# Patient Record
Sex: FEMALE | Race: WHITE | NOT HISPANIC OR LATINO | Employment: UNEMPLOYED | URBAN - METROPOLITAN AREA
[De-identification: names, ages, dates, MRNs, and addresses within clinical notes are randomized per-mention and may not be internally consistent; named-entity substitution may affect disease eponyms.]

---

## 2017-02-27 ENCOUNTER — APPOINTMENT (EMERGENCY)
Dept: RADIOLOGY | Facility: HOSPITAL | Age: 32
End: 2017-02-27
Payer: COMMERCIAL

## 2017-02-27 ENCOUNTER — HOSPITAL ENCOUNTER (EMERGENCY)
Facility: HOSPITAL | Age: 32
Discharge: HOME | End: 2017-02-27
Payer: COMMERCIAL

## 2017-02-27 VITALS
HEART RATE: 79 BPM | OXYGEN SATURATION: 99 % | DIASTOLIC BLOOD PRESSURE: 87 MMHG | SYSTOLIC BLOOD PRESSURE: 157 MMHG | TEMPERATURE: 99 F | RESPIRATION RATE: 20 BRPM | WEIGHT: 135 LBS | BODY MASS INDEX: 24.84 KG/M2 | HEIGHT: 62 IN

## 2017-02-27 DIAGNOSIS — S09.93XA FACIAL INJURY, INITIAL ENCOUNTER: Primary | ICD-10-CM

## 2017-02-27 PROCEDURE — 99284 EMERGENCY DEPT VISIT MOD MDM: CPT

## 2017-02-27 PROCEDURE — 70486 CT MAXILLOFACIAL W/O DYE: CPT

## 2017-02-27 RX ORDER — MULTIVIT-MIN/IRON/FOLIC ACID/K 18-600-40
CAPSULE ORAL DAILY
COMMUNITY
End: 2018-02-02

## 2017-02-27 RX ORDER — NAPROXEN 500 MG/1
500 TABLET ORAL ONCE
Status: COMPLETED | OUTPATIENT
Start: 2017-02-27 | End: 2017-02-27

## 2017-02-27 RX ORDER — NAPROXEN 500 MG/1
500 TABLET ORAL 2 TIMES DAILY WITH MEALS
Qty: 20 TABLET | Refills: 0 | Status: SHIPPED | OUTPATIENT
Start: 2017-02-27 | End: 2018-02-02

## 2017-02-27 RX ADMIN — NAPROXEN 500 MG: 500 TABLET ORAL at 15:20

## 2017-05-16 ENCOUNTER — ALLSCRIPTS OFFICE VISIT (OUTPATIENT)
Dept: OTHER | Facility: OTHER | Age: 32
End: 2017-05-16

## 2017-05-19 LAB
HPV 18 (HISTORICAL): NOT DETECTED
HPV HIGH RISK 16/18 (HISTORICAL): NOT DETECTED
HPV16 (HISTORICAL): NOT DETECTED
PAP (HISTORICAL): NORMAL

## 2017-06-12 ENCOUNTER — ALLSCRIPTS OFFICE VISIT (OUTPATIENT)
Dept: OTHER | Facility: OTHER | Age: 32
End: 2017-06-12

## 2017-06-12 DIAGNOSIS — N64.53 RETRACTION OF NIPPLE: ICD-10-CM

## 2017-06-13 ENCOUNTER — GENERIC CONVERSION - ENCOUNTER (OUTPATIENT)
Dept: OTHER | Facility: OTHER | Age: 32
End: 2017-06-13

## 2017-06-13 ENCOUNTER — HOSPITAL ENCOUNTER (OUTPATIENT)
Dept: MAMMOGRAPHY | Facility: CLINIC | Age: 32
Discharge: HOME | End: 2017-06-13
Payer: COMMERCIAL

## 2017-06-13 ENCOUNTER — HOSPITAL ENCOUNTER (OUTPATIENT)
Dept: ULTRASOUND IMAGING | Facility: CLINIC | Age: 32
Discharge: HOME | End: 2017-06-13
Payer: COMMERCIAL

## 2017-06-13 DIAGNOSIS — N64.53 RETRACTION OF NIPPLE: ICD-10-CM

## 2017-06-13 PROCEDURE — G0204 DX MAMMO INCL CAD BI: HCPCS

## 2017-06-13 PROCEDURE — 76642 ULTRASOUND BREAST LIMITED: CPT

## 2017-06-15 ENCOUNTER — ALLSCRIPTS OFFICE VISIT (OUTPATIENT)
Dept: OTHER | Facility: OTHER | Age: 32
End: 2017-06-15

## 2017-06-15 ENCOUNTER — TRANSCRIBE ORDERS (OUTPATIENT)
Dept: LAB | Facility: CLINIC | Age: 32
End: 2017-06-15

## 2017-06-15 ENCOUNTER — APPOINTMENT (OUTPATIENT)
Dept: LAB | Facility: CLINIC | Age: 32
End: 2017-06-15
Payer: COMMERCIAL

## 2017-06-15 DIAGNOSIS — Z12.39 BREAST CANCER SCREENING: Primary | ICD-10-CM

## 2017-06-15 DIAGNOSIS — Z12.39 BREAST CANCER SCREENING: ICD-10-CM

## 2017-06-15 PROCEDURE — 36415 COLL VENOUS BLD VENIPUNCTURE: CPT

## 2017-06-19 LAB — MISCELLANEOUS LAB TEST RESULT: NORMAL

## 2017-09-18 ENCOUNTER — ALLSCRIPTS OFFICE VISIT (OUTPATIENT)
Dept: OTHER | Facility: OTHER | Age: 32
End: 2017-09-18

## 2017-10-24 ENCOUNTER — GENERIC CONVERSION - ENCOUNTER (OUTPATIENT)
Dept: OTHER | Facility: OTHER | Age: 32
End: 2017-10-24

## 2018-01-04 ENCOUNTER — ALLSCRIPTS OFFICE VISIT (OUTPATIENT)
Dept: OTHER | Facility: OTHER | Age: 33
End: 2018-01-04

## 2018-01-04 DIAGNOSIS — Z34.81 ENCOUNTER FOR SUPERVISION OF OTHER NORMAL PREGNANCY, FIRST TRIMESTER: ICD-10-CM

## 2018-01-04 LAB
EXTERNAL CHLAMYDIA SCREEN: NORMAL
EXTERNAL GONORRHEA SCREEN: NORMAL

## 2018-01-05 ENCOUNTER — GENERIC CONVERSION - ENCOUNTER (OUTPATIENT)
Dept: OBGYN CLINIC | Facility: CLINIC | Age: 33
End: 2018-01-05

## 2018-01-06 LAB
CHLAMYDIA TRACHOMATIS BY MOL. METHOD (HISTORICAL): NOT DETECTED
GC BY MOL. METHOD (HISTORICAL): NOT DETECTED

## 2018-01-07 ENCOUNTER — LAB CONVERSION - ENCOUNTER (OUTPATIENT)
Dept: OTHER | Facility: OTHER | Age: 33
End: 2018-01-07

## 2018-01-07 LAB — CULT RSLT GENITAL (HISTORICAL): NORMAL

## 2018-01-10 NOTE — MISCELLANEOUS
Message  As per Dr Mary Anne Rouse, pt was called with her breast imaging results from 06/13/17 and recommendations for 3 month f/u visit  Pt verbalized understanding and is agreeable  Appt scheduled for September 2017  Active Problems    1  Encounter for routine gynecological examination with Papanicolaou smear of cervix   (V72 31,V76 2) (Z01 419)   2  Retracted nipple in female (611 79) (N64 53)   3  Seasonal allergies (477 9) (J30 2)    Current Meds   1  Magnesium 250 MG Oral Tablet; Therapy: (Recorded:12Jun2017) to Recorded   2  Prenatal Vitamin TABS; pt takes daily; Therapy: (Recorded:12Jun2017) to Recorded   3  Probiotic CAPS; pt takes daily; Therapy: (Recorded:12Jun2017) to Recorded   4  Tylenol with Codeine #3 TABS (Acetaminophen-Codeine #3); Therapy: (Recorded:[id number]) to Recorded   5  Vitamin D3 1000 UNIT Oral Capsule; Therapy: (Recorded:12Jun2017) to Recorded   6  Zyrtec 10 MG TABS; Therapy: (Recorded:12Jun2017) to Recorded    Allergies    1  No Known Drug Allergies    Signatures   Electronically signed by :  Marciana Duverney, ; Jun 13 2017  3:52PM EST                       (Author)

## 2018-01-13 VITALS
WEIGHT: 141 LBS | BODY MASS INDEX: 25.95 KG/M2 | RESPIRATION RATE: 14 BRPM | TEMPERATURE: 100.6 F | SYSTOLIC BLOOD PRESSURE: 124 MMHG | DIASTOLIC BLOOD PRESSURE: 82 MMHG | HEART RATE: 80 BPM | OXYGEN SATURATION: 97 % | HEIGHT: 62 IN

## 2018-01-15 VITALS
HEIGHT: 62 IN | DIASTOLIC BLOOD PRESSURE: 76 MMHG | SYSTOLIC BLOOD PRESSURE: 120 MMHG | WEIGHT: 142 LBS | BODY MASS INDEX: 26.13 KG/M2

## 2018-01-16 NOTE — MISCELLANEOUS
Provider Comments  Provider Comments:   left message to call office regarding missed appointment for today at 8:45 am       Signatures   Electronically signed by : Kady Bolaños, ; Sep 18 2017  9:00AM EST                       (Author)

## 2018-01-23 VITALS
BODY MASS INDEX: 27.42 KG/M2 | DIASTOLIC BLOOD PRESSURE: 72 MMHG | SYSTOLIC BLOOD PRESSURE: 118 MMHG | HEIGHT: 62 IN | WEIGHT: 149 LBS

## 2018-01-23 NOTE — MISCELLANEOUS
Attached is your new obstetrical paperwork package for your upcoming appointment  Please fill out all highlighted sections of Parts 2, 3 and 4 and complete  the questionnaire and consent forms in their entirety  Please contact your insurance company to find out which lab you must use for bloodwork  Also, please bring all completed paperwork with you to your first appointment  Prior to your first  appointment, please fax or email a copy of your current insurance card (both sides) in order for our office to pre-certify your insurance  Our fax number is [phone], tea Peña or email to  [email or web address]  Arrive at least 20 minutes prior to your appointment time

## 2018-02-01 ENCOUNTER — OB ABSTRACT (OUTPATIENT)
Dept: OBGYN CLINIC | Facility: CLINIC | Age: 33
End: 2018-02-01

## 2018-02-02 ENCOUNTER — ULTRASOUND (OUTPATIENT)
Dept: OBGYN CLINIC | Facility: CLINIC | Age: 33
End: 2018-02-02

## 2018-02-02 VITALS — WEIGHT: 148 LBS | BODY MASS INDEX: 27.07 KG/M2 | DIASTOLIC BLOOD PRESSURE: 64 MMHG | SYSTOLIC BLOOD PRESSURE: 108 MMHG

## 2018-02-02 DIAGNOSIS — Z3A.12 12 WEEKS GESTATION OF PREGNANCY: ICD-10-CM

## 2018-02-02 PROCEDURE — PNV: Performed by: OBSTETRICS & GYNECOLOGY

## 2018-02-02 RX ORDER — PNV NO.95/FERROUS FUM/FOLIC AC 28MG-0.8MG
1 TABLET ORAL DAILY
COMMUNITY
End: 2018-08-17 | Stop reason: HOSPADM

## 2018-02-02 NOTE — PROGRESS NOTES
Visit:  Feeling somewhat better - tolerating PNV - still considering PNC early [address] with good FM and FHM

## 2018-02-04 LAB
ABO GROUP BLD: NORMAL
APPEARANCE UR: CLEAR
BACTERIA UR CULT: NORMAL
BACTERIA UR QL AUTO: NORMAL /HPF
BASOPHILS # BLD AUTO: 41 CELLS/UL (ref 0–200)
BASOPHILS NFR BLD AUTO: 0.4 %
BILIRUB UR QL STRIP: NEGATIVE
BLD GP AB SCN SERPL QL: NORMAL
CFTR MUT ANL BLD/T: NORMAL
CFTR MUT ANL BLD/T: NORMAL
CFTR MUT TESTED BLD/T: NORMAL
COLOR UR: YELLOW
EOSINOPHIL # BLD AUTO: 61 CELLS/UL (ref 15–500)
EOSINOPHIL NFR BLD AUTO: 0.6 %
ERYTHROCYTE [DISTWIDTH] IN BLOOD BY AUTOMATED COUNT: 12.8 % (ref 11–15)
GLUCOSE UR QL STRIP: NEGATIVE
HBV SURFACE AG SERPL QL IA: NORMAL
HCT VFR BLD AUTO: 39.8 % (ref 35–45)
HCV AB S/CO SERPL IA: 0.01
HCV AB SERPL QL IA: NORMAL
HGB BLD-MCNC: 13.3 G/DL (ref 11.7–15.5)
HGB UR QL STRIP: NEGATIVE
HIV 1+2 AB+HIV1 P24 AG SERPL QL IA: NORMAL
HYALINE CASTS #/AREA URNS LPF: NORMAL /LPF
KETONES UR QL STRIP: NEGATIVE
LEUKOCYTE ESTERASE UR QL STRIP: NEGATIVE
LYMPHOCYTES # BLD AUTO: 2213 CELLS/UL (ref 850–3900)
LYMPHOCYTES NFR BLD AUTO: 21.7 %
MCH RBC QN AUTO: 29.4 PG (ref 27–33)
MCHC RBC AUTO-ENTMCNC: 33.4 G/DL (ref 32–36)
MCV RBC AUTO: 88.1 FL (ref 80–100)
MONOCYTES # BLD AUTO: 622 CELLS/UL (ref 200–950)
MONOCYTES NFR BLD AUTO: 6.1 %
NEUTROPHILS # BLD AUTO: 7262 CELLS/UL (ref 1500–7800)
NEUTROPHILS NFR BLD AUTO: 71.2 %
NITRITE UR QL STRIP: NEGATIVE
PH UR STRIP: 6 [PH] (ref 5–8)
PLATELET # BLD AUTO: 387 THOUSAND/UL (ref 140–400)
PMV BLD REES-ECKER: 9.9 FL (ref 7.5–12.5)
PROT UR QL STRIP: NEGATIVE
RBC # BLD AUTO: 4.52 MILLION/UL (ref 3.8–5.1)
RBC #/AREA URNS HPF: NORMAL /HPF
REF LAB TEST METHOD: NORMAL
RH BLD: NORMAL
RPR SER QL: NORMAL
RUBV IGG SERPL IA-ACNC: 2.29 INDEX
SERVICE CMNT-IMP: NORMAL
SP GR UR STRIP: 1 (ref 1–1.03)
SQUAMOUS #/AREA URNS HPF: NORMAL /HPF
WBC # BLD AUTO: 10.2 THOUSAND/UL (ref 3.8–10.8)
WBC #/AREA URNS HPF: NORMAL /HPF

## 2018-02-15 ENCOUNTER — TELEPHONE (OUTPATIENT)
Dept: OBGYN CLINIC | Facility: CLINIC | Age: 33
End: 2018-02-15

## 2018-02-15 DIAGNOSIS — Z3A.11 11 WEEKS GESTATION OF PREGNANCY: Primary | ICD-10-CM

## 2018-03-06 ENCOUNTER — ROUTINE PRENATAL (OUTPATIENT)
Dept: OBGYN CLINIC | Facility: CLINIC | Age: 33
End: 2018-03-06

## 2018-03-06 VITALS — DIASTOLIC BLOOD PRESSURE: 78 MMHG | BODY MASS INDEX: 27.62 KG/M2 | WEIGHT: 151 LBS | SYSTOLIC BLOOD PRESSURE: 122 MMHG

## 2018-03-06 DIAGNOSIS — Z3A.16 16 WEEKS GESTATION OF PREGNANCY: Primary | ICD-10-CM

## 2018-03-06 PROCEDURE — PNV: Performed by: NURSE PRACTITIONER

## 2018-03-06 NOTE — PROGRESS NOTES
OFFICE VISIT: Pt states occasional headaches, resolve with rest  Denies any N/V,  Cramping, VB, LOF, Edema, Domestic Violence, Smoking  + flutters  Tolerating PNV  Feeling much better, nausea has resolved  Has Level II scheduled with PNC  RTO 4 weeks or sooner as needed

## 2018-03-07 NOTE — PROGRESS NOTES
Education  Baby & Me Education 1st Trimester:   First Trimester Education provided:   benefits of breastfeeding, importance of exclusive breastfeeding, early initiation of breastfeeding, exclusive breastfeeding for the first 6 months and Pregnancy Essentials Reference Guide given   The patient is planning on breastfeeding     Prenatal education provided by: Isis ECKERT      Signatures   Electronically signed by : INA Cohen; Jan 5 2018  7:08PM EST                       (Author)

## 2018-03-12 ENCOUNTER — TELEPHONE (OUTPATIENT)
Dept: OBGYN CLINIC | Facility: CLINIC | Age: 33
End: 2018-03-12

## 2018-03-16 ENCOUNTER — ROUTINE PRENATAL (OUTPATIENT)
Dept: OBGYN CLINIC | Facility: CLINIC | Age: 33
End: 2018-03-16

## 2018-03-16 VITALS
WEIGHT: 155 LBS | BODY MASS INDEX: 28.52 KG/M2 | DIASTOLIC BLOOD PRESSURE: 76 MMHG | SYSTOLIC BLOOD PRESSURE: 118 MMHG | HEIGHT: 62 IN

## 2018-03-16 DIAGNOSIS — Z87.59 HISTORY OF MIGRAINE DURING PREGNANCY: Primary | ICD-10-CM

## 2018-03-16 DIAGNOSIS — Z86.69 HISTORY OF MIGRAINE DURING PREGNANCY: Primary | ICD-10-CM

## 2018-03-16 PROBLEM — N64.53: Status: ACTIVE | Noted: 2017-06-12

## 2018-03-16 PROBLEM — J30.2 SEASONAL ALLERGIES: Status: ACTIVE | Noted: 2017-05-16

## 2018-03-16 PROCEDURE — PNV: Performed by: NURSE PRACTITIONER

## 2018-03-16 RX ORDER — METOCLOPRAMIDE 10 MG/1
10 TABLET ORAL 2 TIMES DAILY PRN
Qty: 15 TABLET | Refills: 0 | Status: SHIPPED | OUTPATIENT
Start: 2018-03-16 | End: 2018-08-17 | Stop reason: HOSPADM

## 2018-03-16 RX ORDER — BUTALBITAL, ACETAMINOPHEN AND CAFFEINE 50; 325; 40 MG/1; MG/1; MG/1
1 TABLET ORAL 2 TIMES DAILY PRN
Qty: 15 TABLET | Refills: 0 | Status: SHIPPED | OUTPATIENT
Start: 2018-03-16 | End: 2018-08-17 | Stop reason: HOSPADM

## 2018-04-03 ENCOUNTER — ROUTINE PRENATAL (OUTPATIENT)
Dept: OBGYN CLINIC | Facility: CLINIC | Age: 33
End: 2018-04-03

## 2018-04-03 VITALS
DIASTOLIC BLOOD PRESSURE: 80 MMHG | BODY MASS INDEX: 29.08 KG/M2 | WEIGHT: 158 LBS | SYSTOLIC BLOOD PRESSURE: 122 MMHG | HEIGHT: 62 IN

## 2018-04-03 DIAGNOSIS — Z34.82 PRENATAL CARE, SUBSEQUENT PREGNANCY, SECOND TRIMESTER: Primary | ICD-10-CM

## 2018-04-03 PROCEDURE — PNV: Performed by: NURSE PRACTITIONER

## 2018-04-03 NOTE — PROGRESS NOTES
OFFICE VISIT: Pt states Headaches much improved with vitamin cocktail  States only had one migraine since last office visit almost a month ago  Denies any need for any other medication at this point  Pt complains of occasional N/V  Denies any Cramping, VB, LOF, Edema, Domestic Violence, Smoking  + FM  Tolerating PNV  Has Level II scheduled for tomorrow with [facility]  RTO 4 weeks or sooner as needed

## 2018-04-04 ENCOUNTER — ROUTINE PRENATAL (OUTPATIENT)
Dept: PERINATAL CARE | Facility: CLINIC | Age: 33
End: 2018-04-04
Payer: COMMERCIAL

## 2018-04-04 VITALS
HEART RATE: 86 BPM | BODY MASS INDEX: 28.97 KG/M2 | SYSTOLIC BLOOD PRESSURE: 131 MMHG | DIASTOLIC BLOOD PRESSURE: 70 MMHG | WEIGHT: 157.4 LBS | HEIGHT: 62 IN

## 2018-04-04 DIAGNOSIS — Z36.3 ENCOUNTER FOR ANTENATAL SCREENING FOR MALFORMATIONS: Primary | ICD-10-CM

## 2018-04-04 DIAGNOSIS — Z3A.20 20 WEEKS GESTATION OF PREGNANCY: ICD-10-CM

## 2018-04-04 DIAGNOSIS — Z36.86 ENCOUNTER FOR ANTENATAL SCREENING FOR CERVICAL LENGTH: ICD-10-CM

## 2018-04-04 DIAGNOSIS — Z3A.11 11 WEEKS GESTATION OF PREGNANCY: ICD-10-CM

## 2018-04-04 PROCEDURE — 99201 PR OFFICE OUTPATIENT NEW 10 MINUTES: CPT | Performed by: OBSTETRICS & GYNECOLOGY

## 2018-04-04 PROCEDURE — 76805 OB US >/= 14 WKS SNGL FETUS: CPT | Performed by: OBSTETRICS & GYNECOLOGY

## 2018-04-04 PROCEDURE — 76817 TRANSVAGINAL US OBSTETRIC: CPT | Performed by: OBSTETRICS & GYNECOLOGY

## 2018-04-04 RX ORDER — RIBOFLAVIN (VITAMIN B2) 400 MG
1 TABLET ORAL DAILY
COMMUNITY
End: 2018-08-17 | Stop reason: HOSPADM

## 2018-04-04 RX ORDER — CETIRIZINE HYDROCHLORIDE 10 MG/1
10 TABLET ORAL DAILY
COMMUNITY
End: 2019-07-18 | Stop reason: SDUPTHER

## 2018-04-04 RX ORDER — MAGNESIUM 30 MG
400 TABLET ORAL DAILY
COMMUNITY
End: 2018-08-17 | Stop reason: HOSPADM

## 2018-04-04 NOTE — LETTER
April 4, 2018     Courtney Barth MD  [address]    Patient: Joselyn Mijares   YOB: 1985   Date of Visit: 4/4/2018       Dear Dr Lion Vallejo: Thank you for referring Janny Cobos to me for evaluation  Below are my notes for this consultation  If you have questions, please do not hesitate to call me  I look forward to following your patient along with you  Sincerely,        Stephanie Dixon MD        CC: No Recipients  Stephanie Dixon MD  4/4/2018  4:25 PM  Sign at close encounter  Please refer to the [facility] ultrasound report in Ob Procedures for additional information regarding the visit to the [facility]  today

## 2018-04-04 NOTE — PROGRESS NOTES
Please refer to the [facility] ultrasound report in Ob Procedures for additional information regarding the visit to the [facility], [facility]  today

## 2018-04-20 ENCOUNTER — ULTRASOUND (OUTPATIENT)
Dept: PERINATAL CARE | Facility: CLINIC | Age: 33
End: 2018-04-20
Payer: COMMERCIAL

## 2018-04-20 VITALS
DIASTOLIC BLOOD PRESSURE: 79 MMHG | SYSTOLIC BLOOD PRESSURE: 137 MMHG | WEIGHT: 163.4 LBS | BODY MASS INDEX: 30.07 KG/M2 | HEART RATE: 83 BPM | HEIGHT: 62 IN

## 2018-04-20 DIAGNOSIS — Z36.2 ENCOUNTER FOR OTHER ANTENATAL SCREENING FOLLOW-UP: Primary | ICD-10-CM

## 2018-04-20 DIAGNOSIS — R55 NEAR SYNCOPE: ICD-10-CM

## 2018-04-20 DIAGNOSIS — Z3A.22 22 WEEKS GESTATION OF PREGNANCY: ICD-10-CM

## 2018-04-20 PROCEDURE — 76816 OB US FOLLOW-UP PER FETUS: CPT | Performed by: OBSTETRICS & GYNECOLOGY

## 2018-04-20 PROCEDURE — 99212 OFFICE O/P EST SF 10 MIN: CPT | Performed by: OBSTETRICS & GYNECOLOGY

## 2018-05-04 ENCOUNTER — ROUTINE PRENATAL (OUTPATIENT)
Dept: OBGYN CLINIC | Facility: CLINIC | Age: 33
End: 2018-05-04

## 2018-05-04 VITALS — WEIGHT: 168 LBS | DIASTOLIC BLOOD PRESSURE: 60 MMHG | BODY MASS INDEX: 30.73 KG/M2 | SYSTOLIC BLOOD PRESSURE: 110 MMHG

## 2018-05-04 DIAGNOSIS — Z3A.24 24 WEEKS GESTATION OF PREGNANCY: Primary | ICD-10-CM

## 2018-05-04 PROCEDURE — PNV: Performed by: OBSTETRICS & GYNECOLOGY

## 2018-05-04 NOTE — PROGRESS NOTES
Patient reports good fm, no  cramping, bleeding, loss of fluid,  dom violence, or smoking  nickolas pnv   Some n/v headache, and edema, reviewed occasional light headed episode, discussed and reviewed, patient watching for triggers, advised to hydrate and change position slowly, return in 4 weeks or sooner as needed, given slip for glucola cbc

## 2018-05-17 ENCOUNTER — ROUTINE PRENATAL (OUTPATIENT)
Dept: OBGYN CLINIC | Facility: CLINIC | Age: 33
End: 2018-05-17
Payer: COMMERCIAL

## 2018-05-17 ENCOUNTER — TELEPHONE (OUTPATIENT)
Dept: OBGYN CLINIC | Facility: CLINIC | Age: 33
End: 2018-05-17

## 2018-05-17 VITALS — WEIGHT: 173 LBS | SYSTOLIC BLOOD PRESSURE: 108 MMHG | BODY MASS INDEX: 31.64 KG/M2 | DIASTOLIC BLOOD PRESSURE: 76 MMHG

## 2018-05-17 DIAGNOSIS — N89.8 VAGINAL DISCHARGE IN PREGNANCY IN SECOND TRIMESTER: ICD-10-CM

## 2018-05-17 DIAGNOSIS — Z34.82 PRENATAL CARE, SUBSEQUENT PREGNANCY, SECOND TRIMESTER: ICD-10-CM

## 2018-05-17 DIAGNOSIS — R30.0 DYSURIA: Primary | ICD-10-CM

## 2018-05-17 DIAGNOSIS — O26.892 VAGINAL DISCHARGE IN PREGNANCY IN SECOND TRIMESTER: ICD-10-CM

## 2018-05-17 DIAGNOSIS — O26.899 PELVIC PAIN IN PREGNANCY: ICD-10-CM

## 2018-05-17 DIAGNOSIS — R10.2 PELVIC PAIN IN PREGNANCY: ICD-10-CM

## 2018-05-17 LAB
SL AMB  POCT GLUCOSE, UA: ABNORMAL
SL AMB LEUKOCYTE ESTERASE,UA: ABNORMAL
SL AMB POCT BLOOD,UA: ABNORMAL
SL AMB POCT KETONES,UA: ABNORMAL
SL AMB POCT NITRITE,UA: ABNORMAL
SL AMB POCT PH,UA: 6
SL AMB POCT SPECIFIC GRAVITY,UA: 1
SL AMB POCT URINE PROTEIN: ABNORMAL

## 2018-05-17 PROCEDURE — PNV: Performed by: NURSE PRACTITIONER

## 2018-05-17 PROCEDURE — 81002 URINALYSIS NONAUTO W/O SCOPE: CPT | Performed by: NURSE PRACTITIONER

## 2018-05-17 NOTE — PROGRESS NOTES
OB Problem: Pt presents today with complaints of pelvic pain/pressure and back pain  Pt states she has had back pain throughout pregnancy but seems worse today  Pelvic pressure and pain seemed worse today as well, unsure if its just the way the baby is lying, but wanted to be checked out  Denies any cramping or contractions  Pt states she was working all day and checked for any abnormal discharge and did not note any  Denies any urinary symptoms  Has regular BMs with last BM being today  Denies feeling constipated  Has been staying well hydrated throughout the day  Does state she has been doing 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  Has been walking up and down stairs a lot more than normal  Denies any pain medication and has not been able to rest as came right from work  Denies any vaginal itching, burning, or abnormal odors  No fever or chills  Pelvic exam performed moderate amount of leukorrhea, wet mount negative for yeast, bv, trich  Nitrazine negative  Genital culture collected and sent  Cervix closed on visualization, no pooling noted  Urine dip in office sm/mod leuk  Will send out for urine culture  Treat based on culture results due to asymptomatic  Reviewed with patient pelvic pressure/pain as well as back pain may be due to second pregnancy and ligaments and muscles more lax  Reviewed maternity belt to help with pelvic pain/pressure as well as back pain  Rest, massage, tylenol as needed for pain   labor precautions given  Call with any fever and chills, reviewed precaution for kidney infection as has had kidney infection in her past    Denies any N/V, HA, Cramping, VB, LOF, Edema, Domestic Violence, Smoking  + FM  Tolerating PNV  RTO for regular ob check or sooner as needed

## 2018-05-19 LAB — BACTERIA UR CULT: NORMAL

## 2018-05-20 LAB — SL AMB GENITAL CULTURE: NORMAL

## 2018-05-27 LAB
BASOPHILS # BLD AUTO: 128 CELLS/UL (ref 0–200)
BASOPHILS NFR BLD AUTO: 1 %
EOSINOPHIL # BLD AUTO: 128 CELLS/UL (ref 15–500)
EOSINOPHIL NFR BLD AUTO: 1 %
ERYTHROCYTE [DISTWIDTH] IN BLOOD BY AUTOMATED COUNT: 12.9 % (ref 11–15)
GLUCOSE 1H P 50 G GLC PO SERPL-MCNC: 124 MG/DL
HCT VFR BLD AUTO: 36.2 % (ref 35–45)
HGB BLD-MCNC: 12.2 G/DL (ref 11.7–15.5)
LYMPHOCYTES # BLD MANUAL: 1664 CELLS/UL (ref 850–3900)
LYMPHOCYTES NFR BLD AUTO: 13 %
MCH RBC QN AUTO: 31.1 PG (ref 27–33)
MCHC RBC AUTO-ENTMCNC: 33.7 G/DL (ref 32–36)
MCV RBC AUTO: 92.3 FL (ref 80–100)
METAMYELOCYTES # BLD: 256 CELLS/UL
METAMYELOCYTES NFR BLD MANUAL: 2 %
MONOCYTES # BLD AUTO: 768 CELLS/UL (ref 200–950)
MONOCYTES NFR BLD AUTO: 6 %
MYELOCYTES # BLD: 256 CELLS/UL
MYELOCYTES NFR BLD MANUAL: 2 %
NEUTROPHILS # BLD AUTO: 8960 CELLS/UL (ref 1500–7800)
NEUTROPHILS NFR BLD AUTO: 70 %
NEUTS BAND # BLD: 640 CELLS/UL (ref 0–750)
NEUTS BAND NFR BLD MANUAL: 5 %
PLATELET # BLD AUTO: 262 THOUSAND/UL (ref 140–400)
PMV BLD REES-ECKER: 9.5 FL (ref 7.5–12.5)
RBC # BLD AUTO: 3.92 MILLION/UL (ref 3.8–5.1)
WBC # BLD AUTO: 12.8 THOUSAND/UL (ref 3.8–10.8)

## 2018-06-01 ENCOUNTER — ROUTINE PRENATAL (OUTPATIENT)
Dept: OBGYN CLINIC | Facility: CLINIC | Age: 33
End: 2018-06-01

## 2018-06-01 VITALS — WEIGHT: 176 LBS | BODY MASS INDEX: 32.19 KG/M2 | SYSTOLIC BLOOD PRESSURE: 110 MMHG | DIASTOLIC BLOOD PRESSURE: 72 MMHG

## 2018-06-01 DIAGNOSIS — Z34.83 PRENATAL CARE, SUBSEQUENT PREGNANCY, THIRD TRIMESTER: Primary | ICD-10-CM

## 2018-06-01 PROCEDURE — PNV: Performed by: NURSE PRACTITIONER

## 2018-06-01 NOTE — PROGRESS NOTES
OFFICE VISIT: Pregnancy support belt seems to be helping with back pain and lower pelvic pain and pressure  Mild lower leg edema  Denies any N/V, HA, Cramping, VB, LOF, Domestic Violence, Smoking  + FM  Tolerating PNV  Pt planning on 32 week growth scan  RTO 2 weeks or sooner as needed

## 2018-06-15 ENCOUNTER — ROUTINE PRENATAL (OUTPATIENT)
Dept: OBGYN CLINIC | Facility: CLINIC | Age: 33
End: 2018-06-15

## 2018-06-15 VITALS — DIASTOLIC BLOOD PRESSURE: 74 MMHG | WEIGHT: 184 LBS | BODY MASS INDEX: 33.65 KG/M2 | SYSTOLIC BLOOD PRESSURE: 112 MMHG

## 2018-06-15 DIAGNOSIS — Z34.83 PRENATAL CARE, SUBSEQUENT PREGNANCY, THIRD TRIMESTER: Primary | ICD-10-CM

## 2018-06-15 PROCEDURE — PNV: Performed by: NURSE PRACTITIONER

## 2018-06-15 NOTE — PROGRESS NOTES
OFFICE VISIT: Mild lower leg edema after being on feet all day, resolves with rest  Denies any N/V, HA, Cramping, VB, LOF, Domestic Violence, Smoking  + FM  Tolerating PNV  Pt worried this baby is much larger than her last, has 32 week growth scan scheduled  30 week packet given and reviewed  RTO 2 weeks or sooner as needed

## 2018-06-25 ENCOUNTER — ULTRASOUND (OUTPATIENT)
Dept: PERINATAL CARE | Facility: CLINIC | Age: 33
End: 2018-06-25
Payer: COMMERCIAL

## 2018-06-25 VITALS
BODY MASS INDEX: 33.75 KG/M2 | WEIGHT: 183.4 LBS | HEIGHT: 62 IN | SYSTOLIC BLOOD PRESSURE: 111 MMHG | DIASTOLIC BLOOD PRESSURE: 72 MMHG | HEART RATE: 82 BPM

## 2018-06-25 DIAGNOSIS — O40.3XX0 POLYHYDRAMNIOS IN SINGLETON PREGNANCY IN THIRD TRIMESTER: Primary | ICD-10-CM

## 2018-06-25 DIAGNOSIS — O99.213 OBESITY AFFECTING PREGNANCY IN THIRD TRIMESTER: ICD-10-CM

## 2018-06-25 DIAGNOSIS — Z34.83 PRENATAL CARE, SUBSEQUENT PREGNANCY, THIRD TRIMESTER: ICD-10-CM

## 2018-06-25 DIAGNOSIS — Z3A.32 32 WEEKS GESTATION OF PREGNANCY: ICD-10-CM

## 2018-06-25 PROCEDURE — 99212 OFFICE O/P EST SF 10 MIN: CPT | Performed by: OBSTETRICS & GYNECOLOGY

## 2018-06-25 PROCEDURE — 76816 OB US FOLLOW-UP PER FETUS: CPT | Performed by: OBSTETRICS & GYNECOLOGY

## 2018-06-25 NOTE — PROGRESS NOTES
Please refer to the [facility] ultrasound report in Ob Procedures for additional information regarding the visit to the [facility], [facility]  today

## 2018-06-25 NOTE — LETTER
June 26, 2018     Alyx Balderrama MD  [address]    Patient: Aelx Gannon   YOB: 1985   Date of Visit: 6/25/2018       Dear Dr Azalia Aguila: Thank you for referring Sosa Souza to me for evaluation  Below are my notes for this consultation  If you have questions, please do not hesitate to call me  I look forward to following your patient along with you  Sincerely,        Gita Fenton MD        CC: No Recipients  Gita Fenton MD  6/25/2018  2:43 PM  Sign at close encounter  Please refer to the [facility] ultrasound report in Ob Procedures for additional information regarding the visit to the [facility]  today

## 2018-06-26 ENCOUNTER — ROUTINE PRENATAL (OUTPATIENT)
Dept: OBGYN CLINIC | Facility: CLINIC | Age: 33
End: 2018-06-26

## 2018-06-26 VITALS — SYSTOLIC BLOOD PRESSURE: 118 MMHG | WEIGHT: 185 LBS | DIASTOLIC BLOOD PRESSURE: 78 MMHG | BODY MASS INDEX: 33.84 KG/M2

## 2018-06-26 DIAGNOSIS — Z34.83 PRENATAL CARE, SUBSEQUENT PREGNANCY, THIRD TRIMESTER: Primary | ICD-10-CM

## 2018-06-26 PROBLEM — M20.019 MALLET FINGER: Status: ACTIVE | Noted: 2018-06-26

## 2018-06-26 PROBLEM — O99.213 OBESITY AFFECTING PREGNANCY IN THIRD TRIMESTER: Status: ACTIVE | Noted: 2018-06-26

## 2018-06-26 PROBLEM — O40.3XX0 POLYHYDRAMNIOS IN SINGLETON PREGNANCY IN THIRD TRIMESTER: Status: ACTIVE | Noted: 2018-06-26

## 2018-06-26 PROCEDURE — PNV: Performed by: PHYSICIAN ASSISTANT

## 2018-06-27 NOTE — PROGRESS NOTES
Visit: Good Fm, mild swelling in lower legs  No N/V, HA, cramping, VB, LOF, domestic violence, or smoking  Tolerating PNV  Patient saw [facility] yesterday was found to have polyhydramnios and baby measuring in 81st percentile  Has fluid check in 2 weeks and growth scan in 4 weeks  Continue routine prenatal care  Return to office in 2 weeks for ob check/GBS

## 2018-07-09 ENCOUNTER — ROUTINE PRENATAL (OUTPATIENT)
Dept: OBGYN CLINIC | Facility: CLINIC | Age: 33
End: 2018-07-09

## 2018-07-09 VITALS
WEIGHT: 191 LBS | SYSTOLIC BLOOD PRESSURE: 116 MMHG | HEIGHT: 62 IN | DIASTOLIC BLOOD PRESSURE: 70 MMHG | BODY MASS INDEX: 35.15 KG/M2

## 2018-07-09 DIAGNOSIS — Z34.83 PRENATAL CARE, SUBSEQUENT PREGNANCY, THIRD TRIMESTER: Primary | ICD-10-CM

## 2018-07-09 PROCEDURE — PNV: Performed by: PHYSICIAN ASSISTANT

## 2018-07-09 NOTE — PROGRESS NOTES
Pt plans [facility] appt tomorrow for FELIX, then 2 weeks for growth    + FM, + FKCs, no vb/lof  Few ctxns, nothing regular  GBS done today  LT n/v   Pt getting more uncomfortable and fatigued, advised rest, fluids, maternity support belt

## 2018-07-10 ENCOUNTER — ROUTINE PRENATAL (OUTPATIENT)
Dept: PERINATAL CARE | Facility: CLINIC | Age: 33
End: 2018-07-10
Payer: COMMERCIAL

## 2018-07-10 VITALS
WEIGHT: 190.8 LBS | BODY MASS INDEX: 35.11 KG/M2 | SYSTOLIC BLOOD PRESSURE: 111 MMHG | HEART RATE: 93 BPM | DIASTOLIC BLOOD PRESSURE: 72 MMHG | HEIGHT: 62 IN

## 2018-07-10 DIAGNOSIS — Z3A.34 34 WEEKS GESTATION OF PREGNANCY: ICD-10-CM

## 2018-07-10 DIAGNOSIS — O40.3XX0 POLYHYDRAMNIOS IN SINGLETON PREGNANCY IN THIRD TRIMESTER: Primary | ICD-10-CM

## 2018-07-10 PROCEDURE — 76818 FETAL BIOPHYS PROFILE W/NST: CPT | Performed by: OBSTETRICS & GYNECOLOGY

## 2018-07-12 LAB — GP B STREP DNA SPEC QL NAA+PROBE: NEGATIVE

## 2018-07-13 ENCOUNTER — HOSPITAL ENCOUNTER (OUTPATIENT)
Facility: HOSPITAL | Age: 33
Discharge: HOME | End: 2018-07-13
Attending: OBSTETRICS & GYNECOLOGY | Admitting: OBSTETRICS & GYNECOLOGY
Payer: COMMERCIAL

## 2018-07-13 VITALS
HEIGHT: 62 IN | DIASTOLIC BLOOD PRESSURE: 84 MMHG | BODY MASS INDEX: 35.88 KG/M2 | WEIGHT: 195 LBS | OXYGEN SATURATION: 99 % | TEMPERATURE: 97.9 F | SYSTOLIC BLOOD PRESSURE: 132 MMHG | RESPIRATION RATE: 20 BRPM | HEART RATE: 90 BPM

## 2018-07-13 PROCEDURE — 99204 OFFICE O/P NEW MOD 45 MIN: CPT

## 2018-07-13 RX ORDER — ACETAMINOPHEN 325 MG/1
650 TABLET ORAL EVERY 4 HOURS PRN
COMMUNITY
End: 2018-08-17 | Stop reason: HOSPADM

## 2018-07-14 NOTE — DISCHARGE INSTRUCTIONS
Labor   AMBULATORY CARE:    labor  labor occurs when the uterus contracts and your cervix opens earlier than normal  The cervix is the opening of your uterus  In  labor, contractions are strong enough and occur often enough to allow the cervix to open for delivery of your baby   labor happens after the 20th week of pregnancy but before the 37th week of pregnancy  An early labor could cause you to have your baby before he is ready to be born  Common signs and symptoms include the following: You may not know that you are having  labor  It is common to have  contractions (tightening and relaxing of the uterus) and not notice them  The following are signs and symptoms that suggest a  labor:  · Contractions that get stronger and closer together    · Changes in vaginal discharge, such as more discharge or discharge that is watery or bloody     · Low back pain     · Pressure in the lower abdomen     · Vaginal spotting or bleeding  Call 911 for any of the following:   · You see or feel like there is something in your vagina  Seek care immediately if:   · You have bright red, painless vaginal bleeding  · Your symptoms do not get better or they get worse  · Your water broke or you feel warm water gushing or trickling from your vagina  · You have contractions that get stronger and closer together for more than 1 hour  Contact your healthcare provider if:   · You notice a decrease in your baby's movement  · You have abdominal cramps, pressure, or tightening  · You have a change in vaginal discharge  · You have a fever  · You have burning when you urinate or you are urinating less than is normal for you  · You have questions or concerns about your condition or care  Treatment for  labor  may delay delivery  You may need any of the following:  · Bed rest  may be recommended   You may need to lie on your left side, which improves circulation to the uterus and baby  Your healthcare provider will tell you when it is okay to get out of bed  · Medicine  may be given to stop contractions if your baby is not ready to be born  You may also need certain medicines if your  labor cannot be stopped and your healthcare provider thinks you will have your baby early  These medicines help your baby's lungs, brain, and digestive organs mature  They also help decrease your baby's risk of being born with cerebral palsy  Antibiotics may be given to treat a bacterial infection, if needed  Self-care:   · Rest  as much as possible  You may need to lie on your left side to improve circulation to the uterus and baby  You may be able to prevent  labor by resting and reducing your physical activity  · Ask your healthcare provider about activities that are safe for you to do  Your healthcare provider or obstetrician may recommend that you avoid sexual intercourse  Ask your healthcare provider if exercise is safe  · Drink liquids as directed  Ask how much liquid to drink each day and which liquids are best for you  · Do not smoke  Your baby may not grow well and he may weigh less at birth if you smoke during pregnancy  Smoking also increases the risk that your baby will be born too early  Nicotine and other chemicals in cigarettes and cigars can cause lung damage  Ask your healthcare provider for information if you currently smoke and need help to quit  E-cigarettes or smokeless tobacco still contain nicotine  Talk to your healthcare provider before you use these products  · Do not drink alcohol  Alcohol may harm your unborn baby and cause  labor  · Maintain a healthy weight  A healthy weight may prevent  labor  Ask your healthcare provider how much weight you should gain during your pregnancy  Follow up with your healthcare provider as directed:  Write down your questions so you remember to ask them during your visits     © 2017 [address] [facility] Information is for End User's use only and may not be sold, redistributed or otherwise used for commercial purposes  All illustrations and images included in CareNotes® are the copyrighted property of A D A M [employer] Inc  or Gurmeet Andrade  The above information is an  only  It is not intended as medical advice for individual conditions or treatments  Talk to your doctor, nurse or pharmacist before following any medical regimen to see if it is safe and effective for you

## 2018-07-14 NOTE — PROGRESS NOTES
Triage Note - OB  Jaxon Cotto 35 y o  female MRN: [id number]  Unit/Bed#:  Triage  Encounter: [id number]    OB TRIAGE NOTE  Jaxon Cotto  [id number]  2018  11:34 PM  LD Triage 4/LD Triage 4-*    ASSESS:  35 y o   34w4d not in  labor    PLAN  - Cervix is fingertip/thick/high and posterior  - Contractions are happening every 2-3 minutes but patient is comfortable in room  - D/c home with labor precautions    D/w Dr Tracy Helms  ______________    SUBJECTIVE    ISIDRO: Estimated Date of Delivery: 18    HPI Chronology:  35 y o   34w4d presents with complaint of contractions  Contractions have been on and off for the past week but started picking up this evening  Patient rates the contractions 3/10 pain  Pt reports hydrating and eating well throughout the day  Contractions: yes  Leakage: no  Bleeding: no  Fetal Movement: yes    Vitals:   /84 (BP Location: Left arm)   Pulse 90   Temp 97 9 °F (36 6 °C) (Oral)   Resp 20   Ht 5' 2" (1 575 m)   Wt 88 5 kg (195 lb)   LMP 2017   SpO2 99%   BMI 35 67 kg/m²   Body mass index is 35 67 kg/m²  Review of Systems   Constitutional: Negative for diaphoresis and fever  Respiratory: Negative for apnea, shortness of breath and wheezing  Cardiovascular: Negative for chest pain and palpitations  Gastrointestinal: Negative for blood in stool and vomiting  Genitourinary: Negative for dysuria and hematuria  Neurological: Negative for dizziness and numbness  Physical Exam   Constitutional: She appears well-nourished  HENT:   Head: Normocephalic and atraumatic  Cardiovascular: Normal rate, regular rhythm and normal heart sounds  Pulmonary/Chest: Breath sounds normal  No respiratory distress  She has no wheezes  Abdominal: Bowel sounds are normal  There is no tenderness          FHT:  Baseline Rate: 125 bpm  Variability: Moderate 6-25 bpm  Accelerations: Episodic, 15 x 15 or greater, At variable times  Decelerations: None  FHR Category: Category I  TOCO:   Contraction Frequency (minutes): 2-4"  Contraction Duration (seconds): 30-45  Contraction Quality: Mild    Cervical exam: Fingertip/thick/high, posterior        Leonie Ogden MD  7/13/2018  11:34 PM

## 2018-07-16 ENCOUNTER — HOSPITAL ENCOUNTER (EMERGENCY)
Facility: HOSPITAL | Age: 33
Discharge: HOME | End: 2018-07-16
Attending: EMERGENCY MEDICINE
Payer: COMMERCIAL

## 2018-07-16 ENCOUNTER — APPOINTMENT (EMERGENCY)
Dept: RADIOLOGY | Facility: HOSPITAL | Age: 33
End: 2018-07-16
Payer: COMMERCIAL

## 2018-07-16 ENCOUNTER — APPOINTMENT (EMERGENCY)
Dept: NON INVASIVE DIAGNOSTICS | Facility: HOSPITAL | Age: 33
End: 2018-07-16
Payer: COMMERCIAL

## 2018-07-16 ENCOUNTER — TELEPHONE (OUTPATIENT)
Dept: FAMILY MEDICINE CLINIC | Facility: CLINIC | Age: 33
End: 2018-07-16

## 2018-07-16 ENCOUNTER — TELEPHONE (OUTPATIENT)
Dept: OBGYN CLINIC | Facility: CLINIC | Age: 33
End: 2018-07-16

## 2018-07-16 VITALS
DIASTOLIC BLOOD PRESSURE: 59 MMHG | RESPIRATION RATE: 20 BRPM | HEART RATE: 84 BPM | WEIGHT: 195.11 LBS | TEMPERATURE: 98.5 F | SYSTOLIC BLOOD PRESSURE: 118 MMHG | OXYGEN SATURATION: 98 % | BODY MASS INDEX: 35.69 KG/M2

## 2018-07-16 DIAGNOSIS — R06.02 SHORTNESS OF BREATH DUE TO PREGNANCY IN THIRD TRIMESTER: Primary | ICD-10-CM

## 2018-07-16 DIAGNOSIS — R06.02 SHORTNESS OF BREATH: ICD-10-CM

## 2018-07-16 DIAGNOSIS — O26.893 SHORTNESS OF BREATH DUE TO PREGNANCY IN THIRD TRIMESTER: Primary | ICD-10-CM

## 2018-07-16 LAB
ATRIAL RATE: 85 BPM
P AXIS: 49 DEGREES
PR INTERVAL: 138 MS
QRS AXIS: 65 DEGREES
QRSD INTERVAL: 96 MS
QT INTERVAL: 354 MS
QTC INTERVAL: 421 MS
T WAVE AXIS: 47 DEGREES
VENTRICULAR RATE: 85 BPM

## 2018-07-16 PROCEDURE — 93970 EXTREMITY STUDY: CPT

## 2018-07-16 PROCEDURE — 99285 EMERGENCY DEPT VISIT HI MDM: CPT

## 2018-07-16 PROCEDURE — 93010 ELECTROCARDIOGRAM REPORT: CPT | Performed by: INTERNAL MEDICINE

## 2018-07-16 PROCEDURE — 93005 ELECTROCARDIOGRAM TRACING: CPT

## 2018-07-16 PROCEDURE — 71046 X-RAY EXAM CHEST 2 VIEWS: CPT

## 2018-07-16 NOTE — ED PROVIDER NOTES
History  Chief Complaint   Patient presents with    Shortness of Breath     pt 35 weeks and having SOB since Saturday  pt spoke with Dr Seamus Fragoso and was told to be evaluated in ED  pt denies contractions, abd pain, and pelvic pressure  HPI  35 y o  Female at 35 wks gestation complicated by polyhydramnios presents to the ED with shortness of breath, gradually worsening x 3 days  Pt spoke to the staff at her OBGYN office on the phone today, and was advised to come to the ED  Pt denies fever, cough, recent sickness  No leg pain or unilateral swelling, no history of DVT, no recent travel, no recent surgeries  Symptoms are worse with exertion  Pt did not have bad shortness of breath with her previous pregnancy  No abdominal pain or contractions currently, though she has been experiencing this intermittently for the past few days and was seen in L&D triage on 7/13  Prior to Admission Medications   Prescriptions Last Dose Informant Patient Reported? Taking? Prenatal Vit-Fe Fumarate-FA (PRENATAL VITAMIN) 27-0 8 MG TABS 7/16/2018 at morning Self Yes Yes   Sig: Take 1 tablet by mouth daily     Riboflavin 400 MG TABS 7/16/2018 at morning Self Yes Yes   Sig: Take 1 tablet by mouth daily     acetaminophen (TYLENOL) 325 mg tablet Unknown at Unknown time Self Yes No   Sig: Take 650 mg by mouth every 4 (four) hours as needed for mild pain   butalbital-acetaminophen-caffeine (FIORICET,ESGIC) -40 mg per tablet More than a month at Unknown time Self No No   Sig: Take 1 tablet by mouth 2 (two) times a day as needed for headaches or migraine Do not take more than two times a week     cetirizine (ZyrTEC) 10 mg tablet Unknown at Unknown time Self Yes No   Sig: Take 10 mg by mouth daily   magnesium 30 MG tablet 7/16/2018 at morning Self Yes Yes   Sig: Take 400 mg by mouth daily     metoclopramide (REGLAN) 10 mg tablet Past Week at Unknown time Self No Yes   Sig: Take 1 tablet (10 mg total) by mouth 2 (two) times a day as needed (migraine) No more than two times a week  Facility-Administered Medications: None       Past Medical History:   Diagnosis Date    Abnormal Pap smear of cervix     HPV (human papilloma virus) infection     Kidney stone 2011    H/O PYELONEPHRITIS    Migraine     Seasonal allergies     Varicella     POS HX    Visual impairment     glasses for reading       Past Surgical History:   Procedure Laterality Date    CHOLECYSTECTOMY      FINGER SURGERY      left hand     GYNECOLOGIC CRYOSURGERY      HAND SURGERY Left     Phelebitis in left hand  post IV infilitration, had sx for clot removal     LYMPH NODE BIOPSY      of neck       Family History   Problem Relation Age of Onset    Heart disease Mother     Hypertension Mother     Heart disease Father     Hypertension Father     Heart attack Father     Hyperlipidemia Father     Breast cancer Paternal Grandmother     Birth defects Paternal Grandmother     Stroke Paternal Grandmother     Colon cancer Paternal Aunt     Birth defects Paternal Aunt     Lymphoma Maternal Uncle     Early death Maternal Uncle     Lymphoma Maternal Uncle 39    Undescended testes Son     Cystic fibrosis Other     Cystic fibrosis Other      I have reviewed and agree with the history as documented  Social History   Substance Use Topics    Smoking status: Former Smoker     Packs/day: 0 50     Years: 3 00     Quit date: 2013    Smokeless tobacco: Never Used    Alcohol use Yes      Comment: occasional social  , once with pregnancy/wine        Review of Systems   Constitutional: Positive for fatigue  Negative for fever  HENT: Negative for congestion and rhinorrhea  Respiratory: Positive for chest tightness and shortness of breath  Cardiovascular: Negative for chest pain  Gastrointestinal: Negative for abdominal pain  Genitourinary: Negative for pelvic pain  Musculoskeletal: Negative for arthralgias, joint swelling and myalgias     Skin: Negative for color change  All other systems reviewed and are negative  Physical Exam  ED Triage Vitals   Temperature Pulse Respirations Blood Pressure SpO2   07/16/18 1813 07/16/18 1813 07/16/18 1813 07/16/18 1813 07/16/18 1813   98 5 °F (36 9 °C) 97 18 147/72 98 %      Temp Source Heart Rate Source Patient Position - Orthostatic VS BP Location FiO2 (%)   07/16/18 1813 07/16/18 1813 07/16/18 1813 07/16/18 1813 --   Tympanic Monitor Lying Left arm       Pain Score       07/16/18 2015       No Pain           Orthostatic Vital Signs  Vitals:    07/16/18 1813 07/16/18 2015   BP: 147/72 118/59   Pulse: 97 84   Patient Position - Orthostatic VS: Lying Lying       Physical Exam   Constitutional: She is oriented to person, place, and time  She appears well-developed and well-nourished  HENT:   Head: Normocephalic and atraumatic  Eyes: Conjunctivae are normal    Neck: Normal range of motion  Neck supple  Cardiovascular: Normal rate, regular rhythm and normal heart sounds  Exam reveals no gallop and no friction rub  No murmur heard  Pulmonary/Chest: Effort normal and breath sounds normal  No respiratory distress  She has no wheezes  She has no rales  She exhibits no tenderness  Abdominal: Soft  Bowel sounds are normal  There is no tenderness  Musculoskeletal:        Right lower leg: She exhibits no tenderness, no swelling and no edema  Left lower leg: She exhibits no tenderness, no swelling and no edema  Neurological: She is alert and oriented to person, place, and time  Skin: Skin is warm and dry         ED Medications  Medications - No data to display    Diagnostic Studies  Results Reviewed     None                 XR chest 2 views   ED Interpretation by Guerrero Sabillon MD (07/16 2127)   No evidence of pneumonia or pneumothorax      VAS lower limb venous duplex study, complete bilateral    (Results Pending)         Procedures  ECG 12 Lead Documentation  Date/Time: 7/16/2018 8:38 PM  Performed by: Bryce Mcdermott  Authorized by: Bryce Mcdermott     ECG reviewed by me, the ED Provider: yes    Patient location:  ED  Previous ECG:     Previous ECG:  Unavailable  Interpretation:     Interpretation: normal    Rate:     ECG rate:  85    ECG rate assessment: normal    Rhythm:     Rhythm: sinus rhythm    QRS:     QRS axis:  Normal  Conduction:     Conduction: normal    ST segments:     ST segments:  Normal  T waves:     T waves: normal            Phone Consults  ED Phone Contact    ED Course  ED Course as of Jul 16 2130 Mon Jul 16, 2018 2126 No evidence of pneumonia or pneumothorax XR chest 2 views   2127 No evidence of lower extremity DVT VAS lower limb venous duplex study, complete bilateral                               MDM  Number of Diagnoses or Management Options  Diagnosis management comments: 35 y o  Female 35 weeks pregnant presenting with new onset shortness of breath  Will get CXR, EKG, and duplex US to evaluate for pneumonia and DVT/PE  CritCare Time    Disposition  Final diagnoses:   Shortness of breath due to pregnancy in third trimester     Time reflects when diagnosis was documented in both MDM as applicable and the Disposition within this note     Time User Action Codes Description Comment    7/16/2018  7:56 PM Yasmine Spicer Add [R06 02] Shortness of breath     7/16/2018  9:23 PM Patel Rodriguez Add [O26 893,  R06 02] Shortness of breath due to pregnancy in third trimester       ED Disposition     ED Disposition Condition Comment    Discharge  Kobi Ortiz discharge to home/self care  Condition at discharge: Stable        Follow-up Information     Follow up With Specialties Details Why Thomas Walker MD Obstetrics and Gynecology, Obstetrics, Gynecology  If symptoms worsen [address] [place] [address]  [phone]            Patient's Medications   Discharge Prescriptions    No medications on file     No discharge procedures on file      ED Provider  Attending physically available and evaluated Dorie Ramsey I managed the patient along with the ED Attending      Electronically Signed by         Gal Schulz MD  07/16/18 0622

## 2018-07-16 NOTE — TELEPHONE ENCOUNTER
Pt called from work, very tearful and anxious  Pt was seen @ L&D triage fri 07/13 for ctx  Pt has been having ctx since, but with decreased frequency  Pt states ctx worsen with activity and resolve with rest   Advised pt rest, increase po fluid intake, and take tylenol for pain  Pt also states having SOB with exertion  Recommended patient refrain from work today and tomorrow and rest   Pt will call back when home and relaxed to re-evaluate

## 2018-07-16 NOTE — ED ATTENDING ATTESTATION
Marshall Valencia DO, saw and evaluated the patient  I have discussed the patient with the resident/non-physician practitioner and agree with the resident's/non-physician practitioner's findings, Plan of Care, and MDM as documented in the resident's/non-physician practitioner's note, except where noted  All available labs and Radiology studies were reviewed  At this point I agree with the current assessment done in the Emergency Department  I have conducted an independent evaluation of this patient a history and physical is as follows:    34 yo female at 28 WGA presents for evaluation of dyspnea without cough, fever, chest pain, leg pain or swelling  Denies abd pain, urinary sxs, vag dc or bleeding  No hx DVT/PE  Imp: dyspnea  Plan: ECG, CXR, duplex        Critical Care Time  CritCare Time    Procedures

## 2018-07-17 ENCOUNTER — ROUTINE PRENATAL (OUTPATIENT)
Dept: PERINATAL CARE | Facility: CLINIC | Age: 33
End: 2018-07-17
Payer: COMMERCIAL

## 2018-07-17 ENCOUNTER — TELEPHONE (OUTPATIENT)
Dept: OBGYN CLINIC | Facility: CLINIC | Age: 33
End: 2018-07-17

## 2018-07-17 VITALS
SYSTOLIC BLOOD PRESSURE: 127 MMHG | BODY MASS INDEX: 34.96 KG/M2 | HEART RATE: 93 BPM | HEIGHT: 62 IN | DIASTOLIC BLOOD PRESSURE: 87 MMHG | WEIGHT: 190 LBS

## 2018-07-17 DIAGNOSIS — Z3A.35 35 WEEKS GESTATION OF PREGNANCY: ICD-10-CM

## 2018-07-17 DIAGNOSIS — O40.3XX0 POLYHYDRAMNIOS IN SINGLETON PREGNANCY IN THIRD TRIMESTER: Primary | ICD-10-CM

## 2018-07-17 PROCEDURE — 93970 EXTREMITY STUDY: CPT | Performed by: SURGERY

## 2018-07-17 PROCEDURE — 76818 FETAL BIOPHYS PROFILE W/NST: CPT | Performed by: OBSTETRICS & GYNECOLOGY

## 2018-07-17 NOTE — TELEPHONE ENCOUNTER
Pt states still having CABALLERO, but is tolerating  Pt would like to discuss the option of elective induction at 39 weeks at her next appt with Dr Brady Rosenbaum  Reviewed with pt to keep hydrated, rest, use tylenol for abd cramping

## 2018-07-17 NOTE — DISCHARGE INSTRUCTIONS
You were evaluated in the emergency room today for your shortness of breath  Your EKG, chest X-ray, and duplex ultrasound did not show any evidence of pneumonia or blood clot at this time  Please return to the ED for sudden worsening of your shortness of breath, leg swelling, chest pain, any other new or concerning symptoms

## 2018-07-24 ENCOUNTER — TELEPHONE (OUTPATIENT)
Dept: PERINATAL CARE | Facility: CLINIC | Age: 33
End: 2018-07-24

## 2018-07-24 ENCOUNTER — ULTRASOUND (OUTPATIENT)
Dept: PERINATAL CARE | Facility: CLINIC | Age: 33
End: 2018-07-24
Payer: COMMERCIAL

## 2018-07-24 VITALS
HEART RATE: 83 BPM | HEIGHT: 62 IN | SYSTOLIC BLOOD PRESSURE: 116 MMHG | DIASTOLIC BLOOD PRESSURE: 78 MMHG | BODY MASS INDEX: 35.87 KG/M2 | WEIGHT: 194.9 LBS

## 2018-07-24 DIAGNOSIS — Z3A.36 36 WEEKS GESTATION OF PREGNANCY: ICD-10-CM

## 2018-07-24 DIAGNOSIS — O40.3XX0 POLYHYDRAMNIOS IN SINGLETON PREGNANCY IN THIRD TRIMESTER: Primary | ICD-10-CM

## 2018-07-24 DIAGNOSIS — Z36.89 ENCOUNTER FOR ULTRASOUND TO CHECK FETAL GROWTH: ICD-10-CM

## 2018-07-24 PROCEDURE — 76816 OB US FOLLOW-UP PER FETUS: CPT | Performed by: OBSTETRICS & GYNECOLOGY

## 2018-07-24 PROCEDURE — 59025 FETAL NON-STRESS TEST: CPT | Performed by: OBSTETRICS & GYNECOLOGY

## 2018-07-24 PROCEDURE — 99212 OFFICE O/P EST SF 10 MIN: CPT | Performed by: OBSTETRICS & GYNECOLOGY

## 2018-07-24 NOTE — PATIENT INSTRUCTIONS
Thank you for choosing [facility] for your  care today  If you have any questions about your ultrasound or care, please do not hesitate to contact us or your primary obstetrician

## 2018-07-24 NOTE — PROGRESS NOTES
[address]: Ms Enedelia Ramirez was seen today at 36w1d for NST (found under the pregnancy episode) which I reviewed the RN assessment and agree, and fetal growth ultrasound (see ultrasound report under OB procedures tab)  Polyhydramnios was found (by max vertical pocket alone); growth is stably large  Would advise weekly NST/FELIX (either at your office or ours) and delivery by [facility] given poly  Please don't hesitate to contact our office with any concerns or questions    Ely Salamanca MD

## 2018-07-25 ENCOUNTER — HOSPITAL ENCOUNTER (OUTPATIENT)
Facility: HOSPITAL | Age: 33
Discharge: HOME | End: 2018-07-25
Attending: OBSTETRICS & GYNECOLOGY | Admitting: OBSTETRICS & GYNECOLOGY
Payer: COMMERCIAL

## 2018-07-25 ENCOUNTER — TELEPHONE (OUTPATIENT)
Dept: OBGYN CLINIC | Facility: CLINIC | Age: 33
End: 2018-07-25

## 2018-07-25 VITALS
SYSTOLIC BLOOD PRESSURE: 133 MMHG | HEIGHT: 62 IN | RESPIRATION RATE: 18 BRPM | WEIGHT: 195 LBS | TEMPERATURE: 98.9 F | DIASTOLIC BLOOD PRESSURE: 86 MMHG | HEART RATE: 94 BPM | BODY MASS INDEX: 35.88 KG/M2

## 2018-07-25 PROCEDURE — 99213 OFFICE O/P EST LOW 20 MIN: CPT

## 2018-07-25 NOTE — DISCHARGE INSTR - AVS FIRST PAGE
Follow up in office with all scheduled appointments    To ease the pain of contractions:  - Benadryl at night   - Warm showers  - Stay hydrated

## 2018-07-25 NOTE — DISCHARGE INSTRUCTIONS
Pregnancy at 28 to [address]:   You are considered full term at the beginning of 37 weeks  Your breathing may be easier if your baby has moved down into a head-down position  You may need to urinate more often because the baby may be pressing on your bladder  You may also feel more discomfort and get tired easily  DISCHARGE INSTRUCTIONS:   Seek care immediately if:   · You develop a severe headache that does not go away  · You have new or increased vision changes, such as blurred or spotted vision  · You have new or increased swelling in your face or hands  · You have vaginal spotting or bleeding  · Your water broke or you feel warm water gushing or trickling from your vagina  Contact your healthcare provider if:   · You have more than 5 contractions in 1 hour  · You notice any changes in your baby's movements  · You have abdominal cramps, pressure, or tightening  · You have a change in vaginal discharge  · You have chills or a fever  · You have vaginal itching, burning, or pain  · You have yellow, green, white, or foul-smelling vaginal discharge  · You have pain or burning when you urinate, less urine than usual, or pink or bloody urine  · You have questions or concerns about your condition or care  How to care for yourself at this stage of your pregnancy:   · Eat a variety of healthy foods  Healthy foods include fruits, vegetables, whole-grain breads, low-fat dairy foods, beans, lean meats, and fish  Drink liquids as directed  Ask how much liquid to drink each day and which liquids are best for you  Limit caffeine to less than 200 milligrams each day  Limit your intake of fish to 2 servings each week  Choose fish low in mercury such as canned light tuna, shrimp, salmon, cod, or tilapia  Do not  eat fish high in mercury such as swordfish, tilefish, loyda mackerel, and shark  · Take prenatal vitamins as directed    Your need for certain vitamins and minerals, such as folic acid, increases during pregnancy  Prenatal vitamins provide some of the extra vitamins and minerals you need  Prenatal vitamins may also help to decrease the risk of certain birth defects  · Rest as needed  Put your feet up if you have swelling in your ankles and feet  · Do not smoke  If you smoke, it is never too late to quit  Smoking increases your risk of a miscarriage and other health problems during your pregnancy  Smoking can cause your baby to be born too early or weigh less at birth  Ask your healthcare provider for information if you need help quitting  · Do not drink alcohol  Alcohol passes from your body to your baby through the placenta  It can affect your baby's brain development and cause fetal alcohol syndrome (FAS)  FAS is a group of conditions that causes mental, behavior, and growth problems  · Talk to your healthcare provider before you take any medicines  Many medicines may harm your baby if you take them when you are pregnant  Do not take any medicines, vitamins, herbs, or supplements without first talking to your healthcare provider  Never use illegal or street drugs (such as marijuana or cocaine) while you are pregnant  · Talk to your healthcare provider before you travel  You may not be able to travel in an airplane after 36 weeks  He may also recommend that you avoid long road trips  Safety tips:   · Avoid hot tubs and saunas  Do not use a hot tub or sauna while you are pregnant, especially during your first trimester  Hot tubs and saunas may raise your baby's temperature and increase the risk of birth defects  · Avoid toxoplasmosis  This is an infection caused by eating raw meat or being around infected cat feces  It can cause birth defects, miscarriages, and other problems  Wash your hands after you touch raw meat  Make sure any meat is well-cooked before you eat it  Avoid raw eggs and unpasteurized milk   Use gloves or ask someone else to clean your cat's litter box while you are pregnant  · Ask your healthcare provider about travel  The most comfortable time to travel is during the second trimester  Ask your healthcare provider if you can travel after 36 weeks  You may not be able to travel in an airplane after 36 weeks  He may also recommend that you avoid long road trips  Changes that are happening with your baby:  By 38 weeks, your baby may weigh between 6 and 9 pounds  Your baby may be about 14 inches long from the top of the head to the rump (baby's bottom)  Your baby hears well enough to know your voice  As your baby gets larger, you may feel fewer kicks and more stretching and rolling  Your baby may move into a head-down position  Your baby will also rest lower in your abdomen  What you need to know about prenatal care: Your healthcare provider will check your blood pressure and weight  You may also need the following:  · A urine test  may also be done to check for sugar and protein  These can be signs of gestational diabetes or infection  Protein in your urine may also be a sign of preeclampsia  Preeclampsia is a condition that can develop during week 20 or later of your pregnancy  It causes high blood pressure, and it can cause problems with your kidneys and other organs  · A blood test  may be done to check for anemia (low iron level)  · A Tdap vaccine  may be recommended by your healthcare provider  · A group B strep test  is a test that is done to check for group B strep infection  Group B strep is a type of bacteria that may be found in the vagina or rectum  It can be passed to your baby during delivery if you have it  Your healthcare provider will take swab your vagina or rectum and send the sample to the lab for tests  · Fundal height  is a measurement of your uterus to check your baby's growth  This number is usually the same as the number of weeks that you have been pregnant   Your healthcare provider may also check your baby's position  · Your baby's heart rate  will be checked  © 2017 [address] [employer] Information is for End User's use only and may not be sold, redistributed or otherwise used for commercial purposes  All illustrations and images included in CareNotes® are the copyrighted property of A D A M [employer]  or Gurmeet Andrade  The above information is an  only  It is not intended as medical advice for individual conditions or treatments  Talk to your doctor, nurse or pharmacist before following any medical regimen to see if it is safe and effective for you

## 2018-07-25 NOTE — PROGRESS NOTES
Triage Note - OB  Shamar Ortiz 35 y o  female MRN: [id number]  Unit/Bed#: LD Triage 3- Encounter: [id number]    OB TRIAGE NOTE  Shamar Ortiz  [id number]  2018  5:29 PM  LD Triage 3/LD Triage 3-*    ASSESS:  35 y o   36w2d not in  labor    PLAN  - Cervix is closed/thick/high and unchanged one hour later  - FHT: reactive and reassuring  - Patient kathy every two minutes but she looks comfortable   - Counseled patient that polyhydramnios can cause uterus to contract more frequently  - Discussed hydration, warm showers, and benadryl for symptomatic relief  - D/c home with labor precautions    D/w Dr Asuncion Rosales  ______________    SUBJECTIVE    ISIDRO: Estimated Date of Delivery: 18    HPI Chronology:  35 y o   36w2d presents with complaint of contractions  Contractions started at midnight and woke her up  The contractions have progressed to every 3 minutes apart  Patient looks comfortable in the room  Pregnancy complicated by polyhydramnios  Contractions: yes  Leakage: no  Bleeding: no  Fetal Movement: yes      Vitals:   /86 (BP Location: Right arm)   Pulse 94   Temp 98 9 °F (37 2 °C) (Oral)   Resp 18   Ht 5' 2" (1 575 m)   Wt 88 5 kg (195 lb)   LMP 2017   BMI 35 67 kg/m²   Body mass index is 35 67 kg/m²  Review of Systems   Constitutional: Negative for diaphoresis and fever  Respiratory: Negative for apnea, shortness of breath and wheezing  Cardiovascular: Negative for chest pain and palpitations  Gastrointestinal: Negative for blood in stool and vomiting  Genitourinary: Negative for dysuria and hematuria  Neurological: Negative for dizziness and numbness  Physical Exam   Constitutional: She appears well-nourished  HENT:   Head: Normocephalic and atraumatic  Cardiovascular: Normal rate, regular rhythm and normal heart sounds  Pulmonary/Chest: Breath sounds normal  No respiratory distress  She has no wheezes     Abdominal: Bowel sounds are normal  There is no tenderness            FHT:  Variability: Moderate 6-25 bpm  Accelerations: 15 x 15 or greater  Decelerations: None  FHR Category: Category I  TOCO:   Contraction Frequency (minutes): 2-3  Contraction Duration (seconds): 50-80  Contraction Quality: Mild    SVE: Closed/thick/high      Jack Shafer MD  7/25/2018  5:29 PM

## 2018-07-26 ENCOUNTER — ROUTINE PRENATAL (OUTPATIENT)
Dept: OBGYN CLINIC | Facility: CLINIC | Age: 33
End: 2018-07-26

## 2018-07-26 VITALS
BODY MASS INDEX: 35.51 KG/M2 | WEIGHT: 193 LBS | DIASTOLIC BLOOD PRESSURE: 70 MMHG | HEIGHT: 62 IN | SYSTOLIC BLOOD PRESSURE: 116 MMHG

## 2018-07-26 DIAGNOSIS — O40.3XX0 POLYHYDRAMNIOS IN SINGLETON PREGNANCY IN THIRD TRIMESTER: Primary | ICD-10-CM

## 2018-07-26 PROBLEM — Z3A.36 36 WEEKS GESTATION OF PREGNANCY: Status: ACTIVE | Noted: 2018-04-20

## 2018-07-26 PROCEDURE — PNV: Performed by: OBSTETRICS & GYNECOLOGY

## 2018-07-26 NOTE — PROGRESS NOTES
Visit:  Good FM - labor talk done - very tearful and complaints of inability  To do normal activity - very uncomfortable with poly -  Very scared about delivery and would like to have primary c/s at 39 weeks - exam yesterday with r/o labor

## 2018-07-31 ENCOUNTER — ROUTINE PRENATAL (OUTPATIENT)
Dept: PERINATAL CARE | Facility: CLINIC | Age: 33
End: 2018-07-31
Payer: COMMERCIAL

## 2018-07-31 VITALS
DIASTOLIC BLOOD PRESSURE: 74 MMHG | SYSTOLIC BLOOD PRESSURE: 111 MMHG | BODY MASS INDEX: 35.26 KG/M2 | WEIGHT: 191.6 LBS | HEIGHT: 62 IN | HEART RATE: 88 BPM

## 2018-07-31 DIAGNOSIS — Z3A.37 37 WEEKS GESTATION OF PREGNANCY: ICD-10-CM

## 2018-07-31 DIAGNOSIS — O40.3XX0 POLYHYDRAMNIOS IN SINGLETON PREGNANCY IN THIRD TRIMESTER: Primary | ICD-10-CM

## 2018-07-31 PROCEDURE — 76818 FETAL BIOPHYS PROFILE W/NST: CPT | Performed by: OBSTETRICS & GYNECOLOGY

## 2018-08-03 ENCOUNTER — ROUTINE PRENATAL (OUTPATIENT)
Dept: OBGYN CLINIC | Facility: CLINIC | Age: 33
End: 2018-08-03

## 2018-08-03 VITALS
SYSTOLIC BLOOD PRESSURE: 116 MMHG | HEIGHT: 62 IN | BODY MASS INDEX: 35.88 KG/M2 | WEIGHT: 195 LBS | DIASTOLIC BLOOD PRESSURE: 70 MMHG

## 2018-08-03 DIAGNOSIS — Z34.83 PRENATAL CARE, SUBSEQUENT PREGNANCY, THIRD TRIMESTER: Primary | ICD-10-CM

## 2018-08-03 PROCEDURE — PNV: Performed by: NURSE PRACTITIONER

## 2018-08-03 NOTE — PROGRESS NOTES
OFFICE VISIT: Occasional cramping  Denies any N/V, HA, VB, LOF, Edema, Domestic Violence, Smoking  + FM  Tolerating PNV  Has primary c/s scheduled for 8/14 at 8am  RTO 1 week or sooner as needed

## 2018-08-06 ENCOUNTER — APPOINTMENT (OUTPATIENT)
Dept: PERINATAL CARE | Facility: CLINIC | Age: 33
End: 2018-08-06
Payer: COMMERCIAL

## 2018-08-06 ENCOUNTER — ROUTINE PRENATAL (OUTPATIENT)
Dept: PERINATAL CARE | Facility: CLINIC | Age: 33
End: 2018-08-06
Payer: COMMERCIAL

## 2018-08-06 VITALS
HEIGHT: 62 IN | SYSTOLIC BLOOD PRESSURE: 133 MMHG | HEART RATE: 91 BPM | WEIGHT: 195.4 LBS | BODY MASS INDEX: 35.96 KG/M2 | DIASTOLIC BLOOD PRESSURE: 77 MMHG

## 2018-08-06 DIAGNOSIS — Z3A.38 38 WEEKS GESTATION OF PREGNANCY: Primary | ICD-10-CM

## 2018-08-06 DIAGNOSIS — O40.3XX0 POLYHYDRAMNIOS IN SINGLETON PREGNANCY IN THIRD TRIMESTER: ICD-10-CM

## 2018-08-06 PROCEDURE — 76818 FETAL BIOPHYS PROFILE W/NST: CPT | Performed by: OBSTETRICS & GYNECOLOGY

## 2018-08-10 ENCOUNTER — ROUTINE PRENATAL (OUTPATIENT)
Dept: OBGYN CLINIC | Facility: CLINIC | Age: 33
End: 2018-08-10

## 2018-08-10 VITALS
HEIGHT: 62 IN | WEIGHT: 197 LBS | SYSTOLIC BLOOD PRESSURE: 125 MMHG | DIASTOLIC BLOOD PRESSURE: 75 MMHG | BODY MASS INDEX: 36.25 KG/M2

## 2018-08-10 DIAGNOSIS — Z34.83 PRENATAL CARE, SUBSEQUENT PREGNANCY, THIRD TRIMESTER: Primary | ICD-10-CM

## 2018-08-10 PROCEDURE — PNV: Performed by: NURSE PRACTITIONER

## 2018-08-10 NOTE — PROGRESS NOTES
OFFICE VISIT: Denies any N/V, HA, Cramping, VB, LOF, Edema, Domestic Violence, Smoking  + FM  Tolerating PNV  Pt has NST/FELIX on Monday and  scheduled for Tuesday  RTO postpartum or sooner as needed

## 2018-08-13 ENCOUNTER — ROUTINE PRENATAL (OUTPATIENT)
Dept: PERINATAL CARE | Facility: CLINIC | Age: 33
End: 2018-08-13
Payer: COMMERCIAL

## 2018-08-13 ENCOUNTER — ANESTHESIA EVENT (OUTPATIENT)
Dept: LABOR AND DELIVERY | Facility: HOSPITAL | Age: 33
End: 2018-08-13
Payer: COMMERCIAL

## 2018-08-13 ENCOUNTER — APPOINTMENT (OUTPATIENT)
Dept: PERINATAL CARE | Facility: CLINIC | Age: 33
End: 2018-08-13
Payer: COMMERCIAL

## 2018-08-13 VITALS
HEIGHT: 62 IN | BODY MASS INDEX: 36.01 KG/M2 | HEART RATE: 78 BPM | WEIGHT: 195.7 LBS | SYSTOLIC BLOOD PRESSURE: 120 MMHG | DIASTOLIC BLOOD PRESSURE: 80 MMHG

## 2018-08-13 DIAGNOSIS — O40.3XX0 POLYHYDRAMNIOS IN SINGLETON PREGNANCY IN THIRD TRIMESTER: Primary | ICD-10-CM

## 2018-08-13 DIAGNOSIS — Z3A.39 39 WEEKS GESTATION OF PREGNANCY: ICD-10-CM

## 2018-08-13 PROCEDURE — 76818 FETAL BIOPHYS PROFILE W/NST: CPT | Performed by: OBSTETRICS & GYNECOLOGY

## 2018-08-13 NOTE — ANESTHESIA PREPROCEDURE EVALUATION
Review of Systems/Medical History  Patient summary reviewed  Chart reviewed  No history of anesthetic complications     Cardiovascular  Negative cardio ROS    Pulmonary  Smoker ex-smoker  ,        GI/Hepatic  Negative GI/hepatic ROS          Kidney stones,        Endo/Other    Obesity    GYN  Currently pregnant , Prior pregnancy/OB history : 2 Parity: 1,     Comment: Hx of  x1  Current pregnancy c/b polyhydramnios and LGA     Hematology  Negative hematology ROS      Musculoskeletal  Negative musculoskeletal ROS        Neurology    Headaches,   Comment: Migraines Psychology   Negative psychology ROS              Physical Exam    Airway    Mallampati score: II  TM Distance: >3 FB  Neck ROM: full     Dental   No notable dental hx     Cardiovascular  Comment: Negative ROS, Rhythm: regular, Rate: normal, Cardiovascular exam normal    Pulmonary  Pulmonary exam normal Breath sounds clear to auscultation,     Other Findings        Anesthesia Plan  ASA Score- 2     Anesthesia Type- spinal with ASA Monitors  Additional Monitors:   Airway Plan:         Plan Factors-    Induction-     Postoperative Plan- Plan for postoperative opioid use  Informed Consent- Anesthetic plan and risks discussed with patient  Recent labs personally reviewed:  Lab Results   Component Value Date    HGB 12 2 2018     2018     No results found for: NA, K, BUN, CREATININE, GLUCOSE  No results found for: PTT   No results found for: INR    Blood type A    No results found for: Aby Garcia MD, have personally seen and evaluated the patient prior to anesthetic care  I have reviewed the pre-anesthetic record, and other medical records if appropriate to the anesthetic care  If a CRNA is involved in the case, I have reviewed the CRNA assessment, if present, and agree   Risks/benefits and alternatives discussed with patient including possible PONV, sore throat, and possibility of rare anesthetic and surgical emergencies

## 2018-08-14 ENCOUNTER — HOSPITAL ENCOUNTER (INPATIENT)
Facility: HOSPITAL | Age: 33
LOS: 3 days | Discharge: HOME | End: 2018-08-17
Attending: OBSTETRICS & GYNECOLOGY | Admitting: OBSTETRICS & GYNECOLOGY
Payer: COMMERCIAL

## 2018-08-14 ENCOUNTER — ANESTHESIA (OUTPATIENT)
Dept: LABOR AND DELIVERY | Facility: HOSPITAL | Age: 33
End: 2018-08-14
Payer: COMMERCIAL

## 2018-08-14 PROBLEM — Z3A.39 PREGNANCY WITH 39 COMPLETED WEEKS GESTATION: Status: ACTIVE | Noted: 2018-08-14

## 2018-08-14 LAB
ABO GROUP BLD: NORMAL
BASE EXCESS BLDCOA CALC-SCNC: -8.3 MMOL/L (ref 3–11)
BASE EXCESS BLDCOV CALC-SCNC: -8 MMOL/L (ref 1–9)
BLD GP AB SCN SERPL QL: NEGATIVE
ERYTHROCYTE [DISTWIDTH] IN BLOOD BY AUTOMATED COUNT: 13.5 % (ref 11.6–15.1)
EXTERNAL GROUP B STREP ANTIGEN: NEGATIVE
HCO3 BLDCOA-SCNC: 24.1 MMOL/L (ref 17.3–27.3)
HCO3 BLDCOV-SCNC: 23.1 MMOL/L (ref 12.2–28.6)
HCT VFR BLD AUTO: 39.9 % (ref 34.8–46.1)
HGB BLD-MCNC: 13 G/DL (ref 11.5–15.4)
MCH RBC QN AUTO: 29.7 PG (ref 26.8–34.3)
MCHC RBC AUTO-ENTMCNC: 32.6 G/DL (ref 31.4–37.4)
MCV RBC AUTO: 91 FL (ref 82–98)
O2 CT VFR BLDCOA CALC: 2.5 ML/DL
OXYHGB MFR BLDCOA: 11.9 %
OXYHGB MFR BLDCOV: 45.5 %
PCO2 BLDCOA: 86.2 MM[HG] (ref 30–60)
PCO2 BLDCOV: 73.6 MM HG (ref 27–43)
PH BLDCOA: 7.07 [PH] (ref 7.23–7.43)
PH BLDCOV: 7.12 [PH] (ref 7.19–7.49)
PLATELET # BLD AUTO: 343 THOUSANDS/UL (ref 149–390)
PMV BLD AUTO: 10.8 FL (ref 8.9–12.7)
PO2 BLDCOA: 11 MM HG (ref 5–25)
PO2 BLDCOV: 25.5 MM HG (ref 15–45)
RBC # BLD AUTO: 4.38 MILLION/UL (ref 3.81–5.12)
RH BLD: POSITIVE
RPR SER QL: NORMAL
SAO2 % BLDCOV: 9.9 ML/DL
SPECIMEN EXPIRATION DATE: NORMAL
WBC # BLD AUTO: 15.14 THOUSAND/UL (ref 4.31–10.16)

## 2018-08-14 PROCEDURE — 86592 SYPHILIS TEST NON-TREP QUAL: CPT | Performed by: OBSTETRICS & GYNECOLOGY

## 2018-08-14 PROCEDURE — 85027 COMPLETE CBC AUTOMATED: CPT | Performed by: OBSTETRICS & GYNECOLOGY

## 2018-08-14 PROCEDURE — 82805 BLOOD GASES W/O2 SATURATION: CPT | Performed by: OBSTETRICS & GYNECOLOGY

## 2018-08-14 PROCEDURE — 86900 BLOOD TYPING SEROLOGIC ABO: CPT | Performed by: OBSTETRICS & GYNECOLOGY

## 2018-08-14 PROCEDURE — 59510 CESAREAN DELIVERY: CPT | Performed by: OBSTETRICS & GYNECOLOGY

## 2018-08-14 PROCEDURE — 86850 RBC ANTIBODY SCREEN: CPT | Performed by: OBSTETRICS & GYNECOLOGY

## 2018-08-14 PROCEDURE — 86901 BLOOD TYPING SEROLOGIC RH(D): CPT | Performed by: OBSTETRICS & GYNECOLOGY

## 2018-08-14 RX ORDER — SODIUM CHLORIDE, SODIUM LACTATE, POTASSIUM CHLORIDE, CALCIUM CHLORIDE 600; 310; 30; 20 MG/100ML; MG/100ML; MG/100ML; MG/100ML
125 INJECTION, SOLUTION INTRAVENOUS CONTINUOUS
Status: DISCONTINUED | OUTPATIENT
Start: 2018-08-14 | End: 2018-08-14

## 2018-08-14 RX ORDER — DIPHENHYDRAMINE HYDROCHLORIDE 50 MG/ML
25 INJECTION INTRAMUSCULAR; INTRAVENOUS EVERY 6 HOURS PRN
Status: DISCONTINUED | OUTPATIENT
Start: 2018-08-14 | End: 2018-08-17 | Stop reason: HOSPADM

## 2018-08-14 RX ORDER — LORATADINE 10 MG/1
10 TABLET ORAL DAILY PRN
Status: DISCONTINUED | OUTPATIENT
Start: 2018-08-14 | End: 2018-08-17 | Stop reason: HOSPADM

## 2018-08-14 RX ORDER — METOCLOPRAMIDE HYDROCHLORIDE 5 MG/ML
INJECTION INTRAMUSCULAR; INTRAVENOUS AS NEEDED
Status: DISCONTINUED | OUTPATIENT
Start: 2018-08-14 | End: 2018-08-14 | Stop reason: SURG

## 2018-08-14 RX ORDER — OXYTOCIN/RINGER'S LACTATE 30/500 ML
PLASTIC BAG, INJECTION (ML) INTRAVENOUS CONTINUOUS PRN
Status: DISCONTINUED | OUTPATIENT
Start: 2018-08-14 | End: 2018-08-14 | Stop reason: SURG

## 2018-08-14 RX ORDER — KETOROLAC TROMETHAMINE 30 MG/ML
INJECTION, SOLUTION INTRAMUSCULAR; INTRAVENOUS AS NEEDED
Status: DISCONTINUED | OUTPATIENT
Start: 2018-08-14 | End: 2018-08-14 | Stop reason: SURG

## 2018-08-14 RX ORDER — KETOROLAC TROMETHAMINE 30 MG/ML
30 INJECTION, SOLUTION INTRAMUSCULAR; INTRAVENOUS EVERY 6 HOURS
Status: COMPLETED | OUTPATIENT
Start: 2018-08-14 | End: 2018-08-15

## 2018-08-14 RX ORDER — OXYTOCIN/RINGER'S LACTATE 30/500 ML
PLASTIC BAG, INJECTION (ML) INTRAVENOUS
Status: COMPLETED
Start: 2018-08-14 | End: 2018-08-14

## 2018-08-14 RX ORDER — SODIUM CHLORIDE, SODIUM LACTATE, POTASSIUM CHLORIDE, CALCIUM CHLORIDE 600; 310; 30; 20 MG/100ML; MG/100ML; MG/100ML; MG/100ML
75 INJECTION, SOLUTION INTRAVENOUS CONTINUOUS
Status: DISCONTINUED | OUTPATIENT
Start: 2018-08-14 | End: 2018-08-14

## 2018-08-14 RX ORDER — SUCCINYLCHOLINE CHLORIDE 20 MG/ML
INJECTION INTRAMUSCULAR; INTRAVENOUS AS NEEDED
Status: DISCONTINUED | OUTPATIENT
Start: 2018-08-14 | End: 2018-08-14 | Stop reason: SURG

## 2018-08-14 RX ORDER — ONDANSETRON 2 MG/ML
4 INJECTION INTRAMUSCULAR; INTRAVENOUS EVERY 8 HOURS PRN
Status: DISCONTINUED | OUTPATIENT
Start: 2018-08-14 | End: 2018-08-14

## 2018-08-14 RX ORDER — EPHEDRINE SULFATE 50 MG/ML
INJECTION, SOLUTION INTRAVENOUS AS NEEDED
Status: DISCONTINUED | OUTPATIENT
Start: 2018-08-14 | End: 2018-08-14 | Stop reason: SURG

## 2018-08-14 RX ORDER — FENTANYL CITRATE 50 UG/ML
INJECTION, SOLUTION INTRAMUSCULAR; INTRAVENOUS AS NEEDED
Status: DISCONTINUED | OUTPATIENT
Start: 2018-08-14 | End: 2018-08-14 | Stop reason: SURG

## 2018-08-14 RX ORDER — BUPIVACAINE HYDROCHLORIDE 7.5 MG/ML
INJECTION, SOLUTION EPIDURAL; RETROBULBAR AS NEEDED
Status: DISCONTINUED | OUTPATIENT
Start: 2018-08-14 | End: 2018-08-14 | Stop reason: SURG

## 2018-08-14 RX ORDER — DEXAMETHASONE SODIUM PHOSPHATE 4 MG/ML
8 INJECTION, SOLUTION INTRA-ARTICULAR; INTRALESIONAL; INTRAMUSCULAR; INTRAVENOUS; SOFT TISSUE ONCE AS NEEDED
Status: ACTIVE | OUTPATIENT
Start: 2018-08-14 | End: 2018-08-15

## 2018-08-14 RX ORDER — PROPOFOL 10 MG/ML
INJECTION, EMULSION INTRAVENOUS AS NEEDED
Status: DISCONTINUED | OUTPATIENT
Start: 2018-08-14 | End: 2018-08-14 | Stop reason: SURG

## 2018-08-14 RX ORDER — OXYTOCIN/RINGER'S LACTATE 30/500 ML
62.5 PLASTIC BAG, INJECTION (ML) INTRAVENOUS CONTINUOUS
Status: ACTIVE | OUTPATIENT
Start: 2018-08-14 | End: 2018-08-14

## 2018-08-14 RX ORDER — IBUPROFEN 600 MG/1
600 TABLET ORAL EVERY 6 HOURS PRN
Status: DISCONTINUED | OUTPATIENT
Start: 2018-08-15 | End: 2018-08-17 | Stop reason: HOSPADM

## 2018-08-14 RX ORDER — ONDANSETRON 2 MG/ML
4 INJECTION INTRAMUSCULAR; INTRAVENOUS EVERY 4 HOURS PRN
Status: ACTIVE | OUTPATIENT
Start: 2018-08-14 | End: 2018-08-15

## 2018-08-14 RX ORDER — MORPHINE SULFATE 0.5 MG/ML
INJECTION, SOLUTION EPIDURAL; INTRATHECAL; INTRAVENOUS AS NEEDED
Status: DISCONTINUED | OUTPATIENT
Start: 2018-08-14 | End: 2018-08-14 | Stop reason: SURG

## 2018-08-14 RX ORDER — SODIUM CHLORIDE, SODIUM LACTATE, POTASSIUM CHLORIDE, CALCIUM CHLORIDE 600; 310; 30; 20 MG/100ML; MG/100ML; MG/100ML; MG/100ML
125 INJECTION, SOLUTION INTRAVENOUS CONTINUOUS
Status: DISCONTINUED | OUTPATIENT
Start: 2018-08-14 | End: 2018-08-17 | Stop reason: HOSPADM

## 2018-08-14 RX ORDER — MAGNESIUM HYDROXIDE/ALUMINUM HYDROXICE/SIMETHICONE 120; 1200; 1200 MG/30ML; MG/30ML; MG/30ML
15 SUSPENSION ORAL EVERY 6 HOURS PRN
Status: DISCONTINUED | OUTPATIENT
Start: 2018-08-14 | End: 2018-08-17 | Stop reason: HOSPADM

## 2018-08-14 RX ORDER — METOCLOPRAMIDE HYDROCHLORIDE 5 MG/ML
5 INJECTION INTRAMUSCULAR; INTRAVENOUS EVERY 6 HOURS PRN
Status: ACTIVE | OUTPATIENT
Start: 2018-08-14 | End: 2018-08-15

## 2018-08-14 RX ORDER — NALBUPHINE HCL 10 MG/ML
10 AMPUL (ML) INJECTION
Status: DISCONTINUED | OUTPATIENT
Start: 2018-08-14 | End: 2018-08-17 | Stop reason: HOSPADM

## 2018-08-14 RX ADMIN — FENTANYL CITRATE 85 MCG: 50 INJECTION, SOLUTION INTRAMUSCULAR; INTRAVENOUS at 08:32

## 2018-08-14 RX ADMIN — Medication 62.5 MILLI-UNITS/MIN: at 11:52

## 2018-08-14 RX ADMIN — LIDOCAINE HYDROCHLORIDE 5 ML: 20 INJECTION, SOLUTION INTRAVENOUS at 08:28

## 2018-08-14 RX ADMIN — FENTANYL CITRATE 15 MCG: 50 INJECTION, SOLUTION INTRAMUSCULAR; INTRAVENOUS at 08:06

## 2018-08-14 RX ADMIN — SODIUM CHLORIDE, SODIUM LACTATE, POTASSIUM CHLORIDE, AND CALCIUM CHLORIDE 125 ML/HR: .6; .31; .03; .02 INJECTION, SOLUTION INTRAVENOUS at 18:41

## 2018-08-14 RX ADMIN — CEFAZOLIN SODIUM 2000 MG: 2 SOLUTION INTRAVENOUS at 08:53

## 2018-08-14 RX ADMIN — METOCLOPRAMIDE 10 MG: 5 INJECTION, SOLUTION INTRAMUSCULAR; INTRAVENOUS at 09:28

## 2018-08-14 RX ADMIN — SODIUM CHLORIDE, SODIUM LACTATE, POTASSIUM CHLORIDE, AND CALCIUM CHLORIDE 125 ML/HR: .6; .31; .03; .02 INJECTION, SOLUTION INTRAVENOUS at 10:27

## 2018-08-14 RX ADMIN — SUCCINYLCHOLINE CHLORIDE 100 MG: 20 INJECTION, SOLUTION INTRAMUSCULAR; INTRAVENOUS at 08:38

## 2018-08-14 RX ADMIN — SODIUM CHLORIDE, SODIUM LACTATE, POTASSIUM CHLORIDE, AND CALCIUM CHLORIDE 1000 ML: .6; .31; .03; .02 INJECTION, SOLUTION INTRAVENOUS at 06:42

## 2018-08-14 RX ADMIN — KETOROLAC TROMETHAMINE 30 MG: 30 INJECTION, SOLUTION INTRAMUSCULAR at 16:13

## 2018-08-14 RX ADMIN — LIDOCAINE HYDROCHLORIDE 5 ML: 20 INJECTION, SOLUTION INTRAVENOUS at 08:32

## 2018-08-14 RX ADMIN — EPHEDRINE SULFATE 25 MG: 50 INJECTION, SOLUTION INTRAMUSCULAR; INTRAVENOUS; SUBCUTANEOUS at 09:20

## 2018-08-14 RX ADMIN — NALBUPHINE HYDROCHLORIDE 10 MG: 10 INJECTION, SOLUTION INTRAMUSCULAR; INTRAVENOUS; SUBCUTANEOUS at 13:15

## 2018-08-14 RX ADMIN — ONDANSETRON 4 MG: 2 INJECTION INTRAMUSCULAR; INTRAVENOUS at 09:28

## 2018-08-14 RX ADMIN — KETOROLAC TROMETHAMINE 30 MG: 30 INJECTION, SOLUTION INTRAMUSCULAR at 09:59

## 2018-08-14 RX ADMIN — SODIUM CHLORIDE, SODIUM LACTATE, POTASSIUM CHLORIDE, AND CALCIUM CHLORIDE: .6; .31; .03; .02 INJECTION, SOLUTION INTRAVENOUS at 09:11

## 2018-08-14 RX ADMIN — SODIUM CHLORIDE, SODIUM LACTATE, POTASSIUM CHLORIDE, AND CALCIUM CHLORIDE: .6; .31; .03; .02 INJECTION, SOLUTION INTRAVENOUS at 08:36

## 2018-08-14 RX ADMIN — PROPOFOL 200 MG: 10 INJECTION, EMULSION INTRAVENOUS at 08:38

## 2018-08-14 RX ADMIN — LIDOCAINE HYDROCHLORIDE 5 ML: 20 INJECTION, SOLUTION INTRAVENOUS at 08:24

## 2018-08-14 RX ADMIN — PROPOFOL 50 MG: 10 INJECTION, EMULSION INTRAVENOUS at 09:52

## 2018-08-14 RX ADMIN — BUPIVACAINE HYDROCHLORIDE 1.6 ML: 7.5 INJECTION, SOLUTION EPIDURAL; RETROBULBAR at 08:06

## 2018-08-14 RX ADMIN — LIDOCAINE HYDROCHLORIDE 5 ML: 20 INJECTION, SOLUTION INTRAVENOUS at 08:36

## 2018-08-14 RX ADMIN — SODIUM CHLORIDE, SODIUM LACTATE, POTASSIUM CHLORIDE, AND CALCIUM CHLORIDE 125 ML/HR: .6; .31; .03; .02 INJECTION, SOLUTION INTRAVENOUS at 07:29

## 2018-08-14 RX ADMIN — PHENYLEPHRINE HYDROCHLORIDE 50 MCG/MIN: 10 INJECTION INTRAVENOUS at 08:16

## 2018-08-14 RX ADMIN — KETOROLAC TROMETHAMINE 30 MG: 30 INJECTION, SOLUTION INTRAMUSCULAR at 22:19

## 2018-08-14 RX ADMIN — MORPHINE SULFATE 0.15 MG: 0.5 INJECTION, SOLUTION EPIDURAL; INTRATHECAL; INTRAVENOUS at 08:06

## 2018-08-14 RX ADMIN — Medication 250 MILLI-UNITS/MIN: at 09:13

## 2018-08-14 NOTE — ANESTHESIA PROCEDURE NOTES
CSE Block    Patient location during procedure: OR  Start time: 8/14/2018 8:24 AM  Reason for block: procedure for pain and at surgeon's request  Staffing  Anesthesiologist: Jacqueline Higginbotham  Performed: anesthesiologist   Preanesthetic Checklist  Completed: patient identified, site marked, surgical consent, pre-op evaluation, timeout performed, IV checked, risks and benefits discussed and monitors and equipment checked  CSE  Patient position: sitting  Prep: ChloraPrep  Patient monitoring: cardiac monitor and continuous pulse ox  Approach: midline  Spinal Needle  Needle type: pencil-tip   Needle gauge: 27 G  Needle length: 10 cm  Epidural Needle  Injection technique: TEODORA saline  Needle type: Tuohy   Needle gauge: 18 G  Location: lumbar (1-5)  Catheter  Catheter type: side hole  Catheter size: 20 G  Catheter at skin depth: 12 cm  Test dose: negative  Assessment  Injection Assessment:  negative aspiration for heme, no paresthesia on injection, positive aspiration for clear CSF and no pain on injection

## 2018-08-14 NOTE — LACTATION NOTE
This note was copied from a baby's chart  CONSULT - LACTATION  Baby Boy  Ranjit Ceja) Cesar 0 days male MRN: [id number]    [facility] Room / Bed: (N)/(N) Encounter: [id number]    Maternal Information     MOTHER:  Mara Cunningham  Maternal Age: 35 y o    OB History: #: 1, Date: 06, Sex: Male, Weight: 3062 g (6 lb 12 oz), GA: 39w0d, Delivery: Vaginal, Spontaneous Delivery, Apgar1: None, Apgar5: None, Living: Living, Birth Comments: None    #: 2, Date: 18, Sex: Male, Weight: 3890 g (8 lb 9 2 oz), GA: 39w1d, Delivery: , Low Transverse, Apgar1: 4, Apgar5: 8, Living: Living, Birth Comments: None   Previouse breast reduction surgery? No    Lactation history:   Has patient previously breast fed: Yes   How long had patient previously breast fed: 3 months   Previous breast feeding complications: Low milk supply     Past Surgical History:   Procedure Laterality Date    CHOLECYSTECTOMY      FINGER SURGERY      left hand     GYNECOLOGIC CRYOSURGERY      HAND SURGERY Left     Phelebitis in left hand  post IV infilitration, had sx for clot removal     LYMPH NODE BIOPSY      of neck       Birth information:  YOB: 2018   Time of birth: 9:13 AM   Sex: male   Delivery type: , Low Transverse   Birth Weight: 3890 g (8 lb 9 2 oz)   Percent of Weight Change: 0%     Gestational Age: 36w3d   [unfilled]    Assessment     Breast and nipple assessment: inverted nipple    [place] Assessment: suck issue (jittery), sleepy and jittery    Feeding assessment: latch difficulty (wont close mouth over areola)  LATCH:  Latch: Repeated attempts, hold nipple in mouth, stimulate to suck   Audible Swallowing: A few with stimulation   Type of Nipple:  Inverted (inverted right nipple, everted left)   Comfort (Breast/Nipple): Soft/non-tender   Hold (Positioning): Full assist, teach one side, mother does other, staff holds   [facility] Score: 5 Feeding recommendations:  breast feed on demand  And Pump for feedings as needed for low serum glucose levels  Infant not latching well, he did suck well on bottle nipple to deliver mom's pumped breast milk for low serum glucose level  Discussed 2nd night syndrome and ways to calm infant  Hand out given  Information on hand expression given  Discussed benefits of knowing how to manually express breast including stimulating milk supply, softening nipple for latch and evacuating breast in the event of engorgement  Encouraged parents to watch breastfeeding class in the education area of My Chart Bedside  Met with mother  Provided mother with Ready, Set, Baby booklet  Discussed Skin to Skin contact an benefits to mom and baby  Talked about the delay of the first bath until baby has adjusted  Spoke about the benefits of rooming in  Feeding on cue and what that means for recognizing infant's hunger  Avoidance of pacifiers for the first month discussed  Talked about exclusive breastfeeding for the first 6 months  Positioning and latch reviewed as well as showing images of other feeding positions  Discussed the properties of a good latch in any position  Reviewed hand/manual expression  Discussed s/s that baby is getting enough milk and some s/s that breastfeeding dyad may need further help  Gave information on common concerns, what to expect the first few weeks after delivery, preparing for other caregivers, and how partners can help  Resources for support also provided  Instructions given on pumping  Discussed when to start, frequency, different pumps available versus manual expression  Discussed hygiene of hands and supplies as well as assembly, placement of flanges, size of flanged, preparing the breast and cycles and suction settings on pump  Demonstrated use of hand pump  Discussed labeling of milk, storage, and preparation of stored milk      Encouraged MOB to call for assistance, questions, and concerns about breastfeeding  Extension provided      Gauri Encinas RN 8/14/2018 4:23 PM

## 2018-08-14 NOTE — ANESTHESIA PROCEDURE NOTES
Spinal Block    Patient location during procedure: OR  Start time: 8/14/2018 8:06 AM  Reason for block: procedure for pain, at surgeon's request and primary anesthetic  Staffing  Anesthesiologist: Radha LASSITER  Preanesthetic Checklist  Completed: patient identified, site marked, surgical consent, pre-op evaluation, timeout performed, IV checked, risks and benefits discussed and monitors and equipment checked  Spinal Block  Patient position: sitting  Prep: ChloraPrep  Patient monitoring: cardiac monitor and frequent blood pressure checks  Approach: midline  Location: L3-4  Injection technique: single-shot  Needle  Needle type: pencil-tip   Needle gauge: 25 G  Needle length: 10 cm  Assessment  Sensory level: T4  Injection Assessment:  negative aspiration for heme, no paresthesia on injection and positive aspiration for clear CSF    Post-procedure:  adhesive bandage applied, pressure dressing applied, secured with tape, site cleaned and sterile dressing applied

## 2018-08-14 NOTE — INTERVAL H&P NOTE
Patient seen and examined, counseled on c/s, patient status unchanged from time of h&p  Patient and  report no further questions, desire to proceed with  section

## 2018-08-14 NOTE — DISCHARGE SUMMARY
Discharge Summary - OB/GYN   Perico Dunn 35 y o  female MRN: [id number]  Unit/Bed#: LD OR 2- Encounter: [id number]      Admission Date: 2018     Discharge Date: 2018     Admitting Diagnosis:   1  Pregnancy at 39w1d    Discharge Diagnosis:   Same, delivered  Male infant, 8lb9oz    Procedures: primary  section, low transverse incision    Attending: Otis Granados MD    Hospital Course:     Perico Dunn is a 35 y o  [place] at 39w1d wks who was initially admitted for an elective  section  Her obstetrical history is significant for polyhydramnios and LGA baby  Patient elected for  due to difficulty with pregnancy and suspected LGA  Spinal anesthesia was attempted, but patient endorsed sensation  An epidural was placed, but ultimately the patient was put under general anesthesia as the patient was having difficulty breathing  She delivered a viable male  on 2018 at 9:13  Weight 8lbs 9oz via primary  section, low transverse incision  Apgars were 4 (1 min) and 8 (5 min)   was transferred to  nursery  Patient tolerated the procedure well and was transferred to recovery in stable condition  Her post-operative course was uncomplicated  Preoperative hemaglobin was 12 2, postoperative was 9 8  Her postoperative pain was well controlled with oral analgesics  On day of discharge, she was ambulating and able to reasonably perform all ADLs  She was voiding and had appropriate bowel function  Pain was well controlled  She was discharged home on post-operative day #3 without complications  Patient was instructed to follow up with her OB as an outpatient and was given appropriate warnings to call provider if she develops signs of infection or uncontrolled pain      Complications: none apparent    Condition at discharge: stable     Discharge instructions/Information to patient and family:   See after visit summary for information provided to patient and family  Provisions for Follow-Up Care:  See after visit summary for information related to follow-up care and any pertinent home health orders  Disposition: Home    Planned Readmission: No    Discharge Medications: For a complete list of the patient's medications, please refer to her med rec      Victorino Logan  OB/Gyn, PGY-1  8/17/2018  6:55 AM

## 2018-08-14 NOTE — H&P (VIEW-ONLY)
H&P Exam - Obstetrics   Dorie Ramsey 35 y o  female MRN: [id number]  Unit/Bed#:  Encounter: [id number]    Assessment/Plan     History of Present Illness   Chief Complaint: Elective  for LGA baby and Polyhydramnios    HPI:  Dorie Ramsey is a 35 y o   female with an ISIDRO of 2018, by Last Menstrual Period at 39w1d weeks gestation who is being admitted for Elective   Her current obstetrical history is significant for polyhydramnios and LGA baby  Contractions: irregular  Leakage of fluid: None  Bleeding: None  Fetal movement: present  Pregnancy complications: polyhydramnios       Review of Systems    Historical Information   OB History    Para Term  AB Living   2 1 1     1   SAB TAB Ectopic Multiple Live Births           1      # Outcome Date GA Lbr Reji/2nd Weight Sex Delivery Anes PTL Lv   2 Current            1 Term 06 39w0d  3062 g (6 lb 12 oz) M Vag-Spont EPI N UDAY      Complications: Oligohydramnios,Fetal intolerance to labor, delivered, current hospitalization      Obstetric Comments   New paternity, long interval between pregnancy ,migraines      Baby complications/comments:   Past Medical History:   Diagnosis Date    Abnormal Pap smear of cervix     HPV (human papilloma virus) infection     Kidney stone     H/O PYELONEPHRITIS    Migraine     Seasonal allergies     Varicella     POS HX    Visual impairment     glasses for reading     Past Surgical History:   Procedure Laterality Date    CHOLECYSTECTOMY      FINGER SURGERY      left hand     GYNECOLOGIC CRYOSURGERY      HAND SURGERY Left     Phelebitis in left hand  post IV infilitration, had sx for clot removal     LYMPH NODE BIOPSY      of neck     Social History   History   Alcohol Use    Yes     Comment: occasional social  , once with pregnancy/wine     History   Drug Use No     History   Smoking Status    Former Smoker    Packs/day: 0 50    Years: 3 00    Quit date:    Smokeless Tobacco    Never Used         Meds/Allergies   PTA meds:   PNV  No Known Allergies    Objective   Vitals: Last menstrual period 2017, currently breastfeeding  There is no height or weight on file to calculate BMI      Invasive Devices          No matching active lines, drains, or airways          Physical Exam   Lungs: cta B  Heart: RRR S1 S2  Abd: soft gravid nontender positive bowel sounds  Ext no edema no calf pain          Prenatal Labs:   Blood Type:   Lab Results   Component Value Date/Time    ABO Grouping A 2018 03:00 PM    ABO Grouping A 2018     , D (Rh type):   Lab Results   Component Value Date/Time    Rh Type RH(D) POSITIVE 2018 03:00 PM     , Antibody Screen: No results found for: ANTIBODYSCR , HCT/HGB:   Lab Results   Component Value Date/Time    Hematocrit 36 2 2018 07:09 AM    Hemoglobin 12 2 2018 07:09 AM      , Platelets:   Lab Results   Component Value Date/Time    Platelet Count 309  07:09 AM      , 1 hour Glucola:   Lab Results   Component Value Date/Time    Glucose 124 2018 07:09 AM   , Rubella: Immune  Lab Results   Component Value Date/Time    RPR NON-REACTIVE 2018 03:00 PM      , Hep B:   Lab Results   Component Value Date/Time    External Hepatitis B Surface Ag negative 2018     , HIV:   Lab Results   Component Value Date/Time    External HIV-1 Antibody negative 2018     , Group B Strep:  neg        Assessment:  36 y/o  at 44 2/7 weeks desire primary c/s for lga and polyhydramnios    Plan:  Primary c/s

## 2018-08-14 NOTE — PROGRESS NOTES
Progress Note - OB/GYN  Alex Gannon 35 y o  female MRN: [id number]  Unit/Bed#: -01 Encounter: [id number]      Subjective:  Patient is feeling well post-peratively  Baby is in room with mom, dad, and big brother  Pain: Controlled  Tolerating PO: yes, no nausea or vomiting  Voiding: Dale in place, draining clear-yellow urine  Flatus: No  BM: No  Ambulating: No  Breastfeeding:  Yes  Chest pain: no  Shortness of breath: no  Leg pain: no  Lochia: minimal    Vitals:   /77 (BP Location: Right arm)   Pulse 84   Temp 97 7 °F (36 5 °C) (Oral)   Resp 18   Ht 5' 2" (1 575 m)   Wt 86 2 kg (190 lb)   LMP 11/13/2017   SpO2 99%   Breastfeeding? Yes   BMI 34 75 kg/m²       Intake/Output Summary (Last 24 hours) at 08/14/18 1702  Last data filed at 08/14/18 1401   Gross per 24 hour   Intake             2000 ml   Output              900 ml   Net             1100 ml       Invasive Devices     Peripheral Intravenous Line            Peripheral IV 08/14/18 Left Forearm less than 1 day          Drain            Urethral Catheter Double-lumen 16 Fr  less than 1 day                Physical Exam:   Physical Exam   Constitutional: She appears well-developed and well-nourished  No distress  Cardiovascular: Normal rate, regular rhythm and normal heart sounds  Exam reveals no gallop and no friction rub  No murmur heard  Pulmonary/Chest: Effort normal and breath sounds normal  No respiratory distress  She has no wheezes  She has no rales  Abdominal: Soft  Bowel sounds are normal  She exhibits no distension  There is no tenderness  There is no rebound and no guarding  Incision clean, dry, and intact  Covered  No evidence of bleeding or drainage  Fundus at level of the umbilicus  Genitourinary:   Genitourinary Comments: Lochia minimal on pad  Dale draining clear-yellow urine  Musculoskeletal:   SCD's bilaterally   Skin: She is not diaphoretic       Labs:   Admission on 08/14/2018   Component Date Value    ABO Grouping 08/14/2018 A     Rh Factor 08/14/2018 Positive     Antibody Screen 08/14/2018 Negative     Specimen Expiration Date 08/14/2018 [id number]     WBC 08/14/2018 15 14*    RBC 08/14/2018 4 38     Hemoglobin 08/14/2018 13 0     Hematocrit 08/14/2018 39 9     MCV 08/14/2018 91     MCH 08/14/2018 29 7     MCHC 08/14/2018 32 6     RDW 08/14/2018 13 5     Platelets 65/36/6184 343     MPV 08/14/2018 10 8     RPR 08/14/2018 Non-Reactive     External Strep Group B Ag 08/14/2018 Negative     pH, Cord Cam 08/14/2018 7 115*    pCO2, Cord Cam 08/14/2018 73 6*    pO2, Cord Cam 08/14/2018 25 5     HCO3, Cord Cam 08/14/2018 23 1     Base Exc, Cord Cam 08/14/2018 -8 0*    O2 Cont, Cord Cam 08/14/2018 9 9     O2 HGB,VENOUS CORD 08/14/2018 45 5     pH, Cord Art 08/14/2018 7 065*    pCO2, Cord Art 08/14/2018 86 2*    pO2, Cord Art 08/14/2018 11 0     HCO3, Cord Art 08/14/2018 24 1     Base Exc, Cord Art 08/14/2018 -8 3*    O2 Content, Cord Art 08/14/2018 2 5     O2 Hgb, Arterial Cord 08/14/2018 11 9          Patient Active Problem List   Diagnosis    Retracted nipple in female    Seasonal allergies    45 weeks gestation of pregnancy    Polyhydramnios in villalta pregnancy in third trimester    Obesity affecting pregnancy in third trimester    [facility]    Prenatal care, subsequent pregnancy, third trimester    Pregnancy with 39 completed weeks gestation        A/P: POD #0 s/p C/S    1  Postoperative care  - Dale in place, UOP 900mL since surgery  D/C in the AM   - Pain well controlled  - Tolerating PO, not ambulating, no flatus or BM at this time  - Encourage breastfeeding  - Continue routine postpartum care    2   Discharge planning  - Anticipate discharge 8/172018    Karen Dodson MD  8/14/2018  5:02 PM

## 2018-08-14 NOTE — OP NOTE
OPERATIVE REPORT  PATIENT NAME: Mildred Can    :  1985  MRN: [id number]  Pt Location: [facility] L&[facility] OR ROOM 02    SURGERY DATE: 2018    Surgeon(s) and Role:     * Toya Gan MD - Primary     * Rigo Tesfaye MD - Assisting    Preop Diagnosis:  Delivery by elective  section [O82]    Post-Op Diagnosis Codes:     * Delivery by elective  section [O82]    Procedure(s) (LRB):   SECTION () (N/A)    Specimen(s):  ID Type Source Tests Collected by Time Destination   A :  Cord Blood Cord BLOOD GAS, VENOUS, CORD, BLOOD GAS, ARTERIAL, CORD Toya Gan MD 2018    B :  Tissue (Placenta on Hold) OB Only Placenta PLACENTA IN STORAGE Toya Gan MD 2018        Estimated Blood Loss:   Minimal    Drains:  Urethral Catheter Double-lumen 16 Fr  (Active)   Site Assessment Skin intact 2018  8:44 AM   Collection Container Standard drainage bag 2018  8:44 AM   Securement Method Securing device (Describe) 2018  8:44 AM   Number of days: 0       Anesthesia Type:   epidural    Operative Indications:  Delivery by elective  section [O82]  LGA and polyhydramnios    Operative Findings:  polyhydramnios    Complications:   None    Procedure and Technique:  The patient was taken to the operating room where a time out was performed to confirm correct patient and correct procedure  See anesthesia notes, initially spinal placed then epidural placed, patient developed some respiratory distress and general anesthesia was adequately established  2 gm Ancef was given for preoperative prophylaxis  The patient was then placed in the dorsal supine position with a left tilt of the hips  The patient was  prepped and draped in the usual sterile fashion prior to general anesthesia for a pfannenstiel skin incision        An incision was made in the skin with a surgical scalpel and sharp dissection was carried out over subsequent layers of tissue including the fascia  The fascia was incised at the midline and the fascial incision was extended bilaterally using the curved Wade scissors  The superior edge of the fascial incision was grasped with Kocher clamps, tented up and the underlying rectus muscles were dissected off bluntly and sharply using the wade scissors and the gloved fingers of the surgeon  The rectus muscles were then divided at midline and the peritoneum was identified, tented up at its upper margin taking care to avoid the bladder, and then entered  The peritoneal incision was extended superiorly and inferiorly  The Baldev O ring was inserted and the vesicouterine peritoneum was identified, grasped with forceps and cut laterally to both sides using the Metzenbaum scissors  A transverse incision was made in the lower uterine segment using a new surgical blade  The uterine incision was extended cephalad and caudal using blunt dissection  The amniotic sac was entered and the amniotic fluid was noted to be Clear and copious  The surgeon's hand was placed into the uterine cavity  The fetus was noted to be in cephalic presentation, and the presenting part was grasped and delivered through the uterine incision with the assistance of fundal pressure  No nuchal cord was identified but a true knot was noted in the cord  The infant's oral and nasal passages were bulb suctioned  On delivery the cord was doubly clamped and cut  The infant was then passed off the table to the awaiting  staff  Venous and arterial blood gas, cord blood, and portion of cord was obtained for analysis and routine blood testing  The placenta delivery was then manually expressed intact for 3-v cord  Placenta was noted to be intact with a centrally inserted three-vessel cord  Oxytocin was administered by IV infusion to enhance uterine contraction  The uterus cleared of all clots and remaining products of conception    The uterine incision was re approximated using O monocryl in a running locked fashion  A second vertical embrocating stitch with O monocryl was applied  The uterine incision was examined and noted to be hemostatic  The pericolic gutters were irrigated cleared of all clots and products of conception  The uterine incision was once again reexamined and noted to be hemostatic  The fascia was re approximated using 0 Vicryl in a running nonlocked fashion  The subcutaneous tissue was irrigated and cleared of all clots and debris  Good hemostasis was noted  The skin incision was closed using running absorbable suture with  4-0 Vicryl  Histocryl was placed on top of the skin incision  Good hemostasis was noted  Patient tolerated the procedure well  All needle, sponge, and instrument counts were noted to be correct x 2 at the end of the procedure  Patient was transferred to the recovery room in stable condition  Dr Dalila Marshall was present for the procedure         I was present for the entire procedure    Patient Disposition:  PACU  and extubated and stable    SIGNATURE: Whit Palma MD  DATE: August 14, 2018  TIME: 10:25 AM

## 2018-08-15 LAB
BASOPHILS # BLD AUTO: 0.03 THOUSANDS/ΜL (ref 0–0.1)
BASOPHILS NFR BLD AUTO: 0 % (ref 0–1)
EOSINOPHIL # BLD AUTO: 0.03 THOUSAND/ΜL (ref 0–0.61)
EOSINOPHIL NFR BLD AUTO: 0 % (ref 0–6)
ERYTHROCYTE [DISTWIDTH] IN BLOOD BY AUTOMATED COUNT: 13.3 % (ref 11.6–15.1)
HCT VFR BLD AUTO: 30.1 % (ref 34.8–46.1)
HGB BLD-MCNC: 9.8 G/DL (ref 11.5–15.4)
IMM GRANULOCYTES # BLD AUTO: 0.23 THOUSAND/UL (ref 0–0.2)
IMM GRANULOCYTES NFR BLD AUTO: 1 % (ref 0–2)
LYMPHOCYTES # BLD AUTO: 1.9 THOUSANDS/ΜL (ref 0.6–4.47)
LYMPHOCYTES NFR BLD AUTO: 10 % (ref 14–44)
MCH RBC QN AUTO: 29.6 PG (ref 26.8–34.3)
MCHC RBC AUTO-ENTMCNC: 32.6 G/DL (ref 31.4–37.4)
MCV RBC AUTO: 91 FL (ref 82–98)
MONOCYTES # BLD AUTO: 1.17 THOUSAND/ΜL (ref 0.17–1.22)
MONOCYTES NFR BLD AUTO: 6 % (ref 4–12)
NEUTROPHILS # BLD AUTO: 16.3 THOUSANDS/ΜL (ref 1.85–7.62)
NEUTS SEG NFR BLD AUTO: 83 % (ref 43–75)
NRBC BLD AUTO-RTO: 0 /100 WBCS
PLATELET # BLD AUTO: 290 THOUSANDS/UL (ref 149–390)
PMV BLD AUTO: 10.9 FL (ref 8.9–12.7)
RBC # BLD AUTO: 3.31 MILLION/UL (ref 3.81–5.12)
WBC # BLD AUTO: 19.66 THOUSAND/UL (ref 4.31–10.16)

## 2018-08-15 PROCEDURE — 85025 COMPLETE CBC W/AUTO DIFF WBC: CPT | Performed by: OBSTETRICS & GYNECOLOGY

## 2018-08-15 PROCEDURE — 99024 POSTOP FOLLOW-UP VISIT: CPT | Performed by: OBSTETRICS & GYNECOLOGY

## 2018-08-15 RX ORDER — OXYCODONE HYDROCHLORIDE AND ACETAMINOPHEN 5; 325 MG/1; MG/1
1 TABLET ORAL EVERY 4 HOURS PRN
Status: DISCONTINUED | OUTPATIENT
Start: 2018-08-15 | End: 2018-08-17 | Stop reason: HOSPADM

## 2018-08-15 RX ORDER — OXYCODONE HYDROCHLORIDE AND ACETAMINOPHEN 5; 325 MG/1; MG/1
2 TABLET ORAL EVERY 4 HOURS PRN
Status: DISCONTINUED | OUTPATIENT
Start: 2018-08-15 | End: 2018-08-17 | Stop reason: HOSPADM

## 2018-08-15 RX ADMIN — KETOROLAC TROMETHAMINE 30 MG: 30 INJECTION, SOLUTION INTRAMUSCULAR at 03:15

## 2018-08-15 RX ADMIN — OXYCODONE HYDROCHLORIDE AND ACETAMINOPHEN 1 TABLET: 5; 325 TABLET ORAL at 11:12

## 2018-08-15 RX ADMIN — OXYCODONE HYDROCHLORIDE AND ACETAMINOPHEN 1 TABLET: 5; 325 TABLET ORAL at 20:44

## 2018-08-15 RX ADMIN — IBUPROFEN 600 MG: 600 TABLET ORAL at 23:55

## 2018-08-15 RX ADMIN — IBUPROFEN 600 MG: 600 TABLET ORAL at 15:11

## 2018-08-15 RX ADMIN — LORATADINE 10 MG: 10 TABLET ORAL at 11:12

## 2018-08-15 RX ADMIN — KETOROLAC TROMETHAMINE 30 MG: 30 INJECTION, SOLUTION INTRAMUSCULAR at 09:12

## 2018-08-15 NOTE — PROGRESS NOTES
Progress Note - OB/GYN   Mely Trejo 35 y o  female MRN: [id number]  Unit/Bed#: -01 Encounter: [id number]    Assessment:  Post partum Day #1 s/p 1LTCS (elective)    Plan:  1  Post-op Hemoglobin: 9 8    2  Continue routine post partum care   Encourage ambulation   Encourage breastfeeding   Dale is still in place       Subjective: Patient is doing well  She continues to have some numbness in her back  Her Dale is still in place  I removed her dressing this morning       Pain: yes, cramping, improved with meds  Tolerating PO: yes  Voiding: yes  Flatus: yes  BM: no   Ambulating: yes  Breastfeeding:  yes  Chest pain: no  Shortness of breath: no  Leg pain: no  Lochia: minimal    Objective:     Vitals: /63   Pulse 86   Temp 98 1 °F (36 7 °C) (Oral)   Resp 18   Ht 5' 2" (1 575 m)   Wt 86 2 kg (190 lb)   LMP 11/13/2017   SpO2 97% BMI 34 75 kg/m²     Urine output: 4200cc ~525cc/hr    Lab Results   Component Value Date    WBC 19 66 (H) 08/15/2018    HGB 9 8 (L) 08/15/2018    HCT 30 1 (L) 08/15/2018    MCV 91 08/15/2018     08/15/2018       Physical Exam:     Gen: AAOx3, NAD  CV: RRR  Lungs: CTA b/l  Abd: Soft, non-tender, non-distended, no rebound or guarding  Uterine fundus firm and non-tender, dressing removed, incision is clean, dry and intact   Ext: Non tender, minimal swelling     Sav Ragsdale MD  8/15/2018  6:14 AM

## 2018-08-15 NOTE — LACTATION NOTE
This note was copied from a baby's chart  Infant demonstrating more normal behaiors today  Infant will latch to the left, but still struggles with the right because of the inverted nipple  Yesenia Salamanca willing to try football hold when infant wakes for next feed  Encouraged MOB to call for assistance, questions, and concerns about breastfeeding  Extension provided

## 2018-08-16 ENCOUNTER — TELEPHONE (OUTPATIENT)
Dept: OBGYN CLINIC | Facility: CLINIC | Age: 33
End: 2018-08-16

## 2018-08-16 PROCEDURE — 99024 POSTOP FOLLOW-UP VISIT: CPT | Performed by: OBSTETRICS & GYNECOLOGY

## 2018-08-16 RX ADMIN — OXYCODONE HYDROCHLORIDE AND ACETAMINOPHEN 1 TABLET: 5; 325 TABLET ORAL at 09:50

## 2018-08-16 RX ADMIN — LORATADINE 10 MG: 10 TABLET ORAL at 09:51

## 2018-08-16 RX ADMIN — OXYCODONE HYDROCHLORIDE AND ACETAMINOPHEN 1 TABLET: 5; 325 TABLET ORAL at 17:13

## 2018-08-16 RX ADMIN — IBUPROFEN 600 MG: 600 TABLET ORAL at 06:51

## 2018-08-16 RX ADMIN — OXYCODONE HYDROCHLORIDE AND ACETAMINOPHEN 2 TABLET: 5; 325 TABLET ORAL at 21:33

## 2018-08-16 RX ADMIN — OXYCODONE HYDROCHLORIDE AND ACETAMINOPHEN 1 TABLET: 5; 325 TABLET ORAL at 04:15

## 2018-08-16 NOTE — PROGRESS NOTES
Progress Note - OB/GYN   Anita Avila 35 y o  female MRN: [id number]  Unit/Bed#: -01 Encounter: [id number]    Assessment:  Post partum Day #2 s/p 1LTCS (elective)    Plan:  1  Post-op Hemoglobin: 9 8    2  Continue routine post partum care   Encourage ambulation   Encourage breastfeeding   Dale is still in place       Subjective: Patient is doing well  She had no complaints this morning  She says that her pain is well controlled  She is using Percocet prior to ambulation, which controls her pain well with movement  Pain: yes, cramping, improved with meds  Tolerating PO: yes  Voiding: yes  Flatus: yes  BM: no   Ambulating: yes  Breastfeeding:  yes  Chest pain: no  Shortness of breath: no  Leg pain: no  Lochia: minimal    Objective:     Vitals: /63   Pulse 86   Temp 98 1 °F (36 7 °C) (Oral)   Resp 18   Ht 5' 2" (1 575 m)   Wt 86 2 kg (190 lb)   LMP 11/13/2017   SpO2 97% BMI 34 75 kg/m²     I/O last 3 completed shifts: In: 4212 5 [I V :4212 5]  Out: 0381 [Urine:9175]  No intake/output data recorded  Lab Results   Component Value Date    WBC 19 66 (H) 08/15/2018    HGB 9 8 (L) 08/15/2018    HCT 30 1 (L) 08/15/2018    MCV 91 08/15/2018     08/15/2018       Physical Exam:   Gen: AAOx3, NAD  CV: RRR  Lungs: CTA b/l  Abd: Soft, non-tender, non-distended, no rebound or guarding  Uterine fundus firm and non-tender,  incision is clean and intact, there is some dry blood on either side of the incision    Ext: Non tender, minimal swelling     Claudetta Dory, MD  8/16/2018  6:34 AM      Patient seen and examined case and note reviewed agree

## 2018-08-17 VITALS
WEIGHT: 190 LBS | HEIGHT: 62 IN | TEMPERATURE: 97.7 F | OXYGEN SATURATION: 97 % | DIASTOLIC BLOOD PRESSURE: 74 MMHG | HEART RATE: 80 BPM | BODY MASS INDEX: 34.96 KG/M2 | SYSTOLIC BLOOD PRESSURE: 121 MMHG | RESPIRATION RATE: 18 BRPM

## 2018-08-17 RX ORDER — OXYCODONE HYDROCHLORIDE AND ACETAMINOPHEN 5; 325 MG/1; MG/1
1 TABLET ORAL EVERY 4 HOURS PRN
Qty: 20 TABLET | Refills: 0 | Status: SHIPPED | OUTPATIENT
Start: 2018-08-17 | End: 2018-08-27

## 2018-08-17 RX ORDER — IBUPROFEN 600 MG/1
600 TABLET ORAL EVERY 6 HOURS PRN
Qty: 45 TABLET | Refills: 0 | Status: SHIPPED | OUTPATIENT
Start: 2018-08-17 | End: 2019-07-18 | Stop reason: ALTCHOICE

## 2018-08-17 RX ADMIN — IBUPROFEN 600 MG: 600 TABLET ORAL at 05:29

## 2018-08-17 RX ADMIN — LORATADINE 10 MG: 10 TABLET ORAL at 09:41

## 2018-08-17 RX ADMIN — OXYCODONE HYDROCHLORIDE AND ACETAMINOPHEN 1 TABLET: 5; 325 TABLET ORAL at 09:41

## 2018-08-17 RX ADMIN — IBUPROFEN 600 MG: 600 TABLET ORAL at 12:10

## 2018-08-17 NOTE — PROGRESS NOTES
Progress Note - OB/GYN   Jesu Mo 35 y o  female MRN: [id number]  Unit/Bed#: -01 Encounter: [id number]    Assessment:  Post partum Day #3 s/p 1LTCS (elective)    Plan:  1  Post-op Hemoglobin: 9 8    2  Continue routine post partum care   Encourage ambulation   Encourage breastfeeding      Subjective: Patient is doing well and excited to go home     Pain: yes, cramping, improved with meds  Tolerating PO: yes  Voiding: yes  Flatus: yes  BM: yes  Ambulating: yes  Breastfeeding:  yes  Chest pain: no  Shortness of breath: no  Leg pain: no  Lochia: minimal    Objective:     Vitals: /63   Pulse 86   Temp 98 1 °F (36 7 °C) (Oral)   Resp 18   Ht 5' 2" (1 575 m)   Wt 86 2 kg (190 lb)   LMP 11/13/2017   SpO2 97% BMI 34 75 kg/m²       Lab Results   Component Value Date    WBC 19 66 (H) 08/15/2018    HGB 9 8 (L) 08/15/2018    HCT 30 1 (L) 08/15/2018    MCV 91 08/15/2018     08/15/2018       Physical Exam:     Gen: AAOx3, NAD  CV: RRR  Lungs: CTAB  Abd: Soft, non-tender, non-distended, no rebound or guarding  Uterine fundus firm and non-tender, incision is clean, dry and intact   Ext: Non tender swelling resolved    Julia Salazar MD  8/17/2018  6:34 AM    Patient seen and examined case and note reviewed agree  D/c to home, instructions given

## 2018-08-22 ENCOUNTER — POSTPARTUM VISIT (OUTPATIENT)
Dept: OBGYN CLINIC | Facility: CLINIC | Age: 33
End: 2018-08-22

## 2018-08-22 VITALS — SYSTOLIC BLOOD PRESSURE: 118 MMHG | BODY MASS INDEX: 32.19 KG/M2 | DIASTOLIC BLOOD PRESSURE: 82 MMHG | WEIGHT: 176 LBS

## 2018-08-22 DIAGNOSIS — Z98.891 STATUS POST PRIMARY LOW TRANSVERSE CESAREAN SECTION: ICD-10-CM

## 2018-08-22 PROBLEM — Z3A.38 38 WEEKS GESTATION OF PREGNANCY: Status: RESOLVED | Noted: 2018-04-20 | Resolved: 2018-08-22

## 2018-08-22 PROCEDURE — 99024 POSTOP FOLLOW-UP VISIT: CPT | Performed by: PHYSICIAN ASSISTANT

## 2018-08-22 NOTE — PROGRESS NOTES
Assessment/Plan   Diagnoses and all orders for this visit:    Postpartum care following  delivery    Status post primary low transverse  section      Discussion  Pt to increase activity as tolerated  Reviewed driving precautions  Pt is aware no intercourse and nothing in the vagina, like tampons or taking baths, until checked out at postpartum visit  All questions answered today to patient's satisfaction  Patient to call with any further questions/concerns  RTO in 2-3 weeks for postpartum check with Dr Reyna Mason  Subjective     HPI   Riana Suarez is a 35 y o  female who presents to the office today for her an incision check status post elective  delivery 8 days ago on 18 due to size of baby  Found to have a knot in cord at time of delivery so happy with C/S; Patient is doing well and baby is doing well  Baby is breast-feeding - exclusively and going well  Patient denies any fever/chills/chest pain/shortness of breath/headache/dizziness/leg pain/edema  Appetite ok - denies nausea/vomitting; No bowel or bladder issues  Lochia is tolerable and done at this time x 3 days; Tolerating pain with motrin as needed and occasional percocet right before bed; No issues or concerns with her incision  Denies any baby blues/sadness or depression  Review of Systems   Constitutional: Negative for fatigue and unexpected weight change  Eyes: Negative for photophobia and visual disturbance  Respiratory: Negative for shortness of breath  Cardiovascular: Negative for chest pain and leg swelling  Gastrointestinal: Negative for abdominal distention, abdominal pain, constipation, diarrhea, nausea and vomiting  Genitourinary: Negative for menstrual problem, pelvic pain and vaginal bleeding  Neurological: Negative for headaches  Psychiatric/Behavioral: Negative for dysphoric mood  The patient is not nervous/anxious          The following portions of the patient's history were reviewed and updated as appropriate: allergies, current medications, past family history, past medical history, past social history, past surgical history and problem list          Past Medical History:   Diagnosis Date    Abnormal Pap smear of cervix     HPV (human papilloma virus) infection     Kidney stone     H/O PYELONEPHRITIS    Migraine     Seasonal allergies     Varicella     POS HX       Past Surgical History:   Procedure Laterality Date    CHOLECYSTECTOMY      FINGER SURGERY      left hand     GYNECOLOGIC CRYOSURGERY      HAND SURGERY Left     Phelebitis in left hand  post IV infilitration, had sx for clot removal     LYMPH NODE BIOPSY      of neck    MT  DELIVERY ONLY N/A 2018    Procedure:  SECTION (); Surgeon: Margarita Chin MD;  Location: [facility];  Service: Obstetrics       Family History   Problem Relation Age of Onset    Hypertension Mother     Hypertension Father     Hyperlipidemia Father     Heart disease Father         MI    Stroke Paternal Grandmother     Cancer Paternal Grandmother         breast    Cancer Paternal Aunt         colon    Early death Maternal Uncle     Cancer Maternal Uncle         lymphoma    Early death Maternal Uncle         lymphoma    Birth defects Maternal Uncle     Undescended testes Son     Cystic fibrosis Other     Cystic fibrosis Other     Stroke Maternal Grandfather        Social History     Social History    Marital status: /Civil Union     Spouse name: N/A    Number of children: N/A    Years of education: N/A     Occupational History    Not on file       Social History Main Topics    Smoking status: Former Smoker     Packs/day: 0 50     Years: 3 00     Quit date:     Smokeless tobacco: Never Used    Alcohol use Yes      Comment: occasional social  , once with pregnancy/wine    Drug use: No    Sexual activity: Yes     Partners: Male     Other Topics Concern    Not on file     Social History Narrative    No narrative on file         Current Outpatient Prescriptions:     cetirizine (ZyrTEC) 10 mg tablet, Take 10 mg by mouth daily, Disp: , Rfl:     ibuprofen (MOTRIN) 600 mg tablet, Take 1 tablet (600 mg total) by mouth every 6 (six) hours as needed for mild pain, Disp: 45 tablet, Rfl: 0    oxyCODONE-acetaminophen (PERCOCET) 5-325 mg per tablet, Take 1 tablet by mouth every 4 (four) hours as needed for moderate pain for up to 10 days Max Daily Amount: 6 tablets, Disp: 20 tablet, Rfl: 0    No Known Allergies    Objective   Vitals:    08/22/18 1000   BP: 118/82   BP Location: Left arm   Cuff Size: Standard   Weight: 79 8 kg (176 lb)     Physical Exam   Constitutional: She appears well-developed and well-nourished  No distress  Cardiovascular: Normal rate, regular rhythm and normal heart sounds  No murmur heard  Pulmonary/Chest: Effort normal and breath sounds normal  No respiratory distress  She has no wheezes  Abdominal: Soft  She exhibits no distension and no mass  There is no tenderness  Uterine fundus about 4 fingers below umbilicus; Incision C/D/I and nicely healing    Musculoskeletal: She exhibits no edema  Neurological: She is alert  Skin: She is not diaphoretic  Psychiatric: She has a normal mood and affect  Her behavior is normal    Vitals reviewed

## 2018-08-23 ENCOUNTER — TELEPHONE (OUTPATIENT)
Dept: OBGYN CLINIC | Facility: CLINIC | Age: 33
End: 2018-08-23

## 2018-08-23 NOTE — TELEPHONE ENCOUNTER
Advised pt rest and hydration  Will recheck temp before next motrin due and call our office if >100 4  Will call pt in am to follow up

## 2018-08-24 ENCOUNTER — POSTPARTUM VISIT (OUTPATIENT)
Dept: OBGYN CLINIC | Facility: CLINIC | Age: 33
End: 2018-08-24
Payer: COMMERCIAL

## 2018-08-24 VITALS
SYSTOLIC BLOOD PRESSURE: 126 MMHG | WEIGHT: 175 LBS | TEMPERATURE: 101.9 F | HEIGHT: 62 IN | DIASTOLIC BLOOD PRESSURE: 82 MMHG | BODY MASS INDEX: 32.2 KG/M2

## 2018-08-24 DIAGNOSIS — N61.0 MASTITIS: Primary | ICD-10-CM

## 2018-08-24 PROCEDURE — 99214 OFFICE O/P EST MOD 30 MIN: CPT | Performed by: PHYSICIAN ASSISTANT

## 2018-08-24 RX ORDER — CEPHALEXIN 500 MG/1
500 CAPSULE ORAL EVERY 6 HOURS SCHEDULED
Qty: 56 CAPSULE | Refills: 0 | Status: SHIPPED | OUTPATIENT
Start: 2018-08-24 | End: 2018-09-07

## 2018-08-24 NOTE — PROGRESS NOTES
Assessment/Plan:    Mastitis  Reviewed Mastitis with patient  Script for Keflex 500mg Q6hrs x 14 days given  Reviewed with patient should notice improvement of symptoms in the next 24-48 hrs on the antibiotic  Continue Ibuprofen and Tylenol to help reduce fever and for symptom relief  Continue to feed from both breast as well as pump  Massage breast, warm compresses and warm showers to help  Script for APNO ointment called into [address] for cracked nipples and irritation  Call office if symptoms are not improving  Problem List Items Addressed This Visit     Mastitis - Primary     Reviewed Mastitis with patient  Script for Keflex 500mg Q6hrs x 14 days given  Reviewed with patient should notice improvement of symptoms in the next 24-48 hrs on the antibiotic  Continue Ibuprofen and Tylenol to help reduce fever and for symptom relief  Continue to feed from both breast as well as pump  Massage breast, warm compresses and warm showers to help  Script for APNO ointment called into [address] for cracked nipples and irritation  Call office if symptoms are not improving  Relevant Medications    cephalexin (KEFLEX) 500 mg capsule            Subjective:      Patient ID: Nadine Griffith is a 35 y o  female  HPI  34 yo 10 days PP s/p   C/o fever, flu like symptoms and breast pain started last night but worsened throughout the day today  Reports pain in both breasts worse on the left  Breast warm and swollen  Has been trying to pump without relief  Fever of 100-101 at home  Lochia light, no pain at incision or pelvic pain, denies bowel or bladder issues  The following portions of the patient's history were reviewed and updated as appropriate:   She  has a past medical history of Abnormal Pap smear of cervix; HPV (human papilloma virus) infection; Kidney stone (); Migraine; Seasonal allergies; and Varicella    She   Patient Active Problem List    Diagnosis Date Noted    Mastitis 2018    Pregnancy with 44 completed weeks gestation 2018    Polyhydramnios in villalta pregnancy in third trimester 2018    Obesity affecting pregnancy in third trimester 2018    Mallet finger 2018    Prenatal care, subsequent pregnancy, third trimester 2018    Retracted nipple in female 2017    Seasonal allergies 2017     She  has a past surgical history that includes Cholecystectomy; Lymph node biopsy; Gynecologic cryosurgery; Finger surgery; Hand surgery (Left); and pr  delivery only (N/A, 2018)  Her family history includes Birth defects in her maternal uncle; Cancer in her maternal uncle, paternal aunt, and paternal grandmother; Cystic fibrosis in her other and other; Early death in her maternal uncle and maternal uncle; Heart disease in her father; Hyperlipidemia in her father; Hypertension in her father and mother; Stroke in her maternal grandfather and paternal grandmother; Undescended testes in her son  She  reports that she quit smoking about 5 years ago  She has a 1 50 pack-year smoking history  She has never used smokeless tobacco  She reports that she drinks alcohol  She reports that she does not use drugs  Current Outpatient Prescriptions   Medication Sig Dispense Refill    cephalexin (KEFLEX) 500 mg capsule Take 1 capsule (500 mg total) by mouth every 6 (six) hours for 14 days 56 capsule 0    cetirizine (ZyrTEC) 10 mg tablet Take 10 mg by mouth daily      ibuprofen (MOTRIN) 600 mg tablet Take 1 tablet (600 mg total) by mouth every 6 (six) hours as needed for mild pain 45 tablet 0    oxyCODONE-acetaminophen (PERCOCET) 5-325 mg per tablet Take 1 tablet by mouth every 4 (four) hours as needed for moderate pain for up to 10 days Max Daily Amount: 6 tablets 20 tablet 0     No current facility-administered medications for this visit  She has No Known Allergies       Review of Systems   Constitutional: Negative for fatigue, fever and unexpected weight change  HENT: Negative for dental problem and sinus pressure  Eyes: Negative for visual disturbance  Respiratory: Negative for cough, shortness of breath and wheezing  Cardiovascular: Negative for chest pain  Gastrointestinal: Negative for abdominal pain, blood in stool, constipation, diarrhea, nausea and vomiting  Endocrine: Negative for polydipsia  Genitourinary: Negative for difficulty urinating, dyspareunia, dysuria, frequency, hematuria, pelvic pain and urgency  Musculoskeletal: Negative for arthralgias and back pain  Neurological: Negative for dizziness, seizures, light-headedness and headaches  Psychiatric/Behavioral: Negative for suicidal ideas  The patient is not nervous/anxious  Objective:      /82 (BP Location: Left arm, Patient Position: Sitting, Cuff Size: Large)   Temp (!) 101 9 °F (38 8 °C)   Ht 5' 2" (1 575 m)   Wt 79 4 kg (175 lb)   LMP 11/13/2017   BMI 32 01 kg/m²          Physical Exam   Constitutional: She is oriented to person, place, and time  She appears well-developed and well-nourished  Neck: No thyromegaly present  Cardiovascular: Normal rate, regular rhythm and normal heart sounds  Exam reveals no gallop and no friction rub  No murmur heard  Pulmonary/Chest: Effort normal and breath sounds normal  No respiratory distress  She has no wheezes  She has no rales  She exhibits no tenderness  Right breast exhibits skin change and tenderness  Left breast exhibits skin change (erythema lateral breast) and tenderness  Abdominal: She exhibits no distension and no mass  There is no tenderness  There is no rebound and no guarding  Neurological: She is alert and oriented to person, place, and time  Skin: Skin is warm and dry  Psychiatric: She has a normal mood and affect   Her behavior is normal

## 2018-08-24 NOTE — ASSESSMENT & PLAN NOTE
Reviewed Mastitis with patient  Script for Keflex 500mg Q6hrs x 14 days given  Reviewed with patient should notice improvement of symptoms in the next 24-48 hrs on the antibiotic  Continue Ibuprofen and Tylenol to help reduce fever and for symptom relief  Continue to feed from both breast as well as pump  Massage breast, warm compresses and warm showers to help  Script for APNO ointment called into [facility] for cracked nipples and irritation  Call office if symptoms are not improving

## 2018-09-04 LAB — PLACENTA IN STORAGE: NORMAL

## 2018-09-10 ENCOUNTER — TELEPHONE (OUTPATIENT)
Dept: OBGYN CLINIC | Facility: CLINIC | Age: 33
End: 2018-09-10

## 2018-09-10 NOTE — TELEPHONE ENCOUNTER
Pt states stitches opened small amount on side of incision  Pt denies any sx of infection  States small amount of serosanguinous discarge initially, now no drainage  Pt is keeping site clean and dry  Reviewed with pt to call office if any sx of infection or further opening

## 2018-09-21 ENCOUNTER — POSTPARTUM VISIT (OUTPATIENT)
Dept: OBGYN CLINIC | Facility: CLINIC | Age: 33
End: 2018-09-21

## 2018-09-21 VITALS
HEIGHT: 62 IN | DIASTOLIC BLOOD PRESSURE: 70 MMHG | WEIGHT: 172 LBS | SYSTOLIC BLOOD PRESSURE: 126 MMHG | BODY MASS INDEX: 31.65 KG/M2

## 2018-09-21 PROCEDURE — 99024 POSTOP FOLLOW-UP VISIT: CPT | Performed by: OBSTETRICS & GYNECOLOGY

## 2018-09-21 NOTE — PROGRESS NOTES
Postpartum Visit: Patient here for postpartum visit  She is 5 weeks post partum following a repeat  section  I have fully reviewed the prenatal and intrapartum course  The delivery was at 44 gestational weeks  Outcome: RCS  Anesthesia: spinal   Postpartum course has been mildly complicated by Postpartum mastitis which is resolved  Baby's course has been doing well without problems  Baby is feeding breast   Patient is tolerating regular diet, Bleeding thin lochia  Bowel function is normal  Bladder function is normal  Patient is not sexually active  Contraception method is condoms  Postpartum depression screening: negative EPDS 1    Physical:   Vitals:    18 1300   BP: 126/70       Objective     /70 (BP Location: Left arm, Cuff Size: Standard)   Ht 5' 2" (1 575 m)   Wt 78 kg (172 lb)   LMP 2017   Breastfeeding?  Yes   BMI 31 46 kg/m²   General appearance: alert and oriented, in no acute distress  Lungs: clear to auscultation bilaterally  Heart: regular rate and rhythm, S1, S2 normal, no murmur, click, rub or gallop  Abdomen: soft, non-tender; bowel sounds normal; no masses,  no organomegaly   incision clean dry intact healing well the area that had opened on the right margin is not completely sealed and not evident there is a bit of suture at the left border which was excised  Pelvic: external genitalia normal, vagina normal without discharge, uterus normal size, shape, and consistency, no cervical motion tenderness, no adnexal masses or tenderness and rectovaginal septum normal        Assessment 36 y/o  here 5 weeks post partum repeat c/s steadily recovering    Plan: plans to use condoms for contraception, return in 3 months for annual or sooner as needed

## 2019-02-05 ENCOUNTER — TELEPHONE (OUTPATIENT)
Dept: OBGYN CLINIC | Facility: CLINIC | Age: 34
End: 2019-02-05

## 2019-02-05 NOTE — TELEPHONE ENCOUNTER
Pt signed release of records, and c/s op note sent to [facility] @ [phone]

## 2019-03-09 ENCOUNTER — OFFICE VISIT (OUTPATIENT)
Dept: URGENT CARE | Facility: CLINIC | Age: 34
End: 2019-03-09
Payer: COMMERCIAL

## 2019-03-09 VITALS
HEART RATE: 116 BPM | DIASTOLIC BLOOD PRESSURE: 83 MMHG | TEMPERATURE: 101 F | RESPIRATION RATE: 17 BRPM | OXYGEN SATURATION: 100 % | SYSTOLIC BLOOD PRESSURE: 138 MMHG | WEIGHT: 159 LBS | HEIGHT: 62 IN | BODY MASS INDEX: 29.26 KG/M2

## 2019-03-09 DIAGNOSIS — R68.89 FLU-LIKE SYMPTOMS: Primary | ICD-10-CM

## 2019-03-09 DIAGNOSIS — J02.9 SORE THROAT: ICD-10-CM

## 2019-03-09 LAB
FLUAV AG SPEC QL: NOT DETECTED
FLUBV AG SPEC QL: NOT DETECTED
RSV B RNA SPEC QL NAA+PROBE: NOT DETECTED

## 2019-03-09 PROCEDURE — 99213 OFFICE O/P EST LOW 20 MIN: CPT | Performed by: NURSE PRACTITIONER

## 2019-03-09 PROCEDURE — 87631 RESP VIRUS 3-5 TARGETS: CPT | Performed by: NURSE PRACTITIONER

## 2019-03-09 PROCEDURE — 87070 CULTURE OTHR SPECIMN AEROBIC: CPT | Performed by: NURSE PRACTITIONER

## 2019-03-09 RX ORDER — OSELTAMIVIR PHOSPHATE 75 MG/1
75 CAPSULE ORAL EVERY 12 HOURS SCHEDULED
Qty: 10 CAPSULE | Refills: 0 | Status: SHIPPED | OUTPATIENT
Start: 2019-03-09 | End: 2019-03-13 | Stop reason: ALTCHOICE

## 2019-03-09 NOTE — LETTER
March 9, 2019     Patient: Rob Kumar   YOB: 1985   Date of Visit: 3/9/2019       To Whom It May Concern: It is my medical opinion that Navin Bryan may return to work on 3/11/2019  If you have any questions or concerns, please don't hesitate to call           Sincerely,        PATO Sanchez

## 2019-03-09 NOTE — PATIENT INSTRUCTIONS
Your flu swab will be sent out for culture  Your strep swab will be sent out for culture    If using several medications be sure to read all of the ingredients so you are not taking too many of the same medications  Take Tylenol/Ibuprofen as needed  Stay hydrated, drink lots of fluids, soups, and tea with honey  Warm salt water gargles may help with sore throat  Use cool or warm humidifier  Your symptoms may last up to 14 days, continue supportive therapy as needed  Follow up with your PCP if your symptoms become worse or do not improve  If you have difficulty breathing, can not catch your breath or feel short of breath go directly to the emergency room

## 2019-03-10 ENCOUNTER — TELEPHONE (OUTPATIENT)
Dept: URGENT CARE | Facility: CLINIC | Age: 34
End: 2019-03-10

## 2019-03-10 NOTE — TELEPHONE ENCOUNTER
Advised patient that flu swabs were negative and she could discontinue the Tamiflu  Explained that the strep culture was not back yet and I would call if it was positive  Advised her to continue the supportive therapy   She verbalized understanding

## 2019-03-10 NOTE — PROGRESS NOTES
[facility]        NAME: Vivien Wild is a 35 y o  female  : 1985    MRN: [id number]  DATE: March 10, 2019  TIME: 8:09 AM    Assessment and Plan   Flu-like symptoms [R68 89]  1  Flu-like symptoms  oseltamivir (TAMIFLU) 75 mg capsule    Influenza A/B and RSV by PCR   2  Sore throat  Throat culture         Patient Instructions     Your flu swab will be sent out for culture  Your strep swab will be sent out for culture    If using several medications be sure to read all of the ingredients so you are not taking too many of the same medications  Take Tylenol/Ibuprofen as needed  Stay hydrated, drink lots of fluids, soups, and tea with honey  Warm salt water gargles may help with sore throat  Use cool or warm humidifier  Your symptoms may last up to 14 days, continue supportive therapy as needed  Follow up with your PCP if your symptoms become worse or do not improve  If you have difficulty breathing, can not catch your breath or feel short of breath go directly to the emergency room  Advised patient to not breast feed while taking the Tamiflu  Chief Complaint     Chief Complaint   Patient presents with    Fever     Pt reports of fever started today with cough congestion  History of Present Illness       35year old female presents for flu like symptoms  She woke up this morning with a fever, chills, body aches, sore throat, and bilateral ear pain  She did receive her flu shot this year  She denies any N/V, cough, SOB, diarrhea or chest pain  Review of Systems   Review of Systems   Constitutional: Positive for fatigue and fever  Negative for chills  HENT: Positive for congestion, ear pain and sore throat  Negative for postnasal drip, rhinorrhea, sinus pressure and sinus pain  Respiratory: Negative for cough, chest tightness, shortness of breath and wheezing  Cardiovascular: Negative for chest pain  Gastrointestinal: Negative for nausea and vomiting  Musculoskeletal: Positive for myalgias  Neurological: Negative for headaches  Current Medications       Current Outpatient Medications:     cetirizine (ZyrTEC) 10 mg tablet, Take 10 mg by mouth daily, Disp: , Rfl:     ibuprofen (MOTRIN) 600 mg tablet, Take 1 tablet (600 mg total) by mouth every 6 (six) hours as needed for mild pain (Patient not taking: Reported on 2018 ), Disp: 45 tablet, Rfl: 0    oseltamivir (TAMIFLU) 75 mg capsule, Take 1 capsule (75 mg total) by mouth every 12 (twelve) hours for 5 days, Disp: 10 capsule, Rfl: 0    Current Allergies     Allergies as of 2019    (No Known Allergies)            The following portions of the patient's history were reviewed and updated as appropriate: allergies, current medications, past family history, past medical history, past social history, past surgical history and problem list      Past Medical History:   Diagnosis Date    Abnormal Pap smear of cervix     HPV (human papilloma virus) infection     Kidney stone     H/O PYELONEPHRITIS    Migraine     Seasonal allergies     Varicella     POS HX       Past Surgical History:   Procedure Laterality Date    CHOLECYSTECTOMY      FINGER SURGERY      left hand     GYNECOLOGIC CRYOSURGERY      HAND SURGERY Left     Phelebitis in left hand  post IV infilitration, had sx for clot removal     LYMPH NODE BIOPSY      of neck    LYMPH NODE DISSECTION Right     cervical    [facility]    Procedure:  SECTION ();   Surgeon: Steph Grier MD;  Location: [facility];  Service: Obstetrics       Family History   Problem Relation Age of Onset    Hypertension Mother     Heart disease Mother     Depression Mother     Coronary artery disease Mother     Hypertension Father     Hyperlipidemia Father     Heart disease Father         MI    Stroke Paternal Grandmother     Cancer Paternal Grandmother         breast    Cancer Paternal Aunt colon    Early death Maternal Uncle     Cancer Maternal Uncle         lymphoma    Early death Maternal Uncle         lymphoma    Birth defects Maternal Uncle     Undescended testes Son     Cystic fibrosis Other     Cystic fibrosis Other     Stroke Maternal Grandfather     Alcohol abuse Maternal Grandfather     Alcohol abuse Brother          Medications have been verified  Objective   /83   Pulse (!) 116   Temp (!) 101 °F (38 3 °C)   Resp 17   Ht 5' 2" (1 575 m)   Wt 72 1 kg (159 lb)   SpO2 100%   BMI 29 08 kg/m²        Physical Exam     Physical Exam   Constitutional: She is oriented to person, place, and time  She appears well-developed and well-nourished  She appears ill  HENT:   Right Ear: Tympanic membrane, external ear and ear canal normal    Left Ear: Tympanic membrane, external ear and ear canal normal    Nose: Mucosal edema and rhinorrhea present  Mouth/Throat: Posterior oropharyngeal erythema present  No posterior oropharyngeal edema  Cardiovascular: Normal rate, regular rhythm and normal heart sounds  Pulmonary/Chest: Effort normal and breath sounds normal  No respiratory distress  She has no rales  Positive for postnasal drip   Neurological: She is alert and oriented to person, place, and time  Skin: Skin is warm and dry  Nursing note and vitals reviewed

## 2019-03-11 LAB — BACTERIA THROAT CULT: NORMAL

## 2019-03-13 ENCOUNTER — ANNUAL EXAM (OUTPATIENT)
Dept: OBGYN CLINIC | Facility: CLINIC | Age: 34
End: 2019-03-13
Payer: COMMERCIAL

## 2019-03-13 VITALS
WEIGHT: 152 LBS | DIASTOLIC BLOOD PRESSURE: 80 MMHG | SYSTOLIC BLOOD PRESSURE: 120 MMHG | HEIGHT: 63 IN | BODY MASS INDEX: 26.93 KG/M2

## 2019-03-13 DIAGNOSIS — Z01.419 ENCNTR FOR GYN EXAM (GENERAL) (ROUTINE) W/O ABN FINDINGS: Primary | ICD-10-CM

## 2019-03-13 PROCEDURE — S0612 ANNUAL GYNECOLOGICAL EXAMINA: HCPCS | Performed by: NURSE PRACTITIONER

## 2019-03-13 RX ORDER — SERTRALINE HYDROCHLORIDE 25 MG/1
25 TABLET, FILM COATED ORAL DAILY
Qty: 30 TABLET | Refills: 1 | Status: SHIPPED | OUTPATIENT
Start: 2019-03-13 | End: 2019-04-05 | Stop reason: SDUPTHER

## 2019-03-13 RX ORDER — PNV NO.95/FERROUS FUM/FOLIC AC 28MG-0.8MG
TABLET ORAL
COMMUNITY
Start: 2018-11-08 | End: 2019-12-02

## 2019-03-13 RX ORDER — METHYLPREDNISOLONE 4 MG/1
TABLET ORAL
COMMUNITY
Start: 2019-03-11 | End: 2019-07-07 | Stop reason: SDUPTHER

## 2019-03-13 RX ORDER — AZITHROMYCIN 250 MG/1
TABLET, FILM COATED ORAL
COMMUNITY
Start: 2019-03-11 | End: 2019-07-18 | Stop reason: ALTCHOICE

## 2019-03-13 NOTE — PROGRESS NOTES
Assessment/Plan   Diagnoses and all orders for this visit:    Encntr for gyn exam (general) (routine) w/o abn findings    Postpartum depression  -     sertraline (ZOLOFT) 25 mg tablet; Take 1 tablet (25 mg total) by mouth daily    Other orders  -     Prenatal Vit-Fe Fumarate-FA (PRENATAL VITAMIN) 27-0 8 MG TABS  -     methylPREDNISolone 4 MG tablet therapy pack  -     cholecalciferol (VITAMIN D3) 1,000 units tablet  -     azithromycin (ZITHROMAX) 250 mg tablet        Discussion    Reviewed with patient normal exam today  Pap deferred today  Normal breast exam today  Discussed techniques to help increase supply  Pumping every 2 hours  Increase hydration as well as some supplements to help  Rx for Zoloft given to help with postpartum depression and anxiety as well as [facility]  Monthly SBEs advised  Vitamin D and Calcim Supplements advised  Exercise most days of the week  Follow with PCP for regular check-ups and blood work  RTO 4 weeks to reassess postpartum depression after starting Zoloft  Siva Steven is a 35 y o  female who presents for annual well woman exam  She is 7 month postpartum  Son has [address]  as well as potential CP (going for MRI)  Has been in and out of hospital and dealing with physician appointment  Following with cardiologist due to right bundle branch block and going for ECHO  [facility] has been very overwhelmed with working full time while caring for her infant and meeting all of his medical needs as well as going to so many appoitnments  Also torn as has older son as well  She feels very anxious and overwhelmed  Does a ton of research on her sons new diagnosis and trying to put the big picture together  She feels as though that is beginning to effect her life  Last exam 5/16/17 Pap Normal HPV negative  Pap guidelines reviewed with patient  Pap deferred today    Pt denies any abnormal vaginal discharge, itching, or odor  Pt in a mutually exclusive relationship () with a male partner and denies the need for STD testing today  Menstrual Cycle:  LMP: 19  First menses since delivery  OB History     G 2 P 2 0 0 2   Contraception: Condoms  Practices monthly SBEs, no breast complaints today  Has noticed a drop in breast milk supply, thinks due to stress and return of menses  Denies any bowel or bladder issues  Pt follows with PCP for regular check-ups and blood work  Review of Systems   All other systems reviewed and are negative  The following portions of the patient's history were reviewed and updated as appropriate: allergies, current medications, past family history, past medical history, past social history, past surgical history and problem list       Past Medical History:   Diagnosis Date    Abnormal Pap smear of cervix     HPV (human papilloma virus) infection     Kidney stone     H/O PYELONEPHRITIS    Migraine     Seasonal allergies     Varicella     POS HX       Past Surgical History:   Procedure Laterality Date    CHOLECYSTECTOMY      FINGER SURGERY      left hand     GYNECOLOGIC CRYOSURGERY      HAND SURGERY Left     Phelebitis in left hand  post IV infilitration, had sx for clot removal     LYMPH NODE BIOPSY      of neck    LYMPH NODE DISSECTION Right     cervical    [facility]  DELIVERY ONLY N/A 2018    Procedure:  SECTION ();   Surgeon: Maile Groves MD;  Location: [facility] ;  Service: Obstetrics       Family History   Problem Relation Age of Onset    Hypertension Mother     Heart disease Mother     Depression Mother     Coronary artery disease Mother     Hypertension Father     Hyperlipidemia Father     Heart disease Father         MI    Stroke Paternal Grandmother     Cancer Paternal Grandmother         breast    Breast cancer Paternal Grandmother     Cancer Paternal Aunt         colon    Early death Maternal Uncle     Cancer Maternal Uncle         lymphoma    Early death Maternal Uncle         lymphoma    Birth defects Maternal Uncle     Undescended testes Son     Cystic fibrosis Other     Cystic fibrosis Other     Stroke Maternal Grandfather     Alcohol abuse Maternal Grandfather     Alcohol abuse Brother        Social History     Socioeconomic History    Marital status: /Civil Union     Spouse name: Not on file    Number of children: Not on file    Years of education: Not on file    Highest education level: Not on file   Occupational History    Not on file   Social Needs    Financial resource strain: Not on file    Food insecurity:     Worry: Not on file     Inability: Not on file    Transportation needs:     Medical: Not on file     Non-medical: Not on file   Tobacco Use    Smoking status: Former Smoker     Packs/day: 0 50     Years: 3 00     Pack years: 1 50     Types: Cigarettes     Last attempt to quit:      Years since quittin 8    Smokeless tobacco: Never Used   Substance and Sexual Activity    Alcohol use: Not Currently     Comment: occasional social  , once with pregnancy/wine    Drug use: No    Sexual activity: Yes     Partners: Male     Birth control/protection: None   Lifestyle    Physical activity:     Days per week: Not on file     Minutes per session: Not on file    Stress: Not on file   Relationships    Social connections:     Talks on phone: Not on file     Gets together: Not on file     Attends [employer] service: Not on file     Active member of club or organization: Not on file     Attends meetings of clubs or organizations: Not on file     Relationship status: Not on file    Intimate partner violence:     Fear of current or ex partner: Not on file     Emotionally abused: Not on file     Physically abused: Not on file     Forced sexual activity: Not on file   Other Topics Concern    Not on file   Social History Narrative    Not on file Current Outpatient Medications:     azithromycin (ZITHROMAX) 250 mg tablet, , Disp: , Rfl:     cetirizine (ZyrTEC) 10 mg tablet, Take 10 mg by mouth daily, Disp: , Rfl:     cholecalciferol (VITAMIN D3) 1,000 units tablet, , Disp: , Rfl:     ibuprofen (MOTRIN) 600 mg tablet, Take 1 tablet (600 mg total) by mouth every 6 (six) hours as needed for mild pain, Disp: 45 tablet, Rfl: 0    methylPREDNISolone 4 MG tablet therapy pack, , Disp: , Rfl:     Prenatal Vit-Fe Fumarate-FA (PRENATAL VITAMIN) 27-0 8 MG TABS, , Disp: , Rfl:     sertraline (ZOLOFT) 25 mg tablet, Take 1 tablet (25 mg total) by mouth daily, Disp: 30 tablet, Rfl: 1    No Known Allergies    Objective   Vitals:    03/13/19 1103   BP: 120/80   Weight: 68 9 kg (152 lb)   Height: 5' 2 5" (1 588 m)     Physical Exam   Constitutional: She is oriented to person, place, and time  She appears well-developed and well-nourished  HENT:   Head: Normocephalic  Neck: Normal range of motion  Neck supple  No tracheal deviation present  No thyromegaly present  Cardiovascular: Normal rate, regular rhythm and normal heart sounds  Pulmonary/Chest: Effort normal and breath sounds normal  Right breast exhibits no inverted nipple, no mass, no nipple discharge, no skin change and no tenderness  Left breast exhibits no inverted nipple, no mass, no nipple discharge, no skin change and no tenderness  No breast tenderness, discharge or bleeding  Breasts are symmetrical    Abdominal: Soft  Bowel sounds are normal  She exhibits no distension and no mass  There is no tenderness  There is no rebound and no guarding  Genitourinary: Vagina normal and uterus normal  No breast tenderness, discharge or bleeding  No labial fusion  There is no rash, tenderness, lesion or injury on the right labia  There is no rash, tenderness, lesion or injury on the left labia  Cervix exhibits no motion tenderness, no discharge and no friability   Right adnexum displays no mass, no tenderness and no fullness  Left adnexum displays no mass, no tenderness and no fullness  Musculoskeletal: Normal range of motion  Neurological: She is alert and oriented to person, place, and time  Skin: Skin is warm and dry  Psychiatric: She has a normal mood and affect   Her behavior is normal  Judgment and thought content normal

## 2019-03-14 ENCOUNTER — OFFICE VISIT (OUTPATIENT)
Dept: BEHAVIORAL/MENTAL HEALTH CLINIC | Facility: CLINIC | Age: 34
End: 2019-03-14
Payer: COMMERCIAL

## 2019-03-14 DIAGNOSIS — F43.22 ADJUSTMENT DISORDER WITH ANXIETY: Primary | ICD-10-CM

## 2019-03-14 PROCEDURE — 90834 PSYTX W PT 45 MINUTES: CPT | Performed by: SOCIAL WORKER

## 2019-03-14 NOTE — PSYCH
Assessment/Plan:      Diagnoses and all orders for this visit:    Adjustment disorder with anxiety          Subjective:     Patient ID: Adalgisa Kay is a 35 y o  female  Mikayla Peraza, 7 months today, 11yo, Garrett Reveles - 8th grader - busy - his Dad lives in [place] - pt lives in [place] - involved at times   Maranda Finders, teacher - elementary art/math/reading -  2yrs on 3/17/19 - together 6yrs - Fab's treats him like a Dad   with Mikayla Peraza - two failed spinal blocks and epidurals - felt couldn't breathe - needed oxygen - unresponsive then put under completely - pt wheeled into OR, Pietro not in room - became urgent - baby brought to nursery - some breathing challenges  Pt feels not given info - "feels resucited"  On paperwork but when asked, she said no CPR needed, just oxygen and assistance to get breathing started right after birth  Started Zoloft 25mg this weekend  Mikayla Peraza is 21lbs now - dx with Tracheal/Bronchial [place] - collapse when he tries to breathe - breathing issues since birth - tested for CF and whooping cough  Multiple ER visits  Hyptonia in trunk - neuro is starting to assess spasity in arms/legs - MRI on  looked for underlying hypoxic injury  Baby hasmlarge head  Had speech/feeding therapy appt due to aspiration risk  Pt feels anxiety related to postpartum and son's medical issues - denies depression prior  Pt pumps milk then bottle feeds - issues with latching - has enough milk  Was puming 80oz now 60oz  Donates milk  Pt works as medical reviewer for [employer] - work Mon-Friday 6am-2:30 but not worked full time since baby was born - employer working with her - exhausted FMLA on maternity leave - pt and boys on her insurance  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 - feels sitter is great    Pt reports appetite ok, losing sleep due to baby - not sleeping through night - she and  switching trying to sleep - uses [place] to monitor baby - just started rolling over, grabbing feet first time - developmental delays - early intervention in home for PT, now OT for speech and feeding added  [facility], [facility], starting 2nd opinion at [facility] - may go to [facility]    Pt feels overwelmed, tearful often - won't take baby a lot of places because he gets sick a lot - recently aunt who is a nurse was going to stay with baby while pt going to [facility] for son's sporting event - had panic attacks in car driving away and leaving him - crying, heart racing then got sick - had fever - swabbed for flu - negative - put on steroids  Pt reports that doctors don;t believe something is sparrow with her son at many appointments  Father passed away suddenly from heart attack when pt ws 14yo - went to counseling  Pt is oldest of 3 kids  Mom in [facility], In laws in [place] - no family close but large group of friends, coworkers that they are close to  Ups and downs with sleeping  Denies hx of mental health issues  Wantes help with anxiety - on edge, stomach hurting, wringing hands, extreme anxiety   On FB group with parents with kids with similar issues  Pt felt that people/specialists didn't think son ws having an issue - she kept going and felt like something an issues - air flow study recently showed his breathing is impacted      Anxiety screening - 16, PHQ-9 - 0      HPI    Review of Systems      Objective: -    Physical Exam  oriented, engaged, sad, full range affect, engaged, good eye contact, denies suicidal/homicidal ideations/osychosis/desire to harm baby, fair insight/judgement -may be impaired due to anxiety about son

## 2019-04-05 RX ORDER — SERTRALINE HYDROCHLORIDE 25 MG/1
25 TABLET, FILM COATED ORAL DAILY
Qty: 30 TABLET | Refills: 0 | Status: SHIPPED | OUTPATIENT
Start: 2019-04-05 | End: 2019-04-26 | Stop reason: SDUPTHER

## 2019-04-29 RX ORDER — SERTRALINE HYDROCHLORIDE 25 MG/1
25 TABLET, FILM COATED ORAL DAILY
Qty: 30 TABLET | Refills: 0 | Status: SHIPPED | OUTPATIENT
Start: 2019-04-29 | End: 2019-05-14 | Stop reason: SDUPTHER

## 2019-05-14 RX ORDER — SERTRALINE HYDROCHLORIDE 25 MG/1
50 TABLET, FILM COATED ORAL DAILY
Qty: 60 TABLET | Refills: 2 | Status: SHIPPED | OUTPATIENT
Start: 2019-05-14 | End: 2019-07-18 | Stop reason: SDUPTHER

## 2019-05-15 ENCOUNTER — TELEPHONE (OUTPATIENT)
Dept: OBGYN CLINIC | Facility: CLINIC | Age: 34
End: 2019-05-15

## 2019-06-19 ENCOUNTER — TELEPHONE (OUTPATIENT)
Dept: BEHAVIORAL/MENTAL HEALTH CLINIC | Facility: CLINIC | Age: 34
End: 2019-06-19

## 2019-07-07 ENCOUNTER — OFFICE VISIT (OUTPATIENT)
Dept: URGENT CARE | Facility: CLINIC | Age: 34
End: 2019-07-07
Payer: COMMERCIAL

## 2019-07-07 VITALS
HEART RATE: 92 BPM | RESPIRATION RATE: 15 BRPM | WEIGHT: 154 LBS | BODY MASS INDEX: 28.34 KG/M2 | SYSTOLIC BLOOD PRESSURE: 118 MMHG | HEIGHT: 62 IN | DIASTOLIC BLOOD PRESSURE: 75 MMHG | OXYGEN SATURATION: 97 % | TEMPERATURE: 98.2 F

## 2019-07-07 DIAGNOSIS — N61.0 ACUTE MASTITIS OF LEFT BREAST: Primary | ICD-10-CM

## 2019-07-07 DIAGNOSIS — G89.29 CHRONIC RIGHT-SIDED LOW BACK PAIN WITH RIGHT-SIDED SCIATICA: ICD-10-CM

## 2019-07-07 DIAGNOSIS — M54.41 CHRONIC RIGHT-SIDED LOW BACK PAIN WITH RIGHT-SIDED SCIATICA: ICD-10-CM

## 2019-07-07 PROCEDURE — 99213 OFFICE O/P EST LOW 20 MIN: CPT | Performed by: NURSE PRACTITIONER

## 2019-07-07 RX ORDER — CEPHALEXIN 500 MG/1
500 CAPSULE ORAL EVERY 12 HOURS SCHEDULED
Qty: 14 CAPSULE | Refills: 0 | Status: SHIPPED | OUTPATIENT
Start: 2019-07-07 | End: 2019-07-14

## 2019-07-07 RX ORDER — PREDNISONE 20 MG/1
20 TABLET ORAL DAILY
Qty: 5 TABLET | Refills: 0 | Status: SHIPPED | OUTPATIENT
Start: 2019-07-07 | End: 2019-07-12

## 2019-07-07 NOTE — PROGRESS NOTES
[facility]        NAME: Jesu Mo is a 29 y o  female  : 1985    MRN: [id number]  DATE: 2019  TIME: 1:59 PM    Assessment and Plan   Acute mastitis of left breast [N61 0]  1  Acute mastitis of left breast  cephalexin (KEFLEX) 500 mg capsule    predniSONE 20 mg tablet   2  Chronic right-sided low back pain with right-sided sciatica  Ambulatory referral to Orthopedic Surgery         Patient Instructions     Discussed with patient to dump her breast milk while on prednisone  Take antibiotic as prescribed for Mastitis    Continue to use ice and heat on back  Tylenol/ibuprofen as needed    Follow up with orthopedics for back as scheduled  Continue with physical therapy    Try to use non-dominant side to let your right side to rest  Follow up with PCP in 3-5 days  Proceed to  ER if symptoms worsen  Chief Complaint     Chief Complaint   Patient presents with    Breast Pain     Pt reports left side breast pain started yesterday and right sided low back pain started approx 3 weeks ago    Back Pain         History of Present Illness       28 y/o breast feed woman present for left breat pain and right low back pain  She has a history with this current baby of mastitis when she began breast feeding, she presents today with similar symptoms  She c/o warmth, redness, pain, and burning to the left breast   She denies any abnormal drainage or puckering of the skin, denies N/V, or fevers  She has not tried any OTC remedies at this time  She also c/o right low back pain that radiates down the back of her leg and ends at her knee  Additional c/o numbness, tingling, and burning  She states this pain has been on going and that she has been attending PT, applying heat, and ice  She has not seen an orthopedic up to this point  She denies any decrease ROM, difficulty ambulating, or bowel/bladder incontinence  She uses tylenol/ibuprofen for the pain with minimal relief          Review of Systems   Review of Systems   Constitutional: Negative for chills and fever  Respiratory: Negative for cough  Cardiovascular: Negative for chest pain and palpitations  Gastrointestinal: Negative for nausea and vomiting  Musculoskeletal: Positive for back pain  Left breast tenderness   Skin: Positive for color change  Neurological: Positive for numbness           Current Medications       Current Outpatient Medications:     azithromycin (ZITHROMAX) 250 mg tablet, , Disp: , Rfl:     cephalexin (KEFLEX) 500 mg capsule, Take 1 capsule (500 mg total) by mouth every 12 (twelve) hours for 7 days, Disp: 14 capsule, Rfl: 0    cetirizine (ZyrTEC) 10 mg tablet, Take 10 mg by mouth daily, Disp: , Rfl:     cholecalciferol (VITAMIN D3) 1,000 units tablet, , Disp: , Rfl:     ibuprofen (MOTRIN) 600 mg tablet, Take 1 tablet (600 mg total) by mouth every 6 (six) hours as needed for mild pain, Disp: 45 tablet, Rfl: 0    predniSONE 20 mg tablet, Take 1 tablet (20 mg total) by mouth daily for 5 days, Disp: 5 tablet, Rfl: 0    Prenatal Vit-Fe Fumarate-FA (PRENATAL VITAMIN) 27-0 8 MG TABS, , Disp: , Rfl:     sertraline (ZOLOFT) 25 mg tablet, Take 2 tablets (50 mg total) by mouth daily, Disp: 60 tablet, Rfl: 2    Current Allergies     Allergies as of 07/07/2019    (No Known Allergies)            The following portions of the patient's history were reviewed and updated as appropriate: allergies, current medications, past family history, past medical history, past social history, past surgical history and problem list      Past Medical History:   Diagnosis Date    Abnormal Pap smear of cervix     HPV (human papilloma virus) infection     Kidney stone 2011    H/O PYELONEPHRITIS    Migraine     Seasonal allergies     Varicella     POS HX       Past Surgical History:   Procedure Laterality Date    CHOLECYSTECTOMY      FINGER SURGERY      left hand     GYNECOLOGIC CRYOSURGERY      HAND SURGERY Left Phelebitis in left hand  post IV infilitration, had sx for clot removal     LYMPH NODE BIOPSY      of neck    LYMPH NODE DISSECTION Right     cervical    [place]  DEL[place]ERY ONLY N/A 2018    Procedure:  SECTION (); Surgeon: Conor Eric MD;  Location: [facility] ;  Service: Obstetrics       Family History   Problem Relation Age of Onset    Hypertension Mother     Heart disease Mother     Depression Mother     Coronary artery disease Mother     Hypertension Father     Hyperlipidemia Father     Heart disease Father         MI    Stroke Paternal Grandmother     Cancer Paternal Grandmother         breast    Breast cancer Paternal Grandmother     Cancer Paternal Aunt         colon    Early death Maternal Uncle     Cancer Maternal Uncle         lymphoma    Early death Maternal Uncle         lymphoma    Birth defects Maternal Uncle     Undescended testes Son     Cystic fibrosis Other     Cystic fibrosis Other     Stroke Maternal Grandfather     Alcohol abuse Maternal Grandfather     Alcohol abuse Brother          Medications have been verified  Objective   /75   Pulse 92   Temp 98 2 °F (36 8 °C)   Resp 15   Ht 5' 2" (1 575 m)   Wt 69 9 kg (154 lb)   SpO2 97%   BMI 28 17 kg/m²        Physical Exam     Physical Exam   Constitutional: Vital signs are normal  She appears well-developed and well-nourished  No distress  Cardiovascular: Normal rate and regular rhythm  Pulmonary/Chest: Effort normal and breath sounds normal    Musculoskeletal:        Lumbar back: She exhibits tenderness, pain and spasm  She exhibits normal range of motion and no bony tenderness  Palpation of spine reveals no tenderness  Right SI joint tender with palpation which causes radiculopathy to the posterior right knee  Leg extension and flexion against resistance is strong  Lower extremities are warm to touch with positive sensation    There is no decrease ROM with flexion or extension of back  There is no bone or skin deformity  Neurological: She is alert  Skin: Skin is warm and dry  There is erythema  The left breast is erythematous, warm, and painful to touch  There is no puckering of the skin, no palpable lumps  There is no adenopathy  There is no drainage from the nipple  Skin is intact  Nursing note and vitals reviewed

## 2019-07-07 NOTE — PATIENT INSTRUCTIONS
Discussed with patient to dump her breast milk while on prednisone    Take antibiotic as prescribed for Mastitis    Continue to use ice and heat on back  Tylenol/ibuprofen as needed    Follow up with orthopedics for back as scheduled  Continue with physical therapy    Try to use non-dominant side to let your right side to rest

## 2019-07-09 ENCOUNTER — TELEPHONE (OUTPATIENT)
Dept: OBGYN CLINIC | Facility: CLINIC | Age: 34
End: 2019-07-09

## 2019-07-09 NOTE — TELEPHONE ENCOUNTER
Spoke with patient who explained infected breast seems to have several clogged ducts in upper portion of breast  Reviewed with patient some techniques to help unclog ducts  Warm compresses before feed or pumping  Massage area of clogged duct during feeding and pumping  Place chin of baby over area of clogged duct as baby will massage while feeding   warm shower and pump or massage area while letting warm water run over  Advised pt to follow up with lactation consultant

## 2019-07-09 NOTE — TELEPHONE ENCOUNTER
T/c from patient states was seen at Urgent care for mastitis  and treated ,but now complaining that opposite breast is not lactating as much    MM CMA

## 2019-07-11 ENCOUNTER — OFFICE VISIT (OUTPATIENT)
Dept: OBGYN CLINIC | Facility: CLINIC | Age: 34
End: 2019-07-11
Payer: COMMERCIAL

## 2019-07-11 VITALS
HEART RATE: 89 BPM | BODY MASS INDEX: 28.34 KG/M2 | HEIGHT: 62 IN | DIASTOLIC BLOOD PRESSURE: 100 MMHG | SYSTOLIC BLOOD PRESSURE: 140 MMHG | WEIGHT: 154 LBS

## 2019-07-11 DIAGNOSIS — M54.41 CHRONIC RIGHT-SIDED LOW BACK PAIN WITH RIGHT-SIDED SCIATICA: ICD-10-CM

## 2019-07-11 DIAGNOSIS — G89.29 CHRONIC RIGHT-SIDED LOW BACK PAIN WITH RIGHT-SIDED SCIATICA: ICD-10-CM

## 2019-07-11 PROCEDURE — 99203 OFFICE O/P NEW LOW 30 MIN: CPT | Performed by: ORTHOPAEDIC SURGERY

## 2019-07-11 NOTE — PROGRESS NOTES
Assessment/Plan:  1  Chronic right-sided low back pain with right-sided sciatica  Ambulatory referral to Orthopedic Surgery    Ambulatory referral to Physical Therapy     Delores Turcios has right-sided low back pain which appears to be muscular in nature  She does not demonstrate any radicular signs on examination today  I would like for her to begin conservative treatment of physical therapy for the next 4-6 weeks  I will see her back in the office for repeat evaluation and consider further imaging with x-ray or MRI if clinically indicated  Subjective:   Perico Dunn is a 29 y o  female who presents for evaluation for low back pain  She states the pain began after she had a child back in August of last year  She states that she had a difficult time getting an epidural at that time  She has had increased discomfort over low back which intermittently radiates down the right leg ever since  She has not tried any conservative treatment other than going to urgent care recently where she was given a course of prednisone  This did help slightly  Today she has pain that is aching and throbbing and painful over the right lumbar paraspinal region  It radiates into the front of the right leg and down into the calf at times  She is not feeling and radiation today  She denies any numbness into her leg  Review of Systems   Constitutional: Negative for chills, fever and unexpected weight change  HENT: Negative for hearing loss, nosebleeds and sore throat  Eyes: Negative for pain, redness and visual disturbance  Respiratory: Negative for cough, shortness of breath and wheezing  Cardiovascular: Negative for chest pain, palpitations and leg swelling  Gastrointestinal: Negative for abdominal pain, nausea and vomiting  Endocrine: Positive for polydipsia  Negative for polyuria  Genitourinary: Negative for dysuria and hematuria  Musculoskeletal:        See HPI   Skin: Negative for rash and wound  Neurological: Positive for dizziness, numbness and headaches  Psychiatric/Behavioral: Negative for decreased concentration and suicidal ideas  The patient is nervous/anxious  Past Medical History:   Diagnosis Date    Abnormal Pap smear of cervix     HPV (human papilloma virus) infection     Kidney stone     H/O PYELONEPHRITIS    Migraine     Seasonal allergies     Varicella     POS HX       Past Surgical History:   Procedure Laterality Date    CHOLECYSTECTOMY      FINGER SURGERY      left hand     GYNECOLOGIC CRYOSURGERY      HAND SURGERY Left     Phelebitis in left hand  post IV infilitration, had sx for clot removal     LYMPH NODE BIOPSY      of neck    LYMPH NODE DISSECTION Right 2011    cervical    [place] N[place]A [place]    Procedure:  SECTION ();   Surgeon: Ambika Castro MD;  Location: [facility];  Service: Obstetrics       Family History   Problem Relation Age of Onset    Hypertension Mother     Heart disease Mother     Depression Mother     Coronary artery disease Mother     Hypertension Father     Hyperlipidemia Father     Heart disease Father         MI    Stroke Paternal Grandmother     Cancer Paternal Grandmother         breast    Breast cancer Paternal Grandmother     Cancer Paternal Aunt         colon    Early death Maternal Uncle     Cancer Maternal Uncle         lymphoma    Early death Maternal Uncle         lymphoma    Birth defects Maternal Uncle     Undescended testes Son     Cystic fibrosis Other     Cystic fibrosis Other     Stroke Maternal Grandfather     Alcohol abuse Maternal Grandfather     Alcohol abuse Brother        Social History     Occupational History    Not on file   Tobacco Use    Smoking status: Former Smoker     Packs/day: 0 50     Years: 3 00     Pack years: 1 50     Types: Cigarettes     Last attempt to quit:      Years since quittin 1    Smokeless tobacco: Never Used   Substance and Sexual Activity    Alcohol use: Not Currently     Comment: occasional social  , once with pregnancy/wine    Drug use: No    Sexual activity: Yes     Partners: Male     Birth control/protection: None         Current Outpatient Medications:     cephalexin (KEFLEX) 500 mg capsule, Take 1 capsule (500 mg total) by mouth every 12 (twelve) hours for 7 days, Disp: 14 capsule, Rfl: 0    cetirizine (ZyrTEC) 10 mg tablet, Take 10 mg by mouth daily, Disp: , Rfl:     cholecalciferol (VITAMIN D3) 1,000 units tablet, , Disp: , Rfl:     ibuprofen (MOTRIN) 600 mg tablet, Take 1 tablet (600 mg total) by mouth every 6 (six) hours as needed for mild pain, Disp: 45 tablet, Rfl: 0    predniSONE 20 mg tablet, Take 1 tablet (20 mg total) by mouth daily for 5 days, Disp: 5 tablet, Rfl: 0    Prenatal Vit-Fe Fumarate-FA (PRENATAL VITAMIN) 27-0 8 MG TABS, , Disp: , Rfl:     sertraline (ZOLOFT) 25 mg tablet, Take 2 tablets (50 mg total) by mouth daily, Disp: 60 tablet, Rfl: 2    azithromycin (ZITHROMAX) 250 mg tablet, , Disp: , Rfl:     No Known Allergies    Objective:  Vitals:    07/11/19 1527   BP: 140/100   Pulse: 89       Back Exam     Tenderness   Back tenderness location: Tenderness to palpation over right lumbar paraspinal region  Muscle Strength   Right Quadriceps:  5/5   Left Quadriceps:  5/5   Right Hamstrings:  5/5   Left Hamstrings:  5/5     Tests   Straight leg raise right: negative  Straight leg raise left: negative    Other   Sensation: normal  Gait: normal   Erythema: no back redness            Physical Exam   Constitutional: She is oriented to person, place, and time  She appears well-developed and well-nourished  HENT:   Head: Normocephalic and atraumatic  Eyes: Pupils are equal, round, and reactive to light  Conjunctivae are normal    Neck: Normal range of motion  Neck supple  Cardiovascular: Normal rate and intact distal pulses  Pulmonary/Chest: Effort normal  No respiratory distress  Musculoskeletal:   As noted in HPI   Neurological: She is alert and oriented to person, place, and time  Skin: Skin is warm and dry  Psychiatric: She has a normal mood and affect  Her behavior is normal    Vitals reviewed

## 2019-07-18 ENCOUNTER — OFFICE VISIT (OUTPATIENT)
Dept: FAMILY MEDICINE CLINIC | Facility: CLINIC | Age: 34
End: 2019-07-18
Payer: COMMERCIAL

## 2019-07-18 VITALS
DIASTOLIC BLOOD PRESSURE: 74 MMHG | WEIGHT: 150 LBS | RESPIRATION RATE: 18 BRPM | SYSTOLIC BLOOD PRESSURE: 116 MMHG | HEIGHT: 63 IN | TEMPERATURE: 97.6 F | BODY MASS INDEX: 26.58 KG/M2 | HEART RATE: 84 BPM

## 2019-07-18 DIAGNOSIS — Z13.6 SCREENING FOR CARDIOVASCULAR CONDITION: ICD-10-CM

## 2019-07-18 DIAGNOSIS — F41.9 ANXIETY: ICD-10-CM

## 2019-07-18 DIAGNOSIS — Z00.00 WELL ADULT EXAM: Primary | ICD-10-CM

## 2019-07-18 DIAGNOSIS — J30.1 SEASONAL ALLERGIC RHINITIS DUE TO POLLEN: ICD-10-CM

## 2019-07-18 PROBLEM — Z3A.39 PREGNANCY WITH 39 COMPLETED WEEKS GESTATION: Status: RESOLVED | Noted: 2018-08-14 | Resolved: 2019-07-18

## 2019-07-18 PROBLEM — O40.3XX0 POLYHYDRAMNIOS IN SINGLETON PREGNANCY IN THIRD TRIMESTER: Status: RESOLVED | Noted: 2018-06-26 | Resolved: 2019-07-18

## 2019-07-18 PROBLEM — Z34.83 PRENATAL CARE, SUBSEQUENT PREGNANCY, THIRD TRIMESTER: Status: RESOLVED | Noted: 2018-06-26 | Resolved: 2019-07-18

## 2019-07-18 PROBLEM — N61.0 MASTITIS: Status: RESOLVED | Noted: 2018-08-24 | Resolved: 2019-07-18

## 2019-07-18 PROBLEM — O99.213 OBESITY AFFECTING PREGNANCY IN THIRD TRIMESTER: Status: RESOLVED | Noted: 2018-06-26 | Resolved: 2019-07-18

## 2019-07-18 PROCEDURE — 99385 PREV VISIT NEW AGE 18-39: CPT | Performed by: FAMILY MEDICINE

## 2019-07-18 RX ORDER — CETIRIZINE HYDROCHLORIDE 10 MG/1
10 TABLET ORAL DAILY
Start: 2019-07-18 | End: 2020-12-22 | Stop reason: SDUPTHER

## 2019-07-18 NOTE — PROGRESS NOTES
FAMILY PRACTICE HEALTH MAINTENANCE OFFICE VISIT  [facility]    NAME: Blanca Land  AGE: 29 y o  SEX: female  : 1985     DATE: 2019    Assessment and Plan     Problem List Items Addressed This Visit        Respiratory    Seasonal allergic rhinitis due to pollen    Relevant Medications    cetirizine (ZyrTEC) 10 mg tablet       Other    Post-partum depression     Pt has an upcoming appt with Barrie Tabor psych         Relevant Medications    sertraline (ZOLOFT) 50 mg tablet    Anxiety    Relevant Medications    sertraline (ZOLOFT) 50 mg tablet    Lactating mother      Other Visit Diagnoses     Well adult exam    -  Primary    Screening for cardiovascular condition        Relevant Orders    Comprehensive metabolic panel    Lipid Panel with Direct LDL reflex            · Patient Counseling:   · Nutrition: Stressed importance of a well balanced diet, moderation of sodium/saturated fat, caloric balance and sufficient intake of fiber  · Exercise: Stressed the importance of regular exercise with a goal of 150 minutes per week  · Dental Health: Discussed daily flossing and brushing and regular dental visits   · Alcohol Use:  Recommended moderation of alcohol intake    · Immunizations reviewed yes  · Discussed benefits of screening yes   BMI Counseling: Body mass index is 26 57 kg/m²  Discussed with patient's BMI with her  The BMI is above average  BMI counseling and education was provided to the patient  Nutrition recommendations include reducing portion sizes         Return for Annual physical         Chief Complaint     Chief Complaint   Patient presents with    Physical Exam     lj       History of Present Illness     Pt is here for the first time for a physical    NO new issues today      Well Adult Physical   Patient here for a comprehensive physical exam       Diet and Physical Activity  Diet: well balanced diet  Exercise: frequently      Depression Screen  PHQ-9 Depression Screening    PHQ-9:    Frequency of the following problems over the past two weeks:       Little interest or pleasure in doing things:  0 - not at all  Feeling down, depressed, or hopeless:  0 - not at all  PHQ-2 Score:  0          General Health  Hearing: Normal:  bilateral  Vision: just for reading  Dental: regular dental visits    Reproductive Health        The following portions of the patient's history were reviewed and updated as appropriate: allergies, current medications, past family history, past medical history, past social history, past surgical history and problem list     Review of Systems     Review of Systems   Constitutional: Negative  Negative for activity change, appetite change, chills, diaphoresis and fatigue  HENT: Negative  Negative for dental problem, ear pain, sinus pressure and sore throat  Eyes: Negative  Negative for photophobia, pain, discharge, redness, itching and visual disturbance  Respiratory: Negative for apnea and chest tightness  Cardiovascular: Negative  Negative for chest pain, palpitations and leg swelling  Gastrointestinal: Negative  Negative for abdominal distention, abdominal pain, constipation and diarrhea  Endocrine: Negative  Negative for cold intolerance and heat intolerance  Genitourinary: Negative  Negative for difficulty urinating and dyspareunia  Musculoskeletal: Negative  Negative for arthralgias and back pain  Skin: Negative  Allergic/Immunologic: Negative for environmental allergies  Neurological: Negative  Negative for dizziness  Psychiatric/Behavioral: Negative  Negative for agitation         Past Medical History     Past Medical History:   Diagnosis Date    Abnormal Pap smear of cervix     HPV (human papilloma virus) infection     Kidney stone 2011    H/O PYELONEPHRITIS    Migraine     Seasonal allergies     Varicella     POS HX       Past Surgical History     Past Surgical History:   Procedure Laterality Date    CHOLECYSTECTOMY      FINGER SURGERY      left hand     GYNECOLOGIC CRYOSURGERY      HAND SURGERY Left     Phelebitis in left hand  post IV infilitration, had sx for clot removal     LYMPH NODE BIOPSY      of neck    LYMPH NODE DISSECTION Right     cervical    NE  DELIVERY ONLY N/A 2018    Procedure:  SECTION ();   Surgeon: Ambika Castro MD;  Location: [facility];  Service: Obstetrics       Social History     Social History     Socioeconomic History    Marital status: /Civil Union     Spouse name: None    Number of children: None    Years of education: None    Highest education level: None   Occupational History    None   Social Needs    Financial resource strain: None    Food insecurity:     Worry: None     Inability: None    Transportation needs:     Medical: None     Non-medical: None   Tobacco Use    Smoking status: Former Smoker     Packs/day: 0 50     Years: 3 00     Pack years: 1 50     Types: Cigarettes     Last attempt to quit:      Years since quittin 2    Smokeless tobacco: Never Used   Substance and Sexual Activity    Alcohol use: Not Currently     Comment: occasional social  , once with pregnancy/wine    Drug use: No    Sexual activity: Yes     Partners: Male     Birth control/protection: None   Lifestyle    Physical activity:     Days per week: None     Minutes per session: None    Stress: None   Relationships    Social connections:     Talks on phone: None     Gets together: None     Attends Denominational service: None     Active member of club or organization: None     Attends meetings of clubs or organizations: None     Relationship status: None    Intimate partner violence:     Fear of current or ex partner: None     Emotionally abused: None     Physically abused: None     Forced sexual activity: None   Other Topics Concern    None   Social History Narrative    None       Family History     Family History   Problem Relation Age of Onset    Hypertension Mother     Heart disease Mother     Depression Mother     Coronary artery disease Mother     Hypertension Father     Hyperlipidemia Father     Heart disease Father         MI    Stroke Paternal Grandmother     Cancer Paternal Grandmother         breast    Breast cancer Paternal Grandmother     Cancer Paternal Aunt         colon    Early death Maternal Uncle     Cancer Maternal Uncle         lymphoma    Early death Maternal Uncle         lymphoma    Birth defects Maternal Uncle     Undescended testes Son     Cystic fibrosis Other     Cystic fibrosis Other     Stroke Maternal Grandfather     Alcohol abuse Maternal Grandfather     Alcohol abuse Brother        Current Medications       Current Outpatient Medications:     cetirizine (ZyrTEC) 10 mg tablet, Take 1 tablet (10 mg total) by mouth daily, Disp: , Rfl:     cholecalciferol (VITAMIN D3) 1,000 units tablet, , Disp: , Rfl:     Prenatal Vit-Fe Fumarate-FA (PRENATAL VITAMIN) 27-0 8 MG TABS, , Disp: , Rfl:     sertraline (ZOLOFT) 50 mg tablet, Take 1 tablet (50 mg total) by mouth daily, Disp: , Rfl:      Allergies     No Known Allergies    Objective     /74   Pulse 84   Temp 97 6 °F (36 4 °C)   Resp 18   Ht 5' 3" (1 6 m)   Wt 68 kg (150 lb)   BMI 26 57 kg/m²      Physical Exam   Constitutional: She appears well-developed and well-nourished  No distress  HENT:   Head: Normocephalic and atraumatic  Right Ear: External ear normal    Left Ear: External ear normal    Nose: Nose normal    Mouth/Throat: Oropharynx is clear and moist  No oropharyngeal exudate  Eyes: Pupils are equal, round, and reactive to light  EOM are normal  Right eye exhibits no discharge  Left eye exhibits no discharge  No scleral icterus  Neck: No thyromegaly present  Cardiovascular: Normal rate and normal heart sounds  No murmur heard  Pulmonary/Chest: Effort normal and breath sounds normal  No respiratory distress   She has no wheezes  Abdominal: Soft  Bowel sounds are normal  She exhibits no distension and no mass  There is no tenderness  There is no rebound and no guarding  Musculoskeletal: Normal range of motion  Neurological: She is alert  She displays normal reflexes  Coordination normal    Skin: Skin is warm and dry  No rash noted  She is not diaphoretic  No erythema  Psychiatric: She has a normal mood and affect  Her behavior is normal    Nursing note and vitals reviewed           Visual Acuity Screening    Right eye Left eye Both eyes   Without correction: 20/20 20/20 20/20   With correction:              Donte Spaulding, [address]

## 2019-07-22 ENCOUNTER — OFFICE VISIT (OUTPATIENT)
Dept: PSYCHIATRY | Facility: CLINIC | Age: 34
End: 2019-07-22
Payer: COMMERCIAL

## 2019-07-22 VITALS
BODY MASS INDEX: 28.05 KG/M2 | HEIGHT: 62 IN | SYSTOLIC BLOOD PRESSURE: 137 MMHG | DIASTOLIC BLOOD PRESSURE: 81 MMHG | HEART RATE: 83 BPM | WEIGHT: 152.4 LBS

## 2019-07-22 DIAGNOSIS — F41.1 GENERALIZED ANXIETY DISORDER: ICD-10-CM

## 2019-07-22 DIAGNOSIS — F43.10 PTSD (POST-TRAUMATIC STRESS DISORDER): ICD-10-CM

## 2019-07-22 DIAGNOSIS — F41.9 ANXIETY: ICD-10-CM

## 2019-07-22 DIAGNOSIS — Z79.899 HIGH RISK MEDICATION USE: ICD-10-CM

## 2019-07-22 PROCEDURE — 96127 BRIEF EMOTIONAL/BEHAV ASSMT: CPT | Performed by: NURSE PRACTITIONER

## 2019-07-22 PROCEDURE — 99205 OFFICE O/P NEW HI 60 MIN: CPT | Performed by: NURSE PRACTITIONER

## 2019-07-22 NOTE — PSYCH
55 [address] [facility]    Name and Date of Birth:  Christian Whaley 29 y o  1985 MRN: [id number]    Date of Visit: 2019    Sources of information:   Information for this evaluation was gathered through direct interview with the patient  Additionally patient is rib being referred by family physician postpartum depression and anxiety  Confidentiality discussion: Limits of confidentiality in cases of safety concerns involving self and others as well as this physician's role as a mandatory  of abuse  They voiced understanding and a desire to continue with the evaluation    Reason for visit:   Chief Complaint   Patient presents with   Ashley Hercules    Depression     HPI     Yesenia Salamanca is a 29 y o  female with an original diagnosis of depression and anxiety  at age 15 after her father  suddenly from heart attack  She presents today for psychiatric evaluation due to depressive symptoms, anxiety symptoms and for psychiatric medication management  Her symptoms first started abruptly 9 months ago, approximately 2 months after her son was born, and progressively worsened over the last several months  Stressors preceding this visit include family issues (brother is in [facility], mother and sister do not speak, the patient speaks with her mother daily and is pulled in to the stressors of the brother being in [facility] and the mother not speaking to her sister), son's illness (tracheobronchomalacia which will be requiring an extensive chest surgery potentially on his 1st birthday 2019, he also has hydrocephalus, microcephaly and global developmental delay), job stress, problems at work, limited support and ongoing anxiety  She states she was originally diagnosed with depression and anxiety at age 15 post father passing away she was treated with intensive counseling/therapy but no pharmacotherapy    She was treated with Wellbutrin for job stress at 1 time for a short period of time many years ago which was effective, she reports it was a situational event which resolved and the medication was stopped  She was most recently started on Zoloft 25 mg PO daily in 2019 when she was becoming increasingly anxious with her son's medical condition and breathing difficulties with his repeated hospitalizations  The OBGYN then increased her Zoloft to 50 mg PO daily, her anxiety symptoms continued and they suggested she be evaluated by psychiatry for continued management of this medication  She reports that she has been waking up with headaches in the morning so she feels that she may be grinding her teeth at night  Primary complaints include DEPRESSIVE SYMPTOMS: sadness, low energy, low motivation, decreased interest, excessive guilt, poor concentration, irritability, difficulty sleeping, fluctuating appetite and ANXIETY SYMPTOMS: daily anxiety symptoms, feeling nervous, worrying daily, poor concentration, feeling agitated, racing thoughts, anxiety in social situations, panic attacks (with fear of additional panic attacks, feeling of a loss of control, palpitations, dizziness, sweating), obsessive thoughts, PTSD symptoms, flashbacks, nightmares  She denies suicidal ideation, intent or plan at present, denies homicidal ideation, intent or plan at present  She denies auditory hallucinations, denies visual hallucinations, denies delusional thinking  She denies any side effects from psychiatric medications  In terms of gladys, the patient endorses no   Symptoms include no symptoms    PRANAY symptoms: excessive worry more days than not for longer than 3 months, difficulty concentrating, fatigue, irritable, restlessness/keyed up and muscle tightness  Panic Disorder symptoms: palpitations/racing heart, sweating, trembling, shortness of breath, nausea/GI distress, dizzy/light headed, depersonalization/derealization, fear of losing control, significant related worry or behavioral changes for 1+ months  Social Anxiety symptoms: no symptoms suggestive of social anxiety  OCD Symptoms: No significant symptoms supportive of OCD  Eating Disorder symptoms: no historical or current eating disorder  no binge eating disorder; no anorexia nervosa  no symptoms of bulimia  In terms of PTSD, the patient endorses exposure to trauma involving:  The day we found out that my son was going to have to have a major open chest surgery so he could continue to be able to breathe   ; intrusive symptoms including (1+): 1- intrusive memories, 2- distressing dreams, 3- dissociation/flashbacks, 4- significant psychological distress with internal/external cues, 5- significant physiological reactions to internal/external cues; avoidance symptoms including (1+): no avoidance symptoms; Negative alterations including (2+): 10- persistent distorted cognitions leading to blame of self/others, 11- persistent negative emotional state, 12- loss of interest in significant activities, 13- feeling detached/estranged from others; hyperarousal symptoms including: 15- irritability/angry outbursts, 17- hypervigilance, 18- exaggerated startle response, 19- problems with concentration, 20- sleep disturbance  Symptoms have been present for greater than 6 months    In terms of psychotic symptoms, the patient reports no psychotic symptoms now or in the past       HPI ROS Appetite Changes and Sleep: fluctuating sleep pattern, interrupted sleep, frequent awakenings, decrease in number of sleep hours (5 hours), normal appetite, decreased energy    Psychiatric Review Of Systems:    Sleep changes: decreased  Appetite changes: no  Weight changes: no  Energy/anergy: decreased  Interest/pleasure/anhedonia: decreased  Somatic symptoms: no  Anxiety/panic: panic attacks, worrying daily  Magaly: no  Guilty/hopeless: no  Self injurious behavior/risky behavior: no  Suicidal ideation: no  Homicidal ideation: no  Auditory hallucinations: no  Visual hallucinations: no  Other hallucinations: no  Delusional thinking: no  Eating disorder history: no  Obsessive/compulsive symptoms: obsessions About son's care, I am hyper vigilant to make sure everything is correct and in order for my son    Review Of Systems:    Mood anxiety, depression and irritability   Behavior cooperative   Thought Content obsessive thoughts   General sleep disturbances   Personality normal   Other Psych Symptoms decreased concentration   Constitutional feeling tired and low energy   ENT negative   Cardiovascular negative   Respiratory negative   Gastrointestinal negative   Genitourinary negative   Musculoskeletal negative   Integumentary negative   Neurological headache and as noted in HPI   Endocrine negative   Other Symptoms none       Past Psychiatric History:     Past Inpatient Psychiatric Treatment:   No history of past inpatient psychiatric admissions  Past Outpatient Psychiatric Treatment:    Age 15 father passed away from MI patient was diagnosed with depression and anxiety at that time-was not tx with meds-had counseling unsure of name    [place] (2019) OBGYN started Zoloft for Post partum  Past Suicide Attempts: no  Past Violent Behavior: no  Past Psychiatric Medication Trials: Zoloft and Wellbutrin    Traumatic History:     Abuse: no history of physical or sexual abuse  Other Traumatic Events: Son's illness and father passed of MI when she was 15years old (did not witness), nightmares, flashbacks     Family Psychiatric History:     Family History   Problem Relation Age of Onset    Hypertension Mother     Heart disease Mother     Depression Mother     Coronary artery disease Mother     Hypertension Father     Hyperlipidemia Father     Heart disease Father         MI    Stroke Paternal Grandmother     Cancer Paternal Grandmother         breast    Breast cancer Paternal Grandmother     Cancer Paternal Aunt colon    Early death Maternal Uncle     Cancer Maternal Uncle         lymphoma    Early death Maternal Uncle         lymphoma    Birth defects Maternal Uncle     Undescended testes Son     Cystic fibrosis Other     Cystic fibrosis Other     Heart failure Maternal Grandmother     Stroke Maternal Grandfather     Alcohol abuse Maternal Grandfather     Alcohol abuse Brother     Drug abuse Brother     Anxiety disorder Sister     Post-traumatic stress disorder Sister     Developmental delay Son     Macrocephaly Son     Hydrocephalus Son        Substance Use History:    Social History     Substance and Sexual Activity   Alcohol Use Not Currently    Comment: occasional social  , once with pregnancy/wine     Social History     Substance and Sexual Activity   Drug Use No       Social History:    Social History     Socioeconomic History    Marital status: /Civil Union     Spouse name: My Koenig Number of children: 2    Years of education: Not on file    Highest education level: Associate degree: academic program   Occupational History    Occupation: medical review coordinator     Comment: [employer]   Social Needs    Financial resource strain: Not very hard    Food insecurity:     Worry: Never true     Inability: Never true    Transportation needs:     Medical: No     Non-medical: No   Tobacco Use    Smoking status: Former Smoker     Packs/day: 0 50     Years: 3 00     Pack years: 1 50     Types: Cigarettes     Last attempt to quit:      Years since quittin 5    Smokeless tobacco: Never Used   Substance and Sexual Activity    Alcohol use: Not Currently     Comment: occasional social  , once with pregnancy/wine    Drug use: No    Sexual activity: Yes     Partners: Male     Birth control/protection: None   Lifestyle    Physical activity:     Days per week: 0 days     Minutes per session: 0 min    Stress: Not on file   Relationships    Social connections:     Talks on phone: More than three times a week     Gets together: Never     Attends [employer] service: Never     Active member of club or organization: No     Attends meetings of clubs or organizations: Never     Relationship status:     Intimate partner violence:     Fear of current or ex partner: No     Emotionally abused: No     Physically abused: No     Forced sexual activity: No   Other Topics Concern    Not on file   Social History Narrative    Not on file       Education level: Associates degree   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[employer] ([employer]  Marital status:  to Nishi Turk [address] 3/2017  Children: Austin Loomis 2006, Chelseasouleymanerodrigo  18  Current Living Situation: the patient currently livesHouse with  Nishi Turk [address], son's Austin Loomis and Mina   Social support: Pietro   Judaism Affiliation: N/A   experience: N/A  Legal history: N/A  Access to Guns: Yes, they are in a locked cabinet  She does not have a gun licence  Past Medical History:    Past Medical History:   Diagnosis Date    Abnormal Pap smear of cervix     Anxiety     Depression     HPV (human papilloma virus) infection     Kidney stone     H/O PYELONEPHRITIS    Migraine     Seasonal allergies     Sleep difficulties     Varicella     POS HX     Past Medical History Pertinent Negatives:   Diagnosis Date Noted    Disease of thyroid gland 2019    Seizures [facility] ) 2019    Self-injurious behavior 2019    Substance abuse ([facility] ) 2019    [facility]     Past Surgical History:   Procedure Laterality Date    CHOLECYSTECTOMY      FINGER SURGERY      left hand     GYNECOLOGIC CRYOSURGERY      HAND SURGERY Left     Phelebitis in left hand  post IV infilitration, had sx for clot removal     LYMPH NODE BIOPSY      of neck    LYMPH NODE DISSECTION Right     cervical    [place]  DELIVERY ONLY N/A 2018    Procedure:  SECTION ();   Surgeon: Maryan Landis MD; [facility];  Service: Obstetrics     No Known Allergies    History Review:     The following portions of the patient's history were reviewed and updated as appropriate: allergies, current medications, past family history, past medical history, past social history, past surgical history and problem list     OBJECTIVE:    Vital signs in last 24 hours:    Vitals:    07/22/19 1340   BP: 137/81   BP Location: Left arm   Patient Position: Sitting   Pulse: 83   Weight: 69 1 kg (152 lb 6 4 oz)   Height: 5' 2" (1 575 m)       Mental Status Evaluation:    Appearance age appropriate, casually dressed   Behavior cooperative, somewhat anxious   Speech normal rate, normal volume, normal pitch   Mood depressed, anxious   Affect slightly constricted   Thought Processes organized, goal directed, linear   Associations intact associations   Thought Content no overt delusions   Perceptual Disturbances: no auditory hallucinations, no visual hallucinations   Abnormal Thoughts  Risk Potential Suicidal ideation - None  Homicidal ideation - None  Potential for aggression - No   Orientation oriented to person, place, time/date and situation   Memory recent and remote memory grossly intact   Consciousness alert and awake   Attention Span Concentration Span attention span and concentration appear shorter than expected for age   Intellect appears to be of average intelligence   Insight intact   Judgement intact   Muscle Strength and  Gait normal muscle strength and normal muscle tone, normal balance, muscle strength and tone were normal, normal gait    Motor Activity no abnormal movements   Language no difficulty naming common objects, no difficulty repeating a phrase, no difficulty writing a sentence   Fund of Knowledge adequate knowledge of current events  adequate fund of knowledge regarding past history  adequate fund of knowledge regarding vocabulary    Pain none   Pain Scale 0       Laboratory Results:   I have personally reviewed all pertinent laboratory/tests results  Most Recent Labs:   Lab Results   Component Value Date    WBC 19 66 (H) 08/15/2018    RBC 3 31 (L) 08/15/2018    HGB 9 8 (L) 08/15/2018    HCT 30 1 (L) 08/15/2018     08/15/2018    RDW 13 3 08/15/2018    NEUTROABS 16 30 (H) 08/15/2018    RPR Non-Reactive 08/14/2018       Assessment/Plan:     Scales:  PHQ-2= 0  PRANAY-7=13  PCL-5 for PTSD=63       Diagnoses and all orders for this visit:    Post-partum depression  -     sertraline (ZOLOFT) 50 mg tablet; Take 1 5 tablets (75 mg total) by mouth daily for 30 days  -     CBC and differential; Future  -     TSH, 3rd generation with T4 reflex; Future  -     HEMOGLOBIN A1C W/ EAG ESTIMATION; Future  -     RPR; Future    Generalized anxiety disorder  -     sertraline (ZOLOFT) 50 mg tablet; Take 1 5 tablets (75 mg total) by mouth daily for 30 days  -     CBC and differential; Future  -     TSH, 3rd generation with T4 reflex; Future  -     HEMOGLOBIN A1C W/ EAG ESTIMATION; Future  -     RPR; Future    PTSD (post-traumatic stress disorder)  -     CBC and differential; Future  -     TSH, 3rd generation with T4 reflex; Future  -     HEMOGLOBIN A1C W/ EAG ESTIMATION; Future  -     RPR; Future    Anxiety    High risk medication use  -     CBC and differential; Future  -     TSH, 3rd generation with T4 reflex; Future  -     HEMOGLOBIN A1C W/ EAG ESTIMATION; Future  -     RPR; Future          Treatment Recommendations:    Anna Mcmanus is a 29year old female who was originally diagnosed with depression and anxiety at age 15 post father passing away she was treated with intensive counseling/therapy but no pharmacotherapy  She was treated with Wellbutrin for job stress at 1 time (for a short period of time many years ago which was effective) she reports it was a situational event which resolved and the medication was stopped  Denies history of sexual abuse, mental abuse, physical abuse, or verbal abuse    She denies history of self-injurious behavior, denies history of head injuries or loss of consciousness  Current situation: symptoms started abruptly 9 months ago, approximately 2 months after her son was born and became progressively worse as he developed significant medical problems  Son Joao's illness= tracheobronchomalacia which will be requiring an extensive chest surgery potentially on his 1st birthday August 14, 2019, he also has hydrocephalus, microcephaly and global developmental delay  This illness has required Khang Galloway to be in the hospital multiple times since 3months of age including multiple trips to [place] for hospitalization and many trips to the emergency department  He has been on antibiotics, requires breathing treatments 4 times daily, and they do not have family support living in the area  She has 1 sister who suffers from anxiety and PTSD and 1 brother who has drug and alcohol addiction who is currently in correction  She does speak with her mother on the phone daily though this adds to her daily stressors as she gets pulled into the family issues because my mom does not speak to my sister and she goes on about my brother in correction and I tell her I can't handle the extra stressed but it just does not seem to matter      She was placed on Zoloft 25 mg p o  Daily and then increase to 50 mg p o  Daily by her obgyn provider and referred to Psychiatry for continued management  She does believe that she may be grinding her teeth at night as she has been waking with headaches some mornings  She is agreeable to start individual counseling with a therapist here at [address] [place] [address] From 50 mg p  O  Daily to 75 mg p o   Daily  to Improve anxiety and depressive symptoms as well as symptoms of PTSD and panic (patient is breast-feeding)    -Medication management every 3 weeks    -Will start individual therapy with [facility] therapist Scheduling will call to set up appointment    -Follows with family physician for medical issues    -Check CBC/diff, CMP, lipid profile, hemoglobin A1C, TSH and RPR before next visit the lipid panel and CMP are already pending in epic by another provider     -Medication regimen discussed in detail today for 30 minutes with Malia  Dosing schedule reviewed    -Patient will call if issues or concerns     -Patient was provided the crisis number for [facility] as well as the suicide prevention hotline    -Patient has been informed of 24 hours and weekend coverage for urgent situations accessed by calling the main clinic phone number  Risks/Benefits/Precautions:      Risks, Benefits And Possible Side Effects Of Medications:    -Risks, benefits, and possible side effects of medications explained to [facility] and she verbalizes understanding and agreement for treatment     -Risks of medications in pregnancy explained to [facility]  She verbalizes understanding and agrees to notify her doctor if she becomes pregnant     -Risks, benefits, and possible side effects of treatment with Zoloft in breast-feeding reviewed with Malia  She understands risks of treatment with this medication in breast-feeding, understands treatment is only relatively safe and agrees to take the above medications  At this time benefits of treatment outweigh risks and she is agreeable       Controlled Medication Discussion:     Not applicable    PATO Joseph

## 2019-07-22 NOTE — BH TREATMENT PLAN
TREATMENT PLAN (Medication Management Only)        [facility]    Name/Date of Birth/MRN:  Aung Bocanegra 34 y o  1985 MRN: [id number]  Date of Treatment Plan: July 22, 2019  Diagnosis/Diagnoses:   No diagnosis found  Strengths/Personal Resources for Self-Care: supportive family, taking medications as prescribed  Area/Areas of need (in own words): anxiety, depression  1  Long Term Goal: maintain acceptable anxiety level  Target Date: 3 months - 10/22/2019  Person/Persons responsible for completion of goal: Malia  2  Short Term Objective (s) - How will we reach this goal?:   A  Provider new recommended medication/dosage changes and/or continue medication(s): Medication changes: I have changed Malia Guerrero's sertraline  I am also having her maintain her Prenatal Vitamin, cholecalciferol, and cetirizine     B   deep breathing exercises 3 sets of 10 per day  C   N/A  Target Date: 3 months - 10/22/2019  Person/Persons Responsible for Completion of Goal: Malia   Progress Towards Goals: initiating treatment  Treatment Modality: medication management every 4 weeks  Review due 90 to 120 days from date of this plan: 3 months - 10/22/2019  Expected length of service: ongoing treatment  My Physician/PA/NP and I have developed this plan together and I agree to work on the goals and objectives  I understand the treatment goals that were developed for my treatment    Signature:       Date and time:  Signature of parent/guardian if under age of 15 years: Date and time:  Signature of provider:      Date and time:  Signature of Supervising Physician:    Date and time: 7/22/2019      Lasha Mitchell

## 2019-07-31 ENCOUNTER — TELEPHONE (OUTPATIENT)
Dept: PSYCHIATRY | Facility: CLINIC | Age: 34
End: 2019-07-31

## 2019-07-31 NOTE — TELEPHONE ENCOUNTER
Spoke with patient, she states she is having increased anxiety with her baby's upcoming surgery  We discussed risks of ordering and anxiolytic medication while breastfeeding and the risks of having that type of medication transfer through breast milk causing sedation to the infant  Patient verbalizes understanding  We discussed that we just increased her dose of Zoloft from 50 mg to 75 mg p o  Q h s  1 week ago  I asked the patient to describe when she is feeling most anxious  She reports that she feels much more relaxed after taking the 75 mg of Zoloft in the evening the patient was instructed to take 50 mg of Zoloft in the evening and 25 mg in the a m  This will still give her a total dose of 75 mg p  O  Daily though it may give her additional relieved throughout the day as she feels the majority of her anxiety during the day  She has a follow-up with me next week and we will re-evaluate  She denies SI/HI, she contracts for safety

## 2019-08-01 ENCOUNTER — APPOINTMENT (OUTPATIENT)
Dept: LAB | Facility: HOSPITAL | Age: 34
End: 2019-08-01
Attending: FAMILY MEDICINE
Payer: COMMERCIAL

## 2019-08-01 ENCOUNTER — TRANSCRIBE ORDERS (OUTPATIENT)
Dept: ADMINISTRATIVE | Facility: HOSPITAL | Age: 34
End: 2019-08-01

## 2019-08-01 DIAGNOSIS — F41.1 GENERALIZED ANXIETY DISORDER: ICD-10-CM

## 2019-08-01 DIAGNOSIS — Z79.899 HIGH RISK MEDICATION USE: ICD-10-CM

## 2019-08-01 DIAGNOSIS — Z13.6 SCREENING FOR CARDIOVASCULAR CONDITION: ICD-10-CM

## 2019-08-01 DIAGNOSIS — F43.10 PTSD (POST-TRAUMATIC STRESS DISORDER): ICD-10-CM

## 2019-08-01 LAB
ALBUMIN SERPL BCP-MCNC: 3.9 G/DL (ref 3.5–5)
ALP SERPL-CCNC: 103 U/L (ref 46–116)
ALT SERPL W P-5'-P-CCNC: 24 U/L (ref 12–78)
ANION GAP SERPL CALCULATED.3IONS-SCNC: 9 MMOL/L (ref 4–13)
AST SERPL W P-5'-P-CCNC: 17 U/L (ref 5–45)
BASOPHILS # BLD AUTO: 0.03 THOUSANDS/ΜL (ref 0–0.1)
BASOPHILS NFR BLD AUTO: 0 % (ref 0–1)
BILIRUB SERPL-MCNC: 0.5 MG/DL (ref 0.2–1)
BUN SERPL-MCNC: 10 MG/DL (ref 5–25)
CALCIUM SERPL-MCNC: 8.8 MG/DL (ref 8.3–10.1)
CHLORIDE SERPL-SCNC: 102 MMOL/L (ref 100–108)
CHOLEST SERPL-MCNC: 172 MG/DL (ref 50–200)
CO2 SERPL-SCNC: 28 MMOL/L (ref 21–32)
CREAT SERPL-MCNC: 0.76 MG/DL (ref 0.6–1.3)
EOSINOPHIL # BLD AUTO: 0.14 THOUSAND/ΜL (ref 0–0.61)
EOSINOPHIL NFR BLD AUTO: 2 % (ref 0–6)
ERYTHROCYTE [DISTWIDTH] IN BLOOD BY AUTOMATED COUNT: 13.5 % (ref 11.6–15.1)
EST. AVERAGE GLUCOSE BLD GHB EST-MCNC: 85 MG/DL
GFR SERPL CREATININE-BSD FRML MDRD: 103 ML/MIN/1.73SQ M
GLUCOSE P FAST SERPL-MCNC: 72 MG/DL (ref 65–99)
HBA1C MFR BLD: 4.6 % (ref 4.2–6.3)
HCT VFR BLD AUTO: 41.4 % (ref 34.8–46.1)
HDLC SERPL-MCNC: 25 MG/DL (ref 40–60)
HGB BLD-MCNC: 13.3 G/DL (ref 11.5–15.4)
IMM GRANULOCYTES # BLD AUTO: 0.01 THOUSAND/UL (ref 0–0.2)
IMM GRANULOCYTES NFR BLD AUTO: 0 % (ref 0–2)
LDLC SERPL CALC-MCNC: 130 MG/DL (ref 0–100)
LYMPHOCYTES # BLD AUTO: 2.78 THOUSANDS/ΜL (ref 0.6–4.47)
LYMPHOCYTES NFR BLD AUTO: 36 % (ref 14–44)
MCH RBC QN AUTO: 29.4 PG (ref 26.8–34.3)
MCHC RBC AUTO-ENTMCNC: 32.1 G/DL (ref 31.4–37.4)
MCV RBC AUTO: 91 FL (ref 82–98)
MONOCYTES # BLD AUTO: 0.42 THOUSAND/ΜL (ref 0.17–1.22)
MONOCYTES NFR BLD AUTO: 5 % (ref 4–12)
NEUTROPHILS # BLD AUTO: 4.42 THOUSANDS/ΜL (ref 1.85–7.62)
NEUTS SEG NFR BLD AUTO: 57 % (ref 43–75)
NRBC BLD AUTO-RTO: 0 /100 WBCS
PLATELET # BLD AUTO: 425 THOUSANDS/UL (ref 149–390)
PMV BLD AUTO: 9.1 FL (ref 8.9–12.7)
POTASSIUM SERPL-SCNC: 4.1 MMOL/L (ref 3.5–5.3)
PROT SERPL-MCNC: 7.3 G/DL (ref 6.4–8.2)
RBC # BLD AUTO: 4.53 MILLION/UL (ref 3.81–5.12)
SODIUM SERPL-SCNC: 139 MMOL/L (ref 136–145)
TRIGL SERPL-MCNC: 84 MG/DL
TSH SERPL DL<=0.05 MIU/L-ACNC: 2.12 UIU/ML (ref 0.36–3.74)
WBC # BLD AUTO: 7.8 THOUSAND/UL (ref 4.31–10.16)

## 2019-08-01 PROCEDURE — 86592 SYPHILIS TEST NON-TREP QUAL: CPT

## 2019-08-01 PROCEDURE — 84443 ASSAY THYROID STIM HORMONE: CPT

## 2019-08-01 PROCEDURE — 85025 COMPLETE CBC W/AUTO DIFF WBC: CPT

## 2019-08-01 PROCEDURE — 83036 HEMOGLOBIN GLYCOSYLATED A1C: CPT

## 2019-08-01 PROCEDURE — 80061 LIPID PANEL: CPT

## 2019-08-01 PROCEDURE — 36415 COLL VENOUS BLD VENIPUNCTURE: CPT

## 2019-08-01 PROCEDURE — 80053 COMPREHEN METABOLIC PANEL: CPT

## 2019-08-02 ENCOUNTER — TELEPHONE (OUTPATIENT)
Dept: FAMILY MEDICINE CLINIC | Facility: CLINIC | Age: 34
End: 2019-08-02

## 2019-08-02 DIAGNOSIS — D75.839 THROMBOCYTOSIS: Primary | ICD-10-CM

## 2019-08-02 LAB — RPR SER QL: NORMAL

## 2019-08-07 ENCOUNTER — OFFICE VISIT (OUTPATIENT)
Dept: PSYCHIATRY | Facility: CLINIC | Age: 34
End: 2019-08-07
Payer: COMMERCIAL

## 2019-08-07 DIAGNOSIS — F41.1 GENERALIZED ANXIETY DISORDER: ICD-10-CM

## 2019-08-07 DIAGNOSIS — F43.10 PTSD (POST-TRAUMATIC STRESS DISORDER): Primary | ICD-10-CM

## 2019-08-07 PROCEDURE — 99214 OFFICE O/P EST MOD 30 MIN: CPT | Performed by: NURSE PRACTITIONER

## 2019-08-07 NOTE — PSYCH
MEDICATION MANAGEMENT NOTE        [facility]      Name and Date of Birth:  Jaxon Cotto 29 y o  1985 MRN: [id number]    Date of Visit: August 7, 2019    SUBJECTIVE:    Nimo Abraham is seen today for a follow up for depression, Generalized Anxiety Disorder and PTSD  Reports her mood as horrible  "Patient states the trip to [place] with her soon to be 3year-old son Vanessa Pulliam is coming up on Monday  They are supposed to receive genetic testing back this week to determine certain markers for risks of cancer and other significant anomalies for their infant son  In [place] the patient and her  will find out if there son will undergo a significant thoracic surgery  The patient is currently struggling to determine if she should stop breast-feeding as her current medication dose of Zoloft 75 mg is not completely controlling her anxiety due to significant stressors as she is only on on this for 2 weeks  She does understand that benzodiazepines will cross over into the breast milk, her son is on an apnea monitor and prescribing this type of medication to her would place her son at respiratory risk  We will increase her Zoloft to 100 mg p o  Daily to see if this improves in Zydis symptoms  Patient is aware that we can also increase her Zoloft to a higher dose if necessary  Patient is in the decision-making process whether she wants to stop breast-feeding or not  I have a full freezer of breast milk in the basement      She denies auditory hallucinations, denies visual hallucinations, denies delusional thinking  Nataly SOSA ROS:          ('was' notes: recent => remote)  Medication Side Effects:  Denies    Depression (10 worst): 10    Anxiety (10 worst): 10    Safety concerns (SI, HI, etc): Denies    Sleep: 6 hours broken up due to taking on son frequently as he is on an apnea monitor    Energy: Decreased energy decreased motivation    Appetite: Normal    Weight Change: 154 0 lbs        Review Of Systems:      Constitutional negative   ENT negative   Cardiovascular negative   Respiratory negative   Gastrointestinal negative   Genitourinary negative   Musculoskeletal negative   Integumentary negative   Neurological negative   Endocrine negative   Other Symptoms none       The following portions of the patient's history were reviewed and updated as appropriate: past family history, past medical history, past social history, past surgical history and problem list     Past psychiatric history, past traumatic history, past social history, past family psychiatric history copied from my note dated July 22, 2019  Past Psychiatric History:      Past Inpatient Psychiatric Treatment:   No history of past inpatient psychiatric admissions  Past Outpatient Psychiatric Treatment:    Age 15 father passed away from MI patient was diagnosed with depression and anxiety at that time-was not tx with meds-had counseling unsure of name  Bakari (2019) OBGYN started Zoloft for Post partum  Past Suicide Attempts: no  Past Violent Behavior: no  Past Psychiatric Medication Trials: Zoloft and Wellbutrin     Traumatic History:      Abuse: no history of physical or sexual abuse  Other Traumatic Events: Son's illness and father passed of MI when she was 15years old (did not witness), nightmares, flashbacks      Family Psychiatric History: Mother depression  Sister PTSD  Maternal grandfather alcohol abuse  No family history of suicide or suicide attempt  Social History:  Education level: Associates degree   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[employer] ([employer]  Marital status:  to Nishi [address] 3/2017  Children: Janora Bras 1/6/2006, Todd Kail 8/14/18  Current Living Situation: the patient currently livesHouse with  Nishi [address], son's Saniya Bras and Toddruthie Watkins    Social support: Pietro   Pentecostalism Affiliation: N/A   experience: N/A  Legal history: N/A  Access to Guns: Yes, they are in a locked cabinet  She does not have a gun licence       Past Medical History:    Past Medical History:   Diagnosis Date    Abnormal Pap smear of cervix     Anxiety     Depression     HPV (human papilloma virus) infection     Kidney stone     H/O PYELONEPHRITIS    Migraine     Seasonal allergies     Sleep difficulties     Varicella     POS HX     Past Medical History Pertinent Negatives:   Diagnosis Date Noted    Disease of thyroid gland 2019    Seizures ([facility] ) 2019    Self-injurious behavior 2019    Substance abuse ([facility] ) 2019    Suicide attempt ([facility] ) 2019     Past Surgical History:   Procedure Laterality Date    CHOLECYSTECTOMY      FINGER SURGERY      left hand     GYNECOLOGIC CRYOSURGERY      HAND SURGERY Left     Phelebitis in left hand  post IV infilitration, had sx for clot removal     LYMPH NODE BIOPSY      of neck    LYMPH NODE DISSECTION Right     cervical    AR  DELIVERY ONLY N/A 2018    Procedure:  SECTION ();   Surgeon: Kory Rose MD;  Location: [facility];  Service: Obstetrics     No Known Allergies    Substance Abuse History:    Social History     Substance and Sexual Activity   Alcohol Use Not Currently    Comment: occasional social  , once with pregnancy/wine     Social History     Substance and Sexual Activity   Drug Use No       Social History:    Social History     Socioeconomic History    Marital status: /Civil Union     Spouse name: Gail Khan Number of children: 2    Years of education: Not on file    Highest education level: Associate degree: academic program   Occupational History    Occupation: medical review coordinator     Comment: [employer]   Social Needs    Financial resource strain: Not very hard    Food insecurity:     Worry: Never true     Inability: Never true    Transportation needs:     Medical: No     Non-medical: No   Tobacco Use    Smoking status: Former Smoker     Packs/day: 0 50 Years:  3 00     Pack years: 1 50     Types: Cigarettes     Last attempt to quit: 2013     Years since quittin 6    Smokeless tobacco: Never Used   Substance and Sexual Activity    Alcohol use: Not Currently     Comment: occasional social  , once with pregnancy/wine    Drug use: No    Sexual activity: Yes     Partners: Male     Birth control/protection: None   Lifestyle    Physical activity:     Days per week: 0 days     Minutes per session: 0 min    Stress: Not on file   Relationships    Social connections:     Talks on phone: More than three times a week     Gets together: Never     Attends [employer] service: Never     Active member of club or organization: No     Attends meetings of clubs or organizations: Never     Relationship status:     Intimate partner violence:     Fear of current or ex partner: No     Emotionally abused: No     Physically abused: No     Forced sexual activity: No   Other Topics Concern    Not on file   Social History Narrative    Not on file       Family Psychiatric History:     Family History   Problem Relation Age of Onset    Hypertension Mother     Heart disease Mother     Depression Mother     Coronary artery disease Mother     Hypertension Father     Hyperlipidemia Father     Heart disease Father         MI    Stroke Paternal Grandmother     Cancer Paternal Grandmother         breast    Breast cancer Paternal Grandmother     Cancer Paternal Aunt         colon    Early death Maternal Uncle     Cancer Maternal Uncle         lymphoma    Early death Maternal Uncle         lymphoma    Birth defects Maternal Uncle     Undescended testes Son     Cystic fibrosis Other     Cystic fibrosis Other     Heart failure Maternal Grandmother     Stroke Maternal Grandfather     Alcohol abuse Maternal Grandfather     Alcohol abuse Brother     Drug abuse Brother     Anxiety disorder Sister     Post-traumatic stress disorder Sister     Developmental delay Son     Macrocephaly Son     Hydrocephalus Son        History Review: The following portions of the patient's history were reviewed and updated as appropriate: allergies, current medications, past family history, past medical history, past social history, past surgical history and problem list          OBJECTIVE:     Vital signs in last 24 hours:    Vitals:    08/07/19 1630   BP: 120/82   BP Location: Left arm   Patient Position: Sitting   Pulse: 74   Resp: 16   Weight: 69 9 kg (154 lb)   Height: 5' 2" (1 575 m)       Mental Status Evaluation:    Appearance age appropriate, casually dressed   Behavior cooperative, appears anxious   Speech normal rate, normal volume, normal pitch   Mood depressed, anxious   Affect tearful   Thought Processes organized, goal directed, linear   Associations intact associations   Thought Content no overt delusions   Perceptual Disturbances: no auditory hallucinations, no visual hallucinations   Abnormal Thoughts  Risk Potential Suicidal ideation - None  Homicidal ideation - None  Potential for aggression - No   Orientation oriented to person, place, time/date and situation   Memory recent and remote memory grossly intact   Consciousness alert and awake   Attention Span Concentration Span attention span and concentration are age appropriate   Intellect appears to be of average intelligence   Insight intact   Judgement intact   Muscle Strength and  Gait normal muscle strength and normal muscle tone, normal gait and normal balance   Motor activity no abnormal movements   Language no difficulty naming common objects, no difficulty repeating a phrase, no difficulty writing a sentence   Fund of Knowledge adequate knowledge of current events  adequate fund of knowledge regarding past history  adequate fund of knowledge regarding vocabulary    Pain none   Pain Scale 0       Laboratory Results:   I have personally reviewed all pertinent laboratory/tests results    CBC:   Lab Results   Component Value Date    WBC 7 80 08/01/2019    RBC 4 53 08/01/2019    HGB 13 3 08/01/2019    HCT 41 4 08/01/2019    MCV 91 08/01/2019     (H) 08/01/2019    MCH 29 4 08/01/2019    MCHC 32 1 08/01/2019    RDW 13 5 08/01/2019    MPV 9 1 08/01/2019    NRBC 0 08/01/2019    NEUTROABS 4 42 08/01/2019     CMP:   Lab Results   Component Value Date    K 4 1 08/01/2019     08/01/2019    CO2 28 08/01/2019    BUN 10 08/01/2019    CREATININE 0 76 08/01/2019    CALCIUM 8 8 08/01/2019    AST 17 08/01/2019    ALT 24 08/01/2019    ALKPHOS 103 08/01/2019    EGFR 103 08/01/2019     Lipid Profile:   Lab Results   Component Value Date    HDL 25 (L) 08/01/2019    TRIG 84 08/01/2019    LDLCALC 130 (H) 08/01/2019     Thyroid Studies:   Lab Results   Component Value Date    QGJ3TFYDSSYN 2 119 08/01/2019     RPR:   Lab Results   Component Value Date    RPR Non-Reactive 08/01/2019     EKG   Lab Results   Component Value Date    VENTRATE 85 07/16/2018    ATRIALRATE 85 07/16/2018    PRINT 138 07/16/2018    QRSDINT 96 07/16/2018    QTINT 354 07/16/2018    PAXIS 49 07/16/2018    QRSAXIS 65 07/16/2018    TWAVEAXIS 47 07/16/2018       No results found for this or any previous visit  Confidential Assessment:    Copied from my note dated 07/22/2019  Kerrie Méndez is a 29year old female who was originally diagnosed with depression and anxiety at age 15 post father passing away she was treated with intensive counseling/therapy but no pharmacotherapy  She was treated with Wellbutrin for job stress at 1 time (for a short period of time many years ago which was effective) she reports it was a situational event which resolved and the medication was stopped  Denies history of sexual abuse, mental abuse, physical abuse, or verbal abuse    She denies history of self-injurious behavior, denies history of head injuries or loss of consciousness      Current situation: symptoms started abruptly 9 months ago, approximately 2 months after her son was born and became progressively worse as he developed significant medical problems  Son Joao's illness= tracheobronchomalacia which will be requiring an extensive chest surgery potentially on his 1st birthday August [birth year] 2019, he also has hydrocephalus, microcephaly and global developmental delay  This illness has required Jhonatan Re to be in the hospital multiple times since 3months of age including multiple trips to [place] for hospitalization and many trips to the emergency department  He has been on antibiotics, requires breathing treatments 4 times daily, and they do not have family support living in the area        She has 1 sister who suffers from anxiety and PTSD and 1 brother who has drug and alcohol addiction who is currently in halfway  She does speak with her mother on the phone daily though this adds to her daily stressors as she gets pulled into the family issues because my mom does not speak to my sister and she goes on about my brother in halfway and I tell her I can't handle the extra stressed but it just does not seem to matter       She was placed on Zoloft 25 mg p o  Daily and then increase to 50 mg p o  Daily by her obgyn provider and referred to Psychiatry for continued management  She does believe that she may be grinding her teeth at night as she has been waking with headaches some mornings  Assessment/Plan:       Diagnoses and all orders for this visit:    PTSD (post-traumatic stress disorder)  -     sertraline (ZOLOFT) 100 mg tablet; Take 1 tablet (100 mg total) by mouth daily at bedtime for 90 days    Generalized anxiety disorder  -     sertraline (ZOLOFT) 100 mg tablet; Take 1 tablet (100 mg total) by mouth daily at bedtime for 90 days    Post-partum depression  -     sertraline (ZOLOFT) 100 mg tablet; Take 1 tablet (100 mg total) by mouth daily at bedtime for 90 days          Treatment Recommendations/Precautions/Plan:    Patient has been educated about their diagnosis and treatment modalities   They voiced understanding and agreement with the following plan: Increase Zoloft From 75 mg p o  Q h s  To 100 mg p o  Q h s  to Improve symptoms of depression anxiety and PTSD     Medication management every 4 weeks     Referral for individual psychotherapy    Aware of need to follow up with family physician for medical issues    Medication regimen discussed in detail today for 15 minutes with Malia  Dosing schedule reviewed    -Discussed self monitoring of symptoms, and symptom monitoring tools     -Patient has been informed of 24 hours and weekend coverage for urgent situations accessed by calling the main clinic phone number      -Completed and signed during the session: Not applicable - Treatment Plan not due at this session    Risks/Benefits      Risks, Benefits And Possible Side Effects Of Medications:    Risks, benefits, and possible side effects of medications explained to [facility] and she verbalizes understanding and agreement for treatment  Risks of medications in pregnancy explained to [facility]  She verbalizes understanding and agrees to notify her doctor if she becomes pregnant  Patient education for Zoloft completed including serotonin syndrome, SIADH, worsening depression, suicidality, induction of gladys, GI upset, headaches, activation, sexual side effects, sedation, potential drug interactions, and others  Controlled Medication Discussion:     Not applicable    Psychotherapy Provided:     Individual psychotherapy provided: Yes  Counseling was provided during the session today for 25 minutes  Medications, treatment progress and treatment plan reviewed with Malia  Medication changes discussed with Malia  Medication education provided to [facility]  Recent stressor including son's illness, job stress, recent medication change and ongoing anxiety discussed with Malia     Coping strategies including contacting a hot line, contacting a therapist, reading, reducing excessive guilt, relaxation, relaxed breathing, taking breaks and taking walks reviewed with Malia  Importance of follow up with family physician for medical issues reviewed with [facility]  Reassurance and supportive therapy provided  Crisis/safety plan discussed with PATO Blanca 08/08/19

## 2019-08-08 VITALS
RESPIRATION RATE: 16 BRPM | HEART RATE: 74 BPM | BODY MASS INDEX: 28.34 KG/M2 | SYSTOLIC BLOOD PRESSURE: 120 MMHG | DIASTOLIC BLOOD PRESSURE: 82 MMHG | WEIGHT: 154 LBS | HEIGHT: 62 IN

## 2019-08-08 RX ORDER — SERTRALINE HYDROCHLORIDE 100 MG/1
100 TABLET, FILM COATED ORAL
Qty: 90 TABLET | Refills: 0 | Status: SHIPPED | OUTPATIENT
Start: 2019-08-08 | End: 2019-10-14 | Stop reason: SDUPTHER

## 2019-08-13 ENCOUNTER — TELEPHONE (OUTPATIENT)
Dept: BEHAVIORAL/MENTAL HEALTH CLINIC | Facility: CLINIC | Age: 34
End: 2019-08-13

## 2019-08-28 ENCOUNTER — OFFICE VISIT (OUTPATIENT)
Dept: PSYCHIATRY | Facility: CLINIC | Age: 34
End: 2019-08-28
Payer: COMMERCIAL

## 2019-08-28 VITALS
SYSTOLIC BLOOD PRESSURE: 121 MMHG | HEART RATE: 73 BPM | RESPIRATION RATE: 16 BRPM | BODY MASS INDEX: 27.64 KG/M2 | DIASTOLIC BLOOD PRESSURE: 84 MMHG | HEIGHT: 62 IN | WEIGHT: 150.2 LBS

## 2019-08-28 DIAGNOSIS — F41.1 GENERALIZED ANXIETY DISORDER: ICD-10-CM

## 2019-08-28 DIAGNOSIS — F43.10 PTSD (POST-TRAUMATIC STRESS DISORDER): ICD-10-CM

## 2019-08-28 PROCEDURE — 96127 BRIEF EMOTIONAL/BEHAV ASSMT: CPT | Performed by: NURSE PRACTITIONER

## 2019-08-28 PROCEDURE — 99214 OFFICE O/P EST MOD 30 MIN: CPT | Performed by: NURSE PRACTITIONER

## 2019-08-28 RX ORDER — CLONIDINE HYDROCHLORIDE 0.1 MG/1
TABLET ORAL
Qty: 30 TABLET | Refills: 0 | Status: SHIPPED | OUTPATIENT
Start: 2019-08-28 | End: 2019-09-13 | Stop reason: SDUPTHER

## 2019-08-28 NOTE — PSYCH
MEDICATION MANAGEMENT NOTE        [address] [facility]      Name and Date of Birth:  Ayde Arriola 29 y o  1985 MRN: [id number]    Date of Visit: August 28, 2019    SUBJECTIVE:    Justa Farrell is seen today for a follow up for depression, Generalized Anxiety Disorder and PTSD  Justa Farrell states the visit to [facility] went well overall  Son has [employer] (endo, cardio, nephrology, and opthalmology follow-up appointments) and is missing pieces of chromosome 9  She states there are some issues as he has a G-tube site infection so he is on Keflex  Applying for Medicaid for her son but in [place] it is income based so is there are significant potential financial stressors facing the family  Justa Farrell states she is going through waves of nausea and states, " I am sleeping like shit and I have muscle twitches in my lip and chin which I have never felt before "  Justa Farrell states, "I really do feel much better overall though than the last time I was here, still not great but much better "    She denies auditory hallucinations, denies visual hallucinations, denies delusional thinking  HPI ROS:             ('was' notes: recent => remote)  Medication Side Effects:  twitches in chin and lip   Denies   Depression (10 worst): 8 (Was 10)   Anxiety (10 worst): 9 (Was 10)   Safety concerns (SI, HI, etc): Denies  (Was denies)   Sleep: 6 hours interrupted  Son is now sleeping better, the apnea monitor is not going off as frequently but she states she is still waking frequently to check on [place] her son   (Was 6 hours broken up due to taking on son frequently as he is on an apnea monitor)   Energy: Low energy, low motivation "I do what I have to do" (Was Decreased energy decreased motivation)   Appetite: Decreased appetite  (Was normal)   Weight Change: 150 2lbs 154 0 lbs 5ft 2 in       Review Of Systems:      Constitutional low energy and as noted in HPI   ENT negative   Cardiovascular negative Respiratory negative   Gastrointestinal nausea and as noted in HPI   Genitourinary negative   Musculoskeletal negative   Integumentary negative   Neurological negative   Endocrine negative   Other Symptoms none       The following portions of the patient's history were reviewed and updated as appropriate: past family history, past medical history, past social history, past surgical history and problem list     Past psychiatric history, past traumatic history, past social history, past family psychiatric history copied from my note dated July 22, 2019       Past Psychiatric History:      Past Inpatient Psychiatric Treatment:   No history of past inpatient psychiatric admissions  Past Outpatient Psychiatric Treatment:    Age 15 father passed away from [place] patient was diagnosed with depression and anxiety at that time-was not tx with meds-had counseling unsure of name  Ranjeet Sheth (2019) OBGYN started Zoloft for Post partum  Past Suicide Attempts: no  Past Violent Behavior: no  Past Psychiatric Medication Trials: Zoloft and Wellbutrin     Traumatic History:      Abuse: no history of physical or sexual abuse  Other Traumatic Events: Son's illness and father passed of [place] when she was 15years old (did not witness), nightmares, flashbacks      Family Psychiatric History:      Mother depression  Sister PTSD  Maternal grandfather alcohol abuse  No family history of suicide or suicide attempt  Social History:  Education level: Associates degree   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[employer]  Marital status:  to [facility] [place] 3/2017  Children: Fab 1/6/2006, Margo Vega 8/14/18  Current Living Situation: the patient currently livesHouse with  Yarelis Gonsales, son's Hina and Margo Vega    Social support: Pietro   Zoroastrianism Affiliation: N/A   experience: N/A  Legal history: N/A  Access to Guns: Yes, they are in a locked cabinet   She does not have a gun licence     Past Medical History:    Past Medical History:   Diagnosis Date    Abnormal Pap smear of cervix     Anxiety     Depression     HPV (human papilloma virus) infection     Kidney stone     H/O PYELONEPHRITIS    Migraine     Seasonal allergies     Sleep difficulties     Varicella     POS HX     Past Medical History Pertinent Negatives:   Diagnosis Date Noted    Disease of thyroid gland 2019    Seizures ([facility] ) 2019    Self-injurious behavior 2019    Substance abuse ([facility] ) 2019    Suicide attempt ([facility] ) 2019     Past Surgical History:   Procedure Laterality Date    CHOLECYSTECTOMY      FINGER SURGERY      left hand     GYNECOLOGIC CRYOSURGERY      HAND SURGERY Left     Phelebitis in left hand  post IV infilitration, had sx for clot removal     LYMPH NODE BIOPSY      of neck    LYMPH NODE DISSECTION Right     cervical    HI  DELIVERY ONLY N/A 2018    Procedure:  SECTION ();   Surgeon: Vernon Valadez MD;  Location: [facility];  Service: Obstetrics     No Known Allergies    Substance Abuse History:    Social History     Substance and Sexual Activity   Alcohol Use Not Currently    Comment: occasional social  , once with pregnancy/wine     Social History     Substance and Sexual Activity   Drug Use No       Social History:    Social History     Socioeconomic History    Marital status: /Civil Union     Spouse name: Jeanene Dandy Number of children: 2    Years of education: Not on file    Highest education level: Associate degree: academic program   Occupational History    Occupation: medical review coordinator     Comment: [employer]   Social Needs    Financial resource strain: Not very hard    Food insecurity:     Worry: Never true     Inability: Never true    Transportation needs:     Medical: No     Non-medical: No   Tobacco Use    Smoking status: Former Smoker     Packs/day: 0 50     Years: 3 00     Pack years: 1 50     Types: Cigarettes     Last attempt to quit:  Years since quittin 6    Smokeless tobacco: Never Used   Substance and Sexual Activity    Alcohol use: Not Currently     Comment: occasional social  , once with pregnancy/wine    Drug use: No    Sexual activity: Yes     Partners: Male     Birth control/protection: None   Lifestyle    Physical activity:     Days per week: 0 days     Minutes per session: 0 min    Stress: Not on file   Relationships    Social connections:     Talks on phone: More than three times a week     Gets together: Never     Attends Yarsani service: Never     Active member of club or organization: No     Attends meetings of clubs or organizations: Never     Relationship status:     Intimate partner violence:     Fear of current or ex partner: No     Emotionally abused: No     Physically abused: No     Forced sexual activity: No   Other Topics Concern    Not on file   Social History Narrative    Not on file       Family Psychiatric History:     Family History   Problem Relation Age of Onset    Hypertension Mother     Heart disease Mother     Depression Mother     Coronary artery disease Mother     Hypertension Father     Hyperlipidemia Father     Heart disease Father         MI    Stroke Paternal Grandmother     Cancer Paternal Grandmother         breast    Breast cancer Paternal Grandmother     Cancer Paternal Aunt         colon    Early death Maternal Uncle     Cancer Maternal Uncle         lymphoma    Early death Maternal Uncle         lymphoma    Birth defects Maternal Uncle     Undescended testes Son     Cystic fibrosis Other     Cystic fibrosis Other     Heart failure Maternal Grandmother     Stroke Maternal Grandfather     Alcohol abuse Maternal Grandfather     Alcohol abuse Brother     Drug abuse Brother     Anxiety disorder Sister     Post-traumatic stress disorder Sister     Developmental delay Son     Macrocephaly Son     Hydrocephalus Son        History Review:  The following portions of the patient's history were reviewed and updated as appropriate: allergies, current medications, past family history, past medical history, past social history, past surgical history and problem list          OBJECTIVE:     Vital signs in last 24 hours:    Vitals:    08/28/19 0736   BP: 121/84   BP Location: Left arm   Patient Position: Sitting   Pulse: 73   Resp: 16   Weight: 68 1 kg (150 lb 3 2 oz)   Height: 5' 2" (1 575 m)       Mental Status Evaluation:    Appearance age appropriate, casually dressed   Behavior cooperative, slightly anxious   Speech normal rate, normal volume, normal pitch   Mood depressed, anxious   Affect normal range and intensity, appropriate   Thought Processes organized, goal directed, linear   Associations intact associations   Thought Content no overt delusions   Perceptual Disturbances: no auditory hallucinations, no visual hallucinations   Abnormal Thoughts  Risk Potential Suicidal ideation - None  Homicidal ideation - None  Potential for aggression - No   Orientation oriented to person, place, time/date and situation   Memory recent and remote memory grossly intact   Consciousness alert and awake   Attention Span Concentration Span attention span and concentration are age appropriate   Intellect appears to be of average intelligence   Insight intact and good   Judgement intact and good   Muscle Strength and  Gait normal muscle strength and normal muscle tone, normal gait and normal balance   Motor activity no abnormal movements   Pain none   Pain Scale 0       Laboratory Results:   I have personally reviewed all pertinent laboratory/tests results    Most Recent Labs:   Lab Results   Component Value Date    WBC 7 80 08/01/2019    RBC 4 53 08/01/2019    HGB 13 3 08/01/2019    HCT 41 4 08/01/2019     (H) 08/01/2019    RDW 13 5 08/01/2019    NEUTROABS 4 42 08/01/2019    K 4 1 08/01/2019     08/01/2019    CO2 28 08/01/2019    BUN 10 08/01/2019    CREATININE 0 76 08/01/2019 CALCIUM 8 8 08/01/2019    AST 17 08/01/2019    ALT 24 08/01/2019    ALKPHOS 103 08/01/2019    HDL 25 (L) 08/01/2019    TRIG 84 08/01/2019    LDLCALC 130 (H) 08/01/2019    [id number] 2 119 08/01/2019    RPR Non-Reactive 08/01/2019     CBC:   Lab Results   Component Value Date    WBC 7 80 08/01/2019    RBC 4 53 08/01/2019    HGB 13 3 08/01/2019    HCT 41 4 08/01/2019    MCV 91 08/01/2019     (H) 08/01/2019    MCH 29 4 08/01/2019    MCHC 32 1 08/01/2019    RDW 13 5 08/01/2019    MPV 9 1 08/01/2019    NRBC 0 08/01/2019    NEUTROABS 4 42 08/01/2019     CMP:   Lab Results   Component Value Date    K 4 1 08/01/2019     08/01/2019    CO2 28 08/01/2019    BUN 10 08/01/2019    CREATININE 0 76 08/01/2019    CALCIUM 8 8 08/01/2019    AST 17 08/01/2019    ALT 24 08/01/2019    ALKPHOS 103 08/01/2019    EGFR 103 08/01/2019     Lipid Profile:   Lab Results   Component Value Date    HDL 25 (L) 08/01/2019    TRIG 84 08/01/2019    LDLCALC 130 (H) 08/01/2019     Thyroid Studies:   Lab Results   Component Value Date    MXJ3PVVACZMW 2 119 08/01/2019     RPR:   Lab Results   Component Value Date    RPR Non-Reactive 08/01/2019     Hemoglobin A1C/EST AVG Glucose   Lab Results   Component Value Date    HGBA1C 4 6 08/01/2019    EAG 85 08/01/2019       No results found for this or any previous visit        Confidential Assessment:  Copied from my note dated 07/22/2019  Ruddy Guajardo is a 29year old female who was originally diagnosed with depression and anxiety at age 15 post father passing away she was treated with intensive counseling/therapy but no pharmacotherapy  Tomasa Rodriguez was treated with Wellbutrin for job stress at 1 time (for a short period of time many years ago which was effective) she reports it was a situational event which resolved and the medication was stopped   Denies history of sexual abuse, mental abuse, physical abuse, or verbal abuse  Lorrin [place] denies history of self-injurious behavior, denies history of head injuries or loss of consciousness      Current situation: symptoms started abruptly 9 months ago, approximately 2 months after her son was born and became progressively worse as he developed significant medical problems  Son Joao's illness= tracheobronchomalacia which will be requiring an extensive chest surgery potentially on his 1st birthday August [birth year], 2019, he also has hydrocephalus, microcephaly and global developmental delay    This illness has required Chuy Garcia to be in the hospital multiple times since 3months of age including multiple trips to [place] for hospitalization and many trips to the emergency department  Joseph Saul has been on antibiotics, requires breathing treatments 4 times daily, and they do not have family support living in the area        She has 1 sister who suffers from anxiety and PTSD and 1 brother who has drug and alcohol addiction who is currently in senior care  Thi Garcia does speak with her mother on the phone daily though this adds to her daily stressors as she gets pulled into the family issues because my mom does not speak to my sister and she goes on about my brother in senior care and I tell her I can't handle the extra stressed but it just does not seem to matter       She was placed on Zoloft 25 mg p o  Daily and then increase to 50 mg p o  Daily by her obgyn provider and referred to Psychiatry for continued management  Thi Garcia does believe that she may be grinding her teeth at night as she has been waking with headaches some mornings  Scales:    PRANAY-7=16   was 13 on 7/22/19  PHQ-9-20    Was PHQ-2=0 on 7/22/19      Assessment/Plan:       Diagnoses and all orders for this visit:    Post-partum depression  -     cloNIDine (CATAPRES) 0 1 mg tablet; Take 1/2 tab for 7 days if tolerating may increase to 1 tab (0 1mg)    Generalized anxiety disorder  -     cloNIDine (CATAPRES) 0 1 mg tablet;  Take 1/2 tab for 7 days if tolerating may increase to 1 tab (0 1mg)    PTSD (post-traumatic stress disorder)  -     cloNIDine (CATAPRES) 0 1 mg tablet; Take 1/2 tab for 7 days if tolerating may increase to 1 tab (0 1mg)          Treatment Recommendations/Precautions/Plan:    Patient has been educated about their diagnosis and treatment modalities  They voiced understanding and agreement with the following plan:    -Continue Zoloft 100 mg p o  Daily at bedtime to Improve symptoms of anxiety and depression, discussed that we will hold off on increasing Zoloft at this time due to facial twitch-no other EPS symptoms noted will monitor     -Start Clonidine 0 05 mg p o  Q h s  x7 days and then increase to 0 1 mg p o  Q h s  to improve sleep and decrease irritability/agitation/symptoms of PTSD     -Medication management every 4 weeks     -Will start individual therapy with [facility] therapist Phillip Guillaume appointment October 31, 2019    -Medication regimen discussed in detail today for 15 minutes with Malia  Dosing schedule reviewed, and written dosing instructions provided    -Discussed self monitoring of symptoms, and symptom monitoring tools     -Patient has been informed of 24 hours and weekend coverage for urgent situations accessed by calling the main clinic phone number      -Completed and signed during the session: Not applicable - Treatment Plan not due at this session    Risks/Benefits      Risks, Benefits And Possible Side Effects Of Medications:    Risks, benefits, and possible side effects of medications explained to [facility] and she verbalizes understanding and agreement for treatment  Risks of medications in pregnancy explained to [facility]  She verbalizes understanding and agrees to notify her doctor if she becomes pregnant  Clonidine patient education completed including sedation, headache, dizziness, hypotension/postural hypotension, and risks associated with sudden discontinuation, among others      Patient education for Zoloft completed including serotonin syndrome, SIADH, worsening depression, suicidality, induction of gladys, GI upset, headaches, activation, sexual side effects, sedation, potential drug interactions, and others  Controlled Medication Discussion:     Not applicable    Psychotherapy Provided:     Individual psychotherapy provided: Yes  Counseling was provided during the session today for 15 minutes  Medications, treatment progress and treatment plan reviewed with Malia  Medication changes discussed with Malia  Medication education provided to [facility]  Recent stressor including family issues, son's illness, financial problems, lack of health insurance and ongoing anxiety discussed with Malia  Coping strategies reviewed with Malia  Reassurance and supportive therapy provided  Crisis/safety plan discussed with PATO Wong 08/28/19

## 2019-09-06 ENCOUNTER — APPOINTMENT (OUTPATIENT)
Dept: LAB | Facility: HOSPITAL | Age: 34
End: 2019-09-06
Attending: FAMILY MEDICINE
Payer: COMMERCIAL

## 2019-09-06 ENCOUNTER — TRANSCRIBE ORDERS (OUTPATIENT)
Dept: ADMINISTRATIVE | Facility: HOSPITAL | Age: 34
End: 2019-09-06

## 2019-09-06 DIAGNOSIS — D75.839 THROMBOCYTOSIS: ICD-10-CM

## 2019-09-06 LAB
ERYTHROCYTE [DISTWIDTH] IN BLOOD BY AUTOMATED COUNT: 13.2 % (ref 11.6–15.1)
HCT VFR BLD AUTO: 41.1 % (ref 34.8–46.1)
HGB BLD-MCNC: 13.3 G/DL (ref 11.5–15.4)
MCH RBC QN AUTO: 29.2 PG (ref 26.8–34.3)
MCHC RBC AUTO-ENTMCNC: 32.4 G/DL (ref 31.4–37.4)
MCV RBC AUTO: 90 FL (ref 82–98)
PLATELET # BLD AUTO: 415 THOUSANDS/UL (ref 149–390)
PMV BLD AUTO: 9.3 FL (ref 8.9–12.7)
RBC # BLD AUTO: 4.56 MILLION/UL (ref 3.81–5.12)
WBC # BLD AUTO: 7.82 THOUSAND/UL (ref 4.31–10.16)

## 2019-09-06 PROCEDURE — 85027 COMPLETE CBC AUTOMATED: CPT

## 2019-09-06 PROCEDURE — 36415 COLL VENOUS BLD VENIPUNCTURE: CPT

## 2019-09-09 ENCOUNTER — TELEPHONE (OUTPATIENT)
Dept: BEHAVIORAL/MENTAL HEALTH CLINIC | Facility: CLINIC | Age: 34
End: 2019-09-09

## 2019-09-09 DIAGNOSIS — F32.1 CURRENT MODERATE EPISODE OF MAJOR DEPRESSIVE DISORDER WITHOUT PRIOR EPISODE (HCC): Primary | ICD-10-CM

## 2019-09-09 DIAGNOSIS — F43.10 PTSD (POST-TRAUMATIC STRESS DISORDER): ICD-10-CM

## 2019-09-09 DIAGNOSIS — F41.1 GENERALIZED ANXIETY DISORDER: ICD-10-CM

## 2019-09-09 NOTE — TELEPHONE ENCOUNTER
Patient left message  States she's able to tolerate the clonidine 0 1mgs at night  Would like to know if she can increase the dosage  Please advise

## 2019-09-12 ENCOUNTER — TELEPHONE (OUTPATIENT)
Dept: BEHAVIORAL/MENTAL HEALTH CLINIC | Facility: CLINIC | Age: 34
End: 2019-09-12

## 2019-09-12 NOTE — TELEPHONE ENCOUNTER
I have asked the patient to call me back I do need to speak with her  Please check and see if I am in with a patient when she calls back  I need to check what dosing specifically of the clonidine she is on at this time  I want to see if she went up to the 0 1 mg p  O  Q h s  At this point  If she did and she is tolerating this my plan is to potentially increase her to 0 1 mg 1 tab p o  Q a m  And 2 tabs p o  Q h s   Again I need to speak with her before I give her this specific direction thank you

## 2019-09-12 NOTE — TELEPHONE ENCOUNTER
Patient returned your call  She said she will be going for genetic testing for her son   She will call back if she cancels

## 2019-09-13 PROBLEM — F32.9 MAJOR DEPRESSIVE DISORDER WITH SINGLE EPISODE: Status: ACTIVE | Noted: 2019-09-13

## 2019-09-13 RX ORDER — CLONIDINE HYDROCHLORIDE 0.1 MG/1
TABLET ORAL
Qty: 90 TABLET | Refills: 0 | Status: SHIPPED | OUTPATIENT
Start: 2019-09-13 | End: 2019-10-14 | Stop reason: SDUPTHER

## 2019-09-13 NOTE — TELEPHONE ENCOUNTER
Patient reports that clonidine is helping with anxiety, muscle tension, irritability and is helping her sleep at HS  "I can feel my muscles start to relax " She denies dizziness or any other side effects  We discussed titration up on Clonidine for a total dose of 1 tab 0 1 mg q am and 2 tabs of the 0 1 mg Q PM   Patient will call with any questions or concerns

## 2019-09-26 ENCOUNTER — DOCUMENTATION (OUTPATIENT)
Dept: PSYCHIATRY | Facility: CLINIC | Age: 34
End: 2019-09-26

## 2019-09-26 NOTE — PROGRESS NOTES
Treatment Plan not completed within required time limits due to: Barbara Yates  cancelled appointment  on 9/26/2019  (called at 9:26 to cancel her 10:30 appointment   Her son is in the hospital  She will call back to reschedule )

## 2019-10-14 ENCOUNTER — OFFICE VISIT (OUTPATIENT)
Dept: PSYCHIATRY | Facility: CLINIC | Age: 34
End: 2019-10-14
Payer: COMMERCIAL

## 2019-10-14 VITALS
HEART RATE: 86 BPM | DIASTOLIC BLOOD PRESSURE: 74 MMHG | WEIGHT: 153.5 LBS | RESPIRATION RATE: 16 BRPM | HEIGHT: 62 IN | BODY MASS INDEX: 28.25 KG/M2 | SYSTOLIC BLOOD PRESSURE: 148 MMHG

## 2019-10-14 DIAGNOSIS — F32.1 CURRENT MODERATE EPISODE OF MAJOR DEPRESSIVE DISORDER WITHOUT PRIOR EPISODE (HCC): ICD-10-CM

## 2019-10-14 DIAGNOSIS — F43.10 PTSD (POST-TRAUMATIC STRESS DISORDER): ICD-10-CM

## 2019-10-14 DIAGNOSIS — F41.1 GENERALIZED ANXIETY DISORDER: Primary | ICD-10-CM

## 2019-10-14 PROCEDURE — 90833 PSYTX W PT W E/M 30 MIN: CPT | Performed by: NURSE PRACTITIONER

## 2019-10-14 PROCEDURE — 99214 OFFICE O/P EST MOD 30 MIN: CPT | Performed by: NURSE PRACTITIONER

## 2019-10-14 RX ORDER — CLONIDINE HYDROCHLORIDE 0.1 MG/1
TABLET ORAL
Qty: 90 TABLET | Refills: 0 | Status: SHIPPED | OUTPATIENT
Start: 2019-10-14 | End: 2019-11-18 | Stop reason: SDUPTHER

## 2019-10-14 RX ORDER — BUSPIRONE HYDROCHLORIDE 5 MG/1
5 TABLET ORAL 2 TIMES DAILY
Qty: 60 TABLET | Refills: 1 | Status: SHIPPED | OUTPATIENT
Start: 2019-10-14 | End: 2019-11-25 | Stop reason: SDUPTHER

## 2019-10-14 NOTE — PSYCH
Treatment Plan Tracking    Treatment Plan not completed within required time limits due to:  presenting  with emotional issues that required clinical intervention  She was very anxious and tearful during the appointment  Significant time spent on medication discussion and medication changes as well as reasons for current emotional state  Discussed developing a treatment plan at her  next scheduled appointment and she is agreeable

## 2019-10-14 NOTE — PSYCH
MEDICATION MANAGEMENT NOTE        [address] [facility]      Name and Date of Birth:  Leesa Dietz 29 y o  1985 MRN: [id number]    Date of Visit: October 14, 2019    SUBJECTIVE:    Lenore Anton is seen today for a follow up for depression, Generalized Anxiety Disorder and PTSD  Since our last visit, overall symptoms have been gradually improving  She continues to have anxiety symptoms though her mood overall has been improved  She states her anxiety is related to her son's illness and they are awaiting benefits through the state to be approved  Lenore Anton states she will not be able to return to work right now as her and her  decided she needs to be with her son  She states she is beginning to be able to balance things better in the home related to housework, managing her son's illness, appointments, making meals, etc   Lenore Anton reports 1 stressor as feeling as though she is not able to give her older son as much attention as he requires or deserves  She reports she still struggles with how to prepare all of the items that she needs to take for her younger son if she was like to go see her older son play baseball game  She also reports some struggles with the specialists office understanding her concerns related to her son daily temperatures  She continues to have difficulty with sleep related to fear around her son Joao's breathing patterns, he developed croup shortly after his surgical repair to improve his breathing and he continues to struggle requiring frequent breathing treatments, oxygen monitoring and now tube feedings  She reports she is getting approximately 5 hours of sleep per night which are broken up  We discussed trying melatonin and she is now agreeable  We also discussed the addition of BuSpar 5 mg p o  B i d  To help with her underlying anxiety      Of note she was having some lip twitching when she was taking her Zoloft 100 mg at 1 time, she did break her dose up in to 50 mg in the a m  And 50 mg in the p m  And the lip twitching has resolved  Med Compliance: yes    HPI ROS:             ('was' notes: recent => remote)  Medication Side Effects:  none   Twitches in chin and lip   Depression (10 worst):  6 (Was 8)   Anxiety (10 worst):  8 (Was 9)   Safety concerns (SI, HI, etc):  denies (Was denies)   Sleep: (NM = Nightmares)  5-6 hours per night broken, checking on her son Mario Sonia frequently (Was 6 hours interrupted  Son is now sleeping better, the apnea monitor is not going off as frequently but she states she is still waking frequently to check on Mario Sonia her son )   Energy:  good energy and motivation  (Was Low energy, low motivation "I do what I have to do")   Appetite:  a little decreased (Was decreased appetite)   Weight Change:  153 5 lbs, 5 feet 2 inches 150 2 lb, 5 feet 2     Malia denies any side effects from medications unless noted above    Review Of Systems as noted above  In addition:     Constitutional negative   ENT negative   Cardiovascular negative   Respiratory negative   Gastrointestinal negative   Genitourinary negative   Musculoskeletal negative   Integumentary negative   Neurological negative   Endocrine negative   Other Symptoms none     Pain none   Pain Scale 0     History Review: The following portions of the patient's history were reviewed and documented: allergies, current medications, past family history, past medical history, past social history and problem list      Lab Review: Labs were reviewed and discussed with patient  Laboratory Results:   I have personally reviewed all pertinent laboratory/tests results    Most Recent Labs:   Lab Results   Component Value Date    WBC 7 82 09/06/2019    RBC 4 56 09/06/2019    HGB 13 3 09/06/2019    HCT 41 1 09/06/2019     (H) 09/06/2019    RDW 13 2 09/06/2019    NEUTROABS 4 42 08/01/2019    K 4 1 08/01/2019     08/01/2019    CO2 28 08/01/2019    BUN 10 08/01/2019 CREATININE 0 76 08/01/2019    CALCIUM 8 8 08/01/2019    AST 17 08/01/2019    ALT 24 08/01/2019    ALKPHOS 103 08/01/2019    HDL 25 (L) 08/01/2019    TRIG 84 08/01/2019    LDLCALC 130 (H) 08/01/2019    LHF3FPDIWBUM 2 119 08/01/2019    RPR Non-Reactive 08/01/2019     CBC:   Lab Results   Component Value Date    WBC 7 82 09/06/2019    RBC 4 56 09/06/2019    HGB 13 3 09/06/2019    HCT 41 1 09/06/2019    MCV 90 09/06/2019     (H) 09/06/2019    MCH 29 2 09/06/2019    MCHC 32 4 09/06/2019    RDW 13 2 09/06/2019    MPV 9 3 09/06/2019    NRBC 0 08/01/2019    NEUTROABS 4 42 08/01/2019     BMP:   Lab Results   Component Value Date    K 4 1 08/01/2019     08/01/2019    CO2 28 08/01/2019    BUN 10 08/01/2019    CREATININE 0 76 08/01/2019    CALCIUM 8 8 08/01/2019    EGFR 103 08/01/2019     CMP:   Lab Results   Component Value Date    K 4 1 08/01/2019     08/01/2019    CO2 28 08/01/2019    BUN 10 08/01/2019    CREATININE 0 76 08/01/2019    CALCIUM 8 8 08/01/2019    AST 17 08/01/2019    ALT 24 08/01/2019    ALKPHOS 103 08/01/2019    EGFR 103 08/01/2019     Lipid Profile:   Lab Results   Component Value Date    HDL 25 (L) 08/01/2019    TRIG 84 08/01/2019    LDLCALC 130 (H) 08/01/2019     RPR:   Lab Results   Component Value Date    RPR Non-Reactive 08/01/2019     Hemoglobin A1C/EST AVG Glucose   Lab Results   Component Value Date    HGBA1C 4 6 08/01/2019    EAG 85 08/01/2019     EKG   Lab Results   Component Value Date    VENTRATE 85 07/16/2018    ATRIALRATE 85 07/16/2018    PRINT 138 07/16/2018    QRSDINT 96 07/16/2018    QTINT 354 07/16/2018    PAXIS 49 07/16/2018    QRSAXIS 65 07/16/2018    TWAVEAXIS 47 07/16/2018         OBJECTIVE:     Mental Status Evaluation:    Appearance age appropriate, casually dressed   Behavior cooperative, remains anxious, good eye contact   Speech normal rate, normal volume, normal pitch   Mood remains anxious, less depressed   Affect normal range and intensity, appropriate Thought Processes organized, goal directed, linear   Associations intact associations   Thought Content no overt delusions   Perceptual Disturbances: no auditory hallucinations, no visual hallucinations   Abnormal Thoughts  Risk Potential Suicidal ideation - None  Homicidal ideation - None  Potential for aggression - No   Orientation oriented to person, place, time/date and situation   Memory recent and remote memory grossly intact   Consciousness alert and awake   Attention Span Concentration Span attention span and concentration are age appropriate   Intellect appears to be of average intelligence   Insight intact and good   Judgement intact and good   Muscle Strength and  Gait normal muscle strength and normal muscle tone, normal gait and normal balance   Motor Activity no abnormal movements   Language no difficulty naming common objects, no difficulty repeating a phrase, no difficulty writing a sentence   Fund of Knowledge adequate knowledge of current events  adequate fund of knowledge regarding past history  adequate fund of knowledge regarding vocabulary    Pain none   Pain Scale 0       Risks, Benefits And Possible Side Effects Of Medications:    AGREE: Risks, benefits, and possible side effects of medications explained to Radha and she (or legal representative) verbalizes understanding and agreement for treatment  PREGNANCY: Risks related to Pregnancy or becoming pregnant discussed related to medications and treatment  Patient has agreed to discuss treatment if planning to become pregnant, or if they become pregnant    Patient education on serotonin syndrome symptoms completed due to starting BuSpar while on the Zoloft which included the following:  symptoms routinely occur when starting a new drug or increasing the dose of a drug you're already taking   Signs and symptoms include: agitation,restlessness,confusion,rapid heart rate, high blood pressure, dilated pupils, loss of muscle coordination, muscle twitching or rigidity, heavy sweating, diarrhea, headache, shivering, goose bumps  The patient was also informed that severe serotonin syndrome can be life-threatening and needs to seek immediate medical care, these sign and symptoms include: high fever, seizures, irregular heartbeat, and unconsciousness  The patient verbalized understanding      Controlled Medication Discussion:     Not applicable  ______________________________________________________________    The following portions of the patient's history were reviewed and updated as appropriate: past family history, past medical history, past social history, past surgical history and problem list     Past psychiatric history, past traumatic history, past social history, past family psychiatric history copied from my note dated July 22, 2019       Past Psychiatric History:      Past Inpatient Psychiatric Treatment:   No history of past inpatient psychiatric admissions  Past Outpatient Psychiatric Treatment:    Age 15 father passed away from MI patient was diagnosed with depression and anxiety at that time-was not tx with meds-had counseling unsure of name  David Needs (2019) OBGYN started Zoloft for Post partum  Past Suicide Attempts: no  Past Violent Behavior: no  Past Psychiatric Medication Trials: Zoloft and Wellbutrin     Traumatic History:      Abuse: no history of physical or sexual abuse  Other Traumatic Events: Son's illness and father passed of MI when she was 15years old (did not witness), nightmares, flashbacks      Family Psychiatric History:      Mother depression  Sister PTSD  Maternal grandfather alcohol abuse  No family history of suicide or suicide attempt  Social History:  Education level: Associates degree   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[employer]  Marital status:  to [facility] 3/2017  Children: Fab 1/6/2006, Elishae Drought 8/14/18  Current Living Situation: the patient currently livesHouse with  Yair Harrell, son's Isabel Shaver and Alphia Course  Social support: Pietro   Episcopal Affiliation: N/A   experience: N/A  Legal history: N/A  Access to Guns: Yes, they are in a locked cabinet   She does not have a gun licence     Past Medical History:    Past Medical History:   Diagnosis Date    Abnormal Pap smear of cervix     Anxiety     Depression     HPV (human papilloma virus) infection     Kidney stone     H/O PYELONEPHRITIS    Migraine     Seasonal allergies     Sleep difficulties     Varicella     POS HX     Past Medical History Pertinent Negatives:   Diagnosis Date Noted    Disease of thyroid gland 2019    Seizures [facility] ) 2019    Self-injurious behavior 2019    Substance abuse ([facility] ) 2019    [facility]icide [facility] ([facility] ) 2019     Past Surgical History:   Procedure Laterality Date    CHOLECYSTECTOMY      FINGER SURGERY      left hand     GYNECOLOGIC CRYOSURGERY      HAND SURGERY Left     Phelebitis in left hand  post IV infilitration, had sx for clot removal     LYMPH NODE BIOPSY      of neck    LYMPH NODE DISSECTION Right     cervical    DC  DELIVERY ONLY N/A 2018    Procedure:  SECTION ();   Surgeon: Davon Ribeiro MD;  Location: [facility];  Service: Obstetrics     No Known Allergies    Substance Abuse History:    Social History     Substance and Sexual Activity   Alcohol Use Not Currently    Comment: occasional social  , once with pregnancy/wine     Social History     Substance and Sexual Activity   Drug Use No       Social History:    Social History     Socioeconomic History    Marital status: /Civil Union     Spouse name: Regis Blair Number of children: 2    Years of education: Not on file    Highest education level: Associate degree: academic program   Occupational History    Occupation: medical review coordinator     Comment: [employer]   Social Needs    Financial resource strain: Not very hard    Food insecurity:     Worry: Never true Inability: Never true    Transportation needs:     Medical: No     Non-medical: No   Tobacco Use    Smoking status: Former Smoker     Packs/day: 0 50     Years: 3 00     Pack years: 1 50     Types: Cigarettes     Last attempt to quit:      Years since quittin 7    Smokeless tobacco: Never Used   Substance and Sexual Activity    Alcohol use: Not Currently     Comment: occasional social  , once with pregnancy/wine    Drug use: No    Sexual activity: Yes     Partners: Male     Birth control/protection: None   Lifestyle    Physical activity:     Days per week: 0 days     Minutes per session: 0 min    Stress: Not on file   Relationships    Social connections:     Talks on phone: More than three times a week     Gets together: Never     Attends Bahai service: Never     Active member of club or organization: No     Attends meetings of clubs or organizations: Never     Relationship status:     Intimate partner violence:     Fear of current or ex partner: No     Emotionally abused: No     Physically abused: No     Forced sexual activity: No   Other Topics Concern    Not on file   Social History Narrative    Not on file       Family Psychiatric History:     Family History   Problem Relation Age of Onset    Hypertension Mother     Heart disease Mother     Depression Mother     Coronary artery disease Mother     Hypertension Father     Hyperlipidemia Father     Heart disease Father         MI    Stroke Paternal Grandmother     Cancer Paternal Grandmother         breast    Breast cancer Paternal Grandmother     Cancer Paternal Aunt         colon    Early death Maternal Uncle     Cancer Maternal Uncle         lymphoma    Early death Maternal Uncle         lymphoma    Birth defects Maternal Uncle     Undescended testes Son     Cystic fibrosis Other     Cystic fibrosis Other     Heart failure Maternal Grandmother     Stroke Maternal Grandfather     Alcohol abuse Maternal Grandfather     Alcohol abuse Brother     Drug abuse Brother     Anxiety disorder Sister     Post-traumatic stress disorder Sister     Developmental delay Son     Macrocephaly Son     Hydrocephalus Son         Vital signs in last 24 hours:    Vitals:    10/14/19 1619   BP: 148/74   BP Location: Right arm   Patient Position: Sitting   Pulse: 86   Resp: 16   Weight: 69 6 kg (153 lb 8 oz)   Height: 5' 2" (1 575 m)       Confidential Assessment:  Copied from my note dated 07/22/2019  Jessica Yarbrough is a 29year old female who was originally diagnosed with depression and anxiety at age 15 post father passing away she was treated with intensive counseling/therapy but no pharmacotherapy  [facility] was treated with Wellbutrin for job stress at 1 time (for a short period of time many years ago which was effective) she reports it was a situational event which resolved and the medication was stopped   Denies history of sexual abuse, mental abuse, physical abuse, or verbal abuse  [facility] denies history of self-injurious behavior, denies history of head injuries or loss of consciousness      Current situation: symptoms started abruptly 9 months ago, approximately 2 months after her son was born and became progressively worse as he developed significant medical problems  Son Joao's illness= tracheobronchomalacia which will be requiring an extensive chest surgery potentially on his 1st birthday August [birth year], 2019, he also has hydrocephalus, microcephaly and global developmental delay    This illness has required Nixon Cyril to be in the hospital multiple times since 3months of age including multiple trips to [place] for hospitalization and many trips to the emergency department  [facility] has been on antibiotics, requires breathing treatments 4 times daily, and they do not have family support living in the area        She has 1 sister who suffers from anxiety and PTSD and 1 brother who has drug and alcohol addiction who is currently in group home  [facility] does speak with her mother on the phone daily though this adds to her daily stressors as she gets pulled into the family issues because my mom does not speak to my sister and she goes on about my brother in [facility] and I tell her I can't handle the extra stressed but it just does not seem to matter       She was placed on Zoloft 25 mg p o  Daily and then increase to 50 mg p o  Daily by her obgyn provider and referred to Psychiatry for continued management  George Mosley does believe that she may be grinding her teeth at night as she has been waking with headaches some mornings  Scales:    PRANAY-7= 7  was 17 on 8/28/19  PHQ-2=0    Was PHQ-9=20 on 8/28/19      Assessment/Plan:       Diagnoses and all orders for this visit:    Generalized anxiety disorder  -     busPIRone (BUSPAR) 5 mg tablet; Take 1 tablet (5 mg total) by mouth 2 (two) times a day  -     cloNIDine (CATAPRES) 0 1 mg tablet; Take 1 tab (0 1 mg) PO Q AM and 2 Tabs (for a total of 0 2 mg) PO Q PM  -     sertraline (ZOLOFT) 50 mg tablet; Take 1 tablet (50 mg total) by mouth 2 (two) times a day    PTSD (post-traumatic stress disorder)  -     busPIRone (BUSPAR) 5 mg tablet; Take 1 tablet (5 mg total) by mouth 2 (two) times a day  -     cloNIDine (CATAPRES) 0 1 mg tablet; Take 1 tab (0 1 mg) PO Q AM and 2 Tabs (for a total of 0 2 mg) PO Q PM  -     sertraline (ZOLOFT) 50 mg tablet; Take 1 tablet (50 mg total) by mouth 2 (two) times a day    Current moderate episode of major depressive disorder without prior episode (HCC)    Post-partum depression  -     cloNIDine (CATAPRES) 0 1 mg tablet; Take 1 tab (0 1 mg) PO Q AM and 2 Tabs (for a total of 0 2 mg) PO Q PM  -     sertraline (ZOLOFT) 50 mg tablet; Take 1 tablet (50 mg total) by mouth 2 (two) times a day          Treatment Recommendations/Precautions/Plan:    Patient has been educated about their diagnosis and treatment modalities   They voiced understanding and agreement with the following plan:    -Change Zoloft 100 mg p o  Daily at bedtime to Zoloft 50 mg p o  Q a m  And 50 mg p o  Q p m  To Improve symptoms of anxiety and depression (adjusting the dosing to this fashion has resolved her EPS facial and lip twitch will continue with the split dosing)  Patient education for Zoloft completed including serotonin syndrome, SIADH, worsening depression, suicidality, induction of gladys, GI upset, headaches, activation, sexual side effects, sedation, potential drug interactions, and others       -Continue Clonidine 0 05 mg p o  1 tab (0 05mg) p o  Q a m  And 2 tabs (0 1 mg) p o  Q p m  to improve sleep and decrease irritability/agitation/symptoms of PTSD  Clonidine patient education completed including sedation, headache, dizziness, hypotension/postural hypotension, and risks associated with sudden discontinuation, among others    -Start BuSpar 5 mg p o  B i d  For continued anxiety symptoms  Buspar patient education completed including serotonin syndrome, rare TD/EPS, dizziness, sedation, GI distress, confusion, possible mood changes, xerostomia and visual disturbances     -Start melatonin 2 mg to 5 mg p o  Q h s  P r n  For insomnia patient will buy this over-the-counter       -Medication management every 6 weeks     -Will start individual therapy with [facility] therapist Jimena Raphael appointment October 31, 2019    -Medication regimen discussed in detail today for 10 minutes with Malia  Dosing schedule reviewed, and written dosing instructions provided    -Discussed self monitoring of symptoms, and symptom monitoring tools     -Patient has been informed of 24 hours and weekend coverage for urgent situations accessed by calling the main clinic phone number      -Completed and signed during the session: Not completed due to complexity of visit, medication discuss in, level of anxiety  Tracking form complete      Psychotherapy Provided:     Individual psychotherapy provided: Yes  Counseling was provided during the session today for 20 minutes  Medications, treatment progress and treatment plan reviewed with Malia  Medication changes discussed with Malia  Medication education provided to Radha  Recent stressor including family issues, not returning to work, son's illness, stressors with MD appointments, financial problems, lack of health insurance and ongoing anxiety discussed with Malia  Coping skills reviewed with Malia  Reassurance and supportive therapy provided  Crisis/safety plan discussed with PATO Church 10/14/19

## 2019-10-16 ENCOUNTER — CLINICAL SUPPORT (OUTPATIENT)
Dept: URGENT CARE | Facility: CLINIC | Age: 34
End: 2019-10-16
Payer: COMMERCIAL

## 2019-10-16 DIAGNOSIS — Z23 ENCOUNTER FOR IMMUNIZATION: Primary | ICD-10-CM

## 2019-10-16 PROCEDURE — 90686 IIV4 VACC NO PRSV 0.5 ML IM: CPT

## 2019-10-16 PROCEDURE — 90471 IMMUNIZATION ADMIN: CPT

## 2019-11-18 DIAGNOSIS — F43.10 PTSD (POST-TRAUMATIC STRESS DISORDER): ICD-10-CM

## 2019-11-18 DIAGNOSIS — Z20.828 EXPOSURE TO INFLUENZA: Primary | ICD-10-CM

## 2019-11-18 DIAGNOSIS — F41.1 GENERALIZED ANXIETY DISORDER: ICD-10-CM

## 2019-11-18 RX ORDER — CLONIDINE HYDROCHLORIDE 0.1 MG/1
TABLET ORAL
Qty: 90 TABLET | Refills: 1 | Status: SHIPPED | OUTPATIENT
Start: 2019-11-18 | End: 2019-12-30 | Stop reason: SDUPTHER

## 2019-11-18 RX ORDER — OSELTAMIVIR PHOSPHATE 75 MG/1
75 CAPSULE ORAL DAILY
Qty: 10 CAPSULE | Refills: 0 | Status: SHIPPED | OUTPATIENT
Start: 2019-11-18 | End: 2019-11-28

## 2019-11-18 NOTE — TELEPHONE ENCOUNTER
Clonidine 0 1 milligram p o  Q a m  And 2 tabs p o  Q h s  ordered with 1 refill sent to [address] [facility]

## 2019-11-25 ENCOUNTER — OFFICE VISIT (OUTPATIENT)
Dept: URGENT CARE | Facility: CLINIC | Age: 34
End: 2019-11-25
Payer: COMMERCIAL

## 2019-11-25 ENCOUNTER — OFFICE VISIT (OUTPATIENT)
Dept: PSYCHIATRY | Facility: CLINIC | Age: 34
End: 2019-11-25
Payer: COMMERCIAL

## 2019-11-25 VITALS
WEIGHT: 161 LBS | RESPIRATION RATE: 18 BRPM | DIASTOLIC BLOOD PRESSURE: 85 MMHG | BODY MASS INDEX: 28.53 KG/M2 | HEIGHT: 63 IN | SYSTOLIC BLOOD PRESSURE: 143 MMHG | HEART RATE: 96 BPM | OXYGEN SATURATION: 96 % | TEMPERATURE: 99.8 F

## 2019-11-25 VITALS
SYSTOLIC BLOOD PRESSURE: 124 MMHG | RESPIRATION RATE: 16 BRPM | HEART RATE: 87 BPM | DIASTOLIC BLOOD PRESSURE: 89 MMHG | BODY MASS INDEX: 29.66 KG/M2 | WEIGHT: 161.2 LBS | HEIGHT: 62 IN

## 2019-11-25 DIAGNOSIS — F43.10 PTSD (POST-TRAUMATIC STRESS DISORDER): ICD-10-CM

## 2019-11-25 DIAGNOSIS — F32.1 CURRENT MODERATE EPISODE OF MAJOR DEPRESSIVE DISORDER WITHOUT PRIOR EPISODE (HCC): ICD-10-CM

## 2019-11-25 DIAGNOSIS — F41.1 GENERALIZED ANXIETY DISORDER: Primary | ICD-10-CM

## 2019-11-25 DIAGNOSIS — J06.9 VIRAL URI: Primary | ICD-10-CM

## 2019-11-25 DIAGNOSIS — F51.01 PRIMARY INSOMNIA: ICD-10-CM

## 2019-11-25 PROCEDURE — 99213 OFFICE O/P EST LOW 20 MIN: CPT | Performed by: PHYSICIAN ASSISTANT

## 2019-11-25 PROCEDURE — 90833 PSYTX W PT W E/M 30 MIN: CPT | Performed by: NURSE PRACTITIONER

## 2019-11-25 PROCEDURE — 99214 OFFICE O/P EST MOD 30 MIN: CPT | Performed by: NURSE PRACTITIONER

## 2019-11-25 RX ORDER — TRAZODONE HYDROCHLORIDE 50 MG/1
25 TABLET ORAL
COMMUNITY
End: 2019-11-25

## 2019-11-25 RX ORDER — SAW/PYGEUM/BETA/HERB/D3/B6/ZN 30 MG-25MG
1 CAPSULE ORAL DAILY
COMMUNITY
End: 2020-07-20 | Stop reason: SDUPTHER

## 2019-11-25 RX ORDER — TRAZODONE HYDROCHLORIDE 50 MG/1
TABLET ORAL
Qty: 30 TABLET | Refills: 0 | Status: SHIPPED | OUTPATIENT
Start: 2019-11-25 | End: 2020-03-12 | Stop reason: SDUPTHER

## 2019-11-25 RX ORDER — M-VIT,TX,IRON,MINS/CALC/FOLIC 27MG-0.4MG
1 TABLET ORAL DAILY
COMMUNITY

## 2019-11-25 RX ORDER — BUSPIRONE HYDROCHLORIDE 7.5 MG/1
7.5 TABLET ORAL 2 TIMES DAILY
Qty: 180 TABLET | Refills: 0 | Status: SHIPPED | OUTPATIENT
Start: 2019-11-25 | End: 2020-03-12 | Stop reason: SDUPTHER

## 2019-11-25 NOTE — PSYCH
MEDICATION MANAGEMENT NOTE        [facility]      Name and Date of Birth:  Carina Case 29 y o  1985 MRN: [id number]    Date of Visit: November 25, 2019    SUBJECTIVE:    Ravindra Lora is seen today for a follow up for depression, Generalized Anxiety Disorder and PTSD  Since our last visit, overall symptoms have been gradually improving  Ravindra Lora arrives in the office today with her infant son Brunilda Dee who has Sotos syndrome  She reports she has resigned from her job since last visit due to the intensity of her son's medical needs and the medical appointments  She has applied for preferred provider program to be the paid caretaker for her son though she will not find out the outcome for this until sometime next month  She also reports another stressor is that her  now has to work 7 days per week since she had to give up her full-time job to help supplement income  The family is having some financial stressors she reports that her in her  are attempting to work as closely as they can as a team, she is picking up extra chores around the home to help alleviate his stressors of working every day  She is attempting to complete self-care which includes 10 minutes of yoga per day and 10-15 minutes of Pilates per day  She also removed all of the soda from the home and she is attempting to make better eating choices/healthy eating choices  She continues to have anxiety related to his sleep disturbances/apnea/cardiac arrhythmias which causes her to have interrupted sleep resulting in approximately 5 hours of sleep per night  We had a long discussions about possible sleep medications  She is currently taking 10 mg of melatonin per night which helps her fall asleep but not stay asleep  She goes to bed when Brunilda Dee goes to bed at 7:00 p m , she wakes at 10:00 p m  to use the bathroom and is able to fall back to sleep immediately until approximately midnight  She states at midnight she wakes fully and is up until approximately 2 in the morning during this time frame she is either on her cellphone looking up information related to her son's illness or she will watch shows on TV  She usually falls back to sleep around 2:00 a m  And Joao weeks anywhere between 230 and 3:00 a m  She states from that time until 6 or 7 in the morning she just gets very minimal naps and that is only if Joao falls back to sleep  She is concerned about trying Ambien due to potential for dependence and risk for next day sleepiness, risk for complex sleep related behavioral changes  We discussed trazodone, we discussed the risk of SIADH in combination with her current medication Zoloft, she states that she would prefer to monitor for SIADH and take this risk than have the other risks of the Ambien or other sleep aids that we discussed  Her most recent sodium level on August 1, 2019 was 139  She was educated on the signs and symptoms for SIADH  Denies auditory/visual hallucinations, denies suicidal or homicidal ideation, denies delusional thinking  Med Compliance: yes    HPI ROS:             ('was' notes: recent => remote)  Medication Side Effects:  denies  none    Depression (10 worst):  2-3 (Was 6)   Anxiety (10 worst):  3-4 (Was 8)   Safety concerns (SI, HI, etc):  denies  (Was denies)   Sleep: (NM = Nightmares)  interrupted  (Was 5-6 hours per night broken, checking on her son Fantasma Paling frequently)   Energy:  good motivation and energy  (Was good energy and motivation )   Appetite:  slightly improved (still can't eat breakfast (Was little decreased)   Weight Change:  161 0 lbs, 5 ft 2 in 153 5 lbs, 5 feet 2 inches     [place]ia denies any side effects from medications unless noted above    Review Of Systems as noted above   In addition:     Constitutional negative   ENT negative   Cardiovascular negative   Respiratory negative   Gastrointestinal negative   Genitourinary negative Musculoskeletal neck pain   Integumentary negative   Neurological negative   Endocrine negative   Other Symptoms all other systems are negative     Pain mild   Pain Scale 4     History Review:  The following portions of the patient's history were reviewed and documented: allergies, current medications, past family history, past medical history, past social history and problem list      Lab Review: No new labs or no relevant labs needing review with patient today      OBJECTIVE:     Mental Status Evaluation:    Appearance age appropriate, casually dressed   Behavior cooperative, calm, good eye contact   Speech normal rate, normal volume, normal pitch   Mood less anxious, less depressed   Affect normal range and intensity, appropriate   Thought Processes organized, goal directed, linear   Associations intact associations   Thought Content no overt delusions   Perceptual Disturbances: no auditory hallucinations, no visual hallucinations   Abnormal Thoughts  Risk Potential Suicidal ideation - None  Homicidal ideation - None  Potential for aggression - No   Orientation oriented to person, place, time/date and situation   Memory recent and remote memory grossly intact   Consciousness alert and awake   Attention Span Concentration Span attention span and concentration are age appropriate   Intellect appears to be of average intelligence   Insight intact   Judgement intact   Muscle Strength and  Gait normal muscle strength and normal muscle tone, normal gait and normal balance   Motor Activity no abnormal movements   Language no difficulty naming common objects, no difficulty repeating a phrase, no difficulty writing a sentence   Fund of Knowledge adequate knowledge of current events  adequate fund of knowledge regarding past history  adequate fund of knowledge regarding vocabulary    Pain moderate   Pain Scale 5       Risks, Benefits And Possible Side Effects Of Medications:    AGREE: Risks, benefits, and possible side effects of medications explained to [facility] and she (or legal representative) verbalizes understanding and agreement for treatment  PREGNANCY: Risks related to Pregnancy or becoming pregnant discussed related to medications and treatment  Patient has agreed to discuss treatment if planning to become pregnant, or if they become pregnant    Controlled Medication Discussion:     Not applicable  ______________________________________________________________      Since our last visit, overall symptoms have been gradually improving  She continues to have anxiety symptoms though her mood overall has been improved  She states her anxiety is related to her son's illness and they are awaiting benefits through the state to be approved  [facility] states she will not be able to return to work right now as her and her  decided she needs to be with her son  She states she is beginning to be able to balance things better in the home related to housework, managing her son's illness, appointments, making meals, etc   [facility] reports 1 stressor as feeling as though she is not able to give her older son as much attention as he requires or deserves  She reports she still struggles with how to prepare all of the items that she needs to take for her younger son if she was like to go see her older son play baseball game  She also reports some struggles with the specialists office understanding her concerns related to her son daily temperatures  She continues to have difficulty with sleep related to fear around her son Joao's breathing patterns, he developed croup shortly after his surgical repair to improve his breathing and he continues to struggle requiring frequent breathing treatments, oxygen monitoring and now tube feedings  She reports she is getting approximately 5 hours of sleep per night which are broken up  We discussed trying melatonin and she is now agreeable    We also discussed the addition of BuSpar 5 mg p o  B i d  To help with her underlying anxiety  Of note she was having some lip twitching when she was taking her Zoloft 100 mg at 1 time, she did break her dose up in to 50 mg in the a m  And 50 mg in the p m  And the lip twitching has resolved  Med Compliance: yes    HPI ROS:             ('was' notes: recent => remote)  Medication Side Effects:  none   Twitches in chin and lip   Depression (10 worst):  6 (Was 8)   Anxiety (10 worst):  8 (Was 9)   Safety concerns (SI, HI, etc):  denies (Was denies)   Sleep: (NM = Nightmares)  5-6 hours per night broken, checking on her son Linward Maribeth frequently (Was 6 hours interrupted  Son is now sleeping better, the apnea monitor is not going off as frequently but she states she is still waking frequently to check on Linward Maribeth her son )   Energy:  good energy and motivation  (Was Low energy, low motivation "I do what I have to do")   Appetite:  a little decreased (Was decreased appetite)   Weight Change:  153 5 lbs, 5 feet 2 inches 150 2 lb, 5 feet 2     Malia denies any side effects from medications unless noted above    Review Of Systems as noted above  In addition:     Constitutional negative   ENT negative   Cardiovascular negative   Respiratory negative   Gastrointestinal negative   Genitourinary negative   Musculoskeletal negative   Integumentary negative   Neurological negative   Endocrine negative   Other Symptoms none     Pain none   Pain Scale 0     History Review: The following portions of the patient's history were reviewed and documented: allergies, current medications, past family history, past medical history, past social history and problem list      Lab Review: Labs were reviewed and discussed with patient  Laboratory Results:   I have personally reviewed all pertinent laboratory/tests results    Most Recent Labs:   Lab Results   Component Value Date    WBC 7 82 09/06/2019    RBC 4 56 09/06/2019    HGB 13 3 09/06/2019    HCT 41 1 09/06/2019     (H) 09/06/2019    RDW 13 2 09/06/2019    NEUTROABS 4 42 08/01/2019    K 4 1 08/01/2019     08/01/2019    CO2 28 08/01/2019    BUN 10 08/01/2019    CREATININE 0 76 08/01/2019    CALCIUM 8 8 08/01/2019    AST 17 08/01/2019    ALT 24 08/01/2019    ALKPHOS 103 08/01/2019    HDL 25 (L) 08/01/2019    TRIG 84 08/01/2019    LDLCALC 130 (H) 08/01/2019    CWW9NEYWIXOF 2 119 08/01/2019    RPR Non-Reactive 08/01/2019     CBC:   Lab Results   Component Value Date    WBC 7 82 09/06/2019    RBC 4 56 09/06/2019    HGB 13 3 09/06/2019    HCT 41 1 09/06/2019    MCV 90 09/06/2019     (H) 09/06/2019    MCH 29 2 09/06/2019    MCHC 32 4 09/06/2019    RDW 13 2 09/06/2019    MPV 9 3 09/06/2019    NRBC 0 08/01/2019    NEUTROABS 4 42 08/01/2019     BMP:   Lab Results   Component Value Date    K 4 1 08/01/2019     08/01/2019    CO2 28 08/01/2019    BUN 10 08/01/2019    CREATININE 0 76 08/01/2019    CALCIUM 8 8 08/01/2019    EGFR 103 08/01/2019     CMP:   Lab Results   Component Value Date    K 4 1 08/01/2019     08/01/2019    CO2 28 08/01/2019    BUN 10 08/01/2019    CREATININE 0 76 08/01/2019    CALCIUM 8 8 08/01/2019    AST 17 08/01/2019    ALT 24 08/01/2019    ALKPHOS 103 08/01/2019    EGFR 103 08/01/2019     Lipid Profile:   Lab Results   Component Value Date    HDL 25 (L) 08/01/2019    TRIG 84 08/01/2019    LDLCALC 130 (H) 08/01/2019     RPR:   Lab Results   Component Value Date    RPR Non-Reactive 08/01/2019     Hemoglobin A1C/EST AVG Glucose   Lab Results   Component Value Date    HGBA1C 4 6 08/01/2019    EAG 85 08/01/2019     EKG   Lab Results   Component Value Date    VENTRATE 85 07/16/2018    ATRIALRATE 85 07/16/2018    PRINT 138 07/16/2018    QRSDINT 96 07/16/2018    QTINT 354 07/16/2018    PAXIS 49 07/16/2018    QRSAXIS 65 07/16/2018    TWAVEAXIS 47 07/16/2018         OBJECTIVE:     Mental Status Evaluation:    Appearance age appropriate, casually dressed   Behavior cooperative, remains anxious, good eye contact   Speech normal rate, normal volume, normal pitch   Mood remains anxious, less depressed   Affect normal range and intensity, appropriate   Thought Processes organized, goal directed, linear   Associations intact associations   Thought Content no overt delusions   Perceptual Disturbances: no auditory hallucinations, no visual hallucinations   Abnormal Thoughts  Risk Potential Suicidal ideation - None  Homicidal ideation - None  Potential for aggression - No   Orientation oriented to person, place, time/date and situation   Memory recent and remote memory grossly intact   Consciousness alert and awake   Attention Span Concentration Span attention span and concentration are age appropriate   Intellect appears to be of average intelligence   Insight intact and good   Judgement intact and good   Muscle Strength and  Gait normal muscle strength and normal muscle tone, normal gait and normal balance   Motor Activity no abnormal movements   Language no difficulty naming common objects, no difficulty repeating a phrase, no difficulty writing a sentence   Fund of Knowledge adequate knowledge of current events  adequate fund of knowledge regarding past history  adequate fund of knowledge regarding vocabulary    Pain none   Pain Scale 0       Risks, Benefits And Possible Side Effects Of Medications:    AGREE: Risks, benefits, and possible side effects of medications explained to [place] and she (or legal representative) verbalizes understanding and agreement for treatment  PREGNANCY: Risks related to Pregnancy or becoming pregnant discussed related to medications and treatment  Patient has agreed to discuss treatment if planning to become pregnant, or if they become pregnant    Patient education on serotonin syndrome symptoms completed due to starting BuSpar while on the Zoloft which included the following:  symptoms routinely occur when starting a new drug or increasing the dose of a drug you're already taking   Signs and symptoms include: agitation,restlessness,confusion,rapid heart rate, high blood pressure, dilated pupils, loss of muscle coordination, muscle twitching or rigidity, heavy sweating, diarrhea, headache, shivering, goose bumps  The patient was also informed that severe serotonin syndrome can be life-threatening and needs to seek immediate medical care, these sign and symptoms include: high fever, seizures, irregular heartbeat, and unconsciousness  The patient verbalized understanding  Patient education for SIADH (on trazodone and Zoloft ) completed which include: Anorexia, nausea, and malaise are early symptoms and may be seen when the serum Na+ level is less than 125 mEq/L  A further decrease in the serum Na+ level can lead to headache, muscle cramps, irritability, drowsiness, confusion/disorientation, weakness, seizures, coma and more  These occur as osmotic fluid shifts result in cerebral edema and increased intracranial pressure        Controlled Medication Discussion:     Not applicable  ______________________________________________________________    The following portions of the patient's history were reviewed and updated as appropriate: past family history, past medical history, past social history, past surgical history and problem list     Past psychiatric history, past traumatic history, past social history, past family psychiatric history copied from my note dated July 22, 2019       Past Psychiatric History:      Past Inpatient Psychiatric Treatment:   No history of past inpatient psychiatric admissions  Past Outpatient Psychiatric Treatment:    Age 15 father passed away from MI patient was diagnosed with depression and anxiety at that time-was not tx with meds-had counseling unsure of name  Abner Melendez (2019) OBGYN started Zoloft for Post partum  Past Suicide Attempts: no  Past Violent Behavior: no  Past Psychiatric Medication Trials: Zoloft and Wellbutrin     Traumatic History:      Abuse: no history of physical or sexual abuse  Other Traumatic Events: Son's illness and father passed of MI when she was 15years old (did not witness), nightmares, flashbacks      Family Psychiatric History:      Mother depression  Sister PTSD  Maternal grandfather alcohol abuse  No family history of suicide or suicide attempt  Social History:  Education level: Associates degree   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[employer])  Marital status:  to Pietro 3/2017  Children: Fab 2006, Watson Grills 18  Current Living Situation: the patient currently livesHouse with  José Cordero, son's Hina and Watson Grills  Social support: Pietro   Sikh Affiliation: N/A   experience: N/A  Legal history: N/A  Access to Guns: Yes, they are in a locked cabinet   She does not have a gun licence     Past Medical History:    Past Medical History:   Diagnosis Date    Abnormal Pap smear of cervix     Anxiety     Depression     HPV (human papilloma virus) infection     Kidney stone     H/O PYELONEPHRITIS    Migraine     Seasonal allergies     Sleep difficulties     Varicella     POS HX     Past Medical History Pertinent Negatives:   Diagnosis Date Noted    Disease of thyroid gland 2019    Seizures ([facility] [address] ) 2019    Self-injurious behavior 2019    Substance abuse ([facility] [address] ) 2019    Suicide attempt ([facility] [address] ) 2019     Past Surgical History:   Procedure Laterality Date    CHOLECYSTECTOMY      FINGER SURGERY      left hand     GYNECOLOGIC CRYOSURGERY      HAND SURGERY Left     Phelebitis in left hand  post IV infilitration, had sx for clot removal     LYMPH NODE BIOPSY      of neck    LYMPH NODE DISSECTION Right     cervical    TX  DELIVERY ONLY N/A 2018    Procedure:  SECTION ();   Surgeon: Octavio Rodriguez MD;  Location: [facility];  Service: Obstetrics     No Known Allergies    Substance Abuse History:    Social History     Substance and Sexual Activity   Alcohol Use Not Currently    Comment: occasional social  , once with pregnancy/wine     Social History     Substance and Sexual Activity   Drug Use No       Social History:    Social History     Socioeconomic History    Marital status: /Civil Union     Spouse name: Dariana Dunn Number of children: 2    Years of education: Not on file    Highest education level: Associate degree: academic program   Occupational History    Occupation: unemployed   Social Needs    Financial resource strain: Not very hard    Food insecurity:     Worry: Never true     Inability: Never true    Transportation needs:     Medical: No     Non-medical: No   Tobacco Use    Smoking status: Former Smoker     Packs/day: 0 50     Years: 3 00     Pack years: 1 50     Types: Cigarettes     Last attempt to quit:      Years since quittin 9    Smokeless tobacco: Never Used   Substance and Sexual Activity    Alcohol use: Not Currently     Comment: occasional social  , once with pregnancy/wine    Drug use: No    Sexual activity: Yes     Partners: Male     Birth control/protection: None   Lifestyle    Physical activity:     Days per week: 0 days     Minutes per session: 0 min    Stress: Not on file   Relationships    Social connections:     Talks on phone: More than three times a week     Gets together: Never     Attends Baptism service: Never     Active member of club or organization: No     Attends meetings of clubs or organizations: Never     Relationship status:     Intimate partner violence:     Fear of current or ex partner: No     Emotionally abused: No     Physically abused: No     Forced sexual activity: No   Other Topics Concern    Not on file   Social History Narrative    Not on file       Family Psychiatric History:     Family History   Problem Relation Age of Onset    Hypertension Mother     Heart disease Mother     Depression Mother     Coronary artery disease Mother     Hypertension Father  Hyperlipidemia Father     Heart disease Father         MI    Stroke Paternal Grandmother     Cancer Paternal Grandmother         breast    Breast cancer Paternal Grandmother     Cancer Paternal Aunt         colon    Early death Maternal Uncle     Cancer Maternal Uncle         lymphoma    Early death Maternal Uncle         lymphoma    Birth defects Maternal Uncle     Undescended testes Son     Cystic fibrosis Other     Cystic fibrosis Other     Heart failure Maternal Grandmother     Stroke Maternal Grandfather     Alcohol abuse Maternal Grandfather     Alcohol abuse Brother     Drug abuse Brother     Anxiety disorder Sister     Post-traumatic stress disorder Sister     Developmental delay Son     Macrocephaly Son     Hydrocephalus Son         Vital signs in last 24 hours:    Vitals:    11/25/19 0744   BP: 124/89   BP Location: Left arm   Patient Position: Sitting   Pulse: 87   Resp: 16   Weight: 73 1 kg (161 lb 3 2 oz)   Height: 5' 2" (1 575 m)       Confidential Assessment:  Copied from my note dated 07/22/2019  Be [place] is a 29year old female who was originally diagnosed with depression and anxiety at age 15 post father passing away she was treated with intensive counseling/therapy but no pharmacotherapy   She was treated with Wellbutrin for job stress at 1 time (for a short period of time many years ago which was effective) she reports it was a situational event which resolved and the medication was stopped   Denies history of sexual abuse, mental abuse, physical abuse, or verbal abuse  Yumi Fitting denies history of self-injurious behavior, denies history of head injuries or loss of consciousness      Current situation: symptoms started abruptly 9 months ago, approximately 2 months after her son was born and became progressively worse as he developed significant medical problems  Son Joao's illness= tracheobronchomalacia which will be requiring an extensive chest surgery potentially on his 4st birthday August 14, 2019, he also has hydrocephalus, microcephaly and global developmental delay    This illness has required Brunilda Dee to be in the hospital multiple times since 3months of age including multiple trips to [place] for hospitalization and many trips to the emergency department  Oniel Garcia has been on antibiotics, requires breathing treatments 4 times daily, and they do not have family support living in the area        She has 1 sister who suffers from anxiety and PTSD and 1 brother who has drug and alcohol addiction who is currently in custodial  Evelina Cutler does speak with her mother on the phone daily though this adds to her daily stressors as she gets pulled into the family issues because my mom does not speak to my sister and she goes on about my brother in custodial and I tell her I can't handle the extra stressed but it just does not seem to matter       She was placed on Zoloft 25 mg p o  Daily and then increase to 50 mg p o  Daily by her obgyn provider and referred to Psychiatry for continued management  Evelina Cutler does believe that she may be grinding her teeth at night as she has been waking with headaches some mornings  Scales:    PHQ-9=5    Was 20 on 8/28/19  PRANAY-7= 7  was 7 on 10/14/19        Assessment/Plan:       Diagnoses and all orders for this visit:    Generalized anxiety disorder  -     busPIRone (BUSPAR) 7 5 mg tablet; Take 1 tablet (7 5 mg total) by mouth 2 (two) times a day  -     sertraline (ZOLOFT) 50 mg tablet; Take 1 tablet (50 mg total) by mouth 2 (two) times a day    PTSD (post-traumatic stress disorder)  -     traZODone (DESYREL) 50 mg tablet; Take 1/2 (25 mg) to 1 (50 mg)  tab po q hs prn  -     busPIRone (BUSPAR) 7 5 mg tablet; Take 1 tablet (7 5 mg total) by mouth 2 (two) times a day  -     sertraline (ZOLOFT) 50 mg tablet;  Take 1 tablet (50 mg total) by mouth 2 (two) times a day    Current moderate episode of major depressive disorder without prior episode ([facility] [address] )    Primary insomnia  -     traZODone (DESYREL) 50 mg tablet; Take 1/2 (25 mg) to 1 (50 mg)  tab po q hs prn    Post-partum depression  -     sertraline (ZOLOFT) 50 mg tablet; Take 1 tablet (50 mg total) by mouth 2 (two) times a day    Other orders  -     therapeutic multivitamin-minerals (THERAGRAN-M) tablet; Take 1 tablet by mouth daily  -     Melatonin ER 10 MG TBCR; Take 1 tablet by mouth daily          Treatment Recommendations/Precautions/Plan:    Patient has been educated about their diagnosis and treatment modalities  They voiced understanding and agreement with the following plan:    -continue Zoloft 50 mg p o  Q a m  And 50 mg p o  Q p m  To Improve symptoms of anxiety and depression (adjusting the dosing to this fashion has resolved her EPS facial and lip twitch will continue with the split dosing)  Patient education for Zoloft completed including serotonin syndrome, SIADH, worsening depression, suicidality, induction of gladys, GI upset, headaches, activation, sexual side effects, sedation, potential drug interactions, and others       -Continue Clonidine 0 05 mg p o  1 tab (0 05mg) p o  Q a m  And 2 tabs (0 1 mg) p o  Q p m  to improve sleep and decrease irritability/agitation/symptoms of PTSD  Clonidine patient education completed including sedation, headache, dizziness, hypotension/postural hypotension, and risks associated with sudden discontinuation, among others    -increase BuSpar from 5 mg b i d  To 7 5 mg p o  B i d  For continued anxiety symptoms  Buspar patient education completed including serotonin syndrome, rare TD/EPS, dizziness, sedation, GI distress, confusion, possible mood changes, xerostomia and visual disturbances     -continue melatonin 10 mg p o  Q h s  P r n  For insomnia patient will buy this over-the-counter  Patient instructed not to use melatonin and trazodone same nights    -start trazodone 25 mg to 50 mg p o  Q h s  P r n  For insomnia    Patient Education for trazodone completed including dizziness, headache, GI distress, sedation, confusion, priapism, suicidal thoughts, serotonin syndrome, drug interactions, induction of gladys and others     -Medication management every 6 weeks     -patient unable to start individual therapy with [facility] therapist Oz Knight appointment October 31, 2019 due to son's illness and repeat trip to [place]  His going to hold off on starting therapy at this time due to additional requirements in the home  -Medication regimen discussed in detail today for 10 minutes with Malia  Dosing schedule reviewed, and written dosing instructions provided    -Discussed self monitoring of symptoms, and symptom monitoring tools     -Patient has been informed of 24 hours and weekend coverage for urgent situations accessed by calling the main clinic phone number      -Completed and signed during the session:  Completed today 11/25/2019  Psychotherapy Provided:     Individual psychotherapy provided: Yes  Counseling was provided during the session today for 20 minutes  Medications, treatment progress and treatment plan reviewed with Malia  Medication changes discussed with Malia  Medication education provided to [place]  Recent stressor including family issues, issues with  working 7 days per week, increased requirements in the household, continued stressors with medical appointments for her son, resigning from work, son's illness, stressors with MD appointments, financial problems, lack of health insurance and ongoing anxiety discussed with Malia  Coping strategies reviewed with Malia  Reassurance and supportive therapy provided  Crisis/safety plan discussed with PATO Hoang 11/25/19

## 2019-11-25 NOTE — BH TREATMENT PLAN
TREATMENT PLAN (Medication Management Only)        [facility]    Name and Date of Birth:  Marquis Casillas 29 y o  1985  Date of Treatment Plan: November 25, 2019  Diagnosis/Diagnoses:    1  Generalized anxiety disorder    2  PTSD (post-traumatic stress disorder)    3  Current moderate episode of major depressive disorder without prior episode ([facility] [address] )    4  Primary insomnia      Strengths/Personal Resources for Self-Care: "manage household well and focusing on self care", taking medications as prescribed  Area/Areas of need (in own words): "irritability at certain times of month and need to recognize it and need more sleep"  1  Long Term Goal: improvesleep  Target Date: 2 months - 1/25/2020  Person/Persons responsible for completion of goal: Malia  2  Short Term Objective (s) - How will we reach this goal?:   A  Provider new recommended medication/dosage changes and/or continue medication(s): Medication changes: I am having Marquis Casillas maintain her Prenatal Vitamin, cholecalciferol, cetirizine, busPIRone, sertraline, oseltamivir, cloNIDine, therapeutic multivitamin-minerals, and Melatonin ER     B  practice sleep hygiene techniques nightly  C  yoga daily for 20 minutes   Target Date: 3 months - 2/25/2020  Person/Persons Responsible for Completion of Goal: Malia  Progress Towards Goals: continuing treatment  Treatment Modality: medication management every 8 weeks  Review due 90 to 120 days from date of this plan: 3 months - 2/25/2020  Expected length of service: ongoing treatment  My Physician/PA/NP and I have developed this plan together and I agree to work on the goals and objectives  I understand the treatment goals that were developed for my treatment

## 2019-11-25 NOTE — PATIENT INSTRUCTIONS

## 2019-11-25 NOTE — PROGRESS NOTES
[facility]        NAME: Jayy Castro is a 29 y o  female  : 1985    MRN: [id number]  DATE: 2019  TIME: 1:52 PM    Assessment and Plan   Viral URI [J06 9]  1  Viral URI           Patient Instructions     Discussed condition with pt  I suspect an acute viral URI for which I rec hydration, rest, discussed OTC cold meds, and observation  Follow up with PCP in 3-5 days  Proceed to  ER if symptoms worsen  Chief Complaint     Chief Complaint   Patient presents with    Earache     bilateral ear pain, cough and wet feeling  chest, just finished Tamiflu   in for r/o flu ended u0p  with Bacterial pneumonia   she wants to get checked out          History of Present Illness       Pt presents with onset yesterday of B/L ear pain, low grade temp, dry cough, PND, chest congestion, ST, nasal congestion  Denies N/V/D  Recently finished prophylactic dose of Tamiflu as her  was evaluated for R/O flu and ended up being diagnosed with pneumonia  Has taken nothing for symptoms  Denies hx of asthma/allergies  Does not smoke or vape  Has had the flu shot  Review of Systems   Review of Systems   Constitutional: Positive for fever  HENT: Positive for congestion, ear pain, postnasal drip and sore throat  Respiratory: Positive for cough and chest tightness  Cardiovascular: Negative  Gastrointestinal: Negative  Genitourinary: Negative            Current Medications       Current Outpatient Medications:     busPIRone (BUSPAR) 5 mg tablet, Take 1 tablet (5 mg total) by mouth 2 (two) times a day, Disp: 60 tablet, Rfl: 1    cetirizine (ZyrTEC) 10 mg tablet, Take 1 tablet (10 mg total) by mouth daily, Disp: , Rfl:     cloNIDine (CATAPRES) 0 1 mg tablet, Take 1 tab (0 1 mg) PO Q AM and 2 Tabs (for a total of 0 2 mg) PO Q PM, Disp: 90 tablet, Rfl: 1    Melatonin ER 10 MG TBCR, Take 1 tablet by mouth daily, Disp: , Rfl:     sertraline (ZOLOFT) 50 mg tablet, Take 1 tablet (50 mg total) by mouth 2 (two) times a day, Disp: 180 tablet, Rfl: 0    therapeutic multivitamin-minerals (THERAGRAN-M) tablet, Take 1 tablet by mouth daily, Disp: , Rfl:     traZODone (DESYREL) 50 mg tablet, Take 25 mg by mouth daily at bedtime, Disp: , Rfl:     cholecalciferol (VITAMIN D3) 1,000 units tablet, , Disp: , Rfl:     oseltamivir (TAMIFLU) 75 mg capsule, Take 1 capsule (75 mg total) by mouth daily for 10 days (Patient not taking: Reported on 2019), Disp: 10 capsule, Rfl: 0    Prenatal Vit-Fe Fumarate-FA (PRENATAL VITAMIN) 27-0 8 MG TABS, , Disp: , Rfl:     Current Allergies     Allergies as of 2019    (No Known Allergies)            The following portions of the patient's history were reviewed and updated as appropriate: allergies, current medications, past family history, past medical history, past social history, past surgical history and problem list      Past Medical History:   Diagnosis Date    Abnormal Pap smear of cervix     Anxiety     Depression     HPV (human papilloma virus) infection     Kidney stone     H/O PYELONEPHRITIS    Migraine     Seasonal allergies     Sleep difficulties     Varicella     POS HX       Past Surgical History:   Procedure Laterality Date    CHOLECYSTECTOMY      FINGER SURGERY      left hand     GYNECOLOGIC CRYOSURGERY      HAND SURGERY Left     Phelebitis in left hand  post IV infilitration, had sx for clot removal     LYMPH NODE BIOPSY      of neck    LYMPH NODE DISSECTION Right     cervical    [place]  DELIVERY ONLY N/A 2018    Procedure:  SECTION ();   Surgeon: Cristhian Foster MD;  Location: [facility];  Service: Obstetrics       Family History   Problem Relation Age of Onset    Hypertension Mother     Heart disease Mother     Depression Mother     Coronary artery disease Mother     Hypertension Father     Hyperlipidemia Father     Heart disease Father         MI    Stroke Paternal [de-identified] Cancer Paternal Grandmother         breast    Breast cancer Paternal Grandmother     Cancer Paternal Aunt         colon    Early death Maternal Uncle     Cancer Maternal Uncle         lymphoma    Early death Maternal Uncle         lymphoma    Birth defects Maternal Uncle     Undescended testes Son     Cystic fibrosis Other     Cystic fibrosis Other     Heart failure Maternal Grandmother     Stroke Maternal Grandfather     Alcohol abuse Maternal Grandfather     Alcohol abuse Brother     Drug abuse Brother     Anxiety disorder Sister     Post-traumatic stress disorder Sister     Developmental delay Son     Macrocephaly Son     Hydrocephalus Son          Medications have been verified  Objective   /85   Pulse 96   Temp 99 8 °F (37 7 °C)   Resp 18   Ht 5' 3" (1 6 m)   Wt 73 kg (161 lb)   SpO2 96%   BMI 28 52 kg/m²        Physical Exam     Physical Exam   Constitutional: She is oriented to person, place, and time  She appears well-developed and well-nourished  No distress  HENT:   Right Ear: Hearing, tympanic membrane, external ear and ear canal normal    Left Ear: Hearing, tympanic membrane, external ear and ear canal normal    Nose: Mucosal edema (B/L boggy turbinates) present  Mouth/Throat: Mucous membranes are normal  Posterior oropharyngeal erythema (PND) present  No oropharyngeal exudate  Neck: Neck supple  Cardiovascular: Normal rate, regular rhythm and normal heart sounds  Pulmonary/Chest: Effort normal and breath sounds normal    Lymphadenopathy:     She has no cervical adenopathy  Neurological: She is alert and oriented to person, place, and time  Psychiatric: She has a normal mood and affect  Vitals reviewed

## 2019-12-02 ENCOUNTER — OFFICE VISIT (OUTPATIENT)
Dept: FAMILY MEDICINE CLINIC | Facility: CLINIC | Age: 34
End: 2019-12-02
Payer: COMMERCIAL

## 2019-12-02 ENCOUNTER — APPOINTMENT (OUTPATIENT)
Dept: RADIOLOGY | Facility: CLINIC | Age: 34
End: 2019-12-02
Payer: COMMERCIAL

## 2019-12-02 VITALS
HEART RATE: 86 BPM | WEIGHT: 159 LBS | SYSTOLIC BLOOD PRESSURE: 108 MMHG | BODY MASS INDEX: 28.17 KG/M2 | TEMPERATURE: 98.6 F | HEIGHT: 63 IN | OXYGEN SATURATION: 97 % | DIASTOLIC BLOOD PRESSURE: 70 MMHG

## 2019-12-02 DIAGNOSIS — J06.9 ACUTE UPPER RESPIRATORY INFECTION: ICD-10-CM

## 2019-12-02 DIAGNOSIS — J06.9 ACUTE UPPER RESPIRATORY INFECTION: Primary | ICD-10-CM

## 2019-12-02 PROCEDURE — 71046 X-RAY EXAM CHEST 2 VIEWS: CPT

## 2019-12-02 PROCEDURE — 99213 OFFICE O/P EST LOW 20 MIN: CPT | Performed by: NURSE PRACTITIONER

## 2019-12-02 PROCEDURE — 3008F BODY MASS INDEX DOCD: CPT | Performed by: NURSE PRACTITIONER

## 2019-12-02 PROCEDURE — 1036F TOBACCO NON-USER: CPT | Performed by: NURSE PRACTITIONER

## 2019-12-02 RX ORDER — ALBUTEROL SULFATE 90 UG/1
2 AEROSOL, METERED RESPIRATORY (INHALATION) EVERY 4 HOURS PRN
Qty: 8.5 G | Refills: 0 | Status: SHIPPED | OUTPATIENT
Start: 2019-12-02 | End: 2019-12-28

## 2019-12-02 RX ORDER — BENZONATATE 200 MG/1
200 CAPSULE ORAL 3 TIMES DAILY PRN
Qty: 20 CAPSULE | Refills: 0 | Status: SHIPPED | OUTPATIENT
Start: 2019-12-02 | End: 2019-12-28

## 2019-12-02 RX ORDER — AMOXICILLIN AND CLAVULANATE POTASSIUM 875; 125 MG/1; MG/1
1 TABLET, FILM COATED ORAL EVERY 12 HOURS SCHEDULED
Qty: 20 TABLET | Refills: 0 | Status: SHIPPED | OUTPATIENT
Start: 2019-12-02 | End: 2019-12-12

## 2019-12-02 NOTE — PROGRESS NOTES
Assessment/Plan:    Will check CXR and treat with Augmentin  Reviewed symptomatic care  Will f/u with CXR results  1  Acute upper respiratory infection  -     XR chest pa & lateral; Future; Expected date: 12/02/2019  -     amoxicillin-clavulanate (AUGMENTIN) 875-125 mg per tablet; Take 1 tablet by mouth every 12 (twelve) hours for 10 days  -     benzonatate (TESSALON) 200 MG capsule; Take 1 capsule (200 mg total) by mouth 3 (three) times a day as needed for cough  -     albuterol (PROAIR HFA) 90 mcg/act inhaler; Inhale 2 puffs every 4 (four) hours as needed for wheezing or shortness of breath              There are no Patient Instructions on file for this visit  Return if symptoms worsen or fail to improve  Subjective:      Patient ID: Nadege Skaggs is a 29 y o  female  Chief Complaint   Patient presents with    Cold Like Symptoms     with fever for the past week jlopezcma        Here today with compaints of cold symptoms for the past week  She has a worsening cough and chest congestion  She had a temp of 103 last night  She completed a course of Prednisone this morning and is using Tessalon perles which have been helping  She denies SOB or wheezing  Her  is being treated for pneumonia  The following portions of the patient's history were reviewed and updated as appropriate: allergies, current medications, past family history, past medical history, past social history, past surgical history and problem list     Review of Systems   Constitutional: Positive for chills, fatigue and fever  HENT: Negative for congestion, ear pain, postnasal drip, rhinorrhea, sinus pressure and sore throat  Respiratory: Positive for cough and chest tightness  Negative for shortness of breath and wheezing  Cardiovascular: Negative for chest pain  Gastrointestinal: Negative for abdominal pain, diarrhea, nausea and vomiting  Musculoskeletal: Negative for arthralgias  Skin: Negative for rash  Neurological: Positive for headaches  Current Outpatient Medications   Medication Sig Dispense Refill    busPIRone (BUSPAR) 7 5 mg tablet Take 1 tablet (7 5 mg total) by mouth 2 (two) times a day 180 tablet 0    cetirizine (ZyrTEC) 10 mg tablet Take 1 tablet (10 mg total) by mouth daily      cloNIDine (CATAPRES) 0 1 mg tablet Take 1 tab (0 1 mg) PO Q AM and 2 Tabs (for a total of 0 2 mg) PO Q PM 90 tablet 1    Melatonin ER 10 MG TBCR Take 1 tablet by mouth daily      sertraline (ZOLOFT) 50 mg tablet Take 1 tablet (50 mg total) by mouth 2 (two) times a day 180 tablet 0    therapeutic multivitamin-minerals (THERAGRAN-M) tablet Take 1 tablet by mouth daily      traZODone (DESYREL) 50 mg tablet Take 1/2 (25 mg) to 1 (50 mg)  tab po q hs prn 30 tablet 0    albuterol (PROAIR HFA) 90 mcg/act inhaler Inhale 2 puffs every 4 (four) hours as needed for wheezing or shortness of breath 8 5 g 0    amoxicillin-clavulanate (AUGMENTIN) 875-125 mg per tablet Take 1 tablet by mouth every 12 (twelve) hours for 10 days 20 tablet 0    benzonatate (TESSALON) 200 MG capsule Take 1 capsule (200 mg total) by mouth 3 (three) times a day as needed for cough 20 capsule 0    cholecalciferol (VITAMIN D3) 1,000 units tablet        No current facility-administered medications for this visit  Objective:    /70   Pulse 86   Temp 98 6 °F (37 °C)   Ht 5' 3" (1 6 m)   Wt 72 1 kg (159 lb)   LMP 11/15/2019 (Approximate)   SpO2 97%   BMI 28 17 kg/m²        Physical Exam   Constitutional: She appears well-developed and well-nourished  HENT:   Head: Normocephalic and atraumatic  Right Ear: Tympanic membrane, external ear and ear canal normal    Left Ear: Tympanic membrane, external ear and ear canal normal    Nose: No mucosal edema or rhinorrhea  Mouth/Throat: Uvula is midline, oropharynx is clear and moist and mucous membranes are normal    Eyes: Conjunctivae are normal    Neck: Neck supple  No edema present  No thyromegaly present  Cardiovascular: Normal rate, regular rhythm, normal heart sounds and intact distal pulses  No murmur heard  Pulmonary/Chest: Effort normal  She has rales in the right lower field  Abdominal: Bowel sounds are normal  She exhibits no distension  There is no splenomegaly or hepatomegaly  There is no tenderness  Lymphadenopathy:        Right cervical: No superficial cervical adenopathy present  Left cervical: No superficial cervical adenopathy present  Skin: Skin is warm, dry and intact  No rash noted  Psychiatric: She has a normal mood and affect  Nursing note and vitals reviewed               Jessie Sheehan

## 2019-12-28 ENCOUNTER — HOSPITAL ENCOUNTER (EMERGENCY)
Facility: HOSPITAL | Age: 34
Discharge: HOME | End: 2019-12-28
Attending: EMERGENCY MEDICINE
Payer: COMMERCIAL

## 2019-12-28 ENCOUNTER — TELEPHONE (OUTPATIENT)
Dept: FAMILY MEDICINE CLINIC | Facility: CLINIC | Age: 34
End: 2019-12-28

## 2019-12-28 VITALS
OXYGEN SATURATION: 100 % | BODY MASS INDEX: 29.48 KG/M2 | HEART RATE: 69 BPM | DIASTOLIC BLOOD PRESSURE: 71 MMHG | RESPIRATION RATE: 20 BRPM | WEIGHT: 160.2 LBS | SYSTOLIC BLOOD PRESSURE: 140 MMHG | TEMPERATURE: 98.8 F | HEIGHT: 62 IN

## 2019-12-28 DIAGNOSIS — R42 VERTIGO: Primary | ICD-10-CM

## 2019-12-28 LAB
ANION GAP SERPL CALCULATED.3IONS-SCNC: 6 MMOL/L (ref 4–13)
BASOPHILS # BLD AUTO: 0.05 THOUSANDS/ΜL (ref 0–0.1)
BASOPHILS NFR BLD AUTO: 1 % (ref 0–1)
BUN SERPL-MCNC: 11 MG/DL (ref 5–25)
CALCIUM SERPL-MCNC: 9.7 MG/DL (ref 8.3–10.1)
CHLORIDE SERPL-SCNC: 104 MMOL/L (ref 100–108)
CO2 SERPL-SCNC: 29 MMOL/L (ref 21–32)
CREAT SERPL-MCNC: 0.72 MG/DL (ref 0.6–1.3)
EOSINOPHIL # BLD AUTO: 0.07 THOUSAND/ΜL (ref 0–0.61)
EOSINOPHIL NFR BLD AUTO: 1 % (ref 0–6)
ERYTHROCYTE [DISTWIDTH] IN BLOOD BY AUTOMATED COUNT: 13.7 % (ref 11.6–15.1)
GFR SERPL CREATININE-BSD FRML MDRD: 110 ML/MIN/1.73SQ M
GLUCOSE SERPL-MCNC: 81 MG/DL (ref 65–140)
HCT VFR BLD AUTO: 40.4 % (ref 34.8–46.1)
HGB BLD-MCNC: 13.3 G/DL (ref 11.5–15.4)
IMM GRANULOCYTES # BLD AUTO: 0.02 THOUSAND/UL (ref 0–0.2)
IMM GRANULOCYTES NFR BLD AUTO: 0 % (ref 0–2)
LYMPHOCYTES # BLD AUTO: 2.2 THOUSANDS/ΜL (ref 0.6–4.47)
LYMPHOCYTES NFR BLD AUTO: 32 % (ref 14–44)
MCH RBC QN AUTO: 29.4 PG (ref 26.8–34.3)
MCHC RBC AUTO-ENTMCNC: 32.9 G/DL (ref 31.4–37.4)
MCV RBC AUTO: 89 FL (ref 82–98)
MONOCYTES # BLD AUTO: 0.45 THOUSAND/ΜL (ref 0.17–1.22)
MONOCYTES NFR BLD AUTO: 7 % (ref 4–12)
NEUTROPHILS # BLD AUTO: 4.07 THOUSANDS/ΜL (ref 1.85–7.62)
NEUTS SEG NFR BLD AUTO: 59 % (ref 43–75)
NRBC BLD AUTO-RTO: 0 /100 WBCS
PLATELET # BLD AUTO: 393 THOUSANDS/UL (ref 149–390)
PMV BLD AUTO: 8.9 FL (ref 8.9–12.7)
POTASSIUM SERPL-SCNC: 5.3 MMOL/L (ref 3.5–5.3)
RBC # BLD AUTO: 4.53 MILLION/UL (ref 3.81–5.12)
SODIUM SERPL-SCNC: 139 MMOL/L (ref 136–145)
TSH SERPL DL<=0.05 MIU/L-ACNC: 3.9 UIU/ML (ref 0.36–3.74)
WBC # BLD AUTO: 6.86 THOUSAND/UL (ref 4.31–10.16)

## 2019-12-28 PROCEDURE — 36415 COLL VENOUS BLD VENIPUNCTURE: CPT | Performed by: PHYSICIAN ASSISTANT

## 2019-12-28 PROCEDURE — 80048 BASIC METABOLIC PNL TOTAL CA: CPT | Performed by: PHYSICIAN ASSISTANT

## 2019-12-28 PROCEDURE — 96374 THER/PROPH/DIAG INJ IV PUSH: CPT

## 2019-12-28 PROCEDURE — 96361 HYDRATE IV INFUSION ADD-ON: CPT

## 2019-12-28 PROCEDURE — 99284 EMERGENCY DEPT VISIT MOD MDM: CPT

## 2019-12-28 PROCEDURE — 84443 ASSAY THYROID STIM HORMONE: CPT | Performed by: PHYSICIAN ASSISTANT

## 2019-12-28 PROCEDURE — 99284 EMERGENCY DEPT VISIT MOD MDM: CPT | Performed by: PHYSICIAN ASSISTANT

## 2019-12-28 PROCEDURE — 93005 ELECTROCARDIOGRAM TRACING: CPT

## 2019-12-28 PROCEDURE — 85025 COMPLETE CBC W/AUTO DIFF WBC: CPT | Performed by: PHYSICIAN ASSISTANT

## 2019-12-28 PROCEDURE — 96375 TX/PRO/DX INJ NEW DRUG ADDON: CPT

## 2019-12-28 RX ORDER — DIPHENHYDRAMINE HYDROCHLORIDE 50 MG/ML
25 INJECTION INTRAMUSCULAR; INTRAVENOUS ONCE
Status: COMPLETED | OUTPATIENT
Start: 2019-12-28 | End: 2019-12-28

## 2019-12-28 RX ORDER — LORAZEPAM 2 MG/ML
0.5 INJECTION INTRAMUSCULAR ONCE
Status: COMPLETED | OUTPATIENT
Start: 2019-12-28 | End: 2019-12-28

## 2019-12-28 RX ORDER — MECLIZINE HYDROCHLORIDE 25 MG/1
25 TABLET ORAL 3 TIMES DAILY PRN
Qty: 15 TABLET | Refills: 0 | Status: SHIPPED | OUTPATIENT
Start: 2019-12-28 | End: 2020-01-07 | Stop reason: SDUPTHER

## 2019-12-28 RX ADMIN — SODIUM CHLORIDE 1000 ML: 0.9 INJECTION, SOLUTION INTRAVENOUS at 13:21

## 2019-12-28 RX ADMIN — LORAZEPAM 0.5 MG: 2 INJECTION INTRAMUSCULAR; INTRAVENOUS at 13:26

## 2019-12-28 RX ADMIN — DIPHENHYDRAMINE HYDROCHLORIDE 25 MG: 50 INJECTION, SOLUTION INTRAMUSCULAR; INTRAVENOUS at 13:24

## 2019-12-29 LAB
ATRIAL RATE: 71 BPM
P AXIS: 19 DEGREES
PR INTERVAL: 160 MS
QRS AXIS: 55 DEGREES
QRSD INTERVAL: 82 MS
QT INTERVAL: 396 MS
QTC INTERVAL: 418 MS
T WAVE AXIS: 47 DEGREES
VENTRICULAR RATE: 67 BPM

## 2019-12-29 PROCEDURE — 93010 ELECTROCARDIOGRAM REPORT: CPT | Performed by: INTERNAL MEDICINE

## 2019-12-30 ENCOUNTER — TELEPHONE (OUTPATIENT)
Dept: FAMILY MEDICINE CLINIC | Facility: CLINIC | Age: 34
End: 2019-12-30

## 2019-12-30 DIAGNOSIS — R79.89 ABNORMAL TSH: Primary | ICD-10-CM

## 2019-12-30 DIAGNOSIS — R42 VERTIGO: ICD-10-CM

## 2019-12-30 DIAGNOSIS — F41.1 GENERALIZED ANXIETY DISORDER: ICD-10-CM

## 2019-12-30 DIAGNOSIS — F43.10 PTSD (POST-TRAUMATIC STRESS DISORDER): ICD-10-CM

## 2019-12-30 RX ORDER — CLONIDINE HYDROCHLORIDE 0.1 MG/1
TABLET ORAL
Qty: 90 TABLET | Refills: 1 | Status: SHIPPED | OUTPATIENT
Start: 2019-12-30 | End: 2020-03-12

## 2019-12-30 RX ORDER — BUSPIRONE HYDROCHLORIDE 7.5 MG/1
7.5 TABLET ORAL 2 TIMES DAILY
Qty: 180 TABLET | Refills: 0 | OUTPATIENT
Start: 2019-12-30 | End: 2020-03-29

## 2019-12-30 NOTE — TELEPHONE ENCOUNTER
----- Message from Sanjuana Barger sent at 12/30/2019  9:24 AM EST -----  Regarding: FW: Referral Request  Contact: [phone]      ----- Message -----  From: Uche Rangel  Sent: 12/30/2019   9:20 AM EST  To: [facility]  Subject: Referral Request                                 Good morning! Would you be able [facility]? Their fax number is [phone]  I am hoping that will help with the vertigo       Gordan Public

## 2019-12-30 NOTE — TELEPHONE ENCOUNTER
Pharmacist from [facility] left message for refill of Clonidine 0 1 mg and Buspirone 7 5 mg

## 2019-12-30 NOTE — TELEPHONE ENCOUNTER
Patient is a 90 day supply of BuSpar refill to soon, clonidine scripts sent though a cannot be filled until January 16 th as she has enough until that date

## 2020-01-02 ENCOUNTER — OFFICE VISIT (OUTPATIENT)
Dept: FAMILY MEDICINE CLINIC | Facility: CLINIC | Age: 35
End: 2020-01-02
Payer: COMMERCIAL

## 2020-01-02 ENCOUNTER — APPOINTMENT (OUTPATIENT)
Dept: LAB | Facility: CLINIC | Age: 35
End: 2020-01-02
Payer: COMMERCIAL

## 2020-01-02 VITALS
DIASTOLIC BLOOD PRESSURE: 70 MMHG | BODY MASS INDEX: 28.88 KG/M2 | OXYGEN SATURATION: 99 % | RESPIRATION RATE: 16 BRPM | HEIGHT: 63 IN | WEIGHT: 163 LBS | TEMPERATURE: 99.1 F | SYSTOLIC BLOOD PRESSURE: 102 MMHG | HEART RATE: 82 BPM

## 2020-01-02 DIAGNOSIS — I73.00 RAYNAUD'S PHENOMENON WITHOUT GANGRENE: ICD-10-CM

## 2020-01-02 DIAGNOSIS — M25.50 ARTHRALGIA, UNSPECIFIED JOINT: ICD-10-CM

## 2020-01-02 DIAGNOSIS — R79.89 ABNORMAL TSH: ICD-10-CM

## 2020-01-02 DIAGNOSIS — H66.92 LEFT OTITIS MEDIA, UNSPECIFIED OTITIS MEDIA TYPE: Primary | ICD-10-CM

## 2020-01-02 LAB
RHEUMATOID FACT SER QL LA: NEGATIVE
TSH SERPL DL<=0.05 MIU/L-ACNC: 2.9 UIU/ML (ref 0.36–3.74)

## 2020-01-02 PROCEDURE — 84443 ASSAY THYROID STIM HORMONE: CPT

## 2020-01-02 PROCEDURE — 3008F BODY MASS INDEX DOCD: CPT | Performed by: FAMILY MEDICINE

## 2020-01-02 PROCEDURE — 86430 RHEUMATOID FACTOR TEST QUAL: CPT

## 2020-01-02 PROCEDURE — 36415 COLL VENOUS BLD VENIPUNCTURE: CPT

## 2020-01-02 PROCEDURE — 99213 OFFICE O/P EST LOW 20 MIN: CPT | Performed by: FAMILY MEDICINE

## 2020-01-02 PROCEDURE — 86618 LYME DISEASE ANTIBODY: CPT

## 2020-01-02 PROCEDURE — 86038 ANTINUCLEAR ANTIBODIES: CPT

## 2020-01-02 RX ORDER — AMOXICILLIN 875 MG/1
875 TABLET, COATED ORAL 2 TIMES DAILY
Qty: 14 TABLET | Refills: 0 | Status: SHIPPED | OUTPATIENT
Start: 2020-01-02 | End: 2020-01-09

## 2020-01-02 NOTE — PROGRESS NOTES
Assessment/Plan:    1  Left otitis media, unspecified otitis media type  Comments:  new  Orders:  -     amoxicillin (AMOXIL) 875 mg tablet; Take 1 tablet (875 mg total) by mouth 2 (two) times a day for 7 days    2  Raynaud's phenomenon without gangrene  Comments:  new  Orders:  -     Rheumatoid Arthritis Factor; Future  -     JUSTIN Comprehensive Panel; Future    3  Arthralgia, unspecified joint  Comments:  new  Orders:  -     Rheumatoid Arthritis Factor; Future  -     JUSTIN Comprehensive Panel; Future  -     Lyme Antibody Profile with reflex to WB; Future           There are no Patient Instructions on file for this visit  Return if symptoms worsen or fail to improve  Subjective:      Patient ID: Jennie Fairchild is a 29 y o  female  Chief Complaint   Patient presents with    Dizziness     discomfort in the ear jlopezcma        She went to the ER this past weekend for vertigo  She still doesn't feel good  She is getting ear pain  She is still getting dizziness  She has started going to the balance center  She has been getting issues with her hand being cold and turning red or blue  She denies a line of demarcation  It started a few months ago  She has also been having joint pain in her hands  She reports having a few tick bites over the summer  The following portions of the patient's history were reviewed and updated as appropriate:  past social history    Review of Systems   HENT: Positive for ear pain and rhinorrhea  Respiratory: Negative for cough  Musculoskeletal: Positive for arthralgias           Current Outpatient Medications   Medication Sig Dispense Refill    busPIRone (BUSPAR) 7 5 mg tablet Take 1 tablet (7 5 mg total) by mouth 2 (two) times a day 180 tablet 0    cetirizine (ZyrTEC) 10 mg tablet Take 1 tablet (10 mg total) by mouth daily      cloNIDine (CATAPRES) 0 1 mg tablet Take 1 tab (0 1 mg) PO Q AM and 2 Tabs (for a total of 0 2 mg) PO Q PM 90 tablet 1    Melatonin ER 10 MG TBCR Take 1 tablet by mouth daily      sertraline (ZOLOFT) 50 mg tablet Take 1 tablet (50 mg total) by mouth 2 (two) times a day 180 tablet 0    therapeutic multivitamin-minerals (THERAGRAN-M) tablet Take 1 tablet by mouth daily      traZODone (DESYREL) 50 mg tablet Take 1/2 (25 mg) to 1 (50 mg)  tab po q hs prn 30 tablet 0    amoxicillin (AMOXIL) 875 mg tablet Take 1 tablet (875 mg total) by mouth 2 (two) times a day for 7 days 14 tablet 0    meclizine (ANTIVERT) 25 mg tablet Take 1 tablet (25 mg total) by mouth 3 (three) times a day as needed for dizziness for up to 15 doses (Patient not taking: Reported on 1/2/2020) 15 tablet 0     No current facility-administered medications for this visit  Objective:    /70   Pulse 82   Temp 99 1 °F (37 3 °C)   Resp 16   Ht 5' 3" (1 6 m)   Wt 73 9 kg (163 lb)   LMP 12/15/2019 (Exact Date)   SpO2 99%   Breastfeeding? No   BMI 28 87 kg/m²      Physical Exam   Constitutional: She appears well-developed and well-nourished  HENT:   Head: Normocephalic and atraumatic  Right Ear: External ear normal  Tympanic membrane is not erythematous  Left Ear: External ear normal  Tympanic membrane is erythematous  Mouth/Throat: Oropharynx is clear and moist    Cardiovascular: Normal rate, regular rhythm and normal heart sounds  Exam reveals no friction rub  No murmur heard  Pulmonary/Chest: Effort normal and breath sounds normal  No respiratory distress  She has no wheezes  She has no rales  Musculoskeletal: She exhibits no edema or deformity  Nursing note and vitals reviewed            Esvin Amor DO

## 2020-01-03 LAB
B BURGDOR IGG+IGM SER-ACNC: <0.91 ISR (ref 0–0.9)
RYE IGE QN: NEGATIVE

## 2020-01-07 DIAGNOSIS — R42 VERTIGO: ICD-10-CM

## 2020-01-07 RX ORDER — MECLIZINE HYDROCHLORIDE 25 MG/1
25 TABLET ORAL 3 TIMES DAILY PRN
Qty: 15 TABLET | Refills: 0 | Status: SHIPPED | OUTPATIENT
Start: 2020-01-07 | End: 2020-04-04 | Stop reason: SDUPTHER

## 2020-01-07 RX ORDER — MECLIZINE HYDROCHLORIDE 25 MG/1
25 TABLET ORAL 3 TIMES DAILY PRN
Qty: 15 TABLET | Refills: 0 | Status: SHIPPED | OUTPATIENT
Start: 2020-01-07 | End: 2020-01-07 | Stop reason: SDUPTHER

## 2020-01-07 NOTE — TELEPHONE ENCOUNTER
----- Message from Josh Heard sent at 1/6/2020  8:31 PM EST -----  Regarding: Prescription Question  Contact: [phone]  Good evening,    Would you be able to call in a refill for the Meclizine?      Jessica Yarbrough

## 2020-01-09 ENCOUNTER — EVALUATION (OUTPATIENT)
Dept: PHYSICAL THERAPY | Facility: CLINIC | Age: 35
End: 2020-01-09
Payer: COMMERCIAL

## 2020-01-09 DIAGNOSIS — R42 DIZZINESS: Primary | ICD-10-CM

## 2020-01-09 PROCEDURE — 97161 PT EVAL LOW COMPLEX 20 MIN: CPT | Performed by: PHYSICAL THERAPIST

## 2020-01-09 NOTE — PROGRESS NOTES
PT Evaluation     Today's date: 2020  Patient name: Nadege Skaggs  : 1985  MRN: [id number]  Referring provider: Conor Guerrero DO  Dx:   Encounter Diagnosis     ICD-10-CM    1  Dizziness R42                   Assessment  Assessment details: Pt is 28 y/o female presenting to skilled PT with symptoms of vertigo described as "room spinning " Symptoms began 2 weeks ago and are consistent with BPPV based on subjective report and objective findings  Performed Epley maneuever 3x with no observed nystagmus or symptoms on 2nd and 3rd reps  Pt given educational handout on BPPV and aftercare, and instructed to come back Monday for F/U appt  Will schedule more appts as needed for complete symptom resolution  Impairments: safety issue  Other impairment: impaired vestibular function     Symptom irritability: moderateUnderstanding of Dx/Px/POC: excellent  Goals  Goals (2-4 weeks)  1  Pt will report complete symptom resolution to 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  2  Pt will perform habituation exercises as needed to promote symptom-free function  Plan  Patient would benefit from: skilled physical therapy  Planned therapy interventions: balance, neuromuscular re-education, community reintegration, manual therapy, activity modification, canalith repositioning, home exercise program and patient education  Frequency: 3x week (as needed)  Duration in weeks: 4  Plan of Care beginning date: 2020  Plan of Care expiration date: 2020  Treatment plan discussed with: patient        Subjective Evaluation    History of Present Illness  Mechanism of injury: Pt reports onset of dizziness 2 weeks ago  She went to ED , they D/C her home with meclizine  She did not take meclizine today  Increase in symptoms w/ vertical head movements and rolling over in bed, with room spinning sensation       Pain  No pain reported  Current pain ratin  At best pain ratin  At worst pain ratin    Social Support  Lives with: spouse and young children    Employment status: not working  Patient Goals  Patient goals for therapy: improved balance, independence with ADLs/IADLs and return to sport/leisure activities  Patient goal: symptom free         Objective     Functional Assessment        Comments  VBI: normal    Left [place]-Hallpike (-)  Right [place]-Hallpike (+) torsional nystagmus lasting 14 seconds    Left Roll test (-)  Right Roll test (-)    Cervical AROM: WFL, painfree         Flowsheet Rows      Most Recent Value   PT/OT G-Codes   Current Score  81   Projected Score  91             Precautions:   Past Medical History:   Diagnosis Date    Abnormal Pap smear of cervix     Anxiety     Depression     HPV (human papilloma virus) infection     Kidney stone 2011    H/O PYELONEPHRITIS    Migraine     Seasonal allergies     Sleep difficulties     Varicella     POS HX

## 2020-01-13 ENCOUNTER — OFFICE VISIT (OUTPATIENT)
Dept: PHYSICAL THERAPY | Facility: CLINIC | Age: 35
End: 2020-01-13
Payer: COMMERCIAL

## 2020-01-13 DIAGNOSIS — R42 DIZZINESS: Primary | ICD-10-CM

## 2020-01-13 PROCEDURE — 97140 MANUAL THERAPY 1/> REGIONS: CPT | Performed by: PHYSICAL THERAPIST

## 2020-01-13 PROCEDURE — 97112 NEUROMUSCULAR REEDUCATION: CPT | Performed by: PHYSICAL THERAPIST

## 2020-01-13 PROCEDURE — 97530 THERAPEUTIC ACTIVITIES: CPT | Performed by: PHYSICAL THERAPIST

## 2020-01-13 NOTE — PROGRESS NOTES
Daily Note     Today's date: 2020  Patient name: Leesa Dietz  : 1985  MRN: [id number]  Referring provider: Darlene Brar DO  Dx:   Encounter Diagnosis     ICD-10-CM    1  Dizziness R42                   Subjective: Patient stated that she was feeling "a little" dizziness, mainly when bending over to pick things up off the ground  She also reported that she feels better than her previous visit  During treatment, patient discussed previous headaches, UE numbness      Objective:     Left [place] (-)  Right [place] (+) increased dizziness    Right Epley x3      Manuals:   TPR for UT, LS, Right Scalenes    TOS testing:    Tiffanie Saenza test: +   Adson test: +   Selvin test: +       Assessment: Patient demonstrated slight increased dizziness with Right [place]-Hallpike testing  Right Epley maneuver performed 3x to decrease spinning sensation and was tolerated well  Patient described history of headaches and Right UE paresthesia  TPR was performed on UT, LS, Right Scalenes to decrease referral pain and numbness  TOS testing noted numbness in median nerve region of right hand  Patient will benefit from skilled PT services to decrease dizziness, headaches, and UE paresthesia  Plan: Continue per plan of care    Review stretches next session and muscle energy      Precautions:   Past Medical History:   Diagnosis Date    Abnormal Pap smear of cervix     Anxiety     Depression     HPV (human papilloma virus) infection     Kidney stone     H/O PYELONEPHRITIS    Migraine     Seasonal allergies     Sleep difficulties     Varicella     POS HX

## 2020-01-21 ENCOUNTER — DOCUMENTATION (OUTPATIENT)
Dept: PSYCHIATRY | Facility: CLINIC | Age: 35
End: 2020-01-21

## 2020-01-21 NOTE — PROGRESS NOTES
Treatment Plan not completed within required time limits due to: Francy Savage  canceled appointment  on 1/20/2020  (left message on after hours answering oswald cancelling appointment for 1/20 at 7:30 am  No reason was given)

## 2020-02-24 ENCOUNTER — APPOINTMENT (OUTPATIENT)
Dept: RADIOLOGY | Facility: CLINIC | Age: 35
End: 2020-02-24
Payer: COMMERCIAL

## 2020-02-24 ENCOUNTER — OFFICE VISIT (OUTPATIENT)
Dept: URGENT CARE | Facility: CLINIC | Age: 35
End: 2020-02-24
Payer: COMMERCIAL

## 2020-02-24 VITALS
HEIGHT: 62 IN | WEIGHT: 169 LBS | TEMPERATURE: 97.8 F | RESPIRATION RATE: 16 BRPM | BODY MASS INDEX: 31.1 KG/M2 | DIASTOLIC BLOOD PRESSURE: 89 MMHG | OXYGEN SATURATION: 98 % | HEART RATE: 80 BPM | SYSTOLIC BLOOD PRESSURE: 131 MMHG

## 2020-02-24 DIAGNOSIS — S69.92XA INJURY OF FINGER OF LEFT HAND, INITIAL ENCOUNTER: Primary | ICD-10-CM

## 2020-02-24 DIAGNOSIS — S69.92XA INJURY OF FINGER OF LEFT HAND, INITIAL ENCOUNTER: ICD-10-CM

## 2020-02-24 PROCEDURE — 73140 X-RAY EXAM OF FINGER(S): CPT

## 2020-02-24 PROCEDURE — 1036F TOBACCO NON-USER: CPT | Performed by: PHYSICIAN ASSISTANT

## 2020-02-24 PROCEDURE — 99213 OFFICE O/P EST LOW 20 MIN: CPT | Performed by: PHYSICIAN ASSISTANT

## 2020-02-24 NOTE — PATIENT INSTRUCTIONS
X-ray left ring finger performed in office-no acute fractures visualized  Official radiology report pending at this time  Our office will call if there are positive findings  Rest, ice, elevate  The finger was buddy taped in office  The patient has a contraindication to NSAIDs, Tylenol as needed for pain  Referral provided for orthopedics with direct scheduling  Proceed to  ER if symptoms worsen

## 2020-02-24 NOTE — PROGRESS NOTES
[facility]        NAME: Jael Butler is a 29 y o  female  : 1985    MRN: [id number]  DATE: 2020  TIME: 9:18 AM    Assessment and Plan   Injury of finger of left hand, initial encounter [S69 [id number]]  1  Injury of finger of left hand, initial encounter  XR finger left fourth digit-ring    Ambulatory referral to Orthopedic Surgery         Patient Instructions   X-ray left ring finger performed in office-no acute fractures visualized  Official radiology report pending at this time  Our office will call if there are positive findings  Rest, ice, elevate  The finger was buddy taped in office  The patient has a contraindication to NSAIDs, Tylenol as needed for pain  Referral provided for orthopedics with direct scheduling  Proceed to  ER if symptoms worsen  Chief Complaint     Chief Complaint   Patient presents with    Finger Injury     work up with left ring finger pain has history of ligament tear and surgery          History of Present Illness   The patient is a 22-year-old right-hand-dominant female who presents with left ring finger pain that has been present since yesterday  She does have a history of a previous left ring finger ligament tear that required surgical repair  She denies any injury or precipitating event yesterday  She has limited range of motion which she states was not present prior to yesterday  There is some swelling of the finger  She denies pain in any other region  Negative fever or chills  HPI    Review of Systems   Review of Systems   Constitutional: Negative for chills and fever  Musculoskeletal:        Left ring finger pain  All other systems reviewed and are negative          Current Medications       Current Outpatient Medications:     cetirizine (ZyrTEC) 10 mg tablet, Take 1 tablet (10 mg total) by mouth daily, Disp: , Rfl:     cloNIDine (CATAPRES) 0 1 mg tablet, Take 1 tab (0 1 mg) PO Q AM and 2 Tabs (for a total of 0 2 mg) PO Q PM, Disp: 90 tablet, Rfl: 1    Melatonin ER 10 MG TBCR, Take 1 tablet by mouth daily, Disp: , Rfl:     sertraline (ZOLOFT) 50 mg tablet, Take 1 tablet (50 mg total) by mouth 2 (two) times a day, Disp: 180 tablet, Rfl: 0    therapeutic multivitamin-minerals (THERAGRAN-M) tablet, Take 1 tablet by mouth daily, Disp: , Rfl:     traZODone (DESYREL) 50 mg tablet, Take 1/2 (25 mg) to 1 (50 mg)  tab po q hs prn, Disp: 30 tablet, Rfl: 0    busPIRone (BUSPAR) 7 5 mg tablet, Take 1 tablet (7 5 mg total) by mouth 2 (two) times a day, Disp: 180 tablet, Rfl: 0    meclizine (ANTIVERT) 25 mg tablet, Take 1 tablet (25 mg total) by mouth 3 (three) times a day as needed for dizziness for up to 15 doses (Patient not taking: Reported on 2020), Disp: 15 tablet, Rfl: 0    Current Allergies     Allergies as of 2020    (No Known Allergies)            The following portions of the patient's history were reviewed and updated as appropriate: allergies, current medications, past family history, past medical history, past social history, past surgical history and problem list      Past Medical History:   Diagnosis Date    Abnormal Pap smear of cervix     Anxiety     Depression     HPV (human papilloma virus) infection     Kidney stone     H/O PYELONEPHRITIS    Migraine     Seasonal allergies     Sleep difficulties     Varicella     POS HX       Past Surgical History:   Procedure Laterality Date    CHOLECYSTECTOMY      FINGER SURGERY      left hand     GYNECOLOGIC CRYOSURGERY      HAND SURGERY Left     Phelebitis in left hand  post IV infilitration, had sx for clot removal     LYMPH NODE BIOPSY      of neck    LYMPH NODE DISSECTION Right     cervical    SD  DELIVERY ONLY N/A 2018    Procedure:  SECTION ();   Surgeon: Robert Holt MD;  Location: [facility] ;  Service: Obstetrics       Family History   Problem Relation Age of Onset    Hypertension Mother     Heart disease Mother    [employer] Depression Mother     Coronary artery disease Mother     Hypertension Father     Hyperlipidemia Father     Heart disease Father         MI    Stroke Paternal Grandmother     Cancer Paternal Grandmother         breast    Breast cancer Paternal Grandmother     Cancer Paternal Aunt         colon    Early death Maternal Uncle     Cancer Maternal Uncle         lymphoma    Early death Maternal Uncle         lymphoma    Birth defects Maternal Uncle     Undescended testes Son     Cystic fibrosis Other     Cystic fibrosis Other     Heart failure Maternal Grandmother     Stroke Maternal Grandfather     Alcohol abuse Maternal Grandfather     Alcohol abuse Brother     Drug abuse Brother     Anxiety disorder Sister     Post-traumatic stress disorder Sister     Developmental delay Son     Macrocephaly Son     Hydrocephalus Son          Medications have been verified  Objective   /89   Pulse 80   Temp 97 8 °F (36 6 °C)   Resp 16   Ht 5' 2" (1 575 m)   Wt 76 7 kg (169 lb)   SpO2 98%   BMI 30 91 kg/m²        Physical Exam     Physical Exam   Constitutional: She appears well-developed and well-nourished  No distress  Musculoskeletal:        Left hand: She exhibits decreased range of motion, tenderness, bony tenderness and swelling  She exhibits normal two-point discrimination, normal capillary refill, no deformity and no laceration  Normal sensation noted  Decreased sensation is not present in the ulnar distribution, is not present in the medial redistribution and is not present in the radial distribution  Decreased strength noted  She exhibits finger abduction and thumb/finger opposition  She exhibits no wrist extension trouble  Hands:  Skin: Skin is warm and dry  Capillary refill takes less than 2 seconds  No rash noted  She is not diaphoretic  No erythema  No pallor  Psychiatric: She has a normal mood and affect   Her behavior is normal    Nursing note and vitals reviewed

## 2020-03-12 DIAGNOSIS — F41.1 GENERALIZED ANXIETY DISORDER: ICD-10-CM

## 2020-03-12 DIAGNOSIS — F51.01 PRIMARY INSOMNIA: ICD-10-CM

## 2020-03-12 DIAGNOSIS — F43.10 PTSD (POST-TRAUMATIC STRESS DISORDER): ICD-10-CM

## 2020-03-12 RX ORDER — TRAZODONE HYDROCHLORIDE 50 MG/1
TABLET ORAL
Qty: 30 TABLET | Refills: 0 | Status: SHIPPED | OUTPATIENT
Start: 2020-03-12 | End: 2020-08-11

## 2020-03-12 RX ORDER — BUSPIRONE HYDROCHLORIDE 7.5 MG/1
7.5 TABLET ORAL 2 TIMES DAILY
Qty: 180 TABLET | Refills: 0 | Status: SHIPPED | OUTPATIENT
Start: 2020-03-12 | End: 2020-03-30 | Stop reason: SDUPTHER

## 2020-03-12 RX ORDER — CLONIDINE HYDROCHLORIDE 0.1 MG/1
TABLET ORAL
Qty: 90 TABLET | Refills: 2 | Status: SHIPPED | OUTPATIENT
Start: 2020-03-12 | End: 2020-06-09

## 2020-03-12 NOTE — TELEPHONE ENCOUNTER
Refill for Clonidine 0 1 mg sent to [facility] with 2 refills   And refill for Sertraline 50 mg po bid for 90 day supply; buspar 7 5 mg po bid 90 day supply and Trazodone 25-50 mg 30 day supply sent to pharmacy

## 2020-03-24 ENCOUNTER — TELEPHONE (OUTPATIENT)
Dept: FAMILY MEDICINE CLINIC | Facility: CLINIC | Age: 35
End: 2020-03-24

## 2020-03-24 ENCOUNTER — TELEMEDICINE (OUTPATIENT)
Dept: FAMILY MEDICINE CLINIC | Facility: CLINIC | Age: 35
End: 2020-03-24
Payer: COMMERCIAL

## 2020-03-24 VITALS — HEART RATE: 67 BPM

## 2020-03-24 DIAGNOSIS — R00.2 PALPITATIONS: Primary | ICD-10-CM

## 2020-03-24 PROCEDURE — 99214 OFFICE O/P EST MOD 30 MIN: CPT | Performed by: FAMILY MEDICINE

## 2020-03-24 NOTE — PROGRESS NOTES
states he is afebile  No SOB  May be an exacerbation of some stress       Past Medical History:   Diagnosis Date    Abnormal Pap smear of cervix     Anxiety     Depression     HPV (human papilloma virus) infection     Kidney stone     H/O PYELONEPHRITIS    Migraine     Seasonal allergies     Sleep difficulties     Varicella     POS HX       Past Surgical History:   Procedure Laterality Date    CHOLECYSTECTOMY      FINGER SURGERY      left hand     GYNECOLOGIC CRYOSURGERY      HAND SURGERY Left     Phelebitis in left hand  post IV infilitration, had sx for clot removal     LYMPH NODE BIOPSY      of neck    LYMPH NODE DISSECTION Right     cervical    MI  DELIVERY ONLY N/A 2018    Procedure:  SECTION (); Surgeon: Paty Nair MD;  Location: [facility];  Service: Obstetrics       Current Outpatient Medications   Medication Sig Dispense Refill    busPIRone (BUSPAR) 7 5 mg tablet Take 1 tablet (7 5 mg total) by mouth 2 (two) times a day 180 tablet 0    cetirizine (ZyrTEC) 10 mg tablet Take 1 tablet (10 mg total) by mouth daily      cloNIDine (CATAPRES) 0 1 mg tablet TAKE 1 TABLET BY MOUTH EVERY MORNING AND TAKE 2 TABLETS BY MOUTH EVERY EVENING 90 tablet 2    Melatonin ER 10 MG TBCR Take 1 tablet by mouth daily      sertraline (ZOLOFT) 50 mg tablet Take 1 tablet (50 mg total) by mouth 2 (two) times a day 180 tablet 0    therapeutic multivitamin-minerals (THERAGRAN-M) tablet Take 1 tablet by mouth daily      traZODone (DESYREL) 50 mg tablet Take 1/2 (25 mg) to 1 (50 mg)  tab po q hs prn 30 tablet 0    meclizine (ANTIVERT) 25 mg tablet Take 1 tablet (25 mg total) by mouth 3 (three) times a day as needed for dizziness for up to 15 doses (Patient not taking: Reported on 2020) 15 tablet 0     No current facility-administered medications for this visit  No Known Allergies    Review of Systems   Constitutional: Negative    Negative for activity change, appetite change, chills, diaphoresis, fatigue and fever  HENT: Negative  Negative for congestion, dental problem, ear pain, sinus pressure and sore throat  Eyes: Negative  Negative for photophobia, pain, discharge, redness, itching and visual disturbance  Respiratory: Negative for apnea, cough, chest tightness and shortness of breath  Cardiovascular: Positive for palpitations  Negative for chest pain and leg swelling  Gastrointestinal: Negative  Negative for abdominal distention, abdominal pain, constipation and diarrhea  Endocrine: Negative  Negative for cold intolerance and heat intolerance  Genitourinary: Negative  Negative for difficulty urinating and dyspareunia  Musculoskeletal: Negative  Negative for arthralgias and back pain  Skin: Negative  Allergic/Immunologic: Negative for environmental allergies  Neurological: Negative  Negative for dizziness  Psychiatric/Behavioral: Negative  Negative for agitation  Physical Exam   Constitutional: She appears well-developed and well-nourished  No distress  HENT:   Head: Normocephalic and atraumatic  Pulmonary/Chest: Effort normal  No respiratory distress  Skin: She is not diaphoretic  I spent 10 minutes with the patient during this visit

## 2020-03-24 NOTE — TELEPHONE ENCOUNTER
----- Message from Myriam Garcia LPN sent at 9/91/0328  2:32 PM EDT -----  Regarding: FW: Non-Urgent Medical Question  Contact: [phone]      ----- Message -----  From: Len Lazcano  Sent: 3/24/2020   1:51 PM EDT  To: [facility]  Subject: Non-Urgent Medical Question                      Attached Apple report

## 2020-03-30 ENCOUNTER — TELEPHONE (OUTPATIENT)
Dept: PSYCHIATRY | Facility: CLINIC | Age: 35
End: 2020-03-30

## 2020-03-30 DIAGNOSIS — F41.1 GENERALIZED ANXIETY DISORDER: ICD-10-CM

## 2020-03-30 DIAGNOSIS — F43.10 PTSD (POST-TRAUMATIC STRESS DISORDER): ICD-10-CM

## 2020-03-30 RX ORDER — BUSPIRONE HYDROCHLORIDE 7.5 MG/1
15 TABLET ORAL 2 TIMES DAILY
Qty: 360 TABLET | Refills: 0 | Status: SHIPPED | OUTPATIENT
Start: 2020-03-30 | End: 2020-06-15

## 2020-03-30 NOTE — TELEPHONE ENCOUNTER
Samir Hunter states she is very anxious about her son Robbie Buckner, she is concerned because they are now testing him for cancer  She states she is sleeping better but her anxiety level and depression is increased  She is agreeable to increase her buspar from 7 5 mg bid to 15 mg bid  And increase Zoloft from 50 mg po bid to 75 mg po bid  Education completed for both meds  Patient will take her current medications that she has at home she will double up on the 7 5 is a BuSpar b i d  and she will take 1 and half of her Zoloft b i d  New prescriptions with new instructions sent to [facility] in [place] to be placed on hold and patient is aware of this for when she runs out of medication as she will be taking new dosing  Buspar patient education completed including serotonin syndrome, rare TD/EPS, dizziness, sedation, GI distress, confusion, possible mood changes, xerostomia and visual disturbances  Patient Education for Zoloft completed including serotonin syndrome, SIADH, worsening depression, suicidality, induction of gladys, GI upset, headaches, activation, sexual side effects, sedation, potential drug interactions, and others  Patient denies any suicidal or homicidal ideation, she denies any delusional thinking, denies any auditory or visual hallucinations  She verbalizes that she does have [place] crisis as well as National suicide prevention hotline numbers if needed  She reports that she does have support from her  and family members if needed for relief/help with care of sons  Patient is scheduled for virtual visit with me in April and she will contact me if she needs a sooner visit

## 2020-03-30 NOTE — TELEPHONE ENCOUNTER
She would like something for anxiety  Her last appointment she had to cancel due to son in the hospital and now they are testing him for cancer  She is very stressed

## 2020-04-04 DIAGNOSIS — R42 VERTIGO: ICD-10-CM

## 2020-04-04 RX ORDER — MECLIZINE HYDROCHLORIDE 25 MG/1
25 TABLET ORAL 3 TIMES DAILY PRN
Qty: 15 TABLET | Refills: 0 | Status: SHIPPED | OUTPATIENT
Start: 2020-04-04 | End: 2020-08-07

## 2020-04-04 RX ORDER — ONDANSETRON 4 MG/1
4 TABLET, FILM COATED ORAL EVERY 8 HOURS PRN
Qty: 20 TABLET | Refills: 0 | Status: SHIPPED | OUTPATIENT
Start: 2020-04-04 | End: 2020-08-07

## 2020-04-08 ENCOUNTER — EVALUATION (OUTPATIENT)
Dept: PHYSICAL THERAPY | Facility: CLINIC | Age: 35
End: 2020-04-08
Payer: COMMERCIAL

## 2020-04-08 DIAGNOSIS — R42 DIZZINESS: Primary | ICD-10-CM

## 2020-04-08 DIAGNOSIS — H81.11 BENIGN PAROXYSMAL POSITIONAL VERTIGO OF RIGHT EAR: ICD-10-CM

## 2020-04-08 PROCEDURE — 97530 THERAPEUTIC ACTIVITIES: CPT | Performed by: PHYSICAL THERAPIST

## 2020-04-08 PROCEDURE — 97161 PT EVAL LOW COMPLEX 20 MIN: CPT | Performed by: PHYSICAL THERAPIST

## 2020-04-20 ENCOUNTER — TELEMEDICINE (OUTPATIENT)
Dept: PSYCHIATRY | Facility: CLINIC | Age: 35
End: 2020-04-20
Payer: COMMERCIAL

## 2020-04-20 VITALS — WEIGHT: 183 LBS | BODY MASS INDEX: 33.68 KG/M2 | HEIGHT: 62 IN

## 2020-04-20 DIAGNOSIS — F43.10 PTSD (POST-TRAUMATIC STRESS DISORDER): ICD-10-CM

## 2020-04-20 DIAGNOSIS — F32.1 CURRENT MODERATE EPISODE OF MAJOR DEPRESSIVE DISORDER WITHOUT PRIOR EPISODE (HCC): ICD-10-CM

## 2020-04-20 DIAGNOSIS — F51.01 PRIMARY INSOMNIA: ICD-10-CM

## 2020-04-20 DIAGNOSIS — F41.1 GENERALIZED ANXIETY DISORDER: Primary | ICD-10-CM

## 2020-04-20 PROCEDURE — 99214 OFFICE O/P EST MOD 30 MIN: CPT | Performed by: NURSE PRACTITIONER

## 2020-04-20 PROCEDURE — 90833 PSYTX W PT W E/M 30 MIN: CPT | Performed by: NURSE PRACTITIONER

## 2020-06-08 DIAGNOSIS — F43.10 PTSD (POST-TRAUMATIC STRESS DISORDER): ICD-10-CM

## 2020-06-08 DIAGNOSIS — F41.1 GENERALIZED ANXIETY DISORDER: ICD-10-CM

## 2020-06-09 RX ORDER — CLONIDINE HYDROCHLORIDE 0.1 MG/1
TABLET ORAL
Qty: 90 TABLET | Refills: 2 | Status: SHIPPED | OUTPATIENT
Start: 2020-06-09 | End: 2020-08-11 | Stop reason: SDUPTHER

## 2020-06-11 ENCOUNTER — TELEPHONE (OUTPATIENT)
Dept: FAMILY MEDICINE CLINIC | Facility: CLINIC | Age: 35
End: 2020-06-11

## 2020-06-11 ENCOUNTER — TELEMEDICINE (OUTPATIENT)
Dept: FAMILY MEDICINE CLINIC | Facility: CLINIC | Age: 35
End: 2020-06-11
Payer: COMMERCIAL

## 2020-06-11 VITALS — BODY MASS INDEX: 34.96 KG/M2 | TEMPERATURE: 99.1 F | HEIGHT: 62 IN | WEIGHT: 190 LBS

## 2020-06-11 DIAGNOSIS — E66.9 OBESITY (BMI 30.0-34.9): ICD-10-CM

## 2020-06-11 DIAGNOSIS — G43.109 MIGRAINE WITH AURA AND WITHOUT STATUS MIGRAINOSUS, NOT INTRACTABLE: Primary | ICD-10-CM

## 2020-06-11 PROCEDURE — 99213 OFFICE O/P EST LOW 20 MIN: CPT | Performed by: FAMILY MEDICINE

## 2020-06-11 RX ORDER — SUMATRIPTAN 100 MG/1
100 TABLET, FILM COATED ORAL ONCE AS NEEDED
Qty: 12 TABLET | Refills: 5 | Status: SHIPPED | OUTPATIENT
Start: 2020-06-11 | End: 2020-12-22 | Stop reason: SDUPTHER

## 2020-06-14 DIAGNOSIS — F43.10 PTSD (POST-TRAUMATIC STRESS DISORDER): ICD-10-CM

## 2020-06-14 DIAGNOSIS — F41.1 GENERALIZED ANXIETY DISORDER: ICD-10-CM

## 2020-06-15 RX ORDER — BUSPIRONE HYDROCHLORIDE 7.5 MG/1
TABLET ORAL
Qty: 360 TABLET | Refills: 0 | Status: SHIPPED | OUTPATIENT
Start: 2020-06-15 | End: 2020-07-20 | Stop reason: SDUPTHER

## 2020-07-20 ENCOUNTER — TELEMEDICINE (OUTPATIENT)
Dept: PSYCHIATRY | Facility: CLINIC | Age: 35
End: 2020-07-20
Payer: COMMERCIAL

## 2020-07-20 DIAGNOSIS — F43.10 PTSD (POST-TRAUMATIC STRESS DISORDER): ICD-10-CM

## 2020-07-20 DIAGNOSIS — F32.1 CURRENT MODERATE EPISODE OF MAJOR DEPRESSIVE DISORDER WITHOUT PRIOR EPISODE (HCC): Primary | ICD-10-CM

## 2020-07-20 DIAGNOSIS — F41.1 GENERALIZED ANXIETY DISORDER: ICD-10-CM

## 2020-07-20 DIAGNOSIS — F51.01 PRIMARY INSOMNIA: ICD-10-CM

## 2020-07-20 PROCEDURE — 90833 PSYTX W PT W E/M 30 MIN: CPT | Performed by: NURSE PRACTITIONER

## 2020-07-20 PROCEDURE — 99214 OFFICE O/P EST MOD 30 MIN: CPT | Performed by: NURSE PRACTITIONER

## 2020-07-20 RX ORDER — BUSPIRONE HYDROCHLORIDE 15 MG/1
15 TABLET ORAL 3 TIMES DAILY
Qty: 270 TABLET | Refills: 0 | Status: SHIPPED | OUTPATIENT
Start: 2020-07-20 | End: 2020-11-04

## 2020-07-20 RX ORDER — SAW/PYGEUM/BETA/HERB/D3/B6/ZN 30 MG-25MG
1 CAPSULE ORAL DAILY
Start: 2020-07-20

## 2020-07-20 RX ORDER — SERTRALINE HYDROCHLORIDE 100 MG/1
100 TABLET, FILM COATED ORAL 2 TIMES DAILY
Qty: 180 TABLET | Refills: 0 | Status: SHIPPED | OUTPATIENT
Start: 2020-07-20 | End: 2020-11-04

## 2020-07-20 NOTE — PSYCH
Virtual Regular Visit    Name and Date of Birth:  Ulisses Wall 28 y o  1985 MRN: [id number]    Date of Visit:  July 20, 2020    Assessment/Plan:    Problem List Items Addressed This Visit        Other    Generalized anxiety disorder    Relevant Medications    sertraline (ZOLOFT) 100 mg tablet    busPIRone (BUSPAR) 15 mg tablet    PTSD (post-traumatic stress disorder)    Relevant Medications    sertraline (ZOLOFT) 100 mg tablet    busPIRone (BUSPAR) 15 mg tablet    Current moderate episode of major depressive disorder without prior episode (HCC) - Primary    Relevant Medications    sertraline (ZOLOFT) 100 mg tablet    busPIRone (BUSPAR) 15 mg tablet    Primary insomnia    Relevant Medications    Melatonin ER 10 MG TBCR      Other Visit Diagnoses     Post-partum depression        Relevant Medications    sertraline (ZOLOFT) 100 mg tablet    busPIRone (BUSPAR) 15 mg tablet          Reason for visit is   Chief Complaint   Patient presents with    Virtual Regular Visit    Follow-up    Depression    Anxiety    Sleeping Problem        Encounter provider Helena Maxbass, [place]    Provider located at [address] [place] [address] [address]  [facility] [id number]      Recent Visits  No visits were found meeting these conditions  Showing recent visits within past 7 days and meeting all other requirements     Today's Visits  Date Type Provider Dept   07/20/20 [address]  Carie Hardin [facility] 426 today's visits and meeting all other requirements     Future Appointments  No visits were found meeting these conditions  Showing future appointments within next 150 days and meeting all other requirements        The patient was identified by name and date of birth  Ulisses Wall was informed that this is a telemedicine visit and that the visit is being conducted through [employer]  My office door was closed  No one else was in the room  She acknowledged consent and understanding of privacy and security of the video platform  The patient has agreed to participate and understands they can discontinue the visit at any time  Patient is aware this is a billable service  Past Medical History:   Diagnosis Date    Abnormal Pap smear of cervix     Anxiety     Depression     HPV (human papilloma virus) infection     Kidney stone     H/O PYELONEPHRITIS    Migraine     Seasonal allergies     Sleep difficulties     Varicella     POS HX       Past Surgical History:   Procedure Laterality Date    CHOLECYSTECTOMY      FINGER SURGERY      left hand     GYNECOLOGIC CRYOSURGERY      HAND SURGERY Left     Phelebitis in left hand  post IV infilitration, had sx for clot removal     LYMPH NODE BIOPSY      of neck    LYMPH NODE DISSECTION Right     cervical    ME  DELIVERY ONLY N/A 2018    Procedure:  SECTION ();   Surgeon: Maxine Hair MD;  Location: [facility];  Service: Obstetrics       Current Outpatient Medications   Medication Sig Dispense Refill    busPIRone (BUSPAR) 15 mg tablet Take 1 tablet (15 mg total) by mouth 3 (three) times a day 270 tablet 0    cloNIDine (CATAPRES) 0 1 mg tablet TAKE 1 TABLET BY MOUTH EVERY MORNING AND TAKE 2 TABLETS BY MOUTH EVERY EVENING 90 tablet 2    meclizine (ANTIVERT) 25 mg tablet Take 1 tablet (25 mg total) by mouth 3 (three) times a day as needed for dizziness for up to 15 doses 15 tablet 0    Melatonin ER 10 MG TBCR Take 1 tablet (10 mg total) by mouth daily      ondansetron (Zofran) 4 mg tablet Take 1 tablet (4 mg total) by mouth every 8 (eight) hours as needed for nausea or vomiting 20 tablet 0    sertraline (ZOLOFT) 100 mg tablet Take 1 tablet (100 mg total) by mouth 2 (two) times a day 180 tablet 0    SUMAtriptan (IMITREX) 100 mg tablet Take 1 tablet (100 mg total) by mouth once as needed for migraine for up to 1 dose 12 tablet 5    therapeutic multivitamin-minerals (THERAGRAN-M) tablet Take 1 tablet by mouth daily      cetirizine (ZyrTEC) 10 mg tablet Take 1 tablet (10 mg total) by mouth daily (Patient not taking: Reported on 2020)      traZODone (DESYREL) 50 mg tablet Take 1/2 (25 mg) to 1 (50 mg)  tab po q hs prn (Patient not taking: Reported on 2020) 30 tablet 0     No current facility-administered medications for this visit  No Known Allergies      There were no vitals filed for this visit  VIRTUAL VISIT DISCLAIMER    Fahad Howard acknowledges that she has consented to an online visit or consultation  She understands that the online visit is based solely on information provided by her, and that, in the absence of a face-to-face physical evaluation by the physician, the diagnosis she receives is both limited and provisional in terms of accuracy and completeness  This is not intended to replace a full medical face-to-face evaluation by the physician  Fahad Howard understands and accepts these terms  SUBJECTIVE:    Lina Koroma is seen today for a follow up for depression, Generalized Anxiety Disorder and PTSD  Since our last visit, overall symptoms have been gradually worsening  I think I am may be more depressed than I think I am   Today Lina Koroma arrives on the video portal visit with her  Royal Vizcarra and gives consent to continue with the visit as they are driving with their infant son Clifton Hayden to a visit in [place] to see a developmental specialist   They both report that they have been under significant stress for the past 3 weeks  Everything was going pretty well overall until my brother  of an unexpected overdose 3 weeks ago      Since Malia's brothers overdose 3 weeks ago she reports increased difficulty with sleep, increased irritability, feelings of sadness, increased nausea, increase headaches, increased feelings of depression and anxiety    She also reports during that same week of her brother's death they saw the pediatric oncologist for her infant son Luc Molina and the diagnosis of lymphoma and leukemia were ruled out however he continues to have significant medical problems, fever of unknown origin and she reports his fevers have been running high for 35 days  She states that her son has not been sleeping which leads to her not sleeping which is increased exhaustion  Both her and her  report that they are trying to take turns however she states that her  continues to work full-time and she continues to take care of the children full-time  She also reports increased anxiety about the potential of their older son starting high school next year in his freshman year and the potential of him being home schooled  Originally I was excited about the idea but since my brother passed away it just seems overwhelming with that thought "    Emelia Rawls also reports increased stressors with COVID-19 pandemic in general and the fear of her son Luc Molina becoming ill or 1 of the other family members kathy the disease  Patient states that her and her  Anni Alegre started the keto diet 4 weeks ago and they have been working together very well and she is currently down 17 lb and this is making her feel very good  She states that they have not been able to work out due to the turn of events in the family and the significant hot weather  She does report however prior to her brother's death they were working on a garden and trying to do projects in the house  She denies SI/HI, she denies auditory or visual hallucinations  She continues to take her medications as prescribed  She denies any side effects to her medications            HPI ROS:             ('was' notes: recent => remote)  Medication Side Effects:  Denies  (Was weight gain)   Depression (10 worst): 6 (Was 2)   Anxiety (10 worst): 9 (Was 2)   Safety concerns (SI, HI, etc): Denies (Was denies)   Hallucinations/Delusions Denies (Was denies)   Sleep: Significantly decreased due to son's high fevers times 35 days denies nightmares has not been using trazodone continues to use melatonin her and her  are trying to take turns with son being awake (Was improved)   Energy: Vacillating (Was gotten better with change in meds and )   Appetite: Watching what I eat, on KETO diet x4 weeks and down 17 lb (Was aversion to certain foods, good water intake, still eats healthy foods)   Height 5 ft 2 in (Was 5 ft 2 in)   Weight Change: Approximately 166 lb (lost 17 lb from last visit) (Was 183 lb)     Delories Hint denies any side effects from medications unless noted above    Review Of Systems:      Constitutional fluctuating energy level and as noted in HPI   ENT negative   Cardiovascular negative   Respiratory negative   Gastrointestinal nausea and diarrhea   Genitourinary negative   Musculoskeletal negative   Integumentary negative   Neurological headache   Endocrine negative   Other Symptoms none, all other systems are negative     History Review:  The following portions of the patient's history were reviewed and documented: allergies, current medications, past family history, past medical history, past social history and problem list      Lab Review: Labs were reviewed  Laboratory Results:   Most Recent Labs:   Lab Results   Component Value Date    WBC 6 86 12/28/2019    RBC 4 53 12/28/2019    HGB 13 3 12/28/2019    HCT 40 4 12/28/2019     (H) 12/28/2019    RDW 13 7 12/28/2019    NEUTROABS 4 07 12/28/2019    K 5 3 12/28/2019     12/28/2019    CO2 29 12/28/2019    BUN 11 12/28/2019    CREATININE 0 72 12/28/2019    CALCIUM 9 7 12/28/2019    AST 17 08/01/2019    ALT 24 08/01/2019    ALKPHOS 103 08/01/2019    HDL 25 (L) 08/01/2019    TRIG 84 08/01/2019    LDLCALC 130 (H) 08/01/2019    [id number] 2 900 01/02/2020    RPR Non-Reactive 08/01/2019     CBC:   Lab Results   Component Value Date    WBC 6 86 12/28/2019    RBC 4 53 12/28/2019    HGB 13 3 12/28/2019    HCT 40 4 12/28/2019    MCV 89 12/28/2019     (H) 12/28/2019    MCH 29 4 12/28/2019    MCHC 32 9 12/28/2019    RDW 13 7 12/28/2019    MPV 8 9 12/28/2019    NRBC 0 12/28/2019    NEUTROABS 4 07 12/28/2019     BMP:   Lab Results   Component Value Date    K 5 3 12/28/2019     12/28/2019    CO2 29 12/28/2019    BUN 11 12/28/2019    CREATININE 0 72 12/28/2019    CALCIUM 9 7 12/28/2019    EGFR 110 12/28/2019     CMP:   Lab Results   Component Value Date    K 5 3 12/28/2019     12/28/2019    CO2 29 12/28/2019    BUN 11 12/28/2019    CREATININE 0 72 12/28/2019    CALCIUM 9 7 12/28/2019    AST 17 08/01/2019    ALT 24 08/01/2019    ALKPHOS 103 08/01/2019    EGFR 110 12/28/2019     Lipid Profile:   Lab Results   Component Value Date    HDL 25 (L) 08/01/2019    TRIG 84 08/01/2019    LDLCALC 130 (H) 08/01/2019     Liver Enzymes:   Lab Results   Component Value Date    AST 17 08/01/2019    ALT 24 08/01/2019    ALKPHOS 103 08/01/2019     Thyroid Studies:   Lab Results   Component Value Date    [id number] 2 900 01/02/2020     RPR:   Lab Results   Component Value Date    RPR Non-Reactive 08/01/2019     Pregnancy: No results found for: Lennette Raúl, HCG, HCGQUANT  Hemoglobin A1C/EST AVG Glucose   Lab Results   Component Value Date    HGBA1C 4 6 08/01/2019    EAG 85 08/01/2019     EKG   Lab Results   Component Value Date    VENTRATE 67 12/28/2019    ATRIALRATE 71 12/28/2019    PRINT 160 12/28/2019    QRSDINT 82 12/28/2019    QTINT 396 12/28/2019    PAXIS 19 12/28/2019    QRSAXIS 55 12/28/2019    TWAVEAXIS 47 12/28/2019     I have personally reviewed all pertinent laboratory/tests results  Video Exam    OBJECTIVE:     Vital signs in last 24 hours: There were no vitals filed for this visit      Mental Status Evaluation:    Appearance age appropriate, casually dressed   Behavior cooperative, appears anxious, good eye contact   Speech normal rate, normal volume, normal pitch   Mood depressed, anxious   Affect normal range and intensity, appropriate   Thought Processes organized, goal directed, linear   Associations intact associations   Thought Content no overt delusions   Perceptual Disturbances: no auditory hallucinations, no visual hallucinations   Abnormal Thoughts  Risk Potential Suicidal ideation - None  Homicidal ideation - None  Potential for aggression - No   Orientation oriented to person, place, time/date and situation   Memory recent and remote memory grossly intact   Consciousness alert and awake   Attention Span Concentration Span attention span and concentration are age appropriate   Intellect appears to be of average intelligence   Insight intact and good   Judgement intact and good   Muscle Strength and  Gait unable to assess today due to virtual visit   Motor activity unable to assess today due to virtual visit   Pain none   Pain Scale 0       Risks, Benefits And Possible Side Effects Of Medications:    AGREE: Risks, benefits, and possible side effects of medications explained to [facility] hospitals and she (or legal representative) verbalizes understanding and agreement for treatment  PREGNANCY: Risks related to Pregnancy or becoming pregnant discussed related to medications and treatment  Patient has agreed to discuss treatment if planning to become pregnant, or if they become pregnant    Controlled Medication Discussion:     Not applicable  ______________________________________________________________    __________________________________________________    The following portions of the patient's history were reviewed and updated as appropriate: past family history, past medical history, past social history, past surgical history and problem list     Past psychiatric history, past traumatic history, past social history, past family psychiatric history copied from my note dated July 22, 2019 and updated today      Past Psychiatric History:      Past Inpatient Psychiatric Treatment:   No history of past inpatient psychiatric admissions  Past Outpatient Psychiatric Treatment:    Age 15 father passed away from MI patient was diagnosed with depression and anxiety at that time-was not tx with meds-had counseling unsure of name  Kalpesh Batista (2019) OBGYN started Zoloft for Post partum  Past Suicide Attempts: no  Past Violent Behavior: no  Past Psychiatric Medication Trials: Zoloft and Wellbutrin     Traumatic History:      Abuse: no history of physical or sexual abuse  Other Traumatic Events: Son's illness and father passed of MI when she was 15years old (did not witness), nightmares, flashbacks      Family Psychiatric History:      Mother depression  Sister PTSD  Maternal grandfather alcohol abuse  Brother Scooter:   2020 of drug overdose  No family history of suicide or suicide attempt    Social History:  Education level: Associates degree   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[employer])  Marital status:  to Pietro 3/2017  Children: Fab 2006, Negrito Noguera 18  Current Living Situation: the patient currently livesHouse with  Arie Velasquez, son's Hina and Negrito Noguera    Social support: Pietro   Lutheran Affiliation: N/A   experience: N/A  Legal history: N/A  Access to Guns: Yes, they are in a locked cabinet   She does not have a gun licence     Past Medical History:    Past Medical History:   Diagnosis Date    Abnormal Pap smear of cervix     Anxiety     Depression     HPV (human papilloma virus) infection     Kidney stone     H/O PYELONEPHRITIS    Migraine     Seasonal allergies     Sleep difficulties     Varicella     POS HX     Past Medical History Pertinent Negatives:   Diagnosis Date Noted    Disease of thyroid gland 2019    Seizures ([facility] ) 2019    Self-injurious behavior 2019    Substance abuse ([facility] ) 2019    Suicide attempt ([facility] ) 2019     Past Surgical History:   Procedure Laterality Date    CHOLECYSTECTOMY      FINGER SURGERY      left hand     GYNECOLOGIC CRYOSURGERY      HAND SURGERY Left     Phelebitis in left hand  post IV infilitration, had sx for clot removal     LYMPH NODE BIOPSY      of neck    LYMPH NODE DISSECTION Right 2011    cervical    NE  DELIVERY ONLY N/A 2018    Procedure:  SECTION ();   Surgeon: Jaycob Dangelo MD;  [facility];  Service: Obstetrics     No Known Allergies    Substance Abuse History:    Social History     Substance and Sexual Activity   Alcohol Use Never    Frequency: Never     Social History     Substance and Sexual Activity   Drug Use No       Social History:    Social History     Socioeconomic History    Marital status: /Civil Union     Spouse name: Leena Yates Number of children: 2    Years of education: Not on file    Highest education level: Associate degree: academic program   Occupational History    Occupation: unemployed   Social Needs    Financial resource strain: Not very hard    Food insecurity:     Worry: Never true     Inability: Never true    Transportation needs:     Medical: No     Non-medical: No   Tobacco Use    Smoking status: Former Smoker     Packs/day: 0 50     Years: 3 00     Pack years: 1 50     Types: Cigarettes     Last attempt to quit:      Years since quittin 5    Smokeless tobacco: Never Used   Substance and Sexual Activity    Alcohol use: Never     Frequency: Never    Drug use: No    Sexual activity: Yes     Partners: Male     Birth control/protection: None   Lifestyle    Physical activity:     Days per week: 4 days     Minutes per session: 20 min    Stress: Not on file   Relationships    Social connections:     Talks on phone: More than three times a week     Gets together: Never     Attends Sabianist service: Never     Active member of club or organization: No     Attends meetings of clubs or organizations: Never     Relationship status:     Intimate partner violence:     Fear of current or ex partner: No Emotionally abused: No     Physically abused: No     Forced sexual activity: No   Other Topics Concern    Not on file   Social History Narrative    Not on file       Family Psychiatric History:     Family History   Problem Relation Age of Onset    Hypertension Mother     Heart disease Mother     Depression Mother     Coronary artery disease Mother     Hypertension Father     Hyperlipidemia Father     Heart disease Father         MI    Stroke Paternal Grandmother     Cancer Paternal Grandmother         breast    Breast cancer Paternal Grandmother     Cancer Paternal Aunt         colon    Early death Maternal Uncle     Cancer Maternal Uncle         lymphoma    Early death Maternal Uncle         lymphoma    Birth defects Maternal Uncle     Undescended testes Son     Cystic fibrosis Other     Cystic fibrosis Other     Heart failure Maternal Grandmother     Stroke Maternal Grandfather     Alcohol abuse Maternal Grandfather     Alcohol abuse Brother     Drug abuse Brother          of overdose 2020    Anxiety disorder Sister     Post-traumatic stress disorder Sister     Developmental delay Son     Macrocephaly Son     Hydrocephalus Son         Vital signs in last 24 hours: There were no vitals filed for this visit      Confidential Assessment:  Copied from my note dated 2019  Italo Gibbs is a 29year old female who was originally diagnosed with depression and anxiety at age 15 post father passing away she was treated with intensive counseling/therapy but no pharmacotherapy  Britta Cano was treated with Wellbutrin for job stress at 1 time (for a short period of time many years ago which was effective) she reports it was a situational event which resolved and the medication was stopped   Denies history of sexual abuse, mental abuse, physical abuse, or verbal abuse  Dunlap Jerome denies history of self-injurious behavior, denies history of head injuries or loss of consciousness      Current situation: symptoms started abruptly 9 months ago, approximately 2 months after her son was born and became progressively worse as he developed significant medical problems  Son Joao's illness= tracheobronchomalacia which will be requiring an extensive chest surgery potentially on his 1st birthday August 14, 2019, he also has hydrocephalus, microcephaly and global developmental delay    This illness has required Santos Clements to be in the hospital multiple times since 3months of age including multiple trips to [place] for hospitalization and many trips to the emergency department  [facility] has been on antibiotics, requires breathing treatments 4 times daily, and they do not have family support living in the area        She has 1 sister who suffers from anxiety and PTSD and 1 brother who has drug and alcohol addiction who is currently in alf  John Steven does speak with her mother on the phone daily though this adds to her daily stressors as she gets pulled into the family issues because my mom does not speak to my sister and she goes on about my brother in alf and I tell her I can't handle the extra stressed but it just does not seem to matter       She was placed on Zoloft 25 mg p o  Daily and then increase to 50 mg p o  Daily by her obgyn provider and referred to Psychiatry for continued management  John Steven does believe that she may be grinding her teeth at night as she has been waking with headaches some mornings  Scales:    No new scales today    Assessment/Plan:       Diagnoses and all orders for this visit:    Current moderate episode of major depressive disorder without prior episode (HCC)    PTSD (post-traumatic stress disorder)  -     sertraline (ZOLOFT) 100 mg tablet; Take 1 tablet (100 mg total) by mouth 2 (two) times a day  -     busPIRone (BUSPAR) 15 mg tablet; Take 1 tablet (15 mg total) by mouth 3 (three) times a day    Generalized anxiety disorder  -     sertraline (ZOLOFT) 100 mg tablet;  Take 1 tablet (100 mg total) by mouth 2 (two) times a day  -     busPIRone (BUSPAR) 15 mg tablet; Take 1 tablet (15 mg total) by mouth 3 (three) times a day    Primary insomnia  -     Melatonin ER 10 MG TBCR; Take 1 tablet (10 mg total) by mouth daily    Post-partum depression  -     sertraline (ZOLOFT) 100 mg tablet; Take 1 tablet (100 mg total) by mouth 2 (two) times a day          Treatment Recommendations/Precautions/Plan:    Gabriela Argueta has had several significant increased stressors since last visit  Her brother passed away of a drug overdose 3 weeks ago unexpectedly  She also reports the same week she had her infant son Bonny Small to the oncology office and they still do not have answers however his diagnosis of lymphoma and leukemia were ruled out  She states that genetic testing still coming in, he is being evaluated for CMV an [employer]  She reports he has been having significant fevers times 35 days now which has resulted in significant decrease in sleep for both her infant son Bonny Small and for Gabriela Argueta has had increased feelings of depression anxiety, irritability and agitation and decreased sleep for the last 3 weeks since the passing of her brother  She reports no symptoms of SI or HI, no auditory visual hallucinations  She does get feelings of being overwhelmed with her infant son's medical condition  She also reports increased stressors with the COVID-19 pandemic and also feels overwhelmed with the thought of potentially having to home school her teen son in his freshman year of high school next year  We discussed increasing sertraline and BuSpar to help with symptoms of depression anxiety and PTSD  She is agreeable to this and we will follow up in couple of weeks to see how medication changes are helping  Patient has been educated about their diagnosis and treatment modalities   They voiced understanding and agreement with the following plan:    -followup with this provider on August 11, 2020 at 11:00 a m     -Increase Zoloft from 75 mg p o  B i d  To 100 mg PO BID to improve symptoms of anxiety, depression, PTSD  New prescription sent to [facility] for 90 day supply 07/20/2020    -Increase BuSpar 15 mg p o  B i d  To T  I D To improve symptoms of anxiety new prescription sent to [facility] for 90 day supply 07/20/2020    -Continue clonidine 0 1 mg p o  Q a m  And 0 2 mg p o  Q h s  to improve symptoms of sleep and decrease irritability/agitation and symptoms of PTSD  Patient has enough medication until September    -Continue 10 mg of melatonin p o  Q h s  To maintain improvement in sleep and decrease symptoms of insomnia, patient buys this over-the-counter     -Continue trazodone 25-50 mg p o  Q h s  P r n  For symptoms of insomnia--patient does not require refill of this as she is using extremely infrequently as her son has not been sleeping well due to fevers  -followup with primary care physician for routine medical management    -patient has been unable to start individual psychotherapy despite desire to do so as she has been significantly pulled to many appointments with her infant son and he has had many hospitalizations  She is aware that she should contact this office at any time and we will get her set up for individualized therapy or if she sets up with her own therapist she should contact this provider to make her aware    Of note she is part of many support groups online for her son's conditions and she has been volunteering by sewing face masks for this hospital network and trying to stay involved in activities     -Patient will call if issues or concerns     -Discussed self monitoring of symptoms, and symptom monitoring tools     -Patient has been informed of 24 hours and weekend coverage for urgent situations accessed by calling the main clinic phone number      [facility] Crisis Telephone Numbers and the National Suicide Prevention Hotline Number Provided to Patient     -Treatment Plan completed 04/20/2020-electronically and verbal consent obtained due to virtual visit, COVID-19  Psychotherapy Provided:     Individual psychotherapy provided: Yes  Counseling was provided during the session today for 16 minutes  Medications, treatment progress and treatment plan reviewed with Malia  Medication changes discussed with Malia  Medication education provided to Radha  Recent stressor including COVID-19 issues, family issues, death of brother, son's illness and ongoing anxiety discussed with Malia  Importance of medication and treatment compliance reviewed with Malia  Importance of follow up with family physician for medical issues reviewed with Radha  Reassurance and supportive therapy provided  Crisis/safety plan discussed with Malia       I spent 30 minutes directly with the patient during this visit    Na Davis 07/20/20

## 2020-07-21 ENCOUNTER — TELEPHONE (OUTPATIENT)
Dept: FAMILY MEDICINE CLINIC | Facility: CLINIC | Age: 35
End: 2020-07-21

## 2020-07-21 ENCOUNTER — OFFICE VISIT (OUTPATIENT)
Dept: FAMILY MEDICINE CLINIC | Facility: CLINIC | Age: 35
End: 2020-07-21
Payer: COMMERCIAL

## 2020-07-21 VITALS
RESPIRATION RATE: 16 BRPM | WEIGHT: 184 LBS | HEIGHT: 62 IN | HEART RATE: 76 BPM | DIASTOLIC BLOOD PRESSURE: 72 MMHG | TEMPERATURE: 97.9 F | BODY MASS INDEX: 33.86 KG/M2 | SYSTOLIC BLOOD PRESSURE: 126 MMHG

## 2020-07-21 DIAGNOSIS — L30.9 DERMATITIS: Primary | ICD-10-CM

## 2020-07-21 PROCEDURE — 1036F TOBACCO NON-USER: CPT | Performed by: NURSE PRACTITIONER

## 2020-07-21 PROCEDURE — 99213 OFFICE O/P EST LOW 20 MIN: CPT | Performed by: NURSE PRACTITIONER

## 2020-07-21 RX ORDER — CLOTRIMAZOLE AND BETAMETHASONE DIPROPIONATE 10; .64 MG/G; MG/G
CREAM TOPICAL 2 TIMES DAILY
Qty: 30 G | Refills: 1 | Status: SHIPPED | OUTPATIENT
Start: 2020-07-21 | End: 2020-12-22

## 2020-07-21 NOTE — PROGRESS NOTES
Assessment/Plan:    1  Dermatitis  -     clotrimazole-betamethasone (LOTRISONE) 1-0 05 % cream; Apply topically 2 (two) times a day    Instructed to use cream for 2 weeks and f/u if no improvement  There are no Patient Instructions on file for this visit  Return if symptoms worsen or fail to improve  Subjective:      Patient ID: Marielos Valdes is a 28 y o  female  Chief Complaint   Patient presents with    Rash     both legs, noticed about 2 weeks ago, denies any discomfort  /[facility]        Here today with complaints of a rash on her legs that has been present for the past 2 weeks  She initially though this started after shaving with an old razor  Rash is not painful or itchy  She has tried Mupirocin ointment and has also tried OTC cortisone  Nothing has helped  The following portions of the patient's history were reviewed and updated as appropriate: allergies, current medications, past family history, past medical history, past social history, past surgical history and problem list     Review of Systems   Constitutional: Negative  Respiratory: Negative  Cardiovascular: Negative  Skin: Positive for rash           Current Outpatient Medications   Medication Sig Dispense Refill    busPIRone (BUSPAR) 15 mg tablet Take 1 tablet (15 mg total) by mouth 3 (three) times a day 270 tablet 0    cloNIDine (CATAPRES) 0 1 mg tablet TAKE 1 TABLET BY MOUTH EVERY MORNING AND TAKE 2 TABLETS BY MOUTH EVERY EVENING 90 tablet 2    meclizine (ANTIVERT) 25 mg tablet Take 1 tablet (25 mg total) by mouth 3 (three) times a day as needed for dizziness for up to 15 doses 15 tablet 0    Melatonin ER 10 MG TBCR Take 1 tablet (10 mg total) by mouth daily      ondansetron (Zofran) 4 mg tablet Take 1 tablet (4 mg total) by mouth every 8 (eight) hours as needed for nausea or vomiting 20 tablet 0    sertraline (ZOLOFT) 100 mg tablet Take 1 tablet (100 mg total) by mouth 2 (two) times a day 180 tablet 0    SUMAtriptan (IMITREX) 100 mg tablet Take 1 tablet (100 mg total) by mouth once as needed for migraine for up to 1 dose 12 tablet 5    therapeutic multivitamin-minerals (THERAGRAN-M) tablet Take 1 tablet by mouth daily      traZODone (DESYREL) 50 mg tablet Take 1/2 (25 mg) to 1 (50 mg)  tab po q hs prn 30 tablet 0    cetirizine (ZyrTEC) 10 mg tablet Take 1 tablet (10 mg total) by mouth daily (Patient not taking: Reported on 7/21/2020)      clotrimazole-betamethasone (LOTRISONE) 1-0 05 % cream Apply topically 2 (two) times a day 30 g 1     No current facility-administered medications for this visit  Objective:    /72   Pulse 76   Temp 97 9 °F (36 6 °C)   Resp 16   Ht 5' 2" (1 575 m)   Wt 83 5 kg (184 lb)   BMI 33 65 kg/m²        Physical Exam   Constitutional: She appears well-developed and well-nourished  Cardiovascular: Normal rate, regular rhythm and normal heart sounds  No murmur heard  Pulmonary/Chest: Effort normal and breath sounds normal    Neurological: She is alert  Skin: Skin is warm and dry  Few erythematous scaling patches scattered on lower extremities  Psychiatric: She has a normal mood and affect  Nursing note and vitals reviewed               Marva Boyd

## 2020-07-21 NOTE — TELEPHONE ENCOUNTER
----- Message from Dinh Cruz sent at 7/21/2020  6:34 AM EDT -----  Regarding: Non-Urgent Medical Question  Contact: [phone]  Good morning,    Over the past two weeks I have noticed some random weird clusters of tiny bumps like in the picture  The first picture was taken two weeks ago and the other was taken today  They don't itch  They are just there, but getting bigger, and not going away  Do you know what I could do?      Ani Kaufman

## 2020-07-31 ENCOUNTER — OFFICE VISIT (OUTPATIENT)
Dept: URGENT CARE | Facility: CLINIC | Age: 35
End: 2020-07-31
Payer: COMMERCIAL

## 2020-07-31 ENCOUNTER — APPOINTMENT (OUTPATIENT)
Dept: RADIOLOGY | Facility: CLINIC | Age: 35
End: 2020-07-31
Payer: COMMERCIAL

## 2020-07-31 VITALS
SYSTOLIC BLOOD PRESSURE: 120 MMHG | RESPIRATION RATE: 18 BRPM | DIASTOLIC BLOOD PRESSURE: 80 MMHG | WEIGHT: 185 LBS | HEIGHT: 62 IN | HEART RATE: 87 BPM | BODY MASS INDEX: 34.04 KG/M2 | OXYGEN SATURATION: 98 % | TEMPERATURE: 97.5 F

## 2020-07-31 DIAGNOSIS — R07.81 RIB PAIN: Primary | ICD-10-CM

## 2020-07-31 DIAGNOSIS — R07.81 RIB PAIN: ICD-10-CM

## 2020-07-31 PROCEDURE — 71101 X-RAY EXAM UNILAT RIBS/CHEST: CPT

## 2020-07-31 PROCEDURE — 1036F TOBACCO NON-USER: CPT | Performed by: NURSE PRACTITIONER

## 2020-07-31 PROCEDURE — 99213 OFFICE O/P EST LOW 20 MIN: CPT | Performed by: NURSE PRACTITIONER

## 2020-07-31 NOTE — PROGRESS NOTES
[facility]        NAME: Jessica Zeng is a 28 y o  female  : 1985    MRN: [id number]  DATE: 2020  TIME: 6:44 PM    Assessment and Plan   Rib pain [R07 81]  1  Rib pain  XR ribs left w pa chest min 3 views         Patient Instructions     Apply ice or heat for comfort  Advil for pain  Restrict heavy lifting  Use proper body mechanics if you have to lift  Go to the emergency room if your symptoms worsen    Follow up with PCP in 3-5 days  Proceed to  ER if symptoms worsen  Chief Complaint     Chief Complaint   Patient presents with    Chest Pain     Patient complains of left sided rib pain under her breast with slight swelling starting today  No injury that she is aware of, no SOB  Is slightly nausea if she leans the wrong way         History of Present Illness       27 y/o female presents with left rib pain and palpable mass to the left upper quadrant  She noticed the rib pain and lump today  She denies any known injury or fall  She has an active toddler at home, but is unsure if she hurt herself while lifting the child  She denies any change in skin color, negative for SOB, difficulty breathing, N/V, chest pain, or abdominal pain  She has not applied any ice or heat or taken any OTC medications at this time  Review of Systems   Review of Systems   Constitutional: Negative  HENT: Negative  Respiratory: Negative  Cardiovascular: Negative  Gastrointestinal: Negative  Musculoskeletal: Positive for arthralgias and myalgias  Skin: Negative            Current Medications       Current Outpatient Medications:     busPIRone (BUSPAR) 15 mg tablet, Take 1 tablet (15 mg total) by mouth 3 (three) times a day, Disp: 270 tablet, Rfl: 0    clotrimazole-betamethasone (LOTRISONE) 1-0 05 % cream, Apply topically 2 (two) times a day (Patient not taking: Reported on 2020), Disp: 30 g, Rfl: 1    Melatonin ER 10 MG TBCR, Take 1 tablet (10 mg total) by mouth daily (Patient not taking: Reported on 2020), Disp: , Rfl:     sertraline (ZOLOFT) 100 mg tablet, Take 1 tablet (100 mg total) by mouth 2 (two) times a day, Disp: 180 tablet, Rfl: 0    SUMAtriptan (IMITREX) 100 mg tablet, Take 1 tablet (100 mg total) by mouth once as needed for migraine for up to 1 dose (Patient not taking: Reported on 2020), Disp: 12 tablet, Rfl: 5    therapeutic multivitamin-minerals (THERAGRAN-M) tablet, Take 1 tablet by mouth daily, Disp: , Rfl:     cetirizine (ZyrTEC) 10 mg tablet, Take 1 tablet (10 mg total) by mouth daily (Patient not taking: Reported on 2020), Disp: , Rfl:     cloNIDine (CATAPRES) 0 1 mg tablet, Take 1 tablet (0 1 mg) PO Q AM and take 2 tablets (0 2 mg) PO Q HS, Disp: 90 tablet, Rfl: 2    meclizine (ANTIVERT) 25 mg tablet, TAKE ONE TABLET BY MOUTH THREE TIMES A DAY AS NEEDED FOR DIZZINESS, Disp: 15 tablet, Rfl: 0    ondansetron (ZOFRAN) 4 mg tablet, TAKE ONE TABLET BY MOUTH EVERY 8 HOURS AS NEEDED FOR NAUSEA AND VOMITING, Disp: 30 tablet, Rfl: 0    Current Allergies     Allergies as of 2020    (No Known Allergies)            The following portions of the patient's history were reviewed and updated as appropriate: allergies, current medications, past family history, past medical history, past social history, past surgical history and problem list      Past Medical History:   Diagnosis Date    Abnormal Pap smear of cervix     Anxiety     Depression     HPV (human papilloma virus) infection     Kidney stone     H/O PYELONEPHRITIS    Migraine     Seasonal allergies     Sleep difficulties     Varicella     POS HX       Past Surgical History:   Procedure Laterality Date    CHOLECYSTECTOMY      FINGER SURGERY      left hand     GYNECOLOGIC CRYOSURGERY      HAND SURGERY Left     Phelebitis in left hand  post IV infilitration, had sx for clot removal     LYMPH NODE BIOPSY      of neck    LYMPH NODE DISSECTION Right     cervical    MT  DELIVERY ONLY N/A 2018    Procedure:  SECTION (); Surgeon: Carroll Dowd MD;  Location: [facility] ;  Service: Obstetrics       Family History   Problem Relation Age of Onset    Hypertension Mother     Heart disease Mother     Depression Mother     Coronary artery disease Mother     Hypertension Father     Hyperlipidemia Father     Heart disease Father         MI    Stroke Paternal Grandmother     Cancer Paternal Grandmother         breast    Breast cancer Paternal Grandmother     Cancer Paternal Aunt         colon    Early death Maternal Uncle     Cancer Maternal Uncle         lymphoma    Early death Maternal Uncle         lymphoma    Birth defects Maternal Uncle     Undescended testes Son     Cystic fibrosis Other     Cystic fibrosis Other     Heart failure Maternal Grandmother     Stroke Maternal Grandfather     Alcohol abuse Maternal Grandfather     Alcohol abuse Brother     Drug abuse Brother          of overdose 2020    Anxiety disorder Sister     Post-traumatic stress disorder Sister     Developmental delay Son     Macrocephaly Son     Hydrocephalus Son          Medications have been verified  Objective   /80 (BP Location: Left arm, Patient Position: Sitting, Cuff Size: Standard)   Pulse 87   Temp 97 5 °F (36 4 °C) (Tympanic)   Resp 18   Ht 5' 2" (1 575 m)   Wt 83 9 kg (185 lb)   LMP 2020   SpO2 98%   BMI 33 84 kg/m²        Physical Exam     Physical Exam  Vitals signs and nursing note reviewed  Constitutional:       Appearance: Normal appearance  She is well-developed  Cardiovascular:      Rate and Rhythm: Normal rate and regular rhythm  Heart sounds: Normal heart sounds  Pulmonary:      Effort: Pulmonary effort is normal       Breath sounds: Normal breath sounds and air entry  Abdominal:      General: Abdomen is flat  Bowel sounds are normal       Palpations: Abdomen is soft  Comments: Skin of abdomen is WNL  Negative for redness or bruising  There is a superficial, soft palpable mass noted to the left upper quadrant  There is tenderness with palpation  Negative for pain with palpation to the ribs  Musculoskeletal: Normal range of motion  Skin:     General: Skin is warm and dry  Neurological:      Mental Status: She is alert and oriented to person, place, and time  GCS: GCS eye subscore is 4  GCS verbal subscore is 5  GCS motor subscore is 6  Cranial Nerves: Cranial nerves are intact  Motor: Motor function is intact  Psychiatric:         Behavior: Behavior is cooperative

## 2020-07-31 NOTE — PATIENT INSTRUCTIONS
Apply ice or heat for comfort  Advil for pain  Restrict heavy lifting  Use proper body mechanics if you have to lift  Go to the emergency room if your symptoms worsen

## 2020-08-01 ENCOUNTER — HOSPITAL ENCOUNTER (EMERGENCY)
Facility: HOSPITAL | Age: 35
Discharge: HOME | End: 2020-08-01
Attending: EMERGENCY MEDICINE
Payer: COMMERCIAL

## 2020-08-01 ENCOUNTER — APPOINTMENT (EMERGENCY)
Dept: RADIOLOGY | Facility: HOSPITAL | Age: 35
End: 2020-08-01
Payer: COMMERCIAL

## 2020-08-01 VITALS
DIASTOLIC BLOOD PRESSURE: 93 MMHG | OXYGEN SATURATION: 98 % | HEART RATE: 80 BPM | TEMPERATURE: 97.2 F | WEIGHT: 185 LBS | SYSTOLIC BLOOD PRESSURE: 150 MMHG | HEIGHT: 62 IN | RESPIRATION RATE: 18 BRPM | BODY MASS INDEX: 34.04 KG/M2

## 2020-08-01 DIAGNOSIS — R07.81 RIB PAIN ON LEFT SIDE: ICD-10-CM

## 2020-08-01 DIAGNOSIS — R10.10 PAIN OF UPPER ABDOMEN: Primary | ICD-10-CM

## 2020-08-01 LAB
ALBUMIN SERPL BCP-MCNC: 4 G/DL (ref 3.5–5)
ALP SERPL-CCNC: 49 U/L (ref 46–116)
ALT SERPL W P-5'-P-CCNC: 27 U/L (ref 12–78)
ANION GAP SERPL CALCULATED.3IONS-SCNC: 9 MMOL/L (ref 4–13)
AST SERPL W P-5'-P-CCNC: 19 U/L (ref 5–45)
BASOPHILS # BLD AUTO: 0.03 THOUSANDS/ΜL (ref 0–0.1)
BASOPHILS NFR BLD AUTO: 1 % (ref 0–1)
BILIRUB SERPL-MCNC: 0.3 MG/DL (ref 0.2–1)
BUN SERPL-MCNC: 8 MG/DL (ref 5–25)
CALCIUM SERPL-MCNC: 9.1 MG/DL (ref 8.3–10.1)
CHLORIDE SERPL-SCNC: 101 MMOL/L (ref 100–108)
CO2 SERPL-SCNC: 27 MMOL/L (ref 21–32)
CREAT SERPL-MCNC: 0.88 MG/DL (ref 0.6–1.3)
EOSINOPHIL # BLD AUTO: 0.09 THOUSAND/ΜL (ref 0–0.61)
EOSINOPHIL NFR BLD AUTO: 1 % (ref 0–6)
ERYTHROCYTE [DISTWIDTH] IN BLOOD BY AUTOMATED COUNT: 12.7 % (ref 11.6–15.1)
GFR SERPL CREATININE-BSD FRML MDRD: 85 ML/MIN/1.73SQ M
GLUCOSE SERPL-MCNC: 86 MG/DL (ref 65–140)
HCT VFR BLD AUTO: 43.3 % (ref 34.8–46.1)
HGB BLD-MCNC: 14.3 G/DL (ref 11.5–15.4)
IMM GRANULOCYTES # BLD AUTO: 0.02 THOUSAND/UL (ref 0–0.2)
IMM GRANULOCYTES NFR BLD AUTO: 0 % (ref 0–2)
LYMPHOCYTES # BLD AUTO: 1.91 THOUSANDS/ΜL (ref 0.6–4.47)
LYMPHOCYTES NFR BLD AUTO: 29 % (ref 14–44)
MCH RBC QN AUTO: 29.5 PG (ref 26.8–34.3)
MCHC RBC AUTO-ENTMCNC: 33 G/DL (ref 31.4–37.4)
MCV RBC AUTO: 90 FL (ref 82–98)
MONOCYTES # BLD AUTO: 0.43 THOUSAND/ΜL (ref 0.17–1.22)
MONOCYTES NFR BLD AUTO: 7 % (ref 4–12)
NEUTROPHILS # BLD AUTO: 4.13 THOUSANDS/ΜL (ref 1.85–7.62)
NEUTS SEG NFR BLD AUTO: 62 % (ref 43–75)
NRBC BLD AUTO-RTO: 0 /100 WBCS
PLATELET # BLD AUTO: 445 THOUSANDS/UL (ref 149–390)
PMV BLD AUTO: 8.8 FL (ref 8.9–12.7)
POTASSIUM SERPL-SCNC: 3.9 MMOL/L (ref 3.5–5.3)
PROT SERPL-MCNC: 7.7 G/DL (ref 6.4–8.2)
RBC # BLD AUTO: 4.84 MILLION/UL (ref 3.81–5.12)
SODIUM SERPL-SCNC: 137 MMOL/L (ref 136–145)
WBC # BLD AUTO: 6.61 THOUSAND/UL (ref 4.31–10.16)

## 2020-08-01 PROCEDURE — 80053 COMPREHEN METABOLIC PANEL: CPT | Performed by: EMERGENCY MEDICINE

## 2020-08-01 PROCEDURE — 36415 COLL VENOUS BLD VENIPUNCTURE: CPT | Performed by: EMERGENCY MEDICINE

## 2020-08-01 PROCEDURE — 99284 EMERGENCY DEPT VISIT MOD MDM: CPT

## 2020-08-01 PROCEDURE — 74177 CT ABD & PELVIS W/CONTRAST: CPT

## 2020-08-01 PROCEDURE — 85025 COMPLETE CBC W/AUTO DIFF WBC: CPT | Performed by: EMERGENCY MEDICINE

## 2020-08-01 PROCEDURE — 99282 EMERGENCY DEPT VISIT SF MDM: CPT | Performed by: EMERGENCY MEDICINE

## 2020-08-01 RX ADMIN — IOHEXOL 100 ML: 350 INJECTION, SOLUTION INTRAVENOUS at 14:20

## 2020-08-01 NOTE — ED PROVIDER NOTES
History  Chief Complaint   Patient presents with    Rib Pain     seen at urgent care yesterday for left rib pain  Xray was performed and was told it was soft tissue  Told to ice and take advil and proceed to ER if swelling gets worse  Pain is a constant 5/10 and swelling has increased  Patient had noted pain in the left lateral lower rib area without injury  She thought the area appeared swollen  Patient was seen at an urgent care center yesterday and had x-rays performed, which were reported as normal   Patient was told the problem was soft tissue and that she should ice it  Patient was instructed to come to the emergency room if her worsened  Patient states the pain is worse, worse with certain movement and position, and appears more swollen  She denies any constitutional GI symptoms  She is not short of breath          Prior to Admission Medications   Prescriptions Last Dose Informant Patient Reported? Taking?    Melatonin ER 10 MG TBCR Not Taking at Unknown time  No No   Sig: Take 1 tablet (10 mg total) by mouth daily   Patient not taking: Reported on 8/1/2020   SUMAtriptan (IMITREX) 100 mg tablet Not Taking at Unknown time  No No   Sig: Take 1 tablet (100 mg total) by mouth once as needed for migraine for up to 1 dose   Patient not taking: Reported on 8/1/2020   busPIRone (BUSPAR) 15 mg tablet 8/1/2020 at Unknown time  No Yes   Sig: Take 1 tablet (15 mg total) by mouth 3 (three) times a day   cetirizine (ZyrTEC) 10 mg tablet Not Taking at Unknown time  No No   Sig: Take 1 tablet (10 mg total) by mouth daily   Patient not taking: Reported on 7/21/2020   cloNIDine (CATAPRES) 0 1 mg tablet 8/1/2020 at Unknown time  No Yes   Sig: TAKE 1 TABLET BY MOUTH EVERY MORNING AND TAKE 2 TABLETS BY MOUTH EVERY EVENING   clotrimazole-betamethasone (LOTRISONE) 1-0 05 % cream Not Taking at Unknown time  No No   Sig: Apply topically 2 (two) times a day   Patient not taking: Reported on 8/1/2020   meclizine (ANTIVERT) 25 mg tablet Not Taking at Unknown time  No No   Sig: Take 1 tablet (25 mg total) by mouth 3 (three) times a day as needed for dizziness for up to 15 doses   Patient not taking: Reported on 2020   ondansetron (Zofran) 4 mg tablet Not Taking at Unknown time  No No   Sig: Take 1 tablet (4 mg total) by mouth every 8 (eight) hours as needed for nausea or vomiting   Patient not taking: Reported on 2020   sertraline (ZOLOFT) 100 mg tablet 2020 at Unknown time  No Yes   Sig: Take 1 tablet (100 mg total) by mouth 2 (two) times a day   therapeutic multivitamin-minerals (THERAGRAN-M) tablet Not Taking at Unknown time  Yes No   Sig: Take 1 tablet by mouth daily   traZODone (DESYREL) 50 mg tablet Not Taking at Unknown time  No No   Sig: Take 1/2 (25 mg) to 1 (50 mg)  tab po q hs prn   Patient not taking: Reported on 2020      Facility-Administered Medications: None       Past Medical History:   Diagnosis Date    Abnormal Pap smear of cervix     Anxiety     Depression     HPV (human papilloma virus) infection     Kidney stone     H/O PYELONEPHRITIS    Migraine     Seasonal allergies     Sleep difficulties     Varicella     POS HX       Past Surgical History:   Procedure Laterality Date    CHOLECYSTECTOMY      FINGER SURGERY      left hand     GYNECOLOGIC CRYOSURGERY      HAND SURGERY Left     Phelebitis in left hand  post IV infilitration, had sx for clot removal     LYMPH NODE BIOPSY      of neck    LYMPH NODE DISSECTION Right     cervical    [place]  DELIVERY ONLY N/A 2018    Procedure:  SECTION ();   Surgeon: Jaycob Dangelo MD;  Location: [facility];  Service: Obstetrics       Family History   Problem Relation Age of Onset    Hypertension Mother     Heart disease Mother     Depression Mother     Coronary artery disease Mother     Hypertension Father     Hyperlipidemia Father     Heart disease Father         MI    Stroke Paternal Grandmother     Cancer Paternal Grandmother         breast    Breast cancer Paternal Grandmother     Cancer Paternal Aunt         colon    Early death Maternal Uncle     Cancer Maternal Uncle         lymphoma    Early death Maternal Uncle         lymphoma    Birth defects Maternal Uncle     Undescended testes Son     Cystic fibrosis Other     Cystic fibrosis Other     Heart failure Maternal Grandmother     Stroke Maternal Grandfather     Alcohol abuse Maternal Grandfather     Alcohol abuse Brother     Drug abuse Brother          of overdose 2020    Anxiety disorder Sister     Post-traumatic stress disorder Sister     Developmental delay Son     Macrocephaly Son     Hydrocephalus Son      I have reviewed and agree with the history as documented  E-Cigarette/Vaping    E-Cigarette Use Never User      E-Cigarette/Vaping Substances    Nicotine No     THC No     CBD No     Flavoring No     Other No     Unknown No      Social History     Tobacco Use    Smoking status: Former Smoker     Packs/day: 0 50     Years: 3 00     Pack years: 1 50     Types: Cigarettes     Last attempt to quit:      Years since quittin 6    Smokeless tobacco: Never Used   Substance Use Topics    Alcohol use: Never     Frequency: Never    Drug use: No       Review of Systems   Constitutional: Negative for appetite change, chills and fever  HENT: Negative for congestion and sinus pressure  Eyes: Negative for visual disturbance  Respiratory: Negative for cough and shortness of breath  Cardiovascular: Positive for chest pain  Gastrointestinal: Positive for abdominal pain  Negative for vomiting  Genitourinary: Positive for flank pain  Negative for dysuria  Musculoskeletal: Positive for arthralgias  Skin: Negative for rash  Neurological: Negative for weakness and numbness  Hematological: Does not bruise/bleed easily  Psychiatric/Behavioral: Negative for confusion     All other systems reviewed and are negative  Physical Exam  Physical Exam  Vitals signs and nursing note reviewed  Constitutional:       Appearance: Normal appearance  HENT:      Head: Normocephalic  Right Ear: External ear normal       Left Ear: External ear normal       Nose: Nose normal       Mouth/Throat:      Mouth: Mucous membranes are moist    Eyes:      Conjunctiva/sclera: Conjunctivae normal    Neck:      Musculoskeletal: Normal range of motion and neck supple  Cardiovascular:      Rate and Rhythm: Normal rate and regular rhythm  Pulmonary:      Effort: Pulmonary effort is normal       Breath sounds: Normal breath sounds  Abdominal:      Palpations: Abdomen is soft  Tenderness: There is no abdominal tenderness  Musculoskeletal: Normal range of motion  General: Tenderness present  Skin:     General: Skin is warm and dry  Capillary Refill: Capillary refill takes less than 2 seconds  Neurological:      General: No focal deficit present  Mental Status: She is alert and oriented to person, place, and time     Psychiatric:         Mood and Affect: Mood normal          Behavior: Behavior normal          Vital Signs  ED Triage Vitals [08/01/20 1259]   Temperature Pulse Respirations Blood Pressure SpO2   (!) 97 2 °F (36 2 °C) 80 18 150/93 98 %      Temp Source Heart Rate Source Patient Position - Orthostatic VS BP Location FiO2 (%)   Tympanic Monitor Sitting Right arm --      Pain Score       --           Vitals:    08/01/20 1259   BP: 150/93   Pulse: 80   Patient Position - Orthostatic VS: Sitting         Visual Acuity      ED Medications  Medications   iohexol (OMNIPAQUE) 350 MG/ML injection (MULTI-DOSE) 100 mL (100 mL Intravenous Given 8/1/20 1420)       Diagnostic Studies  Results Reviewed     Procedure Component Value Units Date/Time    Comprehensive metabolic panel [[id number]] Collected:  08/01/20 1338    Lab Status:  Final result Specimen:  Blood from Arm, Left Updated:  08/01/20 1359     Sodium 137 mmol/L      Potassium 3 9 mmol/L      Chloride 101 mmol/L      CO2 27 mmol/L      ANION GAP 9 mmol/L      BUN 8 mg/dL      Creatinine 0 88 mg/dL      Glucose 86 mg/dL      Calcium 9 1 mg/dL      AST 19 U/L      ALT 27 U/L      Alkaline Phosphatase 49 U/L      Total Protein 7 7 g/dL      Albumin 4 0 g/dL      Total Bilirubin 0 30 mg/dL      eGFR 85 ml/min/1 73sq m     Narrative:       National Kidney Disease Foundation guidelines for Chronic Kidney Disease (CKD):     Stage 1 with normal or high GFR (GFR > 90 mL/min/1 73 square meters)    Stage 2 Mild CKD (GFR = 60-89 mL/min/1 73 square meters)    Stage 3A Moderate CKD (GFR = 45-59 mL/min/1 73 square meters)    Stage 3B Moderate CKD (GFR = 30-44 mL/min/1 73 square meters)    Stage 4 Severe CKD (GFR = 15-29 mL/min/1 73 square meters)    Stage 5 End Stage CKD (GFR <15 mL/min/1 73 square meters)  Note: GFR calculation is accurate only with a steady state creatinine    CBC and differential [[id number]]  (Abnormal) Collected:  08/01/20 1338    Lab Status:  Final result Specimen:  Blood from Arm, Left Updated:  08/01/20 1342     WBC 6 61 Thousand/uL      RBC 4 84 Million/uL      Hemoglobin 14 3 g/dL      Hematocrit 43 3 %      MCV 90 fL      MCH 29 5 pg      MCHC 33 0 g/dL      RDW 12 7 %      MPV 8 8 fL      Platelets 469 Thousands/uL      nRBC 0 /100 WBCs      Neutrophils Relative 62 %      Immat GRANS % 0 %      Lymphocytes Relative 29 %      Monocytes Relative 7 %      Eosinophils Relative 1 %      Basophils Relative 1 %      Neutrophils Absolute 4 13 Thousands/µL      Immature Grans Absolute 0 02 Thousand/uL      Lymphocytes Absolute 1 91 Thousands/µL      Monocytes Absolute 0 43 Thousand/µL      Eosinophils Absolute 0 09 Thousand/µL      Basophils Absolute 0 03 Thousands/µL                  CT abdomen pelvis with contrast   Final Result by Vickie Lee MD (08/01 0104)      No evidence of bowel obstruction, colitis or diverticulitis  Workstation performed: [id number]                    Procedures  Procedures         ED Course       US AUDIT      Most Recent Value   Initial Alcohol Screen: US AUDIT-C    1  How often do you have a drink containing alcohol?  0 Filed at: 08/01/2020 1300   Audit-C Score  0 Filed at: 08/01/2020 1300                  TON/DAST-10      Most Recent Value   How many times in the past year have you    Used an illegal drug or used a prescription medication for non-medical reasons? Never Filed at: 08/01/2020 1300                                MDM  Number of Diagnoses or Management Options  Pain of upper abdomen:   Rib pain on left side:   Diagnosis management comments: CT scanning was done of the thoracoabdominal area, discussed with patient        Disposition  Final diagnoses:   Pain of upper abdomen   Rib pain on left side     Time reflects when diagnosis was documented in both MDM as applicable and the Disposition within this note     Time User Action Codes Description Comment    8/1/2020  2:44 PM Janus Folds A Add [R10 10] Pain of upper abdomen     8/1/2020  2:44 PM Janus Folds A Add [R07 81] Rib pain on left side       ED Disposition     ED Disposition Condition Date/Time Comment    Discharge Stable Sat Aug 1, 2020  2:43 PM Teodoro Freedman discharge to home/self care              Follow-up Information     Follow up With Specialties Details Why Contact Info    Simran Maciel,  Family Medicine, Wound Care Schedule an appointment as soon as possible for a visit   [address] [place] [address]  [phone]            Discharge Medication List as of 8/1/2020  2:44 PM      CONTINUE these medications which have NOT CHANGED    Details   busPIRone (BUSPAR) 15 mg tablet Take 1 tablet (15 mg total) by mouth 3 (three) times a day, Starting Mon 7/20/2020, Until Sun 10/18/2020, Normal      cloNIDine (CATAPRES) 0 1 mg tablet TAKE 1 TABLET BY MOUTH EVERY MORNING AND TAKE 2 TABLETS BY MOUTH EVERY EVENING, Normal sertraline (ZOLOFT) 100 mg tablet Take 1 tablet (100 mg total) by mouth 2 (two) times a day, Starting Mon 7/20/2020, Until Sun 10/18/2020, Normal      cetirizine (ZyrTEC) 10 mg tablet Take 1 tablet (10 mg total) by mouth daily, Starting Thu 7/18/2019, No Print      clotrimazole-betamethasone (LOTRISONE) 1-0 05 % cream Apply topically 2 (two) times a day, Starting Tue 7/21/2020, Normal      meclizine (ANTIVERT) 25 mg tablet Take 1 tablet (25 mg total) by mouth 3 (three) times a day as needed for dizziness for up to 15 doses, Starting Sat 4/4/2020, Normal      Melatonin ER 10 MG TBCR Take 1 tablet (10 mg total) by mouth daily, Starting Mon 7/20/2020, No Print      ondansetron (Zofran) 4 mg tablet Take 1 tablet (4 mg total) by mouth every 8 (eight) hours as needed for nausea or vomiting, Starting Sat 4/4/2020, Normal      SUMAtriptan (IMITREX) 100 mg tablet Take 1 tablet (100 mg total) by mouth once as needed for migraine for up to 1 dose, Starting Thu 6/11/2020, Normal      therapeutic multivitamin-minerals (THERAGRAN-M) tablet Take 1 tablet by mouth daily, Historical Med      traZODone (DESYREL) 50 mg tablet Take 1/2 (25 mg) to 1 (50 mg)  tab po q hs prn, Normal           No discharge procedures on file      PDMP Review     None          ED Provider  Electronically Signed by           Emma Mann MD  08/06/20 1209

## 2020-08-03 ENCOUNTER — VBI (OUTPATIENT)
Dept: FAMILY MEDICINE CLINIC | Facility: CLINIC | Age: 35
End: 2020-08-03

## 2020-08-03 NOTE — TELEPHONE ENCOUNTER
Gomez Connors    ED Visit Information     Ed visit date: 08/01/2020  Diagnosis Description: rib pain  In Network? Yes Luisito Amor  Discharge status: Home  Discharged with meds ?  Yes  Number of ED visits to date: 2  ED Severity:n/a     Outreach Information    Outreach successful: Yes 1  Date letter mailedn/a  Date Finalized:8/3/20    Care Coordination    Patient will call back  Transportation issues ? no    Value [employer]

## 2020-08-07 DIAGNOSIS — R42 VERTIGO: ICD-10-CM

## 2020-08-07 RX ORDER — ONDANSETRON 4 MG/1
TABLET, FILM COATED ORAL
Qty: 30 TABLET | Refills: 0 | Status: SHIPPED | OUTPATIENT
Start: 2020-08-07 | End: 2020-12-22 | Stop reason: SDUPTHER

## 2020-08-07 RX ORDER — MECLIZINE HYDROCHLORIDE 25 MG/1
TABLET ORAL
Qty: 15 TABLET | Refills: 0 | Status: SHIPPED | OUTPATIENT
Start: 2020-08-07 | End: 2020-12-22 | Stop reason: SDUPTHER

## 2020-08-11 ENCOUNTER — TELEMEDICINE (OUTPATIENT)
Dept: PSYCHIATRY | Facility: CLINIC | Age: 35
End: 2020-08-11
Payer: COMMERCIAL

## 2020-08-11 VITALS — WEIGHT: 160 LBS | BODY MASS INDEX: 29.44 KG/M2 | HEIGHT: 62 IN

## 2020-08-11 DIAGNOSIS — F51.01 PRIMARY INSOMNIA: ICD-10-CM

## 2020-08-11 DIAGNOSIS — F41.1 GENERALIZED ANXIETY DISORDER: ICD-10-CM

## 2020-08-11 DIAGNOSIS — F43.10 PTSD (POST-TRAUMATIC STRESS DISORDER): ICD-10-CM

## 2020-08-11 DIAGNOSIS — F32.1 CURRENT MODERATE EPISODE OF MAJOR DEPRESSIVE DISORDER WITHOUT PRIOR EPISODE (HCC): Primary | ICD-10-CM

## 2020-08-11 PROCEDURE — 99214 OFFICE O/P EST MOD 30 MIN: CPT | Performed by: NURSE PRACTITIONER

## 2020-08-11 PROCEDURE — 90833 PSYTX W PT W E/M 30 MIN: CPT | Performed by: NURSE PRACTITIONER

## 2020-08-11 PROCEDURE — 1036F TOBACCO NON-USER: CPT | Performed by: NURSE PRACTITIONER

## 2020-08-11 PROCEDURE — 3008F BODY MASS INDEX DOCD: CPT | Performed by: NURSE PRACTITIONER

## 2020-08-11 RX ORDER — CLONIDINE HYDROCHLORIDE 0.1 MG/1
TABLET ORAL
Qty: 90 TABLET | Refills: 2 | Status: SHIPPED | OUTPATIENT
Start: 2020-08-11 | End: 2020-11-30

## 2020-08-11 NOTE — PSYCH
Virtual Regular Visit    Name and Date of Birth:  Teodoro Freedman 28 y o  1985 MRN: [id number]    Date of Visit:  August 11, 2020    Assessment/Plan:    Problem List Items Addressed This Visit        Other    Generalized anxiety disorder    PTSD (post-traumatic stress disorder)    Current moderate episode of major depressive disorder without prior episode ([facility] [address] ) - Primary    Primary insomnia          Reason for visit is   Chief Complaint   Patient presents with    Virtual Regular Visit    Depression    Anxiety    Follow-up    PTSD        Encounter provider Hortencia Boone, [facility]    Provider located at [address] [place] [address] [address]  [facility] [id number]      Recent Visits  No visits were found meeting these conditions  Showing recent visits within past 7 days and meeting all other requirements     Today's Visits  Date Type Provider Dept   08/11/20 [address] Carie Taylor 426 today's visits and meeting all other requirements     Future Appointments  No visits were found meeting these conditions  Showing future appointments within next 150 days and meeting all other requirements        The patient was identified by name and date of birth  Teodoro Freedman was informed that this is a telemedicine visit and that the visit is being conducted through [employer]  My office door was closed  No one else was in the room  She acknowledged consent and understanding of privacy and security of the video platform  The patient has agreed to participate and understands they can discontinue the visit at any time  Patient is aware this is a billable service        Past Medical History:   Diagnosis Date    Abnormal Pap smear of cervix     Anxiety     Depression     HPV (human papilloma virus) infection     Kidney stone 2011    H/O PYELONEPHRITIS    Migraine     Seasonal allergies     Sleep difficulties     Varicella POS HX       Past Surgical History:   Procedure Laterality Date    CHOLECYSTECTOMY      FINGER SURGERY      left hand     GYNECOLOGIC CRYOSURGERY      HAND SURGERY Left     Phelebitis in left hand  post IV infilitration, had sx for clot removal     LYMPH NODE BIOPSY      of neck    LYMPH NODE DISSECTION Right     cervical    MS  DELIVERY ONLY N/A 2018    Procedure:  SECTION ();   Surgeon: Candelaria Vallejo MD;  Location: [facility];  Service: Obstetrics       Current Outpatient Medications   Medication Sig Dispense Refill    busPIRone (BUSPAR) 15 mg tablet Take 1 tablet (15 mg total) by mouth 3 (three) times a day 270 tablet 0    cetirizine (ZyrTEC) 10 mg tablet Take 1 tablet (10 mg total) by mouth daily (Patient not taking: Reported on 2020)      cloNIDine (CATAPRES) 0 1 mg tablet TAKE 1 TABLET BY MOUTH EVERY MORNING AND TAKE 2 TABLETS BY MOUTH EVERY EVENING 90 tablet 2    clotrimazole-betamethasone (LOTRISONE) 1-0 05 % cream Apply topically 2 (two) times a day (Patient not taking: Reported on 2020) 30 g 1    meclizine (ANTIVERT) 25 mg tablet TAKE ONE TABLET BY MOUTH THREE TIMES A DAY AS NEEDED FOR DIZZINESS 15 tablet 0    Melatonin ER 10 MG TBCR Take 1 tablet (10 mg total) by mouth daily (Patient not taking: Reported on 2020)      ondansetron (ZOFRAN) 4 mg tablet TAKE ONE TABLET BY MOUTH EVERY 8 HOURS AS NEEDED FOR NAUSEA AND VOMITING 30 tablet 0    sertraline (ZOLOFT) 100 mg tablet Take 1 tablet (100 mg total) by mouth 2 (two) times a day 180 tablet 0    SUMAtriptan (IMITREX) 100 mg tablet Take 1 tablet (100 mg total) by mouth once as needed for migraine for up to 1 dose (Patient not taking: Reported on 2020) 12 tablet 5    therapeutic multivitamin-minerals (THERAGRAN-M) tablet Take 1 tablet by mouth daily      traZODone (DESYREL) 50 mg tablet Take 1/2 (25 mg) to 1 (50 mg)  tab po q hs prn (Patient not taking: Reported on 2020) 30 tablet 0 No current facility-administered medications for this visit  No Known Allergies      Vitals:    08/11/20 1125   Weight: 81 6 kg (180 lb)   Height: 5' 2" (1 575 m)       VIRTUAL VISIT DISCLAIMER    Kirby Horner acknowledges that she has consented to an online visit or consultation  She understands that the online visit is based solely on information provided by her, and that, in the absence of a face-to-face physical evaluation by the physician, the diagnosis she receives is both limited and provisional in terms of accuracy and completeness  This is not intended to replace a full medical face-to-face evaluation by the physician  Kirby Horner understands and accepts these terms  SUBJECTIVE:    Devi Ryder is seen today for a follow up for depression, Generalized Anxiety Disorder and PTSD  Since our last visit, overall symptoms have been gradually improving  Devi Ryder reports significant improvement in mood since last visit I have more energy and motivation and I am actually getting things done around the house even Arie Velasquez has noticed and commented on the improvement    Patient's affect is brighter and she does not appear as anxious as she did at last visit  She continues to have stressors related to her younger son Negrito Noguera and his medical issues  She reports that they are continuing to work with [facility] and she states that the last visit that they were going to with the developmental Peds group  Devi Ryder states Negrito Noguera was diagnosed with autism mild to moderate however she feels in the long run this will help him obtain services  She also reports that they are working with [facility] immunology for 2nd opinion further youngest son so they are facing trips to [facility] for potential lymph node biopsies and evaluation of his abdomen to look at his spleen etc to attempt to find source for daily fevers      Malia reports improvement in sleep (however this is still contingent on how her youngest son sleeps) and she no longer requires melatonin or trazodone at bedtime, no nightmares, she reports decrease in depressive symptoms, decrease in anxiety symptoms  She denies any panic attacks since last visit  She reports clonidine helps with hypervigilance and irritability as well as anxiety and agitation as well as any flashbacks when being in large medical institutions  She denies SI/HI, denies hallucinations, denies paranoid ideation or delusional thinking  She continues to take medications as prescribed and denies side effects  HPI ROS:             ('was' notes: recent => remote)  Medication Side Effects:  denies  (Was denies)   Depression (10 worst): 3 (Was 6)   Anxiety (10 worst): 4 (Was 9)   Safety concerns (SI, HI, etc): denies (Was denies)   Hallucinations/Delusions denies (Was denies)   Sleep: 7-8 hours no nightmares; some days feels rested when waking most days pending how Joao's night is (Was significantly decreased due to son's high fevers times 35 days, denies nightmares has not been using trazodone continues to use melatonin her and her  are trying to take turns with son being awake)   Energy: Improved energy and motivation (Was vacillating)   Appetite: Sticking to KETO diet  (Was watching what I eat, on KETO diet x4 weeks and down 17 lb)   Height 5 ft 2 in (Was 5 ft 2 in)   Weight Change: 160 lbs (Was 166 lbs)     Ceasar Favre denies any side effects from medications unless noted above    Review Of Systems:      Constitutional negative   ENT negative   Cardiovascular negative   Respiratory negative   Gastrointestinal negative   Genitourinary negative   Musculoskeletal negative   Integumentary negative   Neurological headache   Endocrine negative   Other Symptoms none, all other systems are negative     History Review:  The following portions of the patient's history were reviewed and documented: allergies, current medications, past family history, past medical history, past social history and problem list      Lab Review: Labs were reviewed  Laboratory Results:   Most Recent Labs:   Lab Results   Component Value Date    WBC 6 61 08/01/2020    RBC 4 84 08/01/2020    HGB 14 3 08/01/2020    HCT 43 3 08/01/2020     (H) 08/01/2020    RDW 12 7 08/01/2020    NEUTROABS 4 13 08/01/2020    K 3 9 08/01/2020     08/01/2020    CO2 27 08/01/2020    BUN 8 08/01/2020    CREATININE 0 88 08/01/2020    CALCIUM 9 1 08/01/2020    AST 19 08/01/2020    ALT 27 08/01/2020    ALKPHOS 49 08/01/2020    HDL 25 (L) 08/01/2019    TRIG 84 08/01/2019    LDLCALC 130 (H) 08/01/2019    [id number] 2 900 01/02/2020    RPR Non-Reactive 08/01/2019     CBC:   Lab Results   Component Value Date    WBC 6 61 08/01/2020    RBC 4 84 08/01/2020    HGB 14 3 08/01/2020    HCT 43 3 08/01/2020    MCV 90 08/01/2020     (H) 08/01/2020    MCH 29 5 08/01/2020    MCHC 33 0 08/01/2020    RDW 12 7 08/01/2020    MPV 8 8 (L) 08/01/2020    NRBC 0 08/01/2020    NEUTROABS 4 13 08/01/2020     CMP:   Lab Results   Component Value Date    K 3 9 08/01/2020     08/01/2020    CO2 27 08/01/2020    BUN 8 08/01/2020    CREATININE 0 88 08/01/2020    CALCIUM 9 1 08/01/2020    AST 19 08/01/2020    ALT 27 08/01/2020    ALKPHOS 49 08/01/2020    EGFR 85 08/01/2020     I have personally reviewed all pertinent laboratory/tests results    Video Exam  OBJECTIVE:     Vital signs in last 24 hours:    Vitals:    08/11/20 1125   Weight: 72 6 kg (160 lb)   Height: 5' 2" (1 575 m)       Mental Status Evaluation:    Appearance age appropriate, casually dressed   Behavior cooperative, calm, good eye contact   Speech normal rate, normal volume, normal pitch   Mood less anxious, less depressed   Affect normal range and intensity, appropriate   Thought Processes organized, goal directed, linear   Associations intact associations   Thought Content no overt delusions   Perceptual Disturbances: no auditory hallucinations, no visual hallucinations   Abnormal Thoughts  Risk Potential Suicidal ideation - None  Homicidal ideation - None  Potential for aggression - No   Orientation oriented to person, place, time/date and situation   Memory recent and remote memory grossly intact   Consciousness alert and awake   Attention Span Concentration Span attention span and concentration are age appropriate   Intellect appears to be of average intelligence   Insight intact   Judgement intact   Muscle Strength and  Gait unable to assess today due to virtual visit   Motor activity unable to assess today due to virtual visit   Pain none   Pain Scale 0       Risks, Benefits And Possible Side Effects Of Medications:    AGREE: Risks, benefits, and possible side effects of medications explained to [facility] and she (or legal representative) verbalizes understanding and agreement for treatment  PREGNANCY: Risks related to Pregnancy or becoming pregnant discussed related to medications and treatment  Patient has agreed to discuss treatment if planning to become pregnant, or if they become pregnant    Controlled Medication Discussion:     Not applicable  ______________________________________________________________    The following portions of the patient's history were reviewed and updated as appropriate: past family history, past medical history, past social history, past surgical history and problem list     Past psychiatric history, past traumatic history, past social history, past family psychiatric history copied from my note dated July 22, 2019 and updated today      Past Psychiatric History:      Past Inpatient Psychiatric Treatment:   No history of past inpatient psychiatric admissions  Past Outpatient Psychiatric Treatment:    Age 15 father passed away from MI patient was diagnosed with depression and anxiety at that time-was not tx with meds-had counseling unsure of name  Dylan Hampton (2019) OBGYN started Zoloft for Post partum  Past Suicide Attempts: no  Past Violent Behavior: no  Past Psychiatric Medication Trials: Zoloft and Wellbutrin     Traumatic History:      Abuse: no history of physical or sexual abuse  Other Traumatic Events: Son's illness and father passed of MI when she was 15years old (did not witness), nightmares, flashbacks      Family Psychiatric History:      Mother depression  Sister PTSD  Maternal grandfather alcohol abuse  Brother Jolly Rollins:   2020 of drug overdose  No family history of suicide or suicide attempt    Social History:  Education level: Associates degree   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[employer])  Marital status:  to Pietro 3/2017  Children: Fab 2006, Mariella Da Silva 18  Current Living Situation: the patient currently livesHouse with  Marck Worrell, son's Hampton and Mariella Da Silva  Social support: Pietro   Anglican Affiliation: N/A   experience: N/A  Legal history: N/A  Access to Guns: Yes, they are in a locked cabinet   She does not have a gun licence     Past Medical History:    Past Medical History:   Diagnosis Date    Abnormal Pap smear of cervix     Anxiety     Depression     HPV (human papilloma virus) infection     Kidney stone     H/O PYELONEPHRITIS    Migraine     Seasonal allergies     Sleep difficulties     Varicella     POS HX     No past medical history pertinent negatives  Past Surgical History:   Procedure Laterality Date    CHOLECYSTECTOMY      FINGER SURGERY      left hand     GYNECOLOGIC CRYOSURGERY      HAND SURGERY Left     Phelebitis in left hand  post IV infilitration, had sx for clot removal     LYMPH NODE BIOPSY      of neck    LYMPH NODE DISSECTION Right     cervical    [place]  DELIVERY ONLY N/A 2018    Procedure:  SECTION ();   Surgeon: Maile Groves MD;  Location: [facility];  Service: Obstetrics     No Known Allergies    Substance Abuse History:    Social History     Substance and Sexual Activity   Alcohol Use Never    Frequency: Never     Social History Substance and Sexual Activity   Drug Use No       Social History:    Social History     Socioeconomic History    Marital status: /Civil Union     Spouse name: Sharona Rivera Number of children: 2    Years of education: Not on file    Highest education level: Associate degree: academic program   Occupational History    Occupation: unemployed   Social Needs    Financial resource strain: Not very hard    Food insecurity     Worry: Never true     Inability: Never true    Transportation needs     Medical: No     Non-medical: No   Tobacco Use    Smoking status: Former Smoker     Packs/day: 0 50     Years: 3 00     Pack years: 1 50     Types: Cigarettes     Last attempt to quit:      Years since quittin 6    Smokeless tobacco: Never Used   Substance and Sexual Activity    Alcohol use: Never     Frequency: Never    Drug use: No    Sexual activity: Yes     Partners: Male     Birth control/protection: None   Lifestyle    Physical activity     Days per week: 4 days     Minutes per session: 20 min    Stress: Not on file   Relationships    Social connections     Talks on phone: More than three times a week     Gets together: Never     Attends Alevism service: Never     Active member of club or organization: No     Attends meetings of clubs or organizations: Never     Relationship status:     Intimate partner violence     Fear of current or ex partner: No     Emotionally abused: No     Physically abused: No     Forced sexual activity: No   Other Topics Concern    Not on file   Social History Narrative    Not on file       Family Psychiatric History:     Family History   Problem Relation Age of Onset    Hypertension Mother     Heart disease Mother     Depression Mother     Coronary artery disease Mother     Hypertension Father     Hyperlipidemia Father     Heart disease Father         MI    Stroke Paternal Grandmother     Cancer Paternal Grandmother         breast    Breast cancer Paternal Grandmother     Cancer Paternal Aunt         colon    Early death Maternal Uncle     Cancer Maternal Uncle         lymphoma    Early death Maternal Uncle         lymphoma    Birth defects Maternal Uncle     Undescended testes Son     Cystic fibrosis Other     Cystic fibrosis Other     Heart failure Maternal Grandmother     Stroke Maternal Grandfather     Alcohol abuse Maternal Grandfather     Alcohol abuse Brother     Drug abuse Brother          of overdose 2020    Anxiety disorder Sister     Post-traumatic stress disorder Sister     Developmental delay Son     Macrocephaly Son     Hydrocephalus Son         Vital signs in last 24 hours:    Vitals:    20 1125   Weight: 72 6 kg (160 lb)   Height: 5' 2" (1 575 m)       Confidential Assessment:  Copied from my note dated 2019  Gabriela Argueta is a 29year old female who was originally diagnosed with depression and anxiety at age 15 post father passing away she was treated with intensive counseling/therapy but no pharmacotherapy  Navin Michel was treated with Wellbutrin for job stress at 1 time (for a short period of time many years ago which was effective) she reports it was a situational event which resolved and the medication was stopped   Denies history of sexual abuse, mental abuse, physical abuse, or verbal abuse  Navin Michel denies history of self-injurious behavior, denies history of head injuries or loss of consciousness      Current situation: symptoms started abruptly 9 months ago, approximately 2 months after her son was born and became progressively worse as he developed significant medical problems  Son Joao's illness= tracheobronchomalacia which will be requiring an extensive chest surgery potentially on his 1st birthday 2019, he also has hydrocephalus, microcephaly and global developmental delay    This illness has required Bonny Small to be in the hospital multiple times since 3months of age including multiple trips to [place] for hospitalization and many trips to the emergency department  [facility] has been on antibiotics, requires breathing treatments 4 times daily, and they do not have family support living in the area        She has 1 sister who suffers from anxiety and PTSD and 1 brother who has drug and alcohol addiction who is currently in residential  King Silva does speak with her mother on the phone daily though this adds to her daily stressors as she gets pulled into the family issues because my mom does not speak to my sister and she goes on about my brother in residential and I tell her I can't handle the extra stressed but it just does not seem to matter       She was placed on Zoloft 25 mg p o  Daily and then increase to 50 mg p o  Daily by her obgyn provider and referred to Psychiatry for continued management  King Silva does believe that she may be grinding her teeth at night as she has been waking with headaches some mornings  Scales:    No new scales today    Assessment/Plan:       Diagnoses and all orders for this visit:    Current moderate episode of major depressive disorder without prior episode (HCC)    PTSD (post-traumatic stress disorder)  -     cloNIDine (CATAPRES) 0 1 mg tablet; Take 1 tablet (0 1 mg) PO Q AM and take 2 tablets (0 2 mg) PO Q HS    Generalized anxiety disorder  -     cloNIDine (CATAPRES) 0 1 mg tablet; Take 1 tablet (0 1 mg) PO Q AM and take 2 tablets (0 2 mg) PO Q HS    Primary insomnia    Post-partum depression  -     cloNIDine (CATAPRES) 0 1 mg tablet; Take 1 tablet (0 1 mg) PO Q AM and take 2 tablets (0 2 mg) PO Q HS          Treatment Recommendations/Precautions/Plan:    Sirena Alvares reports significant decrease in depression and anxiety since last visit and she feels her medications are working well to maintain symptoms of depression anxiety as well as PTSD symptoms  She denies any anxiety attacks or panic attacks since last visit    She feels as though her clonidine is working well to maintain symptoms of PTSD as she has not had any nightmares, she has not been having any flashbacks or hypervigilance overall when in large medical institutions  She is some moments where she feels anxious however she is able to manage by using coping skills and breathing techniques  She no longer requires melatonin or trazodone to sleep at night and has been getting approximately 6-7 hours per night  She reports sleep still largely depends on how her son Amadou Osullivan sleeps  She states approximately 1 time weekly she will feel exhausted from lack of sleep as he continues to have fevers daily which does make him irritable  She does have some underlying levels of anxiety related to her son's medical conditions and the unknown, he is being worked up at [place] and they have a 2nd opinion telephone follow-up on Thursday  Again she feels her medication is helping significantly to manage the symptoms of anxiety, PTSD and depression since her most recent increase in dosing  She also reports increase in energy and motivation and she has been being necessary tasks around the house, completing self-care  She denies SI/HI, denies hallucinations, denies delusional thinking  She reports she is taking her medication as prescribed and denies side effects  We reviewed her recent labs CBC and CMP in their within normal limits except elevated platelet count at 195 for which she will follow with family doctor  Patient has been educated about their diagnosis and treatment modalities  They voiced understanding and agreement with the following plan:    -followup with this provider on Monday October 5, 2020 9:30 a m     -continue Zoloft 100 mg PO BID to improve symptoms of anxiety, depression, PTSD    New prescription sent to [facility] for 90 day supply 07/20/2020-will have enough until next visit no new prescription needed today    -continue BuSpar 15 mg p o  T I D To improve symptoms of anxiety new prescription sent to [facility] for 90 day supply 07/20/2020-will have enough until next visit no prescription refill needed today    -Continue clonidine 0 1 mg p o  Q a m  And 0 2 mg p o  Q h s  to improve symptoms of sleep and decrease irritability/agitation and symptoms of PTSD  Thirty day supply with 2 refills sent to pharmacy 08/11/2020    -Continue 10 mg of melatonin p o  Q h s  P r n  However patient reports she has not been taking this as she no longer feels the need as she has been sleeping well without sleep medication  However the patient buys this over-the-counter if she would continue to suffer from symptoms of insomnia more frequently again     -discontinue trazodone 25-50 mg p o  Q h s  P r n  As patient has not been taking this medication currently-will be willing to reorder in the future if patient requires medication       -continue follow-up with PCP for routine medical management and review of recent lab work    -consider initiation of psychotherapy in the near future however at this time patient continues to be somewhat overwhelmed with time constraints related to son's number of medical appointments  However she is opening to initiating psychotherapy in the future pending his schedule  For now she would like to hold off     -Patient will call if issues or concerns     -Discussed self monitoring of symptoms, and symptom monitoring tools     -Patient has been informed of 24 hours and weekend coverage for urgent situations accessed by calling the main clinic phone number      [facility] Crisis Telephone Numbers and the National Suicide Prevention Hotline Number Provided to Patient     -Treatment Plan completed 04/20/2020-electronically and verbal consent obtained due to virtual visit, COVID-19  Psychotherapy Provided:     Individual psychotherapy provided: Yes  Counseling was provided during the session today for 20 minutes  Medications, treatment progress and treatment plan reviewed with Malia    Medication education provided to Malia  Recent stressor including COVID-19 issues, son's illness and occasional anxiety discussed with Malia  Coping strategies reviewed with Malia  Importance of follow up with family physician for medical issues reviewed with [facility]  Reassurance and supportive therapy provided  Crisis/safety plan discussed with Malia       I spent 30 minutes directly with the patient during this visit      Na Vergara 08/11/20

## 2020-09-02 ENCOUNTER — TELEPHONE (OUTPATIENT)
Dept: PSYCHIATRY | Facility: CLINIC | Age: 35
End: 2020-09-02

## 2020-09-02 DIAGNOSIS — F41.1 GENERALIZED ANXIETY DISORDER: Primary | ICD-10-CM

## 2020-09-02 NOTE — TELEPHONE ENCOUNTER
John Mann called and left message requesting to speak with Juan R Santamaria  She states she would like "something to help level out her anxiety" She has had migraines for the last 3 days due to her stress level being higher than normal      She takes her son to a doctor appointment at 10:30 and will not be available for an hour to an hour and a half after that       John Mann [phone]

## 2020-09-03 RX ORDER — HYDROXYZINE 50 MG/1
TABLET, FILM COATED ORAL
Qty: 60 TABLET | Refills: 1 | Status: SHIPPED | OUTPATIENT
Start: 2020-09-03 | End: 2021-01-25

## 2020-09-03 NOTE — TELEPHONE ENCOUNTER
I spoke with Malia this morning  She states she has had some increased stressors with her son's illness and they have to travel to [place] again  She has not had a full out panic attack but intermittent increased anxiety  She is agreeable to start atarax 25-50 mg po Q 8 hrs prn  Risks discussed about hydroxyzine/atarax including arrhythmia/cardiovascular effects, anticholinergic effects, seizure risk, drowsiness, headaches, nausea, potential for drug interactions, and others    rx for 60 tabs with 1 refill sent to [facility] in [place]

## 2020-09-06 DIAGNOSIS — F43.10 PTSD (POST-TRAUMATIC STRESS DISORDER): ICD-10-CM

## 2020-09-06 DIAGNOSIS — F41.1 GENERALIZED ANXIETY DISORDER: ICD-10-CM

## 2020-09-08 RX ORDER — CLONIDINE HYDROCHLORIDE 0.1 MG/1
TABLET ORAL
Qty: 90 TABLET | Refills: 2 | OUTPATIENT
Start: 2020-09-08

## 2020-09-30 DIAGNOSIS — F41.1 GENERALIZED ANXIETY DISORDER: ICD-10-CM

## 2020-09-30 DIAGNOSIS — F43.10 PTSD (POST-TRAUMATIC STRESS DISORDER): ICD-10-CM

## 2020-10-01 RX ORDER — SERTRALINE HYDROCHLORIDE 100 MG/1
TABLET, FILM COATED ORAL
Qty: 180 TABLET | Refills: 0 | OUTPATIENT
Start: 2020-10-01

## 2020-10-01 RX ORDER — BUSPIRONE HYDROCHLORIDE 15 MG/1
TABLET ORAL
Qty: 270 TABLET | Refills: 0 | OUTPATIENT
Start: 2020-10-01

## 2020-10-20 ENCOUNTER — NURSE TRIAGE (OUTPATIENT)
Dept: OTHER | Facility: OTHER | Age: 35
End: 2020-10-20

## 2020-10-20 ENCOUNTER — TELEMEDICINE (OUTPATIENT)
Dept: FAMILY MEDICINE CLINIC | Facility: CLINIC | Age: 35
End: 2020-10-20
Payer: COMMERCIAL

## 2020-10-20 VITALS — WEIGHT: 190 LBS | BODY MASS INDEX: 34.75 KG/M2 | TEMPERATURE: 100.1 F

## 2020-10-20 DIAGNOSIS — Z20.828 EXPOSURE TO SARS-ASSOCIATED CORONAVIRUS: ICD-10-CM

## 2020-10-20 DIAGNOSIS — Z20.828 EXPOSURE TO SARS-ASSOCIATED CORONAVIRUS: Primary | ICD-10-CM

## 2020-10-20 PROCEDURE — 99213 OFFICE O/P EST LOW 20 MIN: CPT | Performed by: FAMILY MEDICINE

## 2020-10-20 PROCEDURE — 3725F SCREEN DEPRESSION PERFORMED: CPT | Performed by: FAMILY MEDICINE

## 2020-10-20 PROCEDURE — 1036F TOBACCO NON-USER: CPT | Performed by: FAMILY MEDICINE

## 2020-10-20 PROCEDURE — U0003 INFECTIOUS AGENT DETECTION BY NUCLEIC ACID (DNA OR RNA); SEVERE ACUTE RESPIRATORY SYNDROME CORONAVIRUS 2 (SARS-COV-2) (CORONAVIRUS DISEASE [COVID-19]), AMPLIFIED PROBE TECHNIQUE, MAKING USE OF HIGH THROUGHPUT TECHNOLOGIES AS DESCRIBED BY CMS-2020-01-R: HCPCS | Performed by: FAMILY MEDICINE

## 2020-10-21 ENCOUNTER — TELEPHONE (OUTPATIENT)
Dept: FAMILY MEDICINE CLINIC | Facility: CLINIC | Age: 35
End: 2020-10-21

## 2020-10-21 DIAGNOSIS — J06.9 UPPER RESPIRATORY TRACT INFECTION, UNSPECIFIED TYPE: Primary | ICD-10-CM

## 2020-10-21 LAB — SARS-COV-2 RNA SPEC QL NAA+PROBE: NOT DETECTED

## 2020-10-21 RX ORDER — AMOXICILLIN AND CLAVULANATE POTASSIUM 875; 125 MG/1; MG/1
1 TABLET, FILM COATED ORAL EVERY 12 HOURS SCHEDULED
Qty: 20 TABLET | Refills: 0 | Status: SHIPPED | OUTPATIENT
Start: 2020-10-21 | End: 2020-10-31

## 2020-11-04 DIAGNOSIS — F41.1 GENERALIZED ANXIETY DISORDER: ICD-10-CM

## 2020-11-04 DIAGNOSIS — F43.10 PTSD (POST-TRAUMATIC STRESS DISORDER): ICD-10-CM

## 2020-11-04 RX ORDER — BUSPIRONE HYDROCHLORIDE 15 MG/1
TABLET ORAL
Qty: 270 TABLET | Refills: 0 | Status: SHIPPED | OUTPATIENT
Start: 2020-11-04 | End: 2021-01-12

## 2020-11-04 RX ORDER — SERTRALINE HYDROCHLORIDE 100 MG/1
TABLET, FILM COATED ORAL
Qty: 180 TABLET | Refills: 0 | Status: SHIPPED | OUTPATIENT
Start: 2020-11-04 | End: 2020-12-08

## 2020-11-16 ENCOUNTER — OFFICE VISIT (OUTPATIENT)
Dept: OTOLARYNGOLOGY | Facility: CLINIC | Age: 35
End: 2020-11-16
Payer: COMMERCIAL

## 2020-11-16 VITALS — BODY MASS INDEX: 34.96 KG/M2 | HEIGHT: 62 IN | WEIGHT: 190 LBS | TEMPERATURE: 97.1 F

## 2020-11-16 DIAGNOSIS — J31.0 CHRONIC RHINITIS: Primary | ICD-10-CM

## 2020-11-16 PROCEDURE — 1036F TOBACCO NON-USER: CPT | Performed by: SPECIALIST

## 2020-11-16 PROCEDURE — 3008F BODY MASS INDEX DOCD: CPT | Performed by: SPECIALIST

## 2020-11-16 PROCEDURE — 99243 OFF/OP CNSLTJ NEW/EST LOW 30: CPT | Performed by: SPECIALIST

## 2020-11-30 DIAGNOSIS — F43.10 PTSD (POST-TRAUMATIC STRESS DISORDER): ICD-10-CM

## 2020-11-30 DIAGNOSIS — F41.1 GENERALIZED ANXIETY DISORDER: ICD-10-CM

## 2020-11-30 RX ORDER — CLONIDINE HYDROCHLORIDE 0.1 MG/1
TABLET ORAL
Qty: 120 TABLET | Refills: 2 | Status: SHIPPED | OUTPATIENT
Start: 2020-11-30 | End: 2021-02-01

## 2020-12-08 ENCOUNTER — TELEMEDICINE (OUTPATIENT)
Dept: PSYCHIATRY | Facility: CLINIC | Age: 35
End: 2020-12-08
Payer: COMMERCIAL

## 2020-12-08 VITALS — WEIGHT: 190 LBS | HEIGHT: 62 IN | BODY MASS INDEX: 34.96 KG/M2

## 2020-12-08 DIAGNOSIS — F43.10 PTSD (POST-TRAUMATIC STRESS DISORDER): ICD-10-CM

## 2020-12-08 DIAGNOSIS — F41.1 GENERALIZED ANXIETY DISORDER: ICD-10-CM

## 2020-12-08 DIAGNOSIS — F32.1 CURRENT MODERATE EPISODE OF MAJOR DEPRESSIVE DISORDER WITHOUT PRIOR EPISODE (HCC): Primary | ICD-10-CM

## 2020-12-08 DIAGNOSIS — F51.01 PRIMARY INSOMNIA: ICD-10-CM

## 2020-12-08 PROCEDURE — 1036F TOBACCO NON-USER: CPT | Performed by: NURSE PRACTITIONER

## 2020-12-08 PROCEDURE — 3008F BODY MASS INDEX DOCD: CPT | Performed by: NURSE PRACTITIONER

## 2020-12-08 PROCEDURE — 90833 PSYTX W PT W E/M 30 MIN: CPT | Performed by: NURSE PRACTITIONER

## 2020-12-08 PROCEDURE — 99214 OFFICE O/P EST MOD 30 MIN: CPT | Performed by: NURSE PRACTITIONER

## 2020-12-08 RX ORDER — ESCITALOPRAM OXALATE 10 MG/1
TABLET ORAL
Qty: 46 TABLET | Refills: 0 | Status: SHIPPED | OUTPATIENT
Start: 2020-12-15 | End: 2020-12-31 | Stop reason: SDUPTHER

## 2020-12-08 RX ORDER — FLUTICASONE PROPIONATE 50 MCG
1 SPRAY, SUSPENSION (ML) NASAL DAILY
COMMUNITY

## 2020-12-17 ENCOUNTER — SOCIAL WORK (OUTPATIENT)
Dept: BEHAVIORAL/MENTAL HEALTH CLINIC | Facility: CLINIC | Age: 35
End: 2020-12-17
Payer: COMMERCIAL

## 2020-12-17 DIAGNOSIS — F32.1 CURRENT MODERATE EPISODE OF MAJOR DEPRESSIVE DISORDER WITHOUT PRIOR EPISODE (HCC): ICD-10-CM

## 2020-12-17 DIAGNOSIS — F41.1 GENERALIZED ANXIETY DISORDER: Primary | ICD-10-CM

## 2020-12-17 PROCEDURE — 90791 PSYCH DIAGNOSTIC EVALUATION: CPT

## 2020-12-22 ENCOUNTER — TELEMEDICINE (OUTPATIENT)
Dept: FAMILY MEDICINE CLINIC | Facility: CLINIC | Age: 35
End: 2020-12-22
Payer: COMMERCIAL

## 2020-12-22 DIAGNOSIS — R42 VERTIGO: ICD-10-CM

## 2020-12-22 DIAGNOSIS — J30.1 SEASONAL ALLERGIC RHINITIS DUE TO POLLEN: ICD-10-CM

## 2020-12-22 DIAGNOSIS — J06.9 ACUTE UPPER RESPIRATORY INFECTION: ICD-10-CM

## 2020-12-22 DIAGNOSIS — J06.9 ACUTE UPPER RESPIRATORY INFECTION: Primary | ICD-10-CM

## 2020-12-22 DIAGNOSIS — G43.109 MIGRAINE WITH AURA AND WITHOUT STATUS MIGRAINOSUS, NOT INTRACTABLE: ICD-10-CM

## 2020-12-22 PROCEDURE — 99213 OFFICE O/P EST LOW 20 MIN: CPT | Performed by: NURSE PRACTITIONER

## 2020-12-22 PROCEDURE — U0003 INFECTIOUS AGENT DETECTION BY NUCLEIC ACID (DNA OR RNA); SEVERE ACUTE RESPIRATORY SYNDROME CORONAVIRUS 2 (SARS-COV-2) (CORONAVIRUS DISEASE [COVID-19]), AMPLIFIED PROBE TECHNIQUE, MAKING USE OF HIGH THROUGHPUT TECHNOLOGIES AS DESCRIBED BY CMS-2020-01-R: HCPCS | Performed by: NURSE PRACTITIONER

## 2020-12-22 RX ORDER — CETIRIZINE HYDROCHLORIDE 10 MG/1
10 TABLET ORAL DAILY
Refills: 0
Start: 2020-12-22

## 2020-12-22 RX ORDER — MECLIZINE HYDROCHLORIDE 25 MG/1
25 TABLET ORAL 3 TIMES DAILY PRN
Qty: 15 TABLET | Refills: 0 | Status: SHIPPED | OUTPATIENT
Start: 2020-12-22

## 2020-12-22 RX ORDER — ONDANSETRON 4 MG/1
4 TABLET, FILM COATED ORAL EVERY 8 HOURS PRN
Qty: 30 TABLET | Refills: 0 | Status: SHIPPED | OUTPATIENT
Start: 2020-12-22

## 2020-12-22 RX ORDER — SUMATRIPTAN 100 MG/1
100 TABLET, FILM COATED ORAL ONCE AS NEEDED
Qty: 12 TABLET | Refills: 0 | Status: SHIPPED | OUTPATIENT
Start: 2020-12-22

## 2020-12-24 LAB — SARS-COV-2 RNA SPEC QL NAA+PROBE: NOT DETECTED

## 2020-12-28 ENCOUNTER — PATIENT MESSAGE (OUTPATIENT)
Dept: OBGYN CLINIC | Facility: CLINIC | Age: 35
End: 2020-12-28

## 2020-12-28 DIAGNOSIS — N64.4 BREAST PAIN IN FEMALE: Primary | ICD-10-CM

## 2020-12-28 NOTE — BH TREATMENT PLAN
Treatment  Plan not complete within required time limits  due to:   Counseling was provided during the session and that took most of the office visit, will do at next OV

## 2020-12-31 ENCOUNTER — HOSPITAL ENCOUNTER (OUTPATIENT)
Dept: RADIOLOGY | Facility: HOSPITAL | Age: 35
Discharge: HOME | End: 2020-12-31
Payer: COMMERCIAL

## 2020-12-31 ENCOUNTER — TRANSCRIBE ORDERS (OUTPATIENT)
Dept: ADMINISTRATIVE | Facility: HOSPITAL | Age: 35
End: 2020-12-31

## 2020-12-31 ENCOUNTER — TELEMEDICINE (OUTPATIENT)
Dept: PSYCHIATRY | Facility: CLINIC | Age: 35
End: 2020-12-31
Payer: COMMERCIAL

## 2020-12-31 ENCOUNTER — TELEMEDICINE (OUTPATIENT)
Dept: BEHAVIORAL/MENTAL HEALTH CLINIC | Facility: CLINIC | Age: 35
End: 2020-12-31
Payer: COMMERCIAL

## 2020-12-31 VITALS — HEIGHT: 62 IN | WEIGHT: 190 LBS | BODY MASS INDEX: 34.96 KG/M2

## 2020-12-31 VITALS — WEIGHT: 190 LBS | HEIGHT: 62 IN | BODY MASS INDEX: 34.96 KG/M2

## 2020-12-31 DIAGNOSIS — F41.1 GENERALIZED ANXIETY DISORDER: Primary | ICD-10-CM

## 2020-12-31 DIAGNOSIS — F43.10 PTSD (POST-TRAUMATIC STRESS DISORDER): ICD-10-CM

## 2020-12-31 DIAGNOSIS — F32.1 CURRENT MODERATE EPISODE OF MAJOR DEPRESSIVE DISORDER WITHOUT PRIOR EPISODE (HCC): ICD-10-CM

## 2020-12-31 DIAGNOSIS — N64.4 BREAST PAIN IN FEMALE: ICD-10-CM

## 2020-12-31 DIAGNOSIS — F32.1 CURRENT MODERATE EPISODE OF MAJOR DEPRESSIVE DISORDER WITHOUT PRIOR EPISODE (HCC): Primary | ICD-10-CM

## 2020-12-31 DIAGNOSIS — F41.1 GENERALIZED ANXIETY DISORDER: ICD-10-CM

## 2020-12-31 DIAGNOSIS — F51.01 PRIMARY INSOMNIA: ICD-10-CM

## 2020-12-31 PROCEDURE — 99214 OFFICE O/P EST MOD 30 MIN: CPT | Performed by: NURSE PRACTITIONER

## 2020-12-31 PROCEDURE — 3008F BODY MASS INDEX DOCD: CPT | Performed by: NURSE PRACTITIONER

## 2020-12-31 PROCEDURE — 1036F TOBACCO NON-USER: CPT | Performed by: NURSE PRACTITIONER

## 2020-12-31 PROCEDURE — 90833 PSYTX W PT W E/M 30 MIN: CPT | Performed by: NURSE PRACTITIONER

## 2020-12-31 PROCEDURE — 90834 PSYTX W PT 45 MINUTES: CPT

## 2020-12-31 PROCEDURE — 76642 ULTRASOUND BREAST LIMITED: CPT

## 2020-12-31 RX ORDER — ESCITALOPRAM OXALATE 20 MG/1
20 TABLET ORAL DAILY
Qty: 30 TABLET | Refills: 1 | Status: SHIPPED | OUTPATIENT
Start: 2020-12-31 | End: 2021-02-02 | Stop reason: SDUPTHER

## 2021-01-05 ENCOUNTER — TELEMEDICINE (OUTPATIENT)
Dept: OTOLARYNGOLOGY | Facility: CLINIC | Age: 36
End: 2021-01-05
Payer: COMMERCIAL

## 2021-01-05 DIAGNOSIS — J01.81 OTHER ACUTE RECURRENT SINUSITIS: Primary | ICD-10-CM

## 2021-01-05 PROCEDURE — 1036F TOBACCO NON-USER: CPT | Performed by: NURSE PRACTITIONER

## 2021-01-05 PROCEDURE — 99213 OFFICE O/P EST LOW 20 MIN: CPT | Performed by: NURSE PRACTITIONER

## 2021-01-05 RX ORDER — FLUCONAZOLE 150 MG/1
150 TABLET ORAL ONCE
Qty: 1 TABLET | Refills: 0 | Status: SHIPPED | OUTPATIENT
Start: 2021-01-05 | End: 2021-01-05

## 2021-01-05 RX ORDER — PREDNISONE 10 MG/1
10 TABLET ORAL DAILY
Qty: 30 TABLET | Refills: 0 | Status: SHIPPED | OUTPATIENT
Start: 2021-01-05 | End: 2021-02-15

## 2021-01-05 RX ORDER — CEFDINIR 300 MG/1
300 CAPSULE ORAL EVERY 12 HOURS SCHEDULED
Qty: 20 CAPSULE | Refills: 0 | Status: SHIPPED | OUTPATIENT
Start: 2021-01-05 | End: 2021-01-15

## 2021-01-05 RX ORDER — PREDNISONE 10 MG/1
10 TABLET ORAL DAILY
Qty: 30 TABLET | Refills: 0 | Status: SHIPPED | OUTPATIENT
Start: 2021-01-05 | End: 2021-01-05

## 2021-01-05 NOTE — PROGRESS NOTES
Assessment/Plan:    Other acute recurrent sinusitis  Recommend using saline irrigation and nasal steroids on daily basis for up to at least three months to see improvement  Offered Azelastine for congestion  Reviewed possible allergy involvement and follow up with allergist    Reviewed prior Ct facial bones from 2017 indicating septal deviation, no evidence of sinusitis at that time  Also suggest obtaining CT scan to further evaluate sinus cavities  Discussed surgical options if needed  Agreed to use saline and Fluticasone daily and obtain CT scan if no improvement  Follow up 6 to 8 weeks if needed       [facility]}      Subjective:      Patient ID: Jonathon Carrasquillo is a 28 y o  female  Presents today as a new patient consultation due to nasal congestion  Worse on one side vs other  Occurs often  Poor nasal airflow  No prior allergy testing  History of migraines  Priro treatment with flonase prn, and oral antihistamines  Follow up for sinus infection  Covid test negative  Pressure in head worsening  Alternates left vs right side  Low grade temperature  Thick mucus  {Common ambulatory SmartLinks:79395}    Review of Systems   Constitutional: Negative  HENT: Negative for congestion, ear discharge, ear pain, hearing loss, nosebleeds, postnasal drip, rhinorrhea, sinus pressure, sinus pain, sore throat, tinnitus and voice change  Eyes: Negative  Respiratory: Negative for chest tightness and shortness of breath  Cardiovascular: Negative  Gastrointestinal: Negative  Endocrine: Negative  Musculoskeletal: Negative  Skin: Negative for color change  Neurological: Negative for dizziness, numbness and headaches  Psychiatric/Behavioral: Negative  Objective:      LMP 12/27/2020 (Exact Date)          Physical Exam  Constitutional:       Appearance: She is well-developed  HENT:      Head: Normocephalic        Right Ear: Hearing, tympanic membrane, ear canal and external ear normal  No decreased hearing noted  No drainage or tenderness  Tympanic membrane is not perforated or erythematous  Left Ear: Hearing, tympanic membrane, ear canal and external ear normal  No decreased hearing noted  No drainage or tenderness  Tympanic membrane is not perforated or erythematous  Nose: Nose normal  No nasal deformity or septal deviation  Mouth/Throat:      Mouth: Mucous membranes are not pale and not dry  No oral lesions  Dentition: Normal dentition  Pharynx: Uvula midline  No oropharyngeal exudate  Neck:      Musculoskeletal: Full passive range of motion without pain, normal range of motion and neck supple  Trachea: No tracheal deviation  Cardiovascular:      Rate and Rhythm: Normal rate  Pulmonary:      Effort: Pulmonary effort is normal  No accessory muscle usage or respiratory distress  Musculoskeletal:      Right shoulder: She exhibits normal range of motion  Lymphadenopathy:      Cervical: No cervical adenopathy  Skin:     General: Skin is warm and dry  Neurological:      Mental Status: She is alert and oriented to person, place, and time  Cranial Nerves: No cranial nerve deficit  Sensory: No sensory deficit  Psychiatric:         Behavior: Behavior is cooperative

## 2021-01-05 NOTE — ASSESSMENT & PLAN NOTE
Recommend using saline irrigation and nasal steroids on daily basis for up to at least three months to see improvement  Offered Azelastine for congestion  Reviewed possible allergy involvement and follow up with allergist    Reviewed prior Ct facial bones from 2017 indicating septal deviation, no evidence of sinusitis at that time  Also suggest obtaining CT scan to further evaluate sinus cavities  Discussed surgical options if needed  Agreed to use saline and Fluticasone daily and obtain CT scan if no improvement      Follow up 6 to 8 weeks if needed

## 2021-01-05 NOTE — PROGRESS NOTES
Virtual Regular Visit      Assessment/Plan:    Problem List Items Addressed This Visit        Respiratory    Other acute recurrent sinusitis - Primary    Relevant Medications    cefdinir (OMNICEF) 300 mg capsule    predniSONE 10 mg tablet    Other Relevant Orders    CT sinus wo contrast        Recommend using saline irrigation and nasal steroids on daily basis for up to at least three months to see improvement  Offered Azelastine for congestion  Also discussed role of oral steroids 12 day taper with antibiotics for acute episode  Discussed side effects of oral steroids  Reviewed possible allergy involvement and follow up with allergist    Reviewed prior Ct facial bones from 2017 indicating septal deviation, no evidence of sinusitis at that time  Recommend obtaining CT scan to further evaluate sinus cavities as symptoms are worsening  Discussed surgical options if needed  Agreed to use saline and Fluticasone daily with 12 day oral steroids, oral antibiotics, and obtain CT scan if no improvement  Follow up 4 to 6 weeks         Reason for visit is   Chief Complaint   Patient presents with    Follow-up    Virtual Regular Visit        Encounter provider PATO Guadalupe    Provider located at [place] [address]  [phone]      Recent Visits  Date Type Provider Dept   01/05/21 [facility]   Showing recent visits within past 7 days and meeting all other requirements     Future Appointments  No visits were found meeting these conditions  Showing future appointments within next 150 days and meeting all other requirements        The patient was identified by name and date of birth  Carina Case was informed that this is a telemedicine visit and that the visit is being conducted through [employer] and patient was informed that this is a secure, HIPAA-compliant platform  She agrees to proceed  Anjel Up My office door was closed  No one else was in the room  She acknowledged consent and understanding of privacy and security of the video platform  The patient has agreed to participate and understands they can discontinue the visit at any time  Patient is aware this is a billable service  Siva Jin is a 28 y o  female  Presents today as a follow up due to nasal congestion, and sinus pressure  Feels current sinus infection  Pressure in head worsening  Alternates left vs right side  Low grade temperature  Thick mucus  Occurs often  Poor nasal airflow  No prior allergy testing  History of migraines  Prior treatment with flonase, and oral antihistamines  Covid test negative  Past Medical History:   Diagnosis Date    Abnormal Pap smear of cervix     Anxiety     BRCA1 negative     BRCA2 negative     Breast injury     Pt states scar tissue around nipple from breastfeeding    Depression     HPV (human papilloma virus) infection     Kidney stone     H/O PYELONEPHRITIS    Migraine     Seasonal allergies     Sleep difficulties     Varicella     POS HX       Past Surgical History:   Procedure Laterality Date    CHOLECYSTECTOMY      FINGER SURGERY      left hand     GYNECOLOGIC CRYOSURGERY      HAND SURGERY Left     Phelebitis in left hand  post IV infilitration, had sx for clot removal     LYMPH NODE BIOPSY      of neck    LYMPH NODE DISSECTION Right     cervical    ID  DELIVERY ONLY N/A 2018    Procedure:  SECTION ();   Surgeon: Aruna Pugh MD;  Location: [facility] ;  Service: Obstetrics       Current Outpatient Medications   Medication Sig Dispense Refill    busPIRone (BUSPAR) 15 mg tablet TAKE 1 TABLET BY MOUTH 3 TIMES A DAY (GENERIC FOR BUSPAR) 270 tablet 0    cetirizine (ZyrTEC) 10 mg tablet Take 1 tablet (10 mg total) by mouth daily  0    cloNIDine (CATAPRES) 0 1 mg tablet 1 tab (0 1 mg) PO Q AM and PO Q Afternoon and 2 tabs (0 2 mg) PO Q  tablet 2    escitalopram (LEXAPRO) 20 mg tablet Take 1 tablet (20 mg total) by mouth daily 30 tablet 1    fluticasone (FLONASE) 50 mcg/act nasal spray 1 spray into each nostril daily      hydrOXYzine HCL (ATARAX) 50 mg tablet Take 1/2 tab (25mg) to 1 tab (50mg) every 8 hours as needed for anxiety 60 tablet 1    meclizine (ANTIVERT) 25 mg tablet Take 1 tablet (25 mg total) by mouth 3 (three) times a day as needed for dizziness 15 tablet 0    Melatonin ER 10 MG TBCR Take 1 tablet (10 mg total) by mouth daily      ondansetron (ZOFRAN) 4 mg tablet Take 1 tablet (4 mg total) by mouth every 8 (eight) hours as needed for nausea or vomiting 30 tablet 0    SUMAtriptan (IMITREX) 100 mg tablet Take 1 tablet (100 mg total) by mouth once as needed for migraine for up to 1 dose 12 tablet 0    therapeutic multivitamin-minerals (THERAGRAN-M) tablet Take 1 tablet by mouth daily      cefdinir (OMNICEF) 300 mg capsule Take 1 capsule (300 mg total) by mouth every 12 (twelve) hours for 10 days 20 capsule 0    predniSONE 10 mg tablet Take 1 tablet (10 mg total) by mouth daily 4 tabs for 3 days then 3 tabs for 3 days then 2 tabs for 3 days then one tab for 3 days 30 tablet 0     No current facility-administered medications for this visit  No Known Allergies    Review of Systems   Constitutional: Negative  HENT: Positive for congestion, postnasal drip and sinus pressure  Negative for ear discharge, ear pain, hearing loss, nosebleeds, rhinorrhea, sinus pain, sore throat, tinnitus and voice change  Eyes: Negative  Respiratory: Negative for chest tightness and shortness of breath  Cardiovascular: Negative  Gastrointestinal: Negative  Endocrine: Negative  Musculoskeletal: Negative  Skin: Negative for color change  Neurological: Negative for dizziness, numbness and headaches  Psychiatric/Behavioral: Negative          Video Exam    There were no vitals filed for this visit     Physical Exam  HENT:      Head: Normocephalic  Pulmonary:      Effort: Pulmonary effort is normal    Neurological:      General: No focal deficit present  Mental Status: She is alert and oriented to person, place, and time  Psychiatric:         Mood and Affect: Mood normal           I spent 20 minutes with patient today in which greater than 50% of the time was spent in counseling/coordination of care regarding recurrent sinusitis      VIRTUAL VISIT DISCLAIMER    Tommy Mullen acknowledges that she has consented to an online visit or consultation  She understands that the online visit is based solely on information provided by her, and that, in the absence of a face-to-face physical evaluation by the physician, the diagnosis she receives is both limited and provisional in terms of accuracy and completeness  This is not intended to replace a full medical face-to-face evaluation by the physician  Tommy Mullen understands and accepts these terms

## 2021-01-06 ENCOUNTER — TELEPHONE (OUTPATIENT)
Dept: OTOLARYNGOLOGY | Facility: CLINIC | Age: 36
End: 2021-01-06

## 2021-01-06 NOTE — TELEPHONE ENCOUNTER
I left a message for the patient that the ct scan of the sinus has been approved and to call central scheduling to st up the appt    Also if she calls back she needs a 4 week f/u with Dr Layo Campos

## 2021-01-08 DIAGNOSIS — F41.1 GENERALIZED ANXIETY DISORDER: ICD-10-CM

## 2021-01-08 DIAGNOSIS — F43.10 PTSD (POST-TRAUMATIC STRESS DISORDER): ICD-10-CM

## 2021-01-08 RX ORDER — BUSPIRONE HYDROCHLORIDE 15 MG/1
TABLET ORAL
Qty: 270 TABLET | Refills: 0 | Status: CANCELLED | OUTPATIENT
Start: 2021-01-08

## 2021-01-11 DIAGNOSIS — F41.1 GENERALIZED ANXIETY DISORDER: ICD-10-CM

## 2021-01-11 DIAGNOSIS — F43.10 PTSD (POST-TRAUMATIC STRESS DISORDER): ICD-10-CM

## 2021-01-12 ENCOUNTER — HOSPITAL ENCOUNTER (OUTPATIENT)
Dept: RADIOLOGY | Facility: HOSPITAL | Age: 36
Discharge: HOME | End: 2021-01-12
Payer: COMMERCIAL

## 2021-01-12 DIAGNOSIS — J01.81 OTHER ACUTE RECURRENT SINUSITIS: ICD-10-CM

## 2021-01-12 PROCEDURE — 70486 CT MAXILLOFACIAL W/O DYE: CPT

## 2021-01-12 PROCEDURE — G1004 CDSM NDSC: HCPCS

## 2021-01-12 RX ORDER — BUSPIRONE HYDROCHLORIDE 15 MG/1
15 TABLET ORAL 3 TIMES DAILY
Qty: 270 TABLET | Refills: 0 | Status: SHIPPED | OUTPATIENT
Start: 2021-01-12 | End: 2021-03-15 | Stop reason: SDUPTHER

## 2021-01-12 RX ORDER — BUSPIRONE HYDROCHLORIDE 15 MG/1
TABLET ORAL
Qty: 270 TABLET | Refills: 0 | Status: SHIPPED | OUTPATIENT
Start: 2021-01-12 | End: 2021-01-12

## 2021-01-14 ENCOUNTER — TELEMEDICINE (OUTPATIENT)
Dept: BEHAVIORAL/MENTAL HEALTH CLINIC | Facility: CLINIC | Age: 36
End: 2021-01-14
Payer: COMMERCIAL

## 2021-01-14 DIAGNOSIS — F41.1 GENERALIZED ANXIETY DISORDER: Primary | ICD-10-CM

## 2021-01-14 PROCEDURE — 90834 PSYTX W PT 45 MINUTES: CPT

## 2021-01-14 NOTE — PSYCH
Psychotherapy Provided: Individual Psychotherapy 45 minutes     Length of time in session: 45 minutes, follow up in 2 week    Goals addressed in session: Goal 1     Pain:      none    0    Current suicide risk : Low     D-ct reports that she is recovering from a sinus infection  Ct reports that due to the steroid she has used more atarax  Ct is hopeful that her emotions will level out once she is done with the steroid  Ct reports that she is back to her routine as  and older son are back to work and school  Ct reports that she is concerned because younger son is getting sick  Ct also shared that younger son will possibly have more services to help his development  Ct denies having panic attacks in weeks  Ct reports that she is able to feel her anxiety rise and uses tools to cope with them  Ct session was virtual and ct agreed to parameters of virtual session  Ct agreed to have nurse practioner student join session  A-  Ct was mildly anxious  Ct was feeling better rebounding from sinus infection  Ct needs to work on more self care skills  CP- ct will attend sessions, take med's as ordered, and use techniques to manage anxiety  Behavioral Health Treatment Plan [facility]: Diagnosis and Treatment Plan explained to Ben Jett relates understanding diagnosis and is agreeable to Treatment Plan   Yes

## 2021-01-24 DIAGNOSIS — F41.1 GENERALIZED ANXIETY DISORDER: ICD-10-CM

## 2021-01-25 RX ORDER — HYDROXYZINE 50 MG/1
TABLET, FILM COATED ORAL
Qty: 60 TABLET | Refills: 0 | Status: SHIPPED | OUTPATIENT
Start: 2021-01-25 | End: 2021-02-02 | Stop reason: SDUPTHER

## 2021-01-27 DIAGNOSIS — Z23 ENCOUNTER FOR IMMUNIZATION: ICD-10-CM

## 2021-01-27 DIAGNOSIS — J01.81 OTHER ACUTE RECURRENT SINUSITIS: ICD-10-CM

## 2021-01-27 RX ORDER — PREDNISONE 10 MG/1
TABLET ORAL
Qty: 30 TABLET | Refills: 0 | OUTPATIENT
Start: 2021-01-27

## 2021-01-30 ENCOUNTER — IMMUNIZATIONS (OUTPATIENT)
Dept: FAMILY MEDICINE CLINIC | Facility: HOSPITAL | Age: 36
End: 2021-01-30

## 2021-01-30 DIAGNOSIS — Z23 ENCOUNTER FOR IMMUNIZATION: Primary | ICD-10-CM

## 2021-01-30 PROCEDURE — 0001A SARS-COV-2 / COVID-19 MRNA VACCINE (PFIZER-BIONTECH) 30 MCG: CPT

## 2021-01-30 PROCEDURE — 91300 SARS-COV-2 / COVID-19 MRNA VACCINE (PFIZER-BIONTECH) 30 MCG: CPT

## 2021-01-31 DIAGNOSIS — F43.10 PTSD (POST-TRAUMATIC STRESS DISORDER): ICD-10-CM

## 2021-01-31 DIAGNOSIS — F41.1 GENERALIZED ANXIETY DISORDER: ICD-10-CM

## 2021-02-01 RX ORDER — CLONIDINE HYDROCHLORIDE 0.1 MG/1
TABLET ORAL
Qty: 120 TABLET | Refills: 1 | Status: SHIPPED | OUTPATIENT
Start: 2021-02-01 | End: 2021-03-15 | Stop reason: SDUPTHER

## 2021-02-01 NOTE — PSYCH
Virtual Regular Visit      Name and Date of Birth:  Melissa Castellanos 28 y o  1985 MRN: [id number]    Date of Visit:   February 2, 2021    Assessment/Plan:    Problem List Items Addressed This Visit        Other    Generalized anxiety disorder    Relevant Medications    lamoTRIgine (LaMICtal) 25 mg tablet    hydrOXYzine HCL (ATARAX) 50 mg tablet    escitalopram (LEXAPRO) 20 mg tablet    PTSD (post-traumatic stress disorder)    Relevant Medications    lamoTRIgine (LaMICtal) 25 mg tablet    hydrOXYzine HCL (ATARAX) 50 mg tablet    escitalopram (LEXAPRO) 20 mg tablet    Current moderate episode of major depressive disorder without prior episode (HCC) - Primary    Relevant Medications    lamoTRIgine (LaMICtal) 25 mg tablet    hydrOXYzine HCL (ATARAX) 50 mg tablet    escitalopram (LEXAPRO) 20 mg tablet    Primary insomnia          Reason for visit is   Chief Complaint   Patient presents with    Virtual Regular Visit    Anxiety    Depression    PTSD    Follow-up        Encounter provider Na Ruvalcaba    Provider located at [address] [place] [id number] [phone]      Recent Visits  No visits were found meeting these conditions  Showing recent visits within past 7 days and meeting all other requirements     Today's Visits  Date Type Provider Dept   02/02/21 [facility] visits and meeting all other requirements     Future Appointments  No visits were found meeting these conditions  Showing future appointments within next 150 days and meeting all other requirements        The patient was identified by name and date of birth  Melissa Castellanos was informed that this is a telemedicine visit and that the visit is being conducted through [employer] and patient was informed that this is a secure, HIPAA-compliant platform  She agrees to proceed     My office door was closed  No one else was in the room  She acknowledged consent and understanding of privacy and security of the video platform  The patient has agreed to participate and understands they can discontinue the visit at any time  Patient is aware this is a billable service  Past Medical History:   Diagnosis Date    Abnormal Pap smear of cervix     Anxiety     BRCA1 negative     BRCA2 negative     Breast injury     Pt states scar tissue around nipple from breastfeeding    Depression     HPV (human papilloma virus) infection     Kidney stone     H/O PYELONEPHRITIS    Migraine     Seasonal allergies     Sleep difficulties     Varicella     POS HX       Past Surgical History:   Procedure Laterality Date    CHOLECYSTECTOMY      FINGER SURGERY      left hand     GYNECOLOGIC CRYOSURGERY      HAND SURGERY Left     Phelebitis in left hand  post IV infilitration, had sx for clot removal     LYMPH NODE BIOPSY      of neck    LYMPH NODE DISSECTION Right     cervical    [place]  DELIVERY ONLY N/A 2018    Procedure:  SECTION ();   Surgeon: Kate Wyatt MD;  [facility];  Service: Obstetrics       Current Outpatient Medications   Medication Sig Dispense Refill    busPIRone (BUSPAR) 15 mg tablet Take 1 tablet (15 mg total) by mouth 3 (three) times a day 270 tablet 0    cetirizine (ZyrTEC) 10 mg tablet Take 1 tablet (10 mg total) by mouth daily  0    cloNIDine (CATAPRES) 0 1 mg tablet TAKE 1 TAB BY MOUTH IN THE MORNING, 1 TAB BY MOUTH IN THE AFTERNOON, AND 2 TABS BY MOUTH AT BEDTIME 120 tablet 1    fluticasone (FLONASE) 50 mcg/act nasal spray 1 spray into each nostril daily      hydrOXYzine HCL (ATARAX) 50 mg tablet TAKE 1/2 TO 1 TAB BY MOUTH EVERY 8 HOURS AS NEEDED FOR ANXIETY 60 tablet 0    meclizine (ANTIVERT) 25 mg tablet Take 1 tablet (25 mg total) by mouth 3 (three) times a day as needed for dizziness 15 tablet 0    Melatonin ER 10 MG TBCR Take 1 tablet (10 mg total) by mouth daily      ondansetron (ZOFRAN) 4 mg tablet Take 1 tablet (4 mg total) by mouth every 8 (eight) hours as needed for nausea or vomiting 30 tablet 0    SUMAtriptan (IMITREX) 100 mg tablet Take 1 tablet (100 mg total) by mouth once as needed for migraine for up to 1 dose 12 tablet 0    therapeutic multivitamin-minerals (THERAGRAN-M) tablet Take 1 tablet by mouth daily      escitalopram (LEXAPRO) 20 mg tablet Take 1 tablet (20 mg total) by mouth daily 90 tablet 0    lamoTRIgine (LaMICtal) 25 mg tablet Take 1 tablet (25 mg total) by mouth daily at bedtime for 14 days, THEN 2 tablets (50 mg total) daily at bedtime for 16 days  46 tablet 0    predniSONE 10 mg tablet Take 1 tablet (10 mg total) by mouth daily 4 tabs for 3 days then 3 tabs for 3 days then 2 tabs for 3 days then one tab for 3 days (Patient not taking: Reported on 2/2/2021) 30 tablet 0     No current facility-administered medications for this visit  No Known Allergies    Video Exam    Vitals:    02/02/21 0834   Weight: 95 3 kg (210 lb)   Height: 5' 2" (1 575 m)         I spent 30 minutes directly with the patient during this visit      [address] [place] [address] acknowledges that she has consented to an online visit or consultation  She understands that the online visit is based solely on information provided by her, and that, in the absence of a face-to-face physical evaluation by the physician, the diagnosis she receives is both limited and provisional in terms of accuracy and completeness  This is not intended to replace a full medical face-to-face evaluation by the physician  Janie Henning understands and accepts these terms  SUBJECTIVE:    Yolanda Brandt is seen today for a follow up for depression, Generalized Anxiety Disorder and PTSD  Since our last visit, overall symptoms have been stable  Yolanda Brandt states her anxiety attacks are decreasing    She states she noticed 3 of them in the last 1 weeks  "I need to figure out a way to shut my mind off, it is like a reel that goes on and it doesn't stop "  She is interested in starting EMDR therapy and this provider believes this would be helpful  "I know the medication is working well, I feel like my shoulders up and I can't stop my mind from thinking "    Albert Huang states she is using her FIT bit daily to do "post partum workout "    An attempt to stay active and do something for self each day  Also using this as a way to decrease stressors and anxiety  Continues to have interrupted sleep, reports getting at least 6 hours per night denies nightmares  Reports having feelings of increase in anxiety and panic when she is take son to the hospital for studies or testing will start to cry at times  Reports sensation of throat closing  Denies SI/ HI denies hallucinations or delusions  HPI ROS:             ('was' notes: recent => remote)  Medication Side Effects:  denies  (Was  denies)   Depression (10 worst): 3-4 (Was  3)   Anxiety (10 worst): 8 (Was  6)   Safety concerns (SI, HI, etc): denies (Was  denies)   Hallucinations/Delusions denies (Was  denies)   Sleep: Varies 6 hours and broken (Was  6-7 hours per night interrupted and sometimes 1 nap per day)   Energy: "Hit and miss depending on what it is   I am making sure I am doing the things that need to be done," (Was  "okay, trying to keep up with a toddler")   Appetite: "Cooking meals and planning meals, stopped soda and high sugar drinks "   (Was  "getting better")   Height  5 ft 2 in (Was  5 ft 2 in)   Weight Change: 210 lbs pt reported (on prednisone) (Was  190 lb patient reported)     Albert Huang denies any side effects from medications unless noted above    Review Of Systems:      Constitutional negative and fluctuating energy level   ENT negative   Cardiovascular negative   Respiratory negative   Gastrointestinal reflux   Genitourinary negative   Musculoskeletal neck pain and shoulder pain Integumentary negative   Neurological negative   Endocrine negative   Other Symptoms none, all other systems are negative     History Review:  The following portions of the patient's history were reviewed and documented: allergies, current medications, past family history, past medical history, past social history and problem list      Lab Review: No new labs or no relevant labs needing review with patient today    OBJECTIVE:     Vital signs in last 24 hours:    Vitals:    02/02/21 0834   Weight: 95 3 kg (210 lb)   Height: 5' 2" (1 575 m)       Mental Status Evaluation:    Appearance age appropriate, casually dressed   Behavior cooperative, appears anxious, good eye contact   Speech normal rate, normal volume, normal pitch   Mood depressed, anxious   Affect normal range and intensity, appropriate   Thought Processes goal directed, linear, racing of thoughts   Associations intact associations   Thought Content no overt delusions   Perceptual Disturbances: no auditory hallucinations, no visual hallucinations   Abnormal Thoughts  Risk Potential Suicidal ideation - None  Homicidal ideation - None  Potential for aggression - No   Orientation oriented to person, place, time/date and situation   Memory recent and remote memory grossly intact   Consciousness alert and awake   Attention Span Concentration Span attention span and concentration are age appropriate   Intellect appears to be of average intelligence   Insight intact   Judgement intact   Muscle Strength and  Gait unable to assess today due to virtual visit   Motor activity unable to assess today due to virtual visit   Language no difficulty naming common objects, no difficulty repeating a phrase, unable to assess writing today due to virtual visit   Fund of Knowledge adequate knowledge of current events  adequate fund of knowledge regarding past history  adequate fund of knowledge regarding vocabulary    Pain mild   Pain Scale 3       Risks, Benefits And Possible Side Effects Of Medications:    AGREE: Risks, benefits, and possible side effects of medications explained to [facility] and she (or legal representative) verbalizes understanding and agreement for treatment  PREGNANCY: Risks related to Pregnancy or becoming pregnant discussed related to medications and treatment  Patient has agreed to discuss treatment if planning to become pregnant, or if they become pregnant    Controlled Medication Discussion:     Not applicable  ______________________________________________________________      The following portions of the patient's history were reviewed and updated as appropriate: past family history, past medical history, past social history, past surgical history and problem list     Past psychiatric history, past traumatic history, past social history, past family psychiatric history copied from my note dated 2019 and updated today      Past Psychiatric History:      Past Inpatient Psychiatric Treatment:   No history of past inpatient psychiatric admissions  Past Outpatient Psychiatric Treatment:    Age 15 father passed away from MI patient was diagnosed with depression and anxiety at that time-was not tx with meds-had counseling unsure of name  Abner Melendez (2019) OBGYN started Zoloft for Post partum  Past Suicide Attempts: no  Past Violent Behavior: no  Past Psychiatric Medication Trials: Zoloft and Wellbutrin     Traumatic History:      Abuse: no history of physical or sexual abuse  Other Traumatic Events: Son's illness and father passed of MI when she was 15years old (did not witness), nightmares, flashbacks      Family Psychiatric History:      Mother depression  Sister PTSD  Maternal grandfather alcohol abuse  Brother Scooter:   2020 of drug overdose  No family history of suicide or suicide attempt    Social History:  Education level: Associates degree   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[employer])  Marital status:  to [place] [employer] 3/2017  Children: Fab 2006, Joao 18  Current Living Situation: the patient currently [facility], son's Bill Blackmon and Brunilda Dee  Social support: Pietro   Buddhist Affiliation: N/A   experience: N/A  Legal history: N/A  Access to Guns: Yes, they are in a locked cabinet   She does not have a gun licence     Past Medical History:    Past Medical History:   Diagnosis Date    Abnormal Pap smear of cervix     Anxiety     BRCA1 negative     BRCA2 negative     Breast injury     Pt states scar tissue around nipple from breastfeeding    Depression     HPV (human papilloma virus) infection     Kidney stone     H/O PYELONEPHRITIS    Migraine     Seasonal allergies     Sleep difficulties     Varicella     POS HX     Past Medical History Pertinent Negatives:   Diagnosis Date Noted    BRCA1 positive 2020    BRCA2 positive 2020     Past Surgical History:   Procedure Laterality Date    CHOLECYSTECTOMY      FINGER SURGERY      left hand     GYNECOLOGIC CRYOSURGERY      HAND SURGERY Left     Phelebitis in left hand  post IV infilitration, had sx for clot removal     LYMPH NODE BIOPSY      of neck    LYMPH NODE DISSECTION Right     cervical    [place]  DELIVERY ONLY N/A 2018    Procedure:  SECTION ();   Surgeon: Temitope Thapa MD;  Location: [facility];  Service: Obstetrics     No Known Allergies    Substance Abuse History:    Social History     Substance and Sexual Activity   Alcohol Use Not Currently     Social History     Substance and Sexual Activity   Drug Use No       Social History:    Social History     Socioeconomic History    Marital status: /Civil Union     Spouse name: Mario Castro Number of children: 2    Years of education: Not on file    Highest education level: Associate degree: academic program   Occupational History    Occupation: unemployed   Social Needs    Financial resource strain: Not very hard    Food insecurity Worry: Never true     Inability: Never true    Transportation needs     Medical: No     Non-medical: No   Tobacco Use    Smoking status: Former Smoker     Packs/day: 0 50     Years: 3 00     Pack years: 1 50     Types: Cigarettes     Quit date:      Years since quittin 0    Smokeless tobacco: Never Used   Substance and Sexual Activity    Alcohol use: Not Currently    Drug use: No    Sexual activity: Yes     Partners: Male     Birth control/protection: None   Lifestyle    Physical activity     Days per week: 4 days     Minutes per session: 20 min    Stress: Not on file   Relationships    Social connections     Talks on phone: More than three times a week     Gets together: Never     Attends Confucianism service: Never     Active member of club or organization: No     Attends meetings of clubs or organizations: Never     Relationship status:     Intimate partner violence     Fear of current or ex partner: No     Emotionally abused: No     Physically abused: No     Forced sexual activity: No   Other Topics Concern    Not on file   Social History Narrative    Not on file       Family Psychiatric History:     Family History   Problem Relation Age of Onset    Hypertension Mother     Heart disease Mother     Depression Mother     Coronary artery disease Mother     Hypertension Father     Hyperlipidemia Father     Heart disease Father         MI    Stroke Paternal Grandmother     Cancer Paternal Grandmother         breast    Breast cancer Paternal Grandmother     Cancer Paternal Aunt         colon    Colon cancer Paternal Aunt     Early death Maternal Uncle     Cancer Maternal Uncle         lymphoma    Early death Maternal Uncle         lymphoma    Birth defects Maternal Uncle     Undescended testes Son     Cystic fibrosis Other     Cystic fibrosis Other     Heart failure Maternal Grandmother     Stroke Maternal Grandfather     Alcohol abuse Maternal Grandfather     Alcohol abuse Brother     Drug abuse Brother          of overdose 2020    Anxiety disorder Sister     Post-traumatic stress disorder Sister     Developmental delay Son     Macrocephaly Son     Hydrocephalus Son     Breast cancer Other         Vital signs in last 24 hours:    Vitals:    21 0834   Weight: 95 3 kg (210 lb)   Height: 5' 2" (1 575 m)       Confidential Assessment:  Copied from my note dated 2019  Larae Simmonds is a 29year old female who was originally diagnosed with depression and anxiety at age 15 post father passing away she was treated with intensive counseling/therapy but no pharmacotherapy  Alber Cheek was treated with Wellbutrin for job stress at 1 time (for a short period of time many years ago which was effective) she reports it was a situational event which resolved and the medication was stopped   Denies history of sexual abuse, mental abuse, physical abuse, or verbal abuse  Alber Cheek denies history of self-injurious behavior, denies history of head injuries or loss of consciousness      Current situation: symptoms started abruptly 9 months ago, approximately 2 months after her son was born and became progressively worse as he developed significant medical problems  Son Joao's illness= tracheobronchomalacia which will be requiring an extensive chest surgery potentially on his 1st birthday 2019, he also has hydrocephalus, microcephaly and global developmental delay    This illness has required Stacy Baca to be in the hospital multiple times since 3months of age including multiple trips to [facility] for hospitalization and many trips to the emergency department  Rosas Yap has been on antibiotics, requires breathing treatments 4 times daily, and they do not have family support living in the area        She has 1 sister who suffers from anxiety and PTSD and 1 brother who has drug and alcohol addiction who is currently in [facility]  Alber Cheek does speak with her mother on the phone daily though this adds to her daily stressors as she gets pulled into the family issues because my mom does not speak to my sister and she goes on about my brother in shelter and I tell her I can't handle the extra stressed but it just does not seem to matter       She was placed on Zoloft 25 mg p o  Daily and then increase to 50 mg p o  Daily by her obgyn provider and referred to Psychiatry for continued management  Rogelio Alonso does believe that she may be grinding her teeth at night as she has been waking with headaches some mornings  Scales:    PHQ-9 Follow-up    Little interest or pleasure in doing things: 1 - several days  Feeling down, depressed, or hopeless: 1 - several days  Trouble falling or staying asleep, or sleeping too much: 3 - nearly every day  Feeling tired or having little energy: 1 - several days  Poor appetite or overeatin - several days  Feeling bad about yourself - or that you are a failure or have let yourself or your family down: 0 - not at all  Trouble concentrating on things, such as reading the newspaper or watching television: 1 - several days  Moving or speaking so slowly that other people could have noticed  Or the opposite - being so fidgety or restless that you have been moving around a lot more than usual: 1 - several days  Thoughts that you would be better off dead, or of hurting yourself in some way: 0 - not at all  PHQ-2 Score: 2  PHQ-9 Score: 9         PRANAY-7 Flowsheet Screening      Most Recent Value   Over the last two weeks, how often have you been bothered by the following problems?     Feeling nervous, anxious, or on edge  3   Not being able to stop or control worrying  3   Worrying too much about different things  3   Trouble relaxing   3   Being so restless that it's hard to sit still  3   Becoming easily annoyed or irritable   3   Feeling afraid as if something awful might happen  3   PRANAY Score   21            Assessment/Plan:       Diagnoses and all orders for this visit:    Current moderate episode of major depressive disorder without prior episode (HCC)  -     lamoTRIgine (LaMICtal) 25 mg tablet; Take 1 tablet (25 mg total) by mouth daily at bedtime for 14 days, THEN 2 tablets (50 mg total) daily at bedtime for 16 days  -     escitalopram (LEXAPRO) 20 mg tablet; Take 1 tablet (20 mg total) by mouth daily    PTSD (post-traumatic stress disorder)  -     lamoTRIgine (LaMICtal) 25 mg tablet; Take 1 tablet (25 mg total) by mouth daily at bedtime for 14 days, THEN 2 tablets (50 mg total) daily at bedtime for 16 days  -     escitalopram (LEXAPRO) 20 mg tablet; Take 1 tablet (20 mg total) by mouth daily    Generalized anxiety disorder  -     lamoTRIgine (LaMICtal) 25 mg tablet; Take 1 tablet (25 mg total) by mouth daily at bedtime for 14 days, THEN 2 tablets (50 mg total) daily at bedtime for 16 days  -     hydrOXYzine HCL (ATARAX) 50 mg tablet; TAKE 1/2 TO 1 TAB BY MOUTH EVERY 8 HOURS AS NEEDED FOR ANXIETY  -     escitalopram (LEXAPRO) 20 mg tablet; Take 1 tablet (20 mg total) by mouth daily    Primary insomnia    Other orders  -     Discontinue: fluconazole (DIFLUCAN) 150 mg tablet; TAKE 1 TABLET BY MOUTH AS ONE DOSE          Treatment Recommendations/Precautions/Plan:    Malia  Continues to struggle with PTSD symptoms of flashbacks when having to take son to hospital for testing/studies  She reports that she will have significant anxiety and feelings of panic at times panic attacks however these have decreased somewhat  She also states that she will start to cry when having to take her son into a test or study or doctor's appointment  Eugenia Carlitocamron reports that she is able to do the things she needs to do however she feels as though her thoughts are unable to shut off her mind is racing due to feelings of anxiety and worry  This leads to feelings that her shoulders are up by her ears significant neck tightness and shoulder tightness    She feels the Lexapro has been helping her depressive symptoms though she continues to struggle with anxiety  Tai Back continues to struggle with diet and reports that she gained weight recently due to being on a course of steroids for sinus problems  She has been consciously using her Fitbit to get at least 15 minutes of exercise in daily to help with decreasing anxiety and stressors as well  She also cut out sugary drinks and soda we discussed that this may be impacting her sleep patterns as this happened all around the same time that her sleep patterns change  She is interested in initiating treatment with EMDR therapy for her PTSD and this provider completely supports this  She will be evaluated by Rayna gonzalez at [address]  [facility]  She will continue psychotherapy with her current therapist through [address]  She denies suicidal or homicidal ideation, she denies auditory visual hallucinations, she denies delusional thinking  Patient has been educated about their diagnosis and treatment modalities  They voiced understanding and agreement with the following plan:    -followup with this provider on 3/8/21 8:00 am &   April 14, 2021 8:00 a m     - continue Lexapro 20 mg p o  daily for continued improvement in depression, anxiety and PTSD symptoms     -  Continue BuSpar 15 mg p o  t i d  for continued improvement in symptoms of anxiety  -  Continue clonidine/Catapres 0 1 mg p o  q a m , Q afternoon and 0 2 mg p o  q h s  for continued improvement in feelings of agitation, irritability and improvement in PTSD symptoms as well as improvement in sleep/nightmares  -  Continue melatonin 10 mg p o  q h s  for continued improvement in symptoms of insomnia, patient purchases over-the-counter     -  Continue hydroxyzine/ Atarax 25-50 mg p o  q 8 hours p r n  for improvement in symptoms of anxiety and panic     - start lamotrigine/ Lamictal 25 mg p o  q h s  times 14 days then increase to 50 mg p o  q h s  times  16 days    Patient will then call with report less not how she is doing at that point we will call in 100 mg dosing if doing well  This is adjunct to Lexapro for improvement in depressive symptoms as well as improvement in racing thoughts as well as agitation irritability  Lamotrigine patient Education completed including dizziness, headaches, ataxia, vision problems, somnolence, sleep changes, cognitive difficulties, rash (including Hardy-Jamel rash), and others, risk of teratogenicity for females  -  Maintain follow-up with primary care physician for routine medical management, routine yearly labs and any other medical concerns         -  Maintain follow-up with OBGYN    - continue psychotherapy with Omer Burns SLP therapist at [facility]  - patient interested in starting EMDR therapy with Zonia at [facility] for PTSD symptoms  She would also like to continue psychotherapy with Kaitlin Torres at [facility]     -Patient will call if issues or concerns     -Discussed self monitoring of symptoms, and symptom monitoring tools     -Patient has been informed of 24 hours and weekend coverage for urgent situations accessed by calling the main clinic phone number      [facility] Crisis Telephone Numbers and the [address] Number Provided to Patient     -Treatment Plan completed electronically on 12/17/2020 with SLP therapist Omer Burns  Psychotherapy Provided:       Individual psychotherapy provided: Yes  Counseling was provided during the session today for 19 minutes  Medications, treatment progress and treatment plan reviewed with Malia  Medication changes discussed with Malia  Medication education provided to Radha  Recent stressor including COVID-19 issues, son's illness, health issues and ongoing anxiety discussed with Malia  Coping strategies reviewed with Malia  Importance of follow up with family physician for medical issues reviewed with Radha  Reassurance and supportive therapy provided     Crisis/safety plan discussed with Malia  This note was shared with patient

## 2021-02-02 ENCOUNTER — TELEMEDICINE (OUTPATIENT)
Dept: PSYCHIATRY | Facility: CLINIC | Age: 36
End: 2021-02-02
Payer: COMMERCIAL

## 2021-02-02 VITALS — HEIGHT: 62 IN | BODY MASS INDEX: 38.64 KG/M2 | WEIGHT: 210 LBS

## 2021-02-02 DIAGNOSIS — F43.10 PTSD (POST-TRAUMATIC STRESS DISORDER): ICD-10-CM

## 2021-02-02 DIAGNOSIS — F32.1 CURRENT MODERATE EPISODE OF MAJOR DEPRESSIVE DISORDER WITHOUT PRIOR EPISODE (HCC): Primary | ICD-10-CM

## 2021-02-02 DIAGNOSIS — F51.01 PRIMARY INSOMNIA: ICD-10-CM

## 2021-02-02 DIAGNOSIS — F41.1 GENERALIZED ANXIETY DISORDER: ICD-10-CM

## 2021-02-02 PROCEDURE — 3725F SCREEN DEPRESSION PERFORMED: CPT | Performed by: NURSE PRACTITIONER

## 2021-02-02 PROCEDURE — 99214 OFFICE O/P EST MOD 30 MIN: CPT | Performed by: NURSE PRACTITIONER

## 2021-02-02 PROCEDURE — 90833 PSYTX W PT W E/M 30 MIN: CPT | Performed by: NURSE PRACTITIONER

## 2021-02-02 RX ORDER — FLUCONAZOLE 150 MG/1
TABLET ORAL
COMMUNITY
Start: 2021-01-05 | End: 2021-02-02 | Stop reason: ALTCHOICE

## 2021-02-02 RX ORDER — LAMOTRIGINE 25 MG/1
TABLET ORAL
Qty: 46 TABLET | Refills: 0 | Status: SHIPPED | OUTPATIENT
Start: 2021-02-02 | End: 2021-02-03

## 2021-02-02 RX ORDER — ESCITALOPRAM OXALATE 20 MG/1
20 TABLET ORAL DAILY
Qty: 90 TABLET | Refills: 0 | Status: SHIPPED | OUTPATIENT
Start: 2021-02-02 | End: 2021-04-14 | Stop reason: SDUPTHER

## 2021-02-02 RX ORDER — HYDROXYZINE 50 MG/1
TABLET, FILM COATED ORAL
Qty: 60 TABLET | Refills: 0 | Status: SHIPPED | OUTPATIENT
Start: 2021-02-02

## 2021-02-03 ENCOUNTER — TELEPHONE (OUTPATIENT)
Dept: PSYCHIATRY | Facility: CLINIC | Age: 36
End: 2021-02-03

## 2021-02-03 NOTE — TELEPHONE ENCOUNTER
Malia called and LM on nursing line  She said she took the Lamictal for the first time yesterday and today woke up with Red patches on her cheeks and chin and her throat is on fire and red  She believes this is a side effect       Please advise

## 2021-02-03 NOTE — TELEPHONE ENCOUNTER
I spoke with [place]  She states she feels as though her throat is red, denies difficulty swallowing,  denies blisters, denies feeling skin, denies any shortness of breath  She reports she has red spots she areas on her face  She states that she took lamotrigine last evening 25 mg before going to sleep "and this is how I woke up  "  She reports feeling tired today but no other side effects a than what is listed above  At this time patient was encouraged to hydrate, discontinue lamotrigine immediately, if any symptoms progress she needs to contact or go to the emergency room and keep this office informed  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 just to be safe,the following were reviewed:  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

## 2021-02-04 NOTE — TELEPHONE ENCOUNTER
Spoke with Marah  She reports she is "ok today" Denies rash or any other symptoms       Please review

## 2021-02-04 NOTE — TELEPHONE ENCOUNTER
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

## 2021-02-11 ENCOUNTER — TELEMEDICINE (OUTPATIENT)
Dept: BEHAVIORAL/MENTAL HEALTH CLINIC | Facility: CLINIC | Age: 36
End: 2021-02-11
Payer: COMMERCIAL

## 2021-02-11 DIAGNOSIS — F32.1 CURRENT MODERATE EPISODE OF MAJOR DEPRESSIVE DISORDER WITHOUT PRIOR EPISODE (HCC): Primary | ICD-10-CM

## 2021-02-11 DIAGNOSIS — F41.1 GENERALIZED ANXIETY DISORDER: ICD-10-CM

## 2021-02-11 PROCEDURE — 90834 PSYTX W PT 45 MINUTES: CPT

## 2021-02-11 NOTE — PSYCH
Psychotherapy Provided: Individual Psychotherapy 45 minutes     Length of time in session: 45 minutes, follow up in 2 week    Goals addressed in session: Goal 1     Pain:      none    0    Current suicide risk : Low     D- ct shared that she has been struggling with ups and downs related to sons condition  Ct has had some panic attacks related to doctor appointments  Ct son one procedure was extremely difficult and ct was crying during the procedure  Ct has some difficulty with racing thoughts when trying to get sleep  Ct was encouraged to listen to relaxation video where someone is quietly talking in a calm voice  Ct session was virtual and ct agreed to parameters of a virtual session  A-  Ct presented as anxious  Ct affect was normal   Ct was verbal and motivated for session  Ct insight and judgement are fair  P- ct will attend sessions, take med's as ordered, and use techniques to manage moods and improve self care  Behavioral Health Treatment Plan [facility]: Diagnosis and Treatment Plan explained to Christian Ricci relates understanding diagnosis and is agreeable to Treatment Plan   Yes

## 2021-02-15 ENCOUNTER — TELEMEDICINE (OUTPATIENT)
Dept: FAMILY MEDICINE CLINIC | Facility: CLINIC | Age: 36
End: 2021-02-15
Payer: COMMERCIAL

## 2021-02-15 DIAGNOSIS — R10.9 ABDOMINAL PAIN, UNSPECIFIED ABDOMINAL LOCATION: Primary | ICD-10-CM

## 2021-02-15 DIAGNOSIS — R50.9 FEVER, UNSPECIFIED FEVER CAUSE: ICD-10-CM

## 2021-02-15 DIAGNOSIS — R11.0 NAUSEA: ICD-10-CM

## 2021-02-15 PROCEDURE — 99213 OFFICE O/P EST LOW 20 MIN: CPT | Performed by: NURSE PRACTITIONER

## 2021-02-15 NOTE — PROGRESS NOTES
Virtual Regular Visit      Assessment/Plan:    Differentials include viral gastroenteritis, appendicitis, diverticulitis, or UTI  Recommended bland diet and hydration  Monitor symptoms over the next 24-48 hours and call with any worsening, new, or persistent symptoms  Problem List Items Addressed This Visit     None      Visit Diagnoses     Abdominal pain, unspecified abdominal location    -  Primary    Fever, unspecified fever cause        Nausea                   Reason for visit is   Chief Complaint   Patient presents with    Virtual Regular Visit        Encounter provider PATO Ji    Provider located at  O  [address] [address]      Recent Visits  No visits were found meeting these conditions  Showing recent visits within past 7 days and meeting all other requirements     Today's Visits  Date Type Provider Dept   02/15/21 Telemedicine Lauro Ji today's visits and meeting all other requirements     Future Appointments  No visits were found meeting these conditions  Showing future appointments within next 150 days and meeting all other requirements        The patient was identified by name and date of birth  Vance Ulloa was informed that this is a telemedicine visit and that the visit is being conducted through [facility] and patient was informed that this is not a secure, HIPAA-compliant platform  She agrees to proceed     My office door was closed  No one else was in the room  She acknowledged consent and understanding of privacy and security of the video platform  The patient has agreed to participate and understands they can discontinue the visit at any time  Patient is aware this is a billable service  Subjective  Vance Ulloa is a 28 y o  female         Yesterday, pt developed a low grade fever of 100 3 associated with nausea, upset stomach, and transient RLQ pain   Temperature has been normal today  She had a normal BM earlier today  RLQ pain has subsided  She denies any sinus congestion, cough, or SOB  She denies any flank pain or urinary s/s  No sick contacts  Past Medical History:   Diagnosis Date    Abnormal Pap smear of cervix     Anxiety     BRCA1 negative     BRCA2 negative     Breast injury     Pt states scar tissue around nipple from breastfeeding    Depression     HPV (human papilloma virus) infection     Kidney stone     H/O PYELONEPHRITIS    Migraine     Seasonal allergies     Sleep difficulties     Varicella     POS HX       Past Surgical History:   Procedure Laterality Date    CHOLECYSTECTOMY      FINGER SURGERY      left hand     GYNECOLOGIC CRYOSURGERY      HAND SURGERY Left     Phelebitis in left hand  post IV infilitration, had sx for clot removal     LYMPH NODE BIOPSY      of neck    LYMPH NODE DISSECTION Right     cervical    IN  DELIVERY ONLY N/A 2018    Procedure:  SECTION ();   Surgeon: Simone Song MD;  Location[facility];  Service: Obstetrics       Current Outpatient Medications   Medication Sig Dispense Refill    busPIRone (BUSPAR) 15 mg tablet Take 1 tablet (15 mg total) by mouth 3 (three) times a day 270 tablet 0    cetirizine (ZyrTEC) 10 mg tablet Take 1 tablet (10 mg total) by mouth daily  0    cloNIDine (CATAPRES) 0 1 mg tablet TAKE 1 TAB BY MOUTH IN THE MORNING, 1 TAB BY MOUTH IN THE AFTERNOON, AND 2 TABS BY MOUTH AT BEDTIME 120 tablet 1    escitalopram (LEXAPRO) 20 mg tablet Take 1 tablet (20 mg total) by mouth daily 90 tablet 0    fluticasone (FLONASE) 50 mcg/act nasal spray 1 spray into each nostril daily      hydrOXYzine HCL (ATARAX) 50 mg tablet TAKE 1/2 TO 1 TAB BY MOUTH EVERY 8 HOURS AS NEEDED FOR ANXIETY 60 tablet 0    meclizine (ANTIVERT) 25 mg tablet Take 1 tablet (25 mg total) by mouth 3 (three) times a day as needed for dizziness 15 tablet 0    Melatonin ER 10 MG TBCR Take 1 tablet (10 mg total) by mouth daily      ondansetron (ZOFRAN) 4 mg tablet Take 1 tablet (4 mg total) by mouth every 8 (eight) hours as needed for nausea or vomiting 30 tablet 0    SUMAtriptan (IMITREX) 100 mg tablet Take 1 tablet (100 mg total) by mouth once as needed for migraine for up to 1 dose 12 tablet 0    therapeutic multivitamin-minerals (THERAGRAN-M) tablet Take 1 tablet by mouth daily       No current facility-administered medications for this visit  No Known Allergies    Review of Systems   Constitutional: Positive for fever  Negative for chills and fatigue  HENT: Negative for congestion, ear pain, postnasal drip, rhinorrhea, sinus pressure and sore throat  Respiratory: Negative for cough, shortness of breath and wheezing  Cardiovascular: Negative for chest pain  Gastrointestinal: Positive for abdominal pain and nausea  Negative for diarrhea and vomiting  Musculoskeletal: Negative for arthralgias  Skin: Negative for rash  Neurological: Positive for headaches  Video Exam    There were no vitals filed for this visit  Physical Exam  Constitutional:       Appearance: She is well-developed  She is not ill-appearing or diaphoretic  HENT:      Head: Normocephalic and atraumatic  Eyes:      Conjunctiva/sclera: Conjunctivae normal    Neck:      Musculoskeletal: Normal range of motion  Pulmonary:      Effort: Pulmonary effort is normal  No tachypnea, accessory muscle usage or respiratory distress  Skin:     Coloration: Skin is not pale  Neurological:      Mental Status: She is alert  Psychiatric:         Mood and Affect: Mood normal          Speech: Speech normal          Behavior: Behavior normal           I spent 7 minutes directly with the patient during this visit      [address] [place] [address] acknowledges that she has consented to an online visit or consultation   She understands that the online visit is based solely on information provided by her, and that, in the absence of a face-to-face physical evaluation by the physician, the diagnosis she receives is both limited and provisional in terms of accuracy and completeness  This is not intended to replace a full medical face-to-face evaluation by the physician  Preethi Junior understands and accepts these terms

## 2021-02-19 ENCOUNTER — IMMUNIZATIONS (OUTPATIENT)
Dept: FAMILY MEDICINE CLINIC | Facility: HOSPITAL | Age: 36
End: 2021-02-19

## 2021-02-19 DIAGNOSIS — Z23 ENCOUNTER FOR IMMUNIZATION: Primary | ICD-10-CM

## 2021-02-19 PROCEDURE — 0002A SARS-COV-2 / COVID-19 MRNA VACCINE (PFIZER-BIONTECH) 30 MCG: CPT

## 2021-02-19 PROCEDURE — 91300 SARS-COV-2 / COVID-19 MRNA VACCINE (PFIZER-BIONTECH) 30 MCG: CPT

## 2021-02-24 ENCOUNTER — PATIENT MESSAGE (OUTPATIENT)
Dept: FAMILY MEDICINE CLINIC | Facility: CLINIC | Age: 36
End: 2021-02-24

## 2021-02-24 DIAGNOSIS — F41.1 GENERALIZED ANXIETY DISORDER: ICD-10-CM

## 2021-02-24 DIAGNOSIS — B34.9 VIRAL ILLNESS: ICD-10-CM

## 2021-02-24 DIAGNOSIS — B34.9 VIRAL ILLNESS: Primary | ICD-10-CM

## 2021-02-24 DIAGNOSIS — F43.10 PTSD (POST-TRAUMATIC STRESS DISORDER): ICD-10-CM

## 2021-02-24 PROCEDURE — U0005 INFEC AGEN DETEC AMPLI PROBE: HCPCS | Performed by: NURSE PRACTITIONER

## 2021-02-24 PROCEDURE — U0003 INFECTIOUS AGENT DETECTION BY NUCLEIC ACID (DNA OR RNA); SEVERE ACUTE RESPIRATORY SYNDROME CORONAVIRUS 2 (SARS-COV-2) (CORONAVIRUS DISEASE [COVID-19]), AMPLIFIED PROBE TECHNIQUE, MAKING USE OF HIGH THROUGHPUT TECHNOLOGIES AS DESCRIBED BY CMS-2020-01-R: HCPCS | Performed by: NURSE PRACTITIONER

## 2021-02-25 LAB — SARS-COV-2 RNA RESP QL NAA+PROBE: NEGATIVE

## 2021-02-27 RX ORDER — CLONIDINE HYDROCHLORIDE 0.1 MG/1
TABLET ORAL
Qty: 120 TABLET | Refills: 1 | OUTPATIENT
Start: 2021-02-27

## 2021-03-15 ENCOUNTER — TELEMEDICINE (OUTPATIENT)
Dept: PSYCHIATRY | Facility: CLINIC | Age: 36
End: 2021-03-15
Payer: COMMERCIAL

## 2021-03-15 VITALS — HEIGHT: 62 IN | WEIGHT: 200 LBS | BODY MASS INDEX: 36.8 KG/M2

## 2021-03-15 DIAGNOSIS — F51.01 PRIMARY INSOMNIA: ICD-10-CM

## 2021-03-15 DIAGNOSIS — F41.1 GENERALIZED ANXIETY DISORDER: ICD-10-CM

## 2021-03-15 DIAGNOSIS — F32.1 CURRENT MODERATE EPISODE OF MAJOR DEPRESSIVE DISORDER WITHOUT PRIOR EPISODE (HCC): ICD-10-CM

## 2021-03-15 DIAGNOSIS — F43.10 PTSD (POST-TRAUMATIC STRESS DISORDER): ICD-10-CM

## 2021-03-15 PROCEDURE — 90833 PSYTX W PT W E/M 30 MIN: CPT | Performed by: NURSE PRACTITIONER

## 2021-03-15 PROCEDURE — 3008F BODY MASS INDEX DOCD: CPT | Performed by: NURSE PRACTITIONER

## 2021-03-15 PROCEDURE — 99214 OFFICE O/P EST MOD 30 MIN: CPT | Performed by: NURSE PRACTITIONER

## 2021-03-15 PROCEDURE — 1036F TOBACCO NON-USER: CPT | Performed by: NURSE PRACTITIONER

## 2021-03-15 RX ORDER — CLONIDINE HYDROCHLORIDE 0.1 MG/1
TABLET ORAL
Qty: 120 TABLET | Refills: 2 | Status: SHIPPED | OUTPATIENT
Start: 2021-03-15 | End: 2021-06-09

## 2021-03-15 RX ORDER — BUSPIRONE HYDROCHLORIDE 15 MG/1
15 TABLET ORAL 3 TIMES DAILY
Qty: 270 TABLET | Refills: 0 | Status: SHIPPED | OUTPATIENT
Start: 2021-03-15 | End: 2021-05-12

## 2021-03-15 NOTE — PSYCH
Virtual Regular Visit    Name and Date of Birth:  Preethi Junior 28 y o  1985 MRN: [id number]    Date of Visit:  March 15, 2021    Assessment/Plan:    Problem List Items Addressed This Visit        Other    Generalized anxiety disorder    Relevant Medications    busPIRone (BUSPAR) 15 mg tablet    cloNIDine (CATAPRES) 0 1 mg tablet    PTSD (post-traumatic stress disorder)    Relevant Medications    busPIRone (BUSPAR) 15 mg tablet    cloNIDine (CATAPRES) 0 1 mg tablet    Current moderate episode of major depressive disorder without prior episode (HCC)    Relevant Medications    busPIRone (BUSPAR) 15 mg tablet    Primary insomnia      Other Visit Diagnoses     Post-partum depression        Relevant Medications    busPIRone (BUSPAR) 15 mg tablet    cloNIDine (CATAPRES) 0 1 mg tablet          Reason for visit is   Chief Complaint   Patient presents with    Virtual Regular Visit    Anxiety    Depression    Follow-up    PTSD        Encounter provider Erika Zarate, [address] [place] [address]    Provider located at [address] [address] [phone]      Recent Visits  No visits were found meeting these conditions  Showing recent visits within past 7 days and meeting all other requirements     Today's Visits  Date Type Provider Dept   03/15/21 [address] [facility] 426 today's visits and meeting all other requirements     Future Appointments  No visits were found meeting these conditions  Showing future appointments within next 150 days and meeting all other requirements        The patient was identified by name and date of birth  Preethi Junior was informed that this is a telemedicine visit and that the visit is being conducted through [employer] and patient was informed that this is a secure, HIPAA-compliant platform  She agrees to proceed     My office door was closed   No one else was in the room  She acknowledged consent and understanding of privacy and security of the video platform  The patient has agreed to participate and understands they can discontinue the visit at any time  Patient is aware this is a billable service  Past Medical History:   Diagnosis Date    Abnormal Pap smear of cervix     Anxiety     BRCA1 negative     BRCA2 negative     Breast injury     Pt states scar tissue around nipple from breastfeeding    Depression     HPV (human papilloma virus) infection     Kidney stone     H/O PYELONEPHRITIS    Migraine     Seasonal allergies     Sleep difficulties     Varicella     POS HX       Past Surgical History:   Procedure Laterality Date    CHOLECYSTECTOMY      FINGER SURGERY      left hand     GYNECOLOGIC CRYOSURGERY      HAND SURGERY Left     Phelebitis in left hand  post IV infilitration, had sx for clot removal     LYMPH NODE BIOPSY      of neck    LYMPH NODE DISSECTION Right     cervical    [place]  DELIVERY ONLY N/A 2018    Procedure:  SECTION ();   Surgeon: Roseanne Sibley MD;  Location: [facility];  Service: Obstetrics       Current Outpatient Medications   Medication Sig Dispense Refill    busPIRone (BUSPAR) 15 mg tablet Take 1 tablet (15 mg total) by mouth 3 (three) times a day 270 tablet 0    cetirizine (ZyrTEC) 10 mg tablet Take 1 tablet (10 mg total) by mouth daily  0    cloNIDine (CATAPRES) 0 1 mg tablet TAKE 1 TAB BY MOUTH IN THE MORNING, 1 TAB BY MOUTH IN THE AFTERNOON, AND 2 TABS BY MOUTH AT BEDTIME 120 tablet 2    escitalopram (LEXAPRO) 20 mg tablet Take 1 tablet (20 mg total) by mouth daily 90 tablet 0    fluticasone (FLONASE) 50 mcg/act nasal spray 1 spray into each nostril daily      hydrOXYzine HCL (ATARAX) 50 mg tablet TAKE 1/2 TO 1 TAB BY MOUTH EVERY 8 HOURS AS NEEDED FOR ANXIETY 60 tablet 0    meclizine (ANTIVERT) 25 mg tablet Take 1 tablet (25 mg total) by mouth 3 (three) times a day as needed for dizziness 15 tablet 0    Melatonin ER 10 MG TBCR Take 1 tablet (10 mg total) by mouth daily      ondansetron (ZOFRAN) 4 mg tablet Take 1 tablet (4 mg total) by mouth every 8 (eight) hours as needed for nausea or vomiting 30 tablet 0    SUMAtriptan (IMITREX) 100 mg tablet Take 1 tablet (100 mg total) by mouth once as needed for migraine for up to 1 dose 12 tablet 0    therapeutic multivitamin-minerals (THERAGRAN-M) tablet Take 1 tablet by mouth daily       No current facility-administered medications for this visit  No Known Allergies    Video Exam    Vitals:    03/15/21 1234   Weight: 90 7 kg (200 lb)   Height: 5' 2" (1 575 m)           I spent 30 minutes directly with the patient during this visit      [address] [place] [address] acknowledges that she has consented to an online visit or consultation  She understands that the online visit is based solely on information provided by her, and that, in the absence of a face-to-face physical evaluation by the physician, the diagnosis she receives is both limited and provisional in terms of accuracy and completeness  This is not intended to replace a full medical face-to-face evaluation by the physician  Ibis Tee understands and accepts these terms  SUBJECTIVE:    Nancy Castro is seen today for a follow up for depression, Generalized Anxiety Disorder and PTSD  Since our last visit, overall symptoms have been gradually improving  Malia  States "I feel good  "  She asks her mother who is in the background of the virtual visit and states "hey mom how he think I have been?"   Her mother reports "you have been steady, much better than before  "    She states that she has not used her Atarax since last visit she states that even though things are still "crazy" she is able to manage them without feeling overwhelmed and anxious  She had 2 sessions of EMDR therapy and she feels they have been extremely helpful to her    She feels as though she is better able to ask for help when needed without feeling guilt  She states that she has been sleeping better, she has increased energy and motivation and she is cleaning each day and doing activities to keep herself busy  She also is no longer using caffeine excessively, she cut out sugar drinks, she is no longer using soda  She states that she is feeling better about herself as she is starting to lose weight  She is not having any panic attacks or anxiety attacks she denies auditory visual hallucinations, denies delusional thinking she feels as though she has good supports with her mother and her   She continues to feel as though her current medication regimen is working well and does not require changes  HPI ROS:             ('was' notes: recent => remote)  Medication Side Effects:   denies  (Was  denies)   Depression (10 worst):  0-1 (Was  3-4)   Anxiety (10 worst):  0-1 (Was  8)   Safety concerns (SI, HI, etc):  denies (Was  denies)   Hallucinations/Delusions  denies (Was  denies )   Sleep: 8 hours per night +1 nap when Lucila Mortimer is sleeping (Was  Varies 6 hours and broken   Energy:  improving energy motivation, cleaning each day (Was  "hit and miss depending on what it is, I making sure I am doing the things that I need to do)   Appetite:  good, no longer drinking soda, no longer drinking coffee (Was  "cooking meals and planning meals, stop )   Height  5 ft 2 in (Was  5 ft 2 in)   Weight Change:  200 lb patient reported (Was  190 lb patient reported)     Teresa Parnell denies any side effects from medications unless noted above    Review Of Systems:      Constitutional negative   ENT negative   Cardiovascular negative   Respiratory negative   Gastrointestinal negative   Genitourinary negative   Musculoskeletal negative   Integumentary negative   Neurological negative   Endocrine negative   Other Symptoms none, all other systems are negative     History Review:  The following portions of the patient's history were reviewed and documented: allergies, current medications, past family history, past medical history, past social history and problem list      Lab Review: No new labs or no relevant labs needing review with patient today    OBJECTIVE:     Vital signs in last 24 hours:    Vitals:    03/15/21 1234   Weight: 90 7 kg (200 lb)   Height: 5' 2" (1 575 m)       Mental Status Evaluation:    Appearance age appropriate, casually dressed   Behavior cooperative, less anxious, good eye contact   Speech normal rate, normal volume, normal pitch   Mood less anxious, less depressed   Affect normal range and intensity, appropriate   Thought Processes organized, goal directed, linear   Associations intact associations   Thought Content no overt delusions   Perceptual Disturbances: no auditory hallucinations, no visual hallucinations   Abnormal Thoughts  Risk Potential Suicidal ideation - None  Homicidal ideation - None  Potential for aggression - No   Orientation oriented to person, place, time/date and situation   Memory recent and remote memory grossly intact   Consciousness alert and awake   Attention Span Concentration Span attention span and concentration are age appropriate   Intellect appears to be of average intelligence   Insight intact   Judgement intact   Muscle Strength and  Gait unable to assess today due to virtual visit   Motor activity unable to assess today due to virtual visit   Language no difficulty naming common objects, no difficulty repeating a phrase, unable to assess writing today due to virtual visit   Fund of Knowledge adequate knowledge of current events  adequate fund of knowledge regarding past history  adequate fund of knowledge regarding vocabulary    Pain none   Pain Scale 0       Risks, Benefits And Possible Side Effects Of Medications:    AGREE: Risks, benefits, and possible side effects of medications explained to Radha and she (or legal representative) verbalizes understanding and agreement for treatment  PREGNANCY: Risks related to Pregnancy or becoming pregnant discussed related to medications and treatment  Patient has agreed to discuss treatment if planning to become pregnant, or if they become pregnant    Controlled Medication Discussion:     Not applicable  ______________________________________________________________    The following portions of the patient's history were reviewed and updated as appropriate: past family history, past medical history, past social history, past surgical history and problem list     Past psychiatric history, past traumatic history, past social history, past family psychiatric history copied from my note dated 2019 and updated today      Past Psychiatric History:      Past Inpatient Psychiatric Treatment:   No history of past inpatient psychiatric admissions  Past Outpatient Psychiatric Treatment:    Age 15 father passed away from MI patient was diagnosed with depression and anxiety at that time-was not tx with meds-had counseling unsure of name  Abner Melendez (2019) SHANON started Zoloft for Post partum  Past Suicide Attempts: no  Past Violent Behavior: no  Past Psychiatric Medication Trials: Zoloft and Wellbutrin, lamictal-stopped due to rash/side effect     Traumatic History:      Abuse: no history of physical or sexual abuse  Other Traumatic Events: Son's illness and father passed of MI when she was 15years old (did not witness), nightmares, flashbacks      Family Psychiatric History:      Mother depression  Sister PTSD  Maternal grandfather alcohol abuse  Brother Scooter:   2020 of drug overdose  No family history of suicide or suicide attempt    Social History:  Education level: Associates degree   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[employer])  Marital status:  to Reba Snell 3/2017  Children: Fab 2006, Servando Delgado 18  Current Living Situation: the patient currently livesHouse with  Pietro, son's Tracy Hightower and Rosita Catrachita  Social support: Pietro   [employer] Affiliation: N/A   experience: N/A  Legal history: N/A  Access to Guns: Yes, they are in a locked cabinet   She does not have a gun licence     Past Medical History:    Past Medical History:   Diagnosis Date    Abnormal Pap smear of cervix     Anxiety     BRCA1 negative     BRCA2 negative     Breast injury     Pt states scar tissue around nipple from breastfeeding    Depression     HPV (human papilloma virus) infection     Kidney stone     H/O PYELONEPHRITIS    Migraine     Seasonal allergies     Sleep difficulties     Varicella     POS HX     Past Medical History Pertinent Negatives:   Diagnosis Date Noted    BRCA1 positive 2020    BRCA2 positive 2020     Past Surgical History:   Procedure Laterality Date    CHOLECYSTECTOMY      FINGER SURGERY      left hand     GYNECOLOGIC CRYOSURGERY      HAND SURGERY Left     Phelebitis in left hand  post IV infilitration, had sx for clot removal     LYMPH NODE BIOPSY      of neck    LYMPH NODE DISSECTION Right     cervical    [place]  DELIVERY ONLY N/A 2018    Procedure:  SECTION ();   Surgeon: Rommel Nathan MD;  Location: [facility];  Service: Obstetrics     No Known Allergies    Substance Abuse History:    Social History     Substance and Sexual Activity   Alcohol Use Not Currently     Social History     Substance and Sexual Activity   Drug Use No       Social History:    Social History     Socioeconomic History    Marital status: /Civil Union     Spouse name: Julio Yi Number of children: 2    Years of education: Not on file    Highest education level: Associate degree: academic program   Occupational History    Occupation: unemployed   Social Needs    Financial resource strain: Not very hard    Food insecurity     Worry: Never true     Inability: Never true    Transportation needs     Medical: No     Non-medical: No   Tobacco Use    Smoking status: Former Smoker     Packs/day: 0 50     Years: 3 00     Pack years: 1 50     Types: Cigarettes     Quit date:      Years since quittin 2    Smokeless tobacco: Never Used   Substance and Sexual Activity    Alcohol use: Not Currently    Drug use: No    Sexual activity: Yes     Partners: Male     Birth control/protection: None   Lifestyle    Physical activity     Days per week: 4 days     Minutes per session: 20 min    Stress: Not on file   Relationships    Social connections     Talks on phone: More than three times a week     Gets together: Never     Attends Islam service: Never     Active member of club or organization: No     Attends meetings of clubs or organizations: Never     Relationship status:     Intimate partner violence     Fear of current or ex partner: No     Emotionally abused: No     Physically abused: No     Forced sexual activity: No   Other Topics Concern    Not on file   Social History Narrative    Not on file       Family Psychiatric History:     Family History   Problem Relation Age of Onset    Hypertension Mother     Heart disease Mother     Depression Mother     Coronary artery disease Mother     Hypertension Father     Hyperlipidemia Father     Heart disease Father         MI    Stroke Paternal Grandmother     Cancer Paternal Grandmother         breast    Breast cancer Paternal Grandmother     Cancer Paternal Aunt         colon    Colon cancer Paternal Aunt     Early death Maternal Uncle     Cancer Maternal Uncle         lymphoma    Early death Maternal Uncle         lymphoma    Birth defects Maternal Uncle     Undescended testes Son     Cystic fibrosis Other     Cystic fibrosis Other     Heart failure Maternal Grandmother     Stroke Maternal Grandfather     Alcohol abuse Maternal Grandfather     Alcohol abuse Brother     Drug abuse Brother          of overdose 2020    Anxiety disorder Sister    Bin Bones Post-traumatic stress disorder Sister     Developmental delay Son     Macrocephaly Son     Hydrocephalus Son     Breast cancer Other         Vital signs in last 24 hours:    Vitals:    03/15/21 1234   Weight: 90 7 kg (200 lb)   Height: 5' 2" (1 575 m)       Confidential Assessment:  Copied from my note dated 07/22/2019  Corina Yoon is a 29year old female who was originally diagnosed with depression and anxiety at age 15 post father passing away she was treated with intensive counseling/therapy but no pharmacotherapy  Robert Deras was treated with Wellbutrin for job stress at 1 time (for a short period of time many years ago which was effective) she reports it was a situational event which resolved and the medication was stopped   Denies history of sexual abuse, mental abuse, physical abuse, or verbal abuse  Robert Deras denies history of self-injurious behavior, denies history of head injuries or loss of consciousness      Current situation: symptoms started abruptly 9 months ago, approximately 2 months after her son was born and became progressively worse as he developed significant medical problems  Son Joao's illness= tracheobronchomalacia which will be requiring an extensive chest surgery potentially on his 1st birthday August 14[birth year] 2019, he also has hydrocephalus, microcephaly and global developmental delay    This illness has required Kathy Dey to be in the hospital multiple times since 3months of age including multiple trips to [place] for hospitalization and many trips to the emergency department  [facility] has been on antibiotics, requires breathing treatments 4 times daily, and they do not have family support living in the area        She has 1 sister who suffers from anxiety and PTSD and 1 brother who has drug and alcohol addiction who is currently in nursing home  Robert Deras does speak with her mother on the phone daily though this adds to her daily stressors as she gets pulled into the family issues because my mom does not speak to my sister and she goes on about my brother in assisted and I tell her I can't handle the extra stressed but it just does not seem to matter       She was placed on Zoloft 25 mg p o  Daily and then increase to 50 mg p o  Daily by her obgyn provider and referred to Psychiatry for continued management  Evelina Cutler does believe that she may be grinding her teeth at night as she has been waking with headaches some mornings  Scales:    PHQ-9 Follow-up                 Assessment/Plan:       Diagnoses and all orders for this visit:    Current moderate episode of major depressive disorder without prior episode (HCC)    Generalized anxiety disorder  -     busPIRone (BUSPAR) 15 mg tablet; Take 1 tablet (15 mg total) by mouth 3 (three) times a day  -     cloNIDine (CATAPRES) 0 1 mg tablet; TAKE 1 TAB BY MOUTH IN THE MORNING, 1 TAB BY MOUTH IN THE AFTERNOON, AND 2 TABS BY MOUTH AT BEDTIME    Primary insomnia    PTSD (post-traumatic stress disorder)  -     busPIRone (BUSPAR) 15 mg tablet; Take 1 tablet (15 mg total) by mouth 3 (three) times a day  -     cloNIDine (CATAPRES) 0 1 mg tablet; TAKE 1 TAB BY MOUTH IN THE MORNING, 1 TAB BY MOUTH IN THE AFTERNOON, AND 2 TABS BY MOUTH AT BEDTIME    Post-partum depression  -     cloNIDine (CATAPRES) 0 1 mg tablet; TAKE 1 TAB BY MOUTH IN THE MORNING, 1 TAB BY MOUTH IN THE AFTERNOON, AND 2 TABS BY MOUTH AT BEDTIME          Treatment Recommendations/Precautions/Plan:    Ravindra Lora has been doing better since last visit, she reports decrease in depressive and anxiety symptoms reporting both them at a 0-1 out of 10, 10 being the worst   She has not had any panic attacks anxiety task, she has not been having any nightmares and she states that she has been able to go in to her son's doctors appointments without having overwhelming anxiety  She has not required any Atarax since last visit  She feels as though her energy and motivation have been improving and she has been clean each day    She has been sleeping approximately 8 hours per night waking 1 time for the bathroom but able to fall back to sleep  She does take 1 nap each day when her son sleeps  She initiated EMDR therapy and had 2 visits to date stating it went very well  She reported "I feel good  "   She continue psychotherapy  She denies SI / HI, denies auditory visual hallucinations, denies delusional thinking  She continues feels that her medications on the current dosing are working well she does any side effects  She does not requires any changes today  Patient has been educated about their diagnosis and treatment modalities  They voiced understanding and agreement with the following plan:    -  Follow-up with this provider April 14, 2021 8:00 a m  and 05/18/2021 9:30 a m     -  Continue Lexapro 20 mg  P o  daily for continued improvement in symptoms of anxiety PTSD and depression, no refills required today, 90 day supply sent to pharmacy 02/02/2021     -  Continue BuSpar 15 mg p o  t i d  for continued improvement in symptoms of anxiety  Ninety day supply sent to pharmacy 03/15/2021       -    Continue clonidine/ Catapres 0 1 mg p o  q a m , 0 1 mg p o  Q afternoon and 0 2 mg p o  q h s  for continued improvement in feelings of irritability / agitation/symptoms of PTSD and nightmares  Thirty day supply +2 refills sent to pharmacy 03/15/2021     -   Continue over-the-counter melatonin 10 mg p o  q h s  p r n  for continued improvement in symptoms of insomnia  -  Continue Atarax/ hydroxyzine 25-50 mg p o  q 8 hours p r n  for improvement in symptoms of anxiety or panic  Patient has not required refills today as she has not used any since last visit  -   Lamotrigine discontinued due to side effect after 1 dose, patient had rash  -   Continue follow-up with PCP for routine medical management, routine yearly labs and any other medical concerns         -  Maintain follow-up with OBGYN    - continue psychotherapy with Kaya Rai SLP therapist at [facility]  - continue EMDR therapy with Zonia at [facility] for PTSD symptoms     -Patient will call if issues or concerns     -Discussed self monitoring of symptoms, and symptom monitoring tools     -Patient has been informed of 24 hours and weekend coverage for urgent situations accessed by calling the main clinic phone number      [facility] Crisis Telephone Numbers and the [facility] Suicide Prevention Hotline Number Provided to Patient     -Treatment Plan completed SLP therapist Gabo Garcia  Psychotherapy Provided:       Individual psychotherapy provided: Yes  Counseling was provided during the session today for 19 minutes  Medications, treatment progress and treatment plan reviewed with Malia  Medication changes discussed with Malia  Medication education provided to Radha  Recent stressor including COVID-19 issues, family issues, son's illness and ongoing anxiety discussed with Malia  Coping strategies reviewed with Malia  Importance of medication and treatment compliance reviewed with Malia  Importance of follow up with family physician for medical issues reviewed with Radha  Reassurance and supportive therapy provided  Crisis/safety plan discussed with Malia  This note was shared with patient

## 2021-03-16 ENCOUNTER — TELEPHONE (OUTPATIENT)
Dept: PSYCHIATRY | Facility: CLINIC | Age: 36
End: 2021-03-16

## 2021-03-16 NOTE — TELEPHONE ENCOUNTER
Left message for patient and/or parent/guardian regarding collection of copay for DOS: 3/15/2021 with Ana Whyte, [address]  Notified patient they can contact office at [phone] to make payment over the phone

## 2021-04-14 ENCOUNTER — TELEMEDICINE (OUTPATIENT)
Dept: PSYCHIATRY | Facility: CLINIC | Age: 36
End: 2021-04-14
Payer: COMMERCIAL

## 2021-04-14 VITALS — HEIGHT: 62 IN | BODY MASS INDEX: 39.56 KG/M2 | WEIGHT: 215 LBS

## 2021-04-14 DIAGNOSIS — F41.1 GENERALIZED ANXIETY DISORDER: ICD-10-CM

## 2021-04-14 DIAGNOSIS — F51.01 PRIMARY INSOMNIA: ICD-10-CM

## 2021-04-14 DIAGNOSIS — F43.10 PTSD (POST-TRAUMATIC STRESS DISORDER): ICD-10-CM

## 2021-04-14 DIAGNOSIS — F32.1 CURRENT MODERATE EPISODE OF MAJOR DEPRESSIVE DISORDER WITHOUT PRIOR EPISODE (HCC): Primary | ICD-10-CM

## 2021-04-14 PROCEDURE — 90833 PSYTX W PT W E/M 30 MIN: CPT | Performed by: NURSE PRACTITIONER

## 2021-04-14 PROCEDURE — 99214 OFFICE O/P EST MOD 30 MIN: CPT | Performed by: NURSE PRACTITIONER

## 2021-04-14 PROCEDURE — 3725F SCREEN DEPRESSION PERFORMED: CPT | Performed by: NURSE PRACTITIONER

## 2021-04-14 RX ORDER — ESCITALOPRAM OXALATE 20 MG/1
20 TABLET ORAL DAILY
Qty: 90 TABLET | Refills: 0 | Status: SHIPPED | OUTPATIENT
Start: 2021-04-14 | End: 2021-08-26 | Stop reason: SDUPTHER

## 2021-04-14 NOTE — PSYCH
Virtual Regular Visit    Name and Date of Birth:  Franki Simmonds 28 y o  1985 MRN: [id number]    Date of Visit:   April 14, 2021    Assessment/Plan:    Problem List Items Addressed This Visit        Other    Generalized anxiety disorder    Relevant Medications    escitalopram (LEXAPRO) 20 mg tablet    PTSD (post-traumatic stress disorder)    Relevant Medications    escitalopram (LEXAPRO) 20 mg tablet    Current moderate episode of major depressive disorder without prior episode ([facility] [address] ) - Primary    Relevant Medications    escitalopram (LEXAPRO) 20 mg tablet    Primary insomnia          Reason for visit is   Chief Complaint   Patient presents with    Virtual Regular Visit    Anxiety    Depression    Follow-up    PTSD    Sleeping Problem        Encounter provider Joanne Richardson, [facility]    Provider located at [address] [address] [phone]      Recent Visits  No visits were found meeting these conditions  Showing recent visits within past 7 days and meeting all other requirements     Today's Visits  Date Type Provider Dept   04/14/21 [address]  Carie Hardin 426 today's visits and meeting all other requirements     Future Appointments  No visits were found meeting these conditions  Showing future appointments within next 150 days and meeting all other requirements        The patient was identified by name and date of birth  Franki Simmonds was informed that this is a telemedicine visit and that the visit is being conducted through [employer] and patient was informed that this is a secure, HIPAA-compliant platform  She agrees to proceed     My office door was closed  No one else was in the room  She acknowledged consent and understanding of privacy and security of the video platform   The patient has agreed to participate and understands they can discontinue the visit at any time  Patient is aware this is a billable service  Past Medical History:   Diagnosis Date    Abnormal Pap smear of cervix     Anxiety     BRCA1 negative     BRCA2 negative     Breast injury     Pt states scar tissue around nipple from breastfeeding    Depression     HPV (human papilloma virus) infection     Kidney stone     H/O PYELONEPHRITIS    Migraine     Seasonal allergies     Sleep difficulties     Varicella     POS HX       Past Surgical History:   Procedure Laterality Date    CHOLECYSTECTOMY      FINGER SURGERY      left hand     GYNECOLOGIC CRYOSURGERY      HAND SURGERY Left     Phelebitis in left hand  post IV infilitration, had sx for clot removal     LYMPH NODE BIOPSY      of neck    LYMPH NODE DISSECTION Right     cervical    [place]  DELIVERY ONLY N/A 2018    Procedure:  SECTION ();   Surgeon: Sidney Arana MD;  Location: [facility];  Service: Obstetrics       Current Outpatient Medications   Medication Sig Dispense Refill    busPIRone (BUSPAR) 15 mg tablet Take 1 tablet (15 mg total) by mouth 3 (three) times a day 270 tablet 0    cetirizine (ZyrTEC) 10 mg tablet Take 1 tablet (10 mg total) by mouth daily  0    cloNIDine (CATAPRES) 0 1 mg tablet TAKE 1 TAB BY MOUTH IN THE MORNING, 1 TAB BY MOUTH IN THE AFTERNOON, AND 2 TABS BY MOUTH AT BEDTIME 120 tablet 2    escitalopram (LEXAPRO) 20 mg tablet Take 1 tablet (20 mg total) by mouth daily 90 tablet 0    fluticasone (FLONASE) 50 mcg/act nasal spray 1 spray into each nostril daily      hydrOXYzine HCL (ATARAX) 50 mg tablet TAKE 1/2 TO 1 TAB BY MOUTH EVERY 8 HOURS AS NEEDED FOR ANXIETY 60 tablet 0    meclizine (ANTIVERT) 25 mg tablet Take 1 tablet (25 mg total) by mouth 3 (three) times a day as needed for dizziness 15 tablet 0    Melatonin ER 10 MG TBCR Take 1 tablet (10 mg total) by mouth daily      ondansetron (ZOFRAN) 4 mg tablet Take 1 tablet (4 mg total) by mouth every 8 (eight) hours as needed for nausea or vomiting 30 tablet 0    SUMAtriptan (IMITREX) 100 mg tablet Take 1 tablet (100 mg total) by mouth once as needed for migraine for up to 1 dose 12 tablet 0    therapeutic multivitamin-minerals (THERAGRAN-M) tablet Take 1 tablet by mouth daily       No current facility-administered medications for this visit  No Known Allergies      Video Exam    Vitals:    04/14/21 0804   Weight: 97 5 kg (215 lb)   Height: 5' 2" (1 575 m)         I spent 30 minutes directly with the patient during this visit      [address] [place] [address] acknowledges that she has consented to an online visit or consultation  She understands that the online visit is based solely on information provided by her, and that, in the absence of a face-to-face physical evaluation by the physician, the diagnosis she receives is both limited and provisional in terms of accuracy and completeness  This is not intended to replace a full medical face-to-face evaluation by the physician  Malina Zhou understands and accepts these terms  SUBJECTIVE:    Shellie Castellanos is seen today for a follow up for depression, Generalized Anxiety Disorder and PTSD  Since our last visit, overall symptoms have been gradually improving  Shellie Castellanos states her oldest son Ave Lugo came down with COVID during baseball tryouts and he made the [employer] baseball team   She states they did get back the genetic testing results for Vitor Loomis  They are going to see the genetic counselor at the end of this month  They continue to see the rheumatologist to rule out arthritis  She states they also have a follow up with the airway team     She states her mood has been good through everything  She denies any panic attacks since last visit, she states she still has difficulty with sleep onset however she states she is taking her melatonin quite early, she will adjust dosing time    She feels as though her depressive and anxiety symptoms are well maintained on current medication dosing  Denies suicidal homicidal ideation  HPI ROS:             ('was' notes: recent => remote)  Medication Side Effects:  denies   (Was  denies)   Depression (10 worst): 0 (Was  0-1)   Anxiety (10 worst): 1 (Was  0-1)   Safety concerns (SI, HI, etc): Denies  (Was  denies)   Hallucinations/Delusions Denies  (Was  denies)   Sleep:  varies difficulty falling asleep using 10 mg melatonin but she has been taking it early when she is not able to go to bed, she will try to switch timing to after Mary Ellen Hooker is in bed (Was  8 hours per night and 1/2 when Mary Ellen Hooker sleeping)   Energy: Good energy and motivation (Was  Improving energy and motivation clean each day)   Appetite: Good-"I am losing weight, slowly but sure" (Was  Good, no longer drinking soda no longer drinking coffee)   Height  5 ft 2 in (Was  5 ft 2 in)   Weight Change: 215 lbs (Was  200 lb patient reported)     Tod Pretty denies any side effects from medications unless noted above    Review Of Systems:      Constitutional negative   ENT negative   Cardiovascular negative   Respiratory negative   Gastrointestinal negative   Genitourinary negative   Musculoskeletal negative   Integumentary negative   Neurological negative   Endocrine negative   Other Symptoms none, all other systems are negative     History Review:  The following portions of the patient's history were reviewed and documented: allergies, current medications, past family history, past medical history, past social history and problem list      Lab Review: No new labs or no relevant labs needing review with patient today    OBJECTIVE:     Vital signs in last 24 hours:    Vitals:    04/14/21 0804   Weight: 97 5 kg (215 lb)   Height: 5' 2" (1 575 m)       Mental Status Evaluation:    Appearance age appropriate, casually dressed   Behavior cooperative, calm, good eye contact   Speech normal rate, normal volume, normal pitch   Mood euthymic   Affect normal range and intensity, appropriate   Thought Processes organized, goal directed, linear   Associations intact associations   Thought Content no overt delusions   Perceptual Disturbances: no auditory hallucinations, no visual hallucinations   Abnormal Thoughts  Risk Potential Suicidal ideation - None  Homicidal ideation - None  Potential for aggression - No   Orientation oriented to person, place, time/date and situation   Memory recent and remote memory grossly intact   Consciousness alert and awake   Attention Span Concentration Span attention span and concentration are age appropriate   Intellect appears to be of average intelligence   Insight intact   Judgement intact   Muscle Strength and  Gait unable to assess today due to virtual visit   Motor activity unable to assess today due to virtual visit   Language no difficulty naming common objects, no difficulty repeating a phrase, unable to assess writing today due to virtual visit   Fund of Knowledge adequate knowledge of current events  adequate fund of knowledge regarding past history  adequate fund of knowledge regarding vocabulary    Pain none   Pain Scale 0       Risks, Benefits And Possible Side Effects Of Medications:    AGREE: Risks, benefits, and possible side effects of medications explained to Sue Aguirre and she (or legal representative) verbalizes understanding and agreement for treatment  PREGNANCY: Risks related to Pregnancy or becoming pregnant discussed related to medications and treatment   Patient has agreed to discuss treatment if planning to become pregnant, or if they become pregnant    Controlled Medication Discussion:     Not applicable  ______________________________________________________________      The following portions of the patient's history were reviewed and updated as appropriate: past family history, past medical history, past social history, past surgical history and problem list     Past psychiatric history, past traumatic history, past social history, past family psychiatric history copied from my note dated 2019 and updated today  Past Psychiatric History:      Past Inpatient Psychiatric Treatment:   No history of past inpatient psychiatric admissions  Past Outpatient Psychiatric Treatment:    Age 15 father passed away from MI patient was diagnosed with depression and anxiety at that time-was not tx with meds-had counseling unsure of name  Douglas Musa (2019) OBGYN started Zoloft for Post partum  Past Suicide Attempts: no  Past Violent Behavior: no  Past Psychiatric Medication Trials: Zoloft and Wellbutrin, lamictal-stopped due to rash/side effect     Traumatic History:      Abuse: no history of physical or sexual abuse  Other Traumatic Events: Son's illness and father passed of MI when she was 15years old (did not witness), nightmares, flashbacks      Family Psychiatric History:      Mother depression  Sister PTSD  Maternal grandfather alcohol abuse  Brother Scooter:   2020 of drug overdose  No family history of suicide or suicide attempt    Social History:  Education level: Associates degree   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[employer])  Marital status:  to Pietro 3/2017  Children: Fab 2006, Clifton Hayden 18  Current Living Situation: the patient currently livesHouse with  Royal Vizcarra, son's Hina and Clifton Hayden    Social support: Pietro   Yazidi Affiliation: N/A   experience: N/A  Legal history: N/A  Access to Guns: Yes, they are in a locked cabinet   She does not have a gun licence     Past Medical History:    Past Medical History:   Diagnosis Date    Abnormal Pap smear of cervix     Anxiety     BRCA1 negative     BRCA2 negative     Breast injury     Pt states scar tissue around nipple from breastfeeding    Depression     HPV (human papilloma virus) infection     Kidney stone     H/O PYELONEPHRITIS    Migraine     Seasonal allergies     Sleep difficulties     Varicella POS HX     Past Medical History Pertinent Negatives:   Diagnosis Date Noted    BRCA1 positive 2020    BRCA2 positive 2020     Past Surgical History:   Procedure Laterality Date    CHOLECYSTECTOMY      FINGER SURGERY      left hand     GYNECOLOGIC CRYOSURGERY      HAND SURGERY Left     Phelebitis in left hand  post IV infilitration, had sx for clot removal     LYMPH NODE BIOPSY      of neck    LYMPH NODE DISSECTION Right     cervical    [place]  DELIVERY ONLY N/A 2018    Procedure:  SECTION ();   Surgeon: Lesvia Gerardo MD;  Location: [facility];  Service: Obstetrics     No Known Allergies    Substance Abuse History:    Social History     Substance and Sexual Activity   Alcohol Use Not Currently     Social History     Substance and Sexual Activity   Drug Use No       Social History:    Social History     Socioeconomic History    Marital status: /Civil Union     Spouse name: Argenis Galvan Number of children: 2    Years of education: Not on file    Highest education level: Associate degree: academic program   Occupational History    Occupation: unemployed   Social Needs    Financial resource strain: Not very hard    Food insecurity     Worry: Never true     Inability: Never true    Transportation needs     Medical: No     Non-medical: No   Tobacco Use    Smoking status: Former Smoker     Packs/day: 0 50     Years: 3 00     Pack years: 1 50     Types: Cigarettes     Quit date:      Years since quittin 2    Smokeless tobacco: Never Used   Substance and Sexual Activity    Alcohol use: Not Currently    Drug use: No    Sexual activity: Yes     Partners: Male     Birth control/protection: None   Lifestyle    Physical activity     Days per week: 4 days     Minutes per session: 20 min    Stress: Not on file   Relationships    Social connections     Talks on phone: More than three times a week     Gets together: Never     Attends Pentecostalism service: Never Active member of club or organization: No     Attends meetings of clubs or organizations: Never     Relationship status:     Intimate partner violence     Fear of current or ex partner: No     Emotionally abused: No     Physically abused: No     Forced sexual activity: No   Other Topics Concern    Not on file   Social History Narrative    Not on file       Family Psychiatric History:     Family History   Problem Relation Age of Onset    Hypertension Mother     Heart disease Mother     Depression Mother     Coronary artery disease Mother     Hypertension Father     Hyperlipidemia Father     Heart disease Father         MI    Stroke Paternal Grandmother     Cancer Paternal Grandmother         breast    Breast cancer Paternal Grandmother     Cancer Paternal Aunt         colon    Colon cancer Paternal Aunt     Early death Maternal Uncle     Cancer Maternal Uncle         lymphoma    Early death Maternal Uncle         lymphoma    Birth defects Maternal Uncle     Undescended testes Son     Cystic fibrosis Other     Cystic fibrosis Other     Heart failure Maternal Grandmother     Stroke Maternal Grandfather     Alcohol abuse Maternal Grandfather     Alcohol abuse Brother     Drug abuse Brother          of overdose 2020    Anxiety disorder Sister     Post-traumatic stress disorder Sister     Developmental delay Son     Macrocephaly Son     Hydrocephalus Son     Breast cancer Other         Vital signs in last 24 hours:    Vitals:    21 0804   Weight: 97 5 kg (215 lb)   Height: 5' 2" (1 575 m)       Confidential Assessment:  Copied from my note dated 2019  Devi Ryder is a 29year old female who was originally diagnosed with depression and anxiety at age 15 post father passing away she was treated with intensive counseling/therapy but no pharmacotherapy  Joseph Romanandrez was treated with Wellbutrin for job stress at 1 time (for a short period of time many years ago which was effective) she reports it was a situational event which resolved and the medication was stopped   Denies history of sexual abuse, mental abuse, physical abuse, or verbal abuse  Astrid Sorenson denies history of self-injurious behavior, denies history of head injuries or loss of consciousness      Current situation: symptoms started abruptly 9 months ago, approximately 2 months after her son was born and became progressively worse as he developed significant medical problems  Son Joao's illness= tracheobronchomalacia which will be requiring an extensive chest surgery potentially on his 1st birthday August 14[birth year] 2019, he also has hydrocephalus, microcephaly and global developmental delay    This illness has required Mariella Da Silva to be in the hospital multiple times since 3months of age including multiple trips to [place] for hospitalization and many trips to the emergency department  Juliet Viera has been on antibiotics, requires breathing treatments 4 times daily, and they do not have family support living in the area        She has 1 sister who suffers from anxiety and PTSD and 1 brother who has drug and alcohol addiction who is currently in senior care  Astrid Sorenson does speak with her mother on the phone daily though this adds to her daily stressors as she gets pulled into the family issues because my mom does not speak to my sister and she goes on about my brother in senior care and I tell her I can't handle the extra stressed but it just does not seem to matter       She was placed on Zoloft 25 mg p o  Daily and then increase to 50 mg p o  Daily by her obgyn provider and referred to Psychiatry for continued management  Astrid Sorenson does believe that she may be grinding her teeth at night as she has been waking with headaches some mornings      Scales:    PHQ-9 Follow-up    Little interest or pleasure in doing things: 0 - not at all  Feeling down, depressed, or hopeless: 0 - not at all  Trouble falling or staying asleep, or sleeping too much: 1 - several days  Feeling tired or having little energy: 0 - not at all  Poor appetite or overeatin - not at all  Feeling bad about yourself - or that you are a failure or have let yourself or your family down: 0 - not at all  Trouble concentrating on things, such as reading the newspaper or watching television: 0 - not at all  Moving or speaking so slowly that other people could have noticed  Or the opposite - being so fidgety or restless that you have been moving around a lot more than usual: 0 - not at all  Thoughts that you would be better off dead, or of hurting yourself in some way: 0 - not at all  PHQ-2 Score: 0  PHQ-9 Score: 1         PRANAY-7 Flowsheet Screening      Most Recent Value   Over the last two weeks, how often have you been bothered by the following problems? Feeling nervous, anxious, or on edge  0   Not being able to stop or control worrying  0   Worrying too much about different things  0   Trouble relaxing   0   Being so restless that it's hard to sit still  0   Becoming easily annoyed or irritable   0   Feeling afraid as if something awful might happen  0   How difficult have these problems made it for you to do your work, take care of things at home, or get along with other people? Not difficult at all   PRANAY Score   0            Assessment/Plan:       Diagnoses and all orders for this visit:    Current moderate episode of major depressive disorder without prior episode (HCC)  -     escitalopram (LEXAPRO) 20 mg tablet; Take 1 tablet (20 mg total) by mouth daily    PTSD (post-traumatic stress disorder)  -     escitalopram (LEXAPRO) 20 mg tablet; Take 1 tablet (20 mg total) by mouth daily    Generalized anxiety disorder  -     escitalopram (LEXAPRO) 20 mg tablet;  Take 1 tablet (20 mg total) by mouth daily    Primary insomnia          Treatment Recommendations/Precautions/Plan:    Italo Gibbs  Has been doing well since last visit, she continues in EMDR therapy and feels this is been very beneficial   She denies suicidal homicidal ideation, she denies feelings of depression and if anxiety comes up she is been able to use coping strategies  She states that she does talk to her  if she is feeling anxious and she works through situations  She denies nightmares or flashbacks currently  She does have difficulty with sleep onset however she states that she has been taking her melatonin early and it does make her sleepy however she is unable to fall asleep as her infant son is still up  She will adjust timing of taking the medication/ melatonin to see if this is helpful      she is taking medication as prescribed denies any side effects  Patient has been educated about their diagnosis and treatment modalities  They voiced understanding and agreement with the following plan:    -   Follow-up with this provider  June 17, 2021 9:30 a m     -   Continue the citalopram/Lexapro 20 mg p o  daily for continued improvement in symptoms of PTSD anxiety and depression  90 day supply sent to pharmacy 04/14/2021     -   Continue BuSpar 15 mg p o  t i d  for continued improvement in symptoms of anxiety  No refills required today    -     Continue Catapres/clonidine 0 1 mg p o  q a m  0 1 mg p o  Q afternoon 0 2 mg p o  q h s  for continued improvement in been symptoms of PTSD, irritability and agitation  No refills required today    -    Continue melatonin OTC 10 mg p o  q h s  p r n  for continued improvement symptoms of insomnia  -   Continue hydroxyzine/Atarax 25-50 mg p o  q 8 hours p r n  for improvement in symptoms of panic and anxiety  No refills required today    -  Maintain follow-up appointments with primary care physician for routine yearly labs, medical management and any medical concerns        -   Follow-up with OBGYN for routine medical management    - patient discontinued psychotherapy with Lisandro at [address]    -  continue EMDR therapy with Rodolfo Richmond at [facility] for PTSD symptoms     -Patient will call if issues or concerns     -Discussed self monitoring of symptoms, and symptom monitoring tools     -Patient has been informed of 24 hours and weekend coverage for urgent situations accessed by calling the main clinic phone number      [facility] Crisis Telephone Numbers and the National Suicide Prevention Hotline Number Provided to Patient     -Treatment Plan completed  04/14/2021 electronically and verbal consent obtained due to virtual format and COVID-19 pandemic protocols  Psychotherapy Provided:         Individual psychotherapy provided: Yes  Counseling was provided during the session today for 19 minutes  Medications, treatment progress and treatment plan reviewed with Malia  Medication changes discussed with Malia  Medication education provided to Radha  Goals discussed during in session: improve self-care  Recent stressor including COVID-19 issues, medical illness in family and sons's illness discussed with Malia  Coping strategies reviewed with Malia  Importance of medication and treatment compliance reviewed with Malia  Importance of follow up with family physician for medical issues reviewed with Radha  Reassurance and supportive therapy provided  Crisis/safety plan discussed with Malia  This note was shared with patient

## 2021-04-14 NOTE — BH TREATMENT PLAN
TREATMENT PLAN (Medication Management Only)        [facility]    Name and Date of Birth:  Fahad Howard 39 y o  1985  Date of Treatment Plan: April 14, 2021  Diagnosis/Diagnoses:    1  Current moderate episode of major depressive disorder without prior episode ([address] )    2  PTSD (post-traumatic stress disorder)    3  Generalized anxiety disorder    4  Primary insomnia      Strengths/Personal Resources for Self-Care: "i take my medication consistently, I am doing my EMDR therapy, and I am taking care of my kids"  Area/Areas of need (in own words): "making sure I take care of myself and my medcial needs and focus on getting to healthy weight"  1  Long Term Goal: improve self-care  Target Date:6 months - 10/14/2021  Person/Persons responsible for completion of goal: Malia  2  Short Term Objective (s) - How will we reach this goal?:   A  Provider new recommended medication/dosage changes and/or continue medication(s): continue current medications as prescribed Lexapro, Buspar, Atarax, clonidine, Melatonin  B  20 minutes of physical activity daily  C  schedule follow up yearly physical   Target Date:6 months - 10/14/2021  Person/Persons Responsible for Completion of Goal: Malia  Progress Towards Goals: continuing treatment  Treatment Modality: medication management every 8 weeks  Review due 180 days from date of this plan: 6 months - 10/14/2021  Expected length of service: ongoing treatment  My Physician/PA/NP and I have developed this plan together and I agree to work on the goals and objectives  I understand the treatment goals that were developed for my treatment    Treatment Plan done but not signed at time of office visit due to:  Plan reviewed by phone or in person  and verbal consent given due to Rosina social wayne

## 2021-04-16 DIAGNOSIS — F41.1 GENERALIZED ANXIETY DISORDER: ICD-10-CM

## 2021-04-16 DIAGNOSIS — F43.10 PTSD (POST-TRAUMATIC STRESS DISORDER): ICD-10-CM

## 2021-04-18 RX ORDER — BUSPIRONE HYDROCHLORIDE 15 MG/1
TABLET ORAL
Qty: 270 TABLET | Refills: 0 | OUTPATIENT
Start: 2021-04-18

## 2021-04-21 ENCOUNTER — HOSPITAL ENCOUNTER (EMERGENCY)
Facility: HOSPITAL | Age: 36
Discharge: HOME | End: 2021-04-21
Attending: EMERGENCY MEDICINE
Payer: COMMERCIAL

## 2021-04-21 ENCOUNTER — APPOINTMENT (EMERGENCY)
Dept: RADIOLOGY | Facility: HOSPITAL | Age: 36
End: 2021-04-21
Payer: COMMERCIAL

## 2021-04-21 VITALS
TEMPERATURE: 98.1 F | SYSTOLIC BLOOD PRESSURE: 124 MMHG | RESPIRATION RATE: 17 BRPM | BODY MASS INDEX: 39.56 KG/M2 | OXYGEN SATURATION: 97 % | HEART RATE: 66 BPM | HEIGHT: 62 IN | WEIGHT: 215 LBS | DIASTOLIC BLOOD PRESSURE: 73 MMHG

## 2021-04-21 DIAGNOSIS — R07.9 CHEST PAIN, UNSPECIFIED: Primary | ICD-10-CM

## 2021-04-21 DIAGNOSIS — K21.9 GERD (GASTROESOPHAGEAL REFLUX DISEASE): ICD-10-CM

## 2021-04-21 LAB
ALBUMIN SERPL BCP-MCNC: 3.5 G/DL (ref 3.5–5)
ALP SERPL-CCNC: 73 U/L (ref 46–116)
ALT SERPL W P-5'-P-CCNC: 37 U/L (ref 12–78)
ANION GAP SERPL CALCULATED.3IONS-SCNC: 7 MMOL/L (ref 4–13)
AST SERPL W P-5'-P-CCNC: 21 U/L (ref 5–45)
BASOPHILS # BLD AUTO: 0.04 THOUSANDS/ΜL (ref 0–0.1)
BASOPHILS NFR BLD AUTO: 1 % (ref 0–1)
BILIRUB SERPL-MCNC: 0.18 MG/DL (ref 0.2–1)
BUN SERPL-MCNC: 8 MG/DL (ref 5–25)
CALCIUM SERPL-MCNC: 8.9 MG/DL (ref 8.3–10.1)
CHLORIDE SERPL-SCNC: 103 MMOL/L (ref 100–108)
CO2 SERPL-SCNC: 27 MMOL/L (ref 21–32)
CREAT SERPL-MCNC: 0.86 MG/DL (ref 0.6–1.3)
EOSINOPHIL # BLD AUTO: 0.09 THOUSAND/ΜL (ref 0–0.61)
EOSINOPHIL NFR BLD AUTO: 1 % (ref 0–6)
ERYTHROCYTE [DISTWIDTH] IN BLOOD BY AUTOMATED COUNT: 12.4 % (ref 11.6–15.1)
EXT PREG TEST URINE: NEGATIVE
EXT. CONTROL ED NAV: NORMAL
GFR SERPL CREATININE-BSD FRML MDRD: 87 ML/MIN/1.73SQ M
GLUCOSE SERPL-MCNC: 86 MG/DL (ref 65–140)
HCT VFR BLD AUTO: 41.2 % (ref 34.8–46.1)
HGB BLD-MCNC: 13.4 G/DL (ref 11.5–15.4)
IMM GRANULOCYTES # BLD AUTO: 0.03 THOUSAND/UL (ref 0–0.2)
IMM GRANULOCYTES NFR BLD AUTO: 0 % (ref 0–2)
LYMPHOCYTES # BLD AUTO: 2.26 THOUSANDS/ΜL (ref 0.6–4.47)
LYMPHOCYTES NFR BLD AUTO: 31 % (ref 14–44)
MCH RBC QN AUTO: 28.8 PG (ref 26.8–34.3)
MCHC RBC AUTO-ENTMCNC: 32.5 G/DL (ref 31.4–37.4)
MCV RBC AUTO: 88 FL (ref 82–98)
MONOCYTES # BLD AUTO: 0.46 THOUSAND/ΜL (ref 0.17–1.22)
MONOCYTES NFR BLD AUTO: 6 % (ref 4–12)
NEUTROPHILS # BLD AUTO: 4.49 THOUSANDS/ΜL (ref 1.85–7.62)
NEUTS SEG NFR BLD AUTO: 61 % (ref 43–75)
NRBC BLD AUTO-RTO: 0 /100 WBCS
PLATELET # BLD AUTO: 433 THOUSANDS/UL (ref 149–390)
PMV BLD AUTO: 8.6 FL (ref 8.9–12.7)
POTASSIUM SERPL-SCNC: 3.8 MMOL/L (ref 3.5–5.3)
PROT SERPL-MCNC: 7.2 G/DL (ref 6.4–8.2)
RBC # BLD AUTO: 4.66 MILLION/UL (ref 3.81–5.12)
SODIUM SERPL-SCNC: 137 MMOL/L (ref 136–145)
TROPONIN I SERPL-MCNC: <0.02 NG/ML
TROPONIN I SERPL-MCNC: <0.02 NG/ML
WBC # BLD AUTO: 7.37 THOUSAND/UL (ref 4.31–10.16)

## 2021-04-21 PROCEDURE — 81025 URINE PREGNANCY TEST: CPT | Performed by: EMERGENCY MEDICINE

## 2021-04-21 PROCEDURE — 96375 TX/PRO/DX INJ NEW DRUG ADDON: CPT

## 2021-04-21 PROCEDURE — 80053 COMPREHEN METABOLIC PANEL: CPT | Performed by: EMERGENCY MEDICINE

## 2021-04-21 PROCEDURE — 85025 COMPLETE CBC W/AUTO DIFF WBC: CPT | Performed by: EMERGENCY MEDICINE

## 2021-04-21 PROCEDURE — 36415 COLL VENOUS BLD VENIPUNCTURE: CPT | Performed by: EMERGENCY MEDICINE

## 2021-04-21 PROCEDURE — 93005 ELECTROCARDIOGRAM TRACING: CPT

## 2021-04-21 PROCEDURE — 84484 ASSAY OF TROPONIN QUANT: CPT | Performed by: EMERGENCY MEDICINE

## 2021-04-21 PROCEDURE — 96374 THER/PROPH/DIAG INJ IV PUSH: CPT

## 2021-04-21 PROCEDURE — 71045 X-RAY EXAM CHEST 1 VIEW: CPT

## 2021-04-21 PROCEDURE — 99284 EMERGENCY DEPT VISIT MOD MDM: CPT | Performed by: EMERGENCY MEDICINE

## 2021-04-21 PROCEDURE — 99285 EMERGENCY DEPT VISIT HI MDM: CPT

## 2021-04-21 RX ORDER — KETOROLAC TROMETHAMINE 30 MG/ML
15 INJECTION, SOLUTION INTRAMUSCULAR; INTRAVENOUS ONCE
Status: COMPLETED | OUTPATIENT
Start: 2021-04-21 | End: 2021-04-21

## 2021-04-21 RX ORDER — OMEPRAZOLE 20 MG/1
20 CAPSULE, DELAYED RELEASE ORAL DAILY
Qty: 21 CAPSULE | Refills: 0 | Status: SHIPPED | OUTPATIENT
Start: 2021-04-21

## 2021-04-21 RX ORDER — LIDOCAINE HYDROCHLORIDE 20 MG/ML
15 SOLUTION OROPHARYNGEAL ONCE
Status: COMPLETED | OUTPATIENT
Start: 2021-04-21 | End: 2021-04-21

## 2021-04-21 RX ORDER — MAGNESIUM HYDROXIDE/ALUMINUM HYDROXICE/SIMETHICONE 120; 1200; 1200 MG/30ML; MG/30ML; MG/30ML
30 SUSPENSION ORAL ONCE
Status: COMPLETED | OUTPATIENT
Start: 2021-04-21 | End: 2021-04-21

## 2021-04-21 RX ADMIN — KETOROLAC TROMETHAMINE 15 MG: 30 INJECTION, SOLUTION INTRAMUSCULAR at 13:56

## 2021-04-21 RX ADMIN — ALUMINA, MAGNESIA, AND SIMETHICONE ORAL SUSPENSION REGULAR STRENGTH 30 ML: 1200; 1200; 120 SUSPENSION ORAL at 12:33

## 2021-04-21 RX ADMIN — FAMOTIDINE 20 MG: 10 INJECTION, SOLUTION INTRAVENOUS at 12:31

## 2021-04-21 RX ADMIN — LIDOCAINE HYDROCHLORIDE 15 ML: 20 SOLUTION ORAL; TOPICAL at 12:33

## 2021-04-21 NOTE — ED PROVIDER NOTES
History  Chief Complaint   Patient presents with    Chest Pain     CP that started this morning described as "the worst heartburn I've ever felt shooting up to right side of neck and I feel like I can't swallow"      Pt in the ER with c/o heartburn that began approx 2hrs ago  She localizes it to her upper chest and radiates into the right side of her neck  She states that she feels as if she can't swallow  She denies a hx of similar symptoms  She states that these symptoms are different from her anxiety  History provided by:  Patient   used: No    Chest Pain  Pain location:  Substernal area  Pain quality: burning    Pain radiates to:  Neck  Pain radiates to the back: no    Pain severity:  Mild  Onset quality:  Sudden  Timing:  Constant  Progression:  Unchanged  Chronicity:  New  Relieved by:  None tried  Worsened by:  Nothing tried  Ineffective treatments:  None tried  Associated symptoms: no abdominal pain, no back pain, no cough, no dysphagia, no fever, no nausea, no shortness of breath and not vomiting        Prior to Admission Medications   Prescriptions Last Dose Informant Patient Reported? Taking?    Melatonin ER 10 MG TBCR 4/20/2021 at 2000  No Yes   Sig: Take 1 tablet (10 mg total) by mouth daily   SUMAtriptan (IMITREX) 100 mg tablet More than a month at 0700  No No   Sig: Take 1 tablet (100 mg total) by mouth once as needed for migraine for up to 1 dose   busPIRone (BUSPAR) 15 mg tablet 4/21/2021 at 0700  No Yes   Sig: Take 1 tablet (15 mg total) by mouth 3 (three) times a day   cetirizine (ZyrTEC) 10 mg tablet 4/21/2021 at 0700  No Yes   Sig: Take 1 tablet (10 mg total) by mouth daily   cloNIDine (CATAPRES) 0 1 mg tablet 4/21/2021 at 0700  No Yes   Sig: TAKE 1 TAB BY MOUTH IN THE MORNING, 1 TAB BY MOUTH IN THE AFTERNOON, AND 2 TABS BY MOUTH AT BEDTIME   escitalopram (LEXAPRO) 20 mg tablet 4/21/2021 at 0700  No Yes   Sig: Take 1 tablet (20 mg total) by mouth daily   fluticasone (FLONASE) 50 mcg/act nasal spray 2021 at 0700  Yes Yes   Si spray into each nostril daily   hydrOXYzine HCL (ATARAX) 50 mg tablet Past Month at Unknown time  No Yes   Sig: TAKE 1/2 TO 1 TAB BY MOUTH EVERY 8 HOURS AS NEEDED FOR ANXIETY   meclizine (ANTIVERT) 25 mg tablet More than a month at Unknown time  No No   Sig: Take 1 tablet (25 mg total) by mouth 3 (three) times a day as needed for dizziness   ondansetron (ZOFRAN) 4 mg tablet More than a month at 0700  No No   Sig: Take 1 tablet (4 mg total) by mouth every 8 (eight) hours as needed for nausea or vomiting   therapeutic multivitamin-minerals (THERAGRAN-M) tablet 2021 at 0700  Yes Yes   Sig: Take 1 tablet by mouth daily      Facility-Administered Medications: None       Past Medical History:   Diagnosis Date    Abnormal Pap smear of cervix     Anxiety     BRCA1 negative     BRCA2 negative     Breast injury     Pt states scar tissue around nipple from breastfeeding    Depression     HPV (human papilloma virus) infection     Kidney stone     H/O PYELONEPHRITIS    Migraine     Seasonal allergies     Sleep difficulties     Varicella     POS HX       Past Surgical History:   Procedure Laterality Date    CHOLECYSTECTOMY      FINGER SURGERY      left hand     GYNECOLOGIC CRYOSURGERY      HAND SURGERY Left     Phelebitis in left hand  post IV infilitration, had sx for clot removal     LYMPH NODE BIOPSY      of neck    LYMPH NODE DISSECTION Right     cervical    ID  DELIVERY ONLY N/A 2018    Procedure:  SECTION ();   Surgeon: Florentino Das MD;  Location: [facility];  Service: Obstetrics       Family History   Problem Relation Age of Onset    Hypertension Mother     Heart disease Mother     Depression Mother     Coronary artery disease Mother     Hypertension Father     Hyperlipidemia Father     Heart disease Father         MI    Stroke Paternal Grandmother     Cancer Paternal Grandmother breast    Breast cancer Paternal Grandmother     Cancer Paternal Aunt         colon    Colon cancer Paternal Aunt     Early death Maternal Uncle     Cancer Maternal Uncle         lymphoma    Early death Maternal Uncle         lymphoma    Birth defects Maternal Uncle     Undescended testes Son     Cystic fibrosis Other     Cystic fibrosis Other     Heart failure Maternal Grandmother     Stroke Maternal Grandfather     Alcohol abuse Maternal Grandfather     Alcohol abuse Brother     Drug abuse Brother          of overdose 2020    Anxiety disorder Sister     Post-traumatic stress disorder Sister     Developmental delay Son     Macrocephaly Son     Hydrocephalus Son     Breast cancer Other      I have reviewed and agree with the history as documented  E-Cigarette/Vaping    E-Cigarette Use Never User      E-Cigarette/Vaping Substances    Nicotine No     THC No     CBD No     Flavoring No     Other No     Unknown No      Social History     Tobacco Use    Smoking status: Former Smoker     Packs/day: 0 50     Years: 3 00     Pack years: 1 50     Types: Cigarettes     Quit date:      Years since quittin 3    Smokeless tobacco: Never Used   Substance Use Topics    Alcohol use: Not Currently    Drug use: No       Review of Systems   Constitutional: Negative for chills and fever  HENT: Negative for ear discharge and trouble swallowing  Respiratory: Negative for cough, chest tightness and shortness of breath  Cardiovascular: Positive for chest pain  Gastrointestinal: Negative for abdominal pain, diarrhea, nausea and vomiting  Genitourinary: Negative for dysuria, frequency, hematuria and urgency  Musculoskeletal: Negative for back pain, neck pain and neck stiffness  All other systems reviewed and are negative  Physical Exam  Physical Exam  Vitals signs and nursing note reviewed  Constitutional:       General: She is not in acute distress       Appearance: She is well-developed  She is not diaphoretic  HENT:      Head: Normocephalic and atraumatic  Eyes:      Conjunctiva/sclera: Conjunctivae normal       Pupils: Pupils are equal, round, and reactive to light  Neck:      Musculoskeletal: Normal range of motion and neck supple  Cardiovascular:      Rate and Rhythm: Normal rate and regular rhythm  Heart sounds: Normal heart sounds  No murmur  Pulmonary:      Effort: Pulmonary effort is normal  No respiratory distress  Breath sounds: Normal breath sounds  Abdominal:      General: Bowel sounds are normal  There is no distension  Palpations: Abdomen is soft  Tenderness: There is no abdominal tenderness  Musculoskeletal: Normal range of motion  General: No deformity  Skin:     General: Skin is warm and dry  Coloration: Skin is not pale  Findings: No rash  Neurological:      Mental Status: She is alert  Cranial Nerves: No cranial nerve deficit     Psychiatric:         Behavior: Behavior normal          Vital Signs  ED Triage Vitals   Temperature Pulse Respirations Blood Pressure SpO2   04/21/21 1127 04/21/21 1127 04/21/21 1127 04/21/21 1127 04/21/21 1127   98 1 °F (36 7 °C) 80 16 156/76 98 %      Temp Source Heart Rate Source Patient Position - Orthostatic VS BP Location FiO2 (%)   04/21/21 1127 04/21/21 1127 04/21/21 1127 04/21/21 1127 --   Tympanic Monitor Lying Left arm       Pain Score       04/21/21 1356       4           Vitals:    04/21/21 1127 04/21/21 1300 04/21/21 1430 04/21/21 1530   BP: 156/76 118/65 119/68 124/73   Pulse: 80 68 68 66   Patient Position - Orthostatic VS: Lying Sitting Sitting Sitting         Visual Acuity      ED Medications  Medications   famotidine (PEPCID) injection 20 mg (20 mg Intravenous Given 4/21/21 1231)   aluminum-magnesium hydroxide-simethicone (MYLANTA) oral suspension 30 mL (30 mL Oral Given 4/21/21 1233)   Lidocaine Viscous HCl (XYLOCAINE) 2 % mucosal solution 15 mL (15 mL Swish & Spit Given 4/21/21 1233)   ketorolac (TORADOL) injection 15 mg (15 mg Intravenous Given 4/21/21 1356)       Diagnostic Studies  Results Reviewed     Procedure Component Value Units Date/Time    Troponin I repeat in 3hrs [[id number]]  (Normal) Collected: 04/21/21 1459    Lab Status: Final result Specimen: Blood from Arm, Right Updated: 04/21/21 1524     Troponin I <0 02 ng/mL     POCT pregnancy, urine [[id number]]  (Normal) Resulted: 04/21/21 1355    Lab Status: Final result Updated: 04/21/21 1358     EXT PREG TEST UR (Ref: Negative) negative     Control valid    Troponin I [[id number]]  (Normal) Collected: 04/21/21 1209    Lab Status: Final result Specimen: Blood from Arm, Right Updated: 04/21/21 1236     Troponin I <0 02 ng/mL     Comprehensive metabolic panel [[id number]]  (Abnormal) Collected: 04/21/21 1209    Lab Status: Final result Specimen: Blood from Arm, Right Updated: 04/21/21 1233     Sodium 137 mmol/L      Potassium 3 8 mmol/L      Chloride 103 mmol/L      CO2 27 mmol/L      ANION GAP 7 mmol/L      BUN 8 mg/dL      Creatinine 0 86 mg/dL      Glucose 86 mg/dL      Calcium 8 9 mg/dL      AST 21 U/L      ALT 37 U/L      Alkaline Phosphatase 73 U/L      Total Protein 7 2 g/dL      Albumin 3 5 g/dL      Total Bilirubin 0 18 mg/dL      eGFR 87 ml/min/1 73sq m     Narrative:      Ryan guidelines for Chronic Kidney Disease (CKD):     Stage 1 with normal or high GFR (GFR > 90 mL/min/1 73 square meters)    Stage 2 Mild CKD (GFR = 60-89 mL/min/1 73 square meters)    Stage 3A Moderate CKD (GFR = 45-59 mL/min/1 73 square meters)    Stage 3B Moderate CKD (GFR = 30-44 mL/min/1 73 square meters)    Stage 4 Severe CKD (GFR = 15-29 mL/min/1 73 square meters)    Stage 5 End Stage CKD (GFR <15 mL/min/1 73 square meters)  Note: GFR calculation is accurate only with a steady state creatinine    CBC and differential [[id number]]  (Abnormal) Collected: 04/21/21 1209    Lab Status: Final result Specimen: Blood from Arm, Right Updated: 04/21/21 1215     WBC 7 37 Thousand/uL      RBC 4 66 Million/uL      Hemoglobin 13 4 g/dL      Hematocrit 41 2 %      MCV 88 fL      MCH 28 8 pg      MCHC 32 5 g/dL      RDW 12 4 %      MPV 8 6 fL      Platelets 379 Thousands/uL      nRBC 0 /100 WBCs      Neutrophils Relative 61 %      Immat GRANS % 0 %      Lymphocytes Relative 31 %      Monocytes Relative 6 %      Eosinophils Relative 1 %      Basophils Relative 1 %      Neutrophils Absolute 4 49 Thousands/µL      Immature Grans Absolute 0 03 Thousand/uL      Lymphocytes Absolute 2 26 Thousands/µL      Monocytes Absolute 0 46 Thousand/µL      Eosinophils Absolute 0 09 Thousand/µL      Basophils Absolute 0 04 Thousands/µL                  XR chest 1 view portable   ED Interpretation by Barbara Vaughn DO (04/21 1236)   nad      Final Result by Nitza Mohamud MD (04/21 9452)      No acute cardiopulmonary disease                    Workstation performed: [id number]                    Procedures  ECG 12 Lead Documentation Only    Date/Time: 4/21/2021 11:27 AM  Performed by: Barbara Vaughn DO  Authorized by: Barbara Vaughn DO     Indications / Diagnosis:  Chest pain  ECG reviewed by me, the ED Provider: yes    Patient location:  ED  Previous ECG:     Previous ECG:  Unavailable    Comparison to cardiac monitor: Yes    Interpretation:     Interpretation: normal    Rate:     ECG rate:  81bpm    ECG rate assessment: normal    Rhythm:     Rhythm: sinus rhythm    Ectopy:     Ectopy: none    QRS:     QRS axis:  Normal  Conduction:     Conduction: normal    ST segments:     ST segments:  Normal  T waves:     T waves: normal               ED Course             HEART Risk Score      Most Recent Value   Heart Score Risk Calculator   History  1 Filed at: 04/21/2021 1524   ECG  0 Filed at: 04/21/2021 1524   Age  0 Filed at: 04/21/2021 1524   Risk Factors  0 Filed at: 04/21/2021 1524   Troponin  0 Filed at: 04/21/2021 1524 HEART Score  1 Filed at: 04/21/2021 1524                      SBIRT 20yo+      Most Recent Value   SBIRT (25 yo +)   In order to provide better care to our patients, we are screening all of our patients for alcohol and drug use  Would it be okay to ask you these screening questions? No Filed at: 04/21/2021 1132                    MDM  Number of Diagnoses or Management Options  Chest pain, unspecified: new and requires workup  GERD (gastroesophageal reflux disease): new and requires workup  Diagnosis management comments: Pt in the ER with c/o chest pain x 2 hrs  EKG reviewed and neg for acute changes  Labs reviewed, including Trop x 2  Pt treated with pepcid/Gi cocktail and Toradol  + improvement of symptoms  I suspect that patient has GERD  Will start on a course of Prilosec  Pt to f/u with PCP and cards and will return to the ER for worsening symptoms  Amount and/or Complexity of Data Reviewed  Clinical lab tests: ordered and reviewed  Tests in the radiology section of CPT®: ordered and reviewed    Risk of Complications, Morbidity, and/or Mortality  Presenting problems: high  Diagnostic procedures: high  Management options: high    Patient Progress  Patient progress: improved      Disposition  Final diagnoses:   Chest pain, unspecified   GERD (gastroesophageal reflux disease)     Time reflects when diagnosis was documented in both MDM as applicable and the Disposition within this note     Time User Action Codes Description Comment    4/21/2021  3:26 PM Esther Davis Add [R07 9] Chest pain, unspecified     4/21/2021  3:26 PM Esther THOMAS Add [K21 9] GERD (gastroesophageal reflux disease)       ED Disposition     ED Disposition Condition Date/Time Comment    Discharge Stable Wed Apr 21, 2021  3:24 PM Davenita Zhou discharge to home/self care              Follow-up Information     Follow up With Specialties Details Why Contact Info Additional Information    Yakov Cohn DO Family Medicine, Wound Care Schedule an appointment as soon as possible for a visit in 2 days for follow up [address] [facility] [address]       [facility] Cardiology Schedule an appointment as soon as possible for a visit in 2 days for follow up [address] [facility], [place] [address] [facility] Drew Medeiros, [address], [address] [facility], [facility], [address]          Discharge Medication List as of 4/21/2021  3:27 PM      START taking these medications    Details   omeprazole (PriLOSEC) 20 mg delayed release capsule Take 1 capsule (20 mg total) by mouth daily, Starting Wed 4/21/2021, Normal         CONTINUE these medications which have NOT CHANGED    Details   busPIRone (BUSPAR) 15 mg tablet Take 1 tablet (15 mg total) by mouth 3 (three) times a day, Starting Mon 3/15/2021, Until Sun 6/13/2021, Normal      cetirizine (ZyrTEC) 10 mg tablet Take 1 tablet (10 mg total) by mouth daily, Starting Tue 12/22/2020, No Print      cloNIDine (CATAPRES) 0 1 mg tablet TAKE 1 TAB BY MOUTH IN THE MORNING, 1 TAB BY MOUTH IN THE AFTERNOON, AND 2 TABS BY MOUTH AT BEDTIME, Normal      escitalopram (LEXAPRO) 20 mg tablet Take 1 tablet (20 mg total) by mouth daily, Starting Wed 4/14/2021, Until Tue 7/13/2021, Normal      fluticasone (FLONASE) 50 mcg/act nasal spray 1 spray into each nostril daily, Historical Med      hydrOXYzine HCL (ATARAX) 50 mg tablet TAKE 1/2 TO 1 TAB BY MOUTH EVERY 8 HOURS AS NEEDED FOR ANXIETY, Normal      meclizine (ANTIVERT) 25 mg tablet Take 1 tablet (25 mg total) by mouth 3 (three) times a day as needed for dizziness, Starting Tue 12/22/2020, Normal      Melatonin ER 10 MG TBCR Take 1 tablet (10 mg total) by mouth daily, Starting Mon 7/20/2020, No Print      ondansetron (ZOFRAN) 4 mg tablet Take 1 tablet (4 mg total) by mouth every 8 (eight) hours as needed for nausea or vomiting, Starting Tue 12/22/2020, Normal      SUMAtriptan (IMITREX) 100 mg tablet Take 1 tablet (100 mg total) by mouth once as needed for migraine for up to 1 dose, Starting Tue 12/22/2020, Normal      therapeutic multivitamin-minerals (THERAGRAN-M) tablet Take 1 tablet by mouth daily, Historical Med           No discharge procedures on file      [address] Review     None          ED Provider  Electronically Signed by           Xiomara Joiner DO  04/21/21 2015

## 2021-04-24 LAB
ATRIAL RATE: 81 BPM
P AXIS: -5 DEGREES
PR INTERVAL: 154 MS
QRS AXIS: 28 DEGREES
QRSD INTERVAL: 92 MS
QT INTERVAL: 390 MS
QTC INTERVAL: 453 MS
T WAVE AXIS: 37 DEGREES
VENTRICULAR RATE: 81 BPM

## 2021-04-24 PROCEDURE — 93010 ELECTROCARDIOGRAM REPORT: CPT | Performed by: INTERNAL MEDICINE

## 2021-04-29 ENCOUNTER — CONSULT (OUTPATIENT)
Dept: CARDIOLOGY CLINIC | Facility: CLINIC | Age: 36
End: 2021-04-29
Payer: COMMERCIAL

## 2021-04-29 VITALS
HEART RATE: 73 BPM | TEMPERATURE: 97.6 F | OXYGEN SATURATION: 98 % | BODY MASS INDEX: 39.75 KG/M2 | DIASTOLIC BLOOD PRESSURE: 72 MMHG | RESPIRATION RATE: 18 BRPM | SYSTOLIC BLOOD PRESSURE: 110 MMHG | HEIGHT: 62 IN | WEIGHT: 216 LBS

## 2021-04-29 DIAGNOSIS — E78.5 HYPERLIPIDEMIA LDL GOAL <130: ICD-10-CM

## 2021-04-29 DIAGNOSIS — R07.9 CHEST PAIN, UNSPECIFIED TYPE: Primary | ICD-10-CM

## 2021-04-29 DIAGNOSIS — Z82.49 FAMILY HISTORY OF HEART DISEASE: ICD-10-CM

## 2021-04-29 DIAGNOSIS — R01.1 CARDIAC MURMUR: ICD-10-CM

## 2021-04-29 PROCEDURE — 3008F BODY MASS INDEX DOCD: CPT | Performed by: INTERNAL MEDICINE

## 2021-04-29 PROCEDURE — 99243 OFF/OP CNSLTJ NEW/EST LOW 30: CPT | Performed by: INTERNAL MEDICINE

## 2021-04-29 PROCEDURE — 1036F TOBACCO NON-USER: CPT | Performed by: INTERNAL MEDICINE

## 2021-04-29 PROCEDURE — 93000 ELECTROCARDIOGRAM COMPLETE: CPT | Performed by: INTERNAL MEDICINE

## 2021-04-29 NOTE — PROGRESS NOTES
Consultation - Cardiology Office  [facility]  Pete Tripathi 39 y o  female MRN: [id number]  : 1985  Unit/Bed#:  Encounter: [id number]      ASSESSMENT:  Chest pain:  Complained of intermittent retrosternal chest pain which was more than her usual heartburn requiring her to visit the ED    Murmur:  2/6 systolic murmur, rule out valvular heart disease    Obesity:39 51    Anxiety/Depression    Family h/o CAD  Father had MI/SCD at 36    Dyslipidemia:  Last LDL was 130 in 2019    RECOMMENDATIONS:  Echocardiogram  Stress test  Lipid profile  Coronary calcium score            Thank you for your consultation  If you have any question please call me at [phone]      Primary Care Physician Requesting Consult: Sakshi Mckay DO      Reason for Consult / Principal Problem:  Chest pain        HPI :     Pete Tripathi is a 39y o  year old female who was referred by primary care doctor for evaluation of chest pain  Patient describes the pain is retrosternal pressure-like to burning, worst heartburn she has ever had requiring her to go to the ED  She has a very strong family history of coronary artery disease  Her father  of massive MI at the age of 36 and atopsy revealed that he had had previous silent MIs  Patient's LDL from 2019 was 130  In view of her obesity, strong family history and history of chest pain with size repeating lipid profile I would also recommend coronary calcium score for further risk stratification  REVIEW OF SYSTEMS:   Constitutional: Negative for activity change, appetite change, chills, fatigue, fever and unexpected weight change  HENT: Negative for congestion, sore throat and trouble swallowing  Eyes: Negative for discharge and redness  Respiratory: Negative for apnea, cough, shortness of breath and wheezing      Cardiovascular:  Positive for chest pain, shortness of breath, palpitations and leg swelling  Gastrointestinal: Negative for abdominal distention, abdominal pain, anal bleeding, blood in stool, constipation, diarrhea, nausea and vomiting  Endocrine: Negative for polydipsia, polyphagia and polyuria  Genitourinary: Negative for difficulty urinating, dysuria, flank pain and hematuria  Musculoskeletal: Negative for arthralgias, myalgias and neck stiffness  Skin: Negative for pallor and rash  Allergic/Immunologic: Negative for environmental allergies  Neurological: Negative for dizziness, syncope, light-headedness, numbness and headaches  Hematological: Negative for adenopathy  Does not bruise/bleed easily  Psychiatric/Behavioral: Negative for confusion and hallucinations  The patient is not nervous/anxious  Historical Information   Past Medical History:   Diagnosis Date    Abnormal Pap smear of cervix     Anxiety     BRCA1 negative     BRCA2 negative     Breast injury     Pt states scar tissue around nipple from breastfeeding    Depression     HPV (human papilloma virus) infection     Kidney stone     H/O PYELONEPHRITIS    Migraine     Seasonal allergies     Sleep difficulties     Varicella     POS HX     Past Surgical History:   Procedure Laterality Date    CHOLECYSTECTOMY      FINGER SURGERY      left hand     GYNECOLOGIC CRYOSURGERY      HAND SURGERY Left     Phelebitis in left hand  post IV infilitration, had sx for clot removal     LYMPH NODE BIOPSY      of neck    LYMPH NODE DISSECTION Right     cervical    DC  DELIVERY ONLY N/A 2018    Procedure:  SECTION ();   Surgeon: Lesvia Gerardo MD;  Location: [facility] ;  Service: Obstetrics     Social History     Substance and Sexual Activity   Alcohol Use Not Currently     Social History     Substance and Sexual Activity   Drug Use No     Social History     Tobacco Use   Smoking Status Former Smoker    Packs/day: 0 50    Years: 3 00    Pack years: 1 50    Types: Cigarettes    Quit date:     Years since quitting: 8 3   Smokeless Tobacco Never Used     Family History:   Family History   Problem Relation Age of Onset    Hypertension Mother     Heart disease Mother     Depression Mother     Coronary artery disease Mother     Hypertension Father     Hyperlipidemia Father     Heart disease Father         MI    Stroke Paternal Grandmother     Cancer Paternal Grandmother         breast    Breast cancer Paternal Grandmother     Cancer Paternal Aunt         colon    Colon cancer Paternal Aunt     Early death Maternal Uncle     Cancer Maternal Uncle         lymphoma    Early death Maternal Uncle         lymphoma    Birth defects Maternal Uncle     Undescended testes Son     Cystic fibrosis Other     Cystic fibrosis Other     Heart failure Maternal Grandmother     Stroke Maternal Grandfather     Alcohol abuse Maternal Grandfather     Alcohol abuse Brother     Drug abuse Brother          of overdose 2020    Anxiety disorder Sister     Post-traumatic stress disorder Sister     Developmental delay Son     Macrocephaly Son     Hydrocephalus Son     Breast cancer Other        Meds/Allergies     No Known Allergies    Current Outpatient Medications:     busPIRone (BUSPAR) 15 mg tablet, Take 1 tablet (15 mg total) by mouth 3 (three) times a day, Disp: 270 tablet, Rfl: 0    cetirizine (ZyrTEC) 10 mg tablet, Take 1 tablet (10 mg total) by mouth daily, Disp: , Rfl: 0    cloNIDine (CATAPRES) 0 1 mg tablet, TAKE 1 TAB BY MOUTH IN THE MORNING, 1 TAB BY MOUTH IN THE AFTERNOON, AND 2 TABS BY MOUTH AT BEDTIME, Disp: 120 tablet, Rfl: 2    escitalopram (LEXAPRO) 20 mg tablet, Take 1 tablet (20 mg total) by mouth daily, Disp: 90 tablet, Rfl: 0    fluticasone (FLONASE) 50 mcg/act nasal spray, 1 spray into each nostril daily, Disp: , Rfl:     hydrOXYzine HCL (ATARAX) 50 mg tablet, TAKE 1/2 TO 1 TAB BY MOUTH EVERY 8 HOURS AS NEEDED FOR ANXIETY, Disp: 60 tablet, Rfl: 0    meclizine (ANTIVERT) 25 mg tablet, Take 1 tablet (25 mg total) by mouth 3 (three) times a day as needed for dizziness, Disp: 15 tablet, Rfl: 0    Melatonin ER 10 MG TBCR, Take 1 tablet (10 mg total) by mouth daily, Disp: , Rfl:     omeprazole (PriLOSEC) 20 mg delayed release capsule, Take 1 capsule (20 mg total) by mouth daily, Disp: 21 capsule, Rfl: 0    ondansetron (ZOFRAN) 4 mg tablet, Take 1 tablet (4 mg total) by mouth every 8 (eight) hours as needed for nausea or vomiting, Disp: 30 tablet, Rfl: 0    SUMAtriptan (IMITREX) 100 mg tablet, Take 1 tablet (100 mg total) by mouth once as needed for migraine for up to 1 dose, Disp: 12 tablet, Rfl: 0    therapeutic multivitamin-minerals (THERAGRAN-M) tablet, Take 1 tablet by mouth daily, Disp: , Rfl:     Vitals: Blood pressure 110/72, pulse 73, temperature 97 6 °F (36 4 °C), temperature source Temporal, resp  rate 18, height 5' 2" (1 575 m), weight 98 kg (216 lb), SpO2 98 %, not currently breastfeeding  Body mass index is 39 51 kg/m²  Vitals:    04/29/21 0954   Weight: 98 kg (216 lb)     BP Readings from Last 3 Encounters:   04/29/21 110/72   04/21/21 124/73   08/01/20 150/93         PHYSICAL EXAMINATION:  Neurologic:  Alert & oriented x 3, no new focal deficits, Not in any acute distress,  Constitutional:  Obese  Eyes:  Pupil equal and reacting to light, conjunctiva normal, No JVP, No LNP   HENT:  Atraumatic, oropharynx moist, Neck- normal range of motion, no tenderness,  Neck supple   Respiratory:  Bilateral air entry, mostly clear to auscultation  Cardiovascular: S1-S2 regular with a II/VI systolic murmur   GI:  Soft, nondistended, normal bowel sounds, nontender, no hepatosplenomegaly appreciated  Musculoskeletal:  No edema, no tenderness, no deformities     Skin:  Well hydrated, no rash   Lymphatic:  No lymphadenopathy noted   Extremities:  No edema and distal pulses are present    Diagnostic Studies Review Cardio:      EKG:  Normal sinus rhythm, heart rate 74 per minute    Cardiac testing:   No results found for this or any previous visit  Imaging:  Chest X-Ray:   No Chest XR results available for this patient  CT-scan of the chest:     No CTA results available for this patient    Lab Review   Lab Results   Component Value Date    WBC 7 37 04/21/2021    HGB 13 4 04/21/2021    HCT 41 2 04/21/2021    MCV 88 04/21/2021    RDW 12 4 04/21/2021     (H) 04/21/2021     BMP:  Lab Results   Component Value Date    SODIUM 137 04/21/2021    K 3 8 04/21/2021     04/21/2021    CO2 27 04/21/2021    BUN 8 04/21/2021    CREATININE 0 86 04/21/2021    GLUC 86 04/21/2021    GLUF 72 08/01/2019    CALCIUM 8 9 04/21/2021    EGFR 87 04/21/2021     LFT:  Lab Results   Component Value Date    AST 21 04/21/2021    ALT 37 04/21/2021    ALKPHOS 73 04/21/2021    TP 7 2 04/21/2021    ALB 3 5 04/21/2021      Lab Results   Component Value Date    DZQ5ZNWEMBPC 2 900 01/02/2020     No components found for: [facility]  Lab Results   Component Value Date    HGBA1C 4 6 08/01/2019     Lipid Profile:   Lab Results   Component Value Date    CHOLESTEROL 172 08/01/2019    HDL 25 (L) 08/01/2019    LDLCALC 130 (H) 08/01/2019    TRIG 84 08/01/2019     Lab Results   Component Value Date    CHOLESTEROL 172 08/01/2019     Lab Results   Component Value Date    TROPONINI <0 02 04/21/2021     No results found for: NTBNP   Recent Results (from the past 672 hour(s))   [facility]    Collection Time: 04/10/21 11:38 AM   Result Value Ref Range    SARS-CoV-2 Negative Negative   ECG 12 lead    Collection Time: 04/21/21 11:25 AM   Result Value Ref Range    Ventricular Rate 81 BPM    Atrial Rate 81 BPM    PA Interval 154 ms    QRSD Interval 92 ms    QT Interval 390 ms    QTC Interval 453 ms    P Axis -5 degrees    QRS Axis 28 degrees    T Wave Axis 37 degrees   CBC and differential    Collection Time: 04/21/21 12:09 PM   Result Value Ref Range    WBC 7 37 4 31 - 10 16 Thousand/uL    RBC 4 66 3 81 - 5 12 Million/uL Hemoglobin 13 4 11 5 - 15 4 g/dL    Hematocrit 41 2 34 8 - 46 1 %    MCV 88 82 - 98 fL    MCH 28 8 26 8 - 34 3 pg    MCHC 32 5 31 4 - 37 4 g/dL    RDW 12 4 11 6 - 15 1 %    MPV 8 6 (L) 8 9 - 12 7 fL    Platelets 452 (H) 877 - 390 Thousands/uL    nRBC 0 /100 WBCs    Neutrophils Relative 61 43 - 75 %    Immat GRANS % 0 0 - 2 %    Lymphocytes Relative 31 14 - 44 %    Monocytes Relative 6 4 - 12 %    Eosinophils Relative 1 0 - 6 %    Basophils Relative 1 0 - 1 %    Neutrophils Absolute 4 49 1 85 - 7 62 Thousands/µL    Immature Grans Absolute 0 03 0 00 - 0 20 Thousand/uL    Lymphocytes Absolute 2 26 0 60 - 4 47 Thousands/µL    Monocytes Absolute 0 46 0 17 - 1 22 Thousand/µL    Eosinophils Absolute 0 09 0 00 - 0 61 Thousand/µL    Basophils Absolute 0 04 0 00 - 0 10 Thousands/µL   Comprehensive metabolic panel    Collection Time: 04/21/21 12:09 PM   Result Value Ref Range    Sodium 137 136 - 145 mmol/L    Potassium 3 8 3 5 - 5 3 mmol/L    Chloride 103 100 - 108 mmol/L    CO2 27 21 - 32 mmol/L    ANION GAP 7 4 - 13 mmol/L    BUN 8 5 - 25 mg/dL    Creatinine 0 86 0 60 - 1 30 mg/dL    Glucose 86 65 - 140 mg/dL    Calcium 8 9 8 3 - 10 1 mg/dL    AST 21 5 - 45 U/L    ALT 37 12 - 78 U/L    Alkaline Phosphatase 73 46 - 116 U/L    Total Protein 7 2 6 4 - 8 2 g/dL    Albumin 3 5 3 5 - 5 0 g/dL    Total Bilirubin 0 18 (L) 0 20 - 1 00 mg/dL    eGFR 87 ml/min/1 73sq m   Troponin I    Collection Time: 04/21/21 12:09 PM   Result Value Ref Range    Troponin I <0 02 <=0 04 ng/mL   POCT pregnancy, urine    Collection Time: 04/21/21  1:55 PM   Result Value Ref Range    EXT PREG TEST UR (Ref: Negative) negative     Control valid    Troponin I repeat in 3hrs    Collection Time: 04/21/21  2:59 PM   Result Value Ref Range    Troponin I <0 02 <=0 04 ng/mL           Dr Marva Roberts MD, [facility] [place]      "This note has been constructed using a voice recognition system  Therefore there may be syntax, spelling, and/or grammatical errors   Please call if you have any questions  "

## 2021-05-03 ENCOUNTER — HOSPITAL ENCOUNTER (OUTPATIENT)
Dept: RADIOLOGY | Facility: HOSPITAL | Age: 36
Discharge: HOME | End: 2021-05-03
Payer: COMMERCIAL

## 2021-05-03 DIAGNOSIS — Z82.49 FAMILY HISTORY OF HEART DISEASE: ICD-10-CM

## 2021-05-03 DIAGNOSIS — E78.5 HYPERLIPIDEMIA LDL GOAL <130: ICD-10-CM

## 2021-05-03 PROCEDURE — G1004 CDSM NDSC: HCPCS

## 2021-05-03 PROCEDURE — 75571 CT HRT W/O DYE W/CA TEST: CPT

## 2021-05-07 ENCOUNTER — TELEPHONE (OUTPATIENT)
Dept: CARDIOLOGY CLINIC | Facility: CLINIC | Age: 36
End: 2021-05-07

## 2021-05-07 NOTE — TELEPHONE ENCOUNTER
----- Message from Adam Membreno MD sent at 5/7/2021  9:14 AM EDT -----  Please call and inform patient that his coronary calcium score is 0

## 2021-05-11 DIAGNOSIS — F41.1 GENERALIZED ANXIETY DISORDER: ICD-10-CM

## 2021-05-11 DIAGNOSIS — F43.10 PTSD (POST-TRAUMATIC STRESS DISORDER): ICD-10-CM

## 2021-05-12 RX ORDER — BUSPIRONE HYDROCHLORIDE 15 MG/1
TABLET ORAL
Qty: 270 TABLET | Refills: 0 | Status: SHIPPED | OUTPATIENT
Start: 2021-05-12 | End: 2021-08-05

## 2021-05-20 ENCOUNTER — TELEPHONE (OUTPATIENT)
Dept: CARDIOLOGY CLINIC | Facility: CLINIC | Age: 36
End: 2021-05-20

## 2021-05-20 ENCOUNTER — HOSPITAL ENCOUNTER (OUTPATIENT)
Dept: NON INVASIVE DIAGNOSTICS | Age: 36
Discharge: HOME | End: 2021-05-20
Attending: INTERNAL MEDICINE
Payer: COMMERCIAL

## 2021-05-20 DIAGNOSIS — Z82.49 FAMILY HISTORY OF HEART DISEASE: ICD-10-CM

## 2021-05-20 DIAGNOSIS — R07.9 CHEST PAIN, UNSPECIFIED TYPE: ICD-10-CM

## 2021-05-20 DIAGNOSIS — R01.1 CARDIAC MURMUR: ICD-10-CM

## 2021-05-20 LAB
CHEST PAIN STATEMENT: NORMAL
MAX DIASTOLIC BP: 76 MMHG
MAX HEART RATE: 141 BPM
MAX PREDICTED HEART RATE: 184 BPM
MAX. SYSTOLIC BP: 124 MMHG
PROTOCOL NAME: NORMAL
REASON FOR TERMINATION: NORMAL
TARGET HR FORMULA: NORMAL
TEST INDICATION: NORMAL
TIME IN EXERCISE PHASE: NORMAL

## 2021-05-20 PROCEDURE — 93017 CV STRESS TEST TRACING ONLY: CPT

## 2021-05-20 PROCEDURE — 93018 CV STRESS TEST I&R ONLY: CPT | Performed by: INTERNAL MEDICINE

## 2021-05-20 PROCEDURE — 93306 TTE W/DOPPLER COMPLETE: CPT

## 2021-05-20 PROCEDURE — 93016 CV STRESS TEST SUPVJ ONLY: CPT | Performed by: INTERNAL MEDICINE

## 2021-05-20 PROCEDURE — 93306 TTE W/DOPPLER COMPLETE: CPT | Performed by: INTERNAL MEDICINE

## 2021-05-20 NOTE — TELEPHONE ENCOUNTER
----- Message from Liliane Doll MD sent at 5/20/2021  1:14 PM EDT -----  Please inform the patient that the stress test showed NO evidence of any significant blockage in the blood vessels of your heart

## 2021-05-20 NOTE — TELEPHONE ENCOUNTER
Spoke with patient, message given per Dr Mayra Caballero, patient verbalized understanding, no additional questions at this time

## 2021-05-20 NOTE — TELEPHONE ENCOUNTER
----- Message from Yasmine Pollock MD sent at 5/20/2021  9:44 AM EDT -----  Please call and inform patient that the Echocardiogram showed normal pumping function of the heart  No significant valve abnormality was seen

## 2021-05-26 ENCOUNTER — OFFICE VISIT (OUTPATIENT)
Dept: PHYSICAL THERAPY | Facility: CLINIC | Age: 36
End: 2021-05-26
Payer: COMMERCIAL

## 2021-05-26 DIAGNOSIS — M62.08 DIASTASIS RECTI: Primary | ICD-10-CM

## 2021-05-26 DIAGNOSIS — N81.89 PELVIC FLOOR WEAKNESS IN FEMALE: ICD-10-CM

## 2021-05-26 PROCEDURE — 97112 NEUROMUSCULAR REEDUCATION: CPT

## 2021-05-26 PROCEDURE — 97162 PT EVAL MOD COMPLEX 30 MIN: CPT

## 2021-05-26 NOTE — PROGRESS NOTES
PT Evaluation     Today's date: 2021  Patient name: Laurence Copeland  : 1985  MRN: [id number]  Referring provider: Shagufta Shaffer DO  Dx:   Encounter Diagnosis     ICD-10-CM    1  Diastasis recti  M62 08    2  Pelvic floor weakness in female  N81 89                   Assessment  Assessment details:   CASE SUMMARY:   Laurence Copeland is a 39y o  year old female who reports onset of symptoms ~one to two years      Patient presents with decrease core strength, decrease hip strength, symptoms that relate to mixed stress and urge incontinence, and poor posture/poor body mechanics  PMHx includes: See chart for full details with medications  Patient's clinical presentation is consistent with their referring diagnosis of: Diastasis recti and Pelvic floor weakness in female    POC was discussed and agreed upon with patient  Patient was educated on: lumbosacral anatomy, pelvic floor anatomy and function, pelvic floor rehabilitation, diagnosis and prognosis, and POC  Patient vocalized a good understanding of  POC and HEP issued  Patient would benefit from skilled physical therapy services to address their aforementioned functional limitations and progress towards prior level of function and independence with home exercise program       Pelvic floor verbal consent and written consent signed and in chart  Patient deferred second person in room: YES        Impairments: abnormal muscle firing, abnormal muscle tone, activity intolerance, impaired physical strength, lacks appropriate home exercise program, poor posture  and poor body mechanics    Symptom irritability: highUnderstanding of Dx/Px/POC: good   Prognosis: good    Plan  Plan details: HEP development, stretching, strengthening, A/AA/PROM, joint mobilizations, posture education, STM/MI as needed to reduce muscle tension, muscle reeducation, patient has been educated in Dx, prognosis and plan of care and is in agreement     Patient would benefit from: PT eval, women's health eval and skilled physical therapy  Planned modality interventions: biofeedback, cryotherapy, ultrasound, TENS and hydrotherapy  Planned therapy interventions: abdominal trunk stabilization, activity modification, manual therapy, massage, motor coordination training, neuromuscular re-education, body mechanics training, patient education, postural training, self care, strengthening, therapeutic exercise, therapeutic activities, home exercise program, breathing training and flexibility  Frequency: 1x week  Duration in weeks: 8        PT Pelvic Floor Subjective:   History of Present Illness:   Pt presents today with low back pain, complaining of abdominal separation, and urinary leakage with changes in her intra-abdominal pressure  Pt reports that she has had 2 prior pregnancies; one was a vaginal delivery and the second was a   Pt reports that she has difficulty participating in all her recreational activities; has difficulty lifting, doing ab workouts, running, or any high intensity exercises due to pain  Pt reports while lifting her two year old she notices she has increase pain in her low back as she is unable to engage her core  Pt also complains of leakage when laughing, sneezing, coughing, and bending  States that she has to wear pads in order to prevent leakage  Complains of occasionally having tingling down her legs, which increases when she is walking long distances  Pt denies any tingling in her saddle region, denies any pain with sexual intercourse, and denies any issues with her bowel movements         Mechanism of injury: childbirth          Recurrent probem    Quality of life: fair    Social Support:     Lives in:  Multiple-level home    Lives with:  Spouse and young children    Relationship status: /committed    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     Life stress level: 2    Life stress severity: mild    History of Depression: yes    pt reports currently being managed   Hand dominance:  Right  Diet and Exercise:      Exercise type: walking    Exercise frequency: once to twice a week    Not exercising due to: pain and bladder incontinence  OB/ gyn History    Gestational History:     Number of prior pregnancies: 2    Number of term pregnancies: 2    Delivery Type: vaginal delivery and  section      Number of vaginal deliveries: 1    Number of caesarian sections: 1  Bladder Function:     Voiding Difficulties positive for: urgency and incomplete emptying      Voiding Difficulties comments:     Voiding frequency: every 31-60 minutes    Urinary leakage: urine leakage    Urinary leakage aggravated by: coughing, sneezing, bending, exercise and post-void dribble    Nocturia (episodes per night): 2    Painful urination: No      Intake (ounces):  Water: 60,   Incontinence Management:     Pads/Diaper Use:  Day  Bowel Function:     Bowel frequency: daily    [place] Stool Scale: type 4 and type 3    Stool softener use: no stool softeners    Enema use: no enema    Uses "squatty potty": no Squatty Potty  Sexual Function:     Sexually Active:  Not sexually active  Pain:     Current pain ratin    At best pain ratin    At worst pain ratin    Onset:  1-2 years ago    Quality:  Dull ache, pressure and sharp    Aggravating factors:  Prolonged positions, exercise, walking and sit to stand transition    Relieving factors:  Change in position and support    Progression:  Worsening  Treatments:     None    Patient Goals:     Patient goals for therapy:  Improved pain management, improved quality of life, fully empty bladder or bowels, improved bladder or bowel function, improved comfort, return to work, return to sport/leisure activities, improved sleep and decreased pain      Objective     Postural Observations  Seated posture: poor  Standing posture: fair    Additional Postural Observation Details  Rounded shoulders   Forward head  Increase thoracic kyphosis     Active Range of Motion     Lumbar   Flexion:  WFL  Extension:  WFL  Left lateral flexion:  WFL  Right lateral flexion:  WFL    Strength/Myotome Testing     Left Hip   Planes of Motion   Flexion: 3+  Abduction: 3+  External rotation: 4-    Right Hip   Planes of Motion   Flexion: 3+  Abduction: 4-  External rotation: 4    Left Knee   Flexion: 4  Extension: 4    Right Knee   Flexion: 4  Extension: 4    Muscle Activation   Patient able to activate left transverse abdominals, left multifidus, right transverse abdominals and right multifidus  Tests     Lumbar   Negative SIJ compression, sacroiliac distraction and Gaenslen's   Left   Negative passive SLR, quadrant and slump test      Right   Negative passive SLR, quadrant and slump test      Left Pelvic Girdle/Sacrum   Positive: active SLR test      Right Pelvic Girdle/Sacrum   Negative: active SLR test      Left Hip   Negative WILLEM  Right Hip   Negative WILLEM  Pelvic Floor Exam   Position: supine exam    Diastatis   Diastasis recti present: yes  3" above umbilicus (# fingers): 2 5  Umbilicus (# fingers): 3  3" below umbilicus (# fingers): 3  Connective tissue integrity at linea alba: boggy  tenderness at linea alba    Skin inspection:   scars present  Number of scars: 1  Scar 1 location:  Sensitivity level low Restriction level low         Flowsheet Rows      Most Recent Value   PT/OT G-Codes   Current Score  33   Projected Score  0             Precautions:Pelvic floor muscle assessment next session due to time restraints         Manuals                                                                 Neuro Re-Ed                                                                                                        Ther Ex                                                                                                                     Ther Activity                                       Gait Training                                       Modalities

## 2021-05-30 ENCOUNTER — OFFICE VISIT (OUTPATIENT)
Dept: URGENT CARE | Facility: CLINIC | Age: 36
End: 2021-05-30
Payer: COMMERCIAL

## 2021-05-30 VITALS
HEIGHT: 62 IN | TEMPERATURE: 97.1 F | WEIGHT: 213 LBS | RESPIRATION RATE: 16 BRPM | HEART RATE: 68 BPM | BODY MASS INDEX: 39.2 KG/M2 | DIASTOLIC BLOOD PRESSURE: 89 MMHG | SYSTOLIC BLOOD PRESSURE: 147 MMHG | OXYGEN SATURATION: 98 %

## 2021-05-30 DIAGNOSIS — J06.9 VIRAL UPPER RESPIRATORY TRACT INFECTION: Primary | ICD-10-CM

## 2021-05-30 PROCEDURE — 1036F TOBACCO NON-USER: CPT | Performed by: PHYSICIAN ASSISTANT

## 2021-05-30 PROCEDURE — 3008F BODY MASS INDEX DOCD: CPT | Performed by: PHYSICIAN ASSISTANT

## 2021-05-30 PROCEDURE — 3725F SCREEN DEPRESSION PERFORMED: CPT | Performed by: PHYSICIAN ASSISTANT

## 2021-05-30 PROCEDURE — 99213 OFFICE O/P EST LOW 20 MIN: CPT | Performed by: PHYSICIAN ASSISTANT

## 2021-05-30 RX ORDER — BENZONATATE 200 MG/1
200 CAPSULE ORAL 3 TIMES DAILY PRN
Qty: 20 CAPSULE | Refills: 0 | Status: SHIPPED | OUTPATIENT
Start: 2021-05-30

## 2021-05-30 NOTE — PROGRESS NOTES
[facility]      NAME: Ulisses Wall is a 39 y o  female  : 1985    MRN: [id number]  DATE: May 30, 2021  TIME: 2:26 PM    Assessment and Plan   Viral upper respiratory tract infection [J06 9]  1  Viral upper respiratory tract infection       Patient Instructions   Viral upper respiratory infection  rx tessalon perls three times a day as needed for cough  Recommend mucinex D for cough and congestion  Rest, fluids and supportive care  May benefit from a cool mist humidifier on night stand  Tylenol/ibuprofen as needed for pain/fever    Follow up with PCP in 3-5 days  Proceed to  ER if symptoms worsen  Chief Complaint     Chief Complaint   Patient presents with    Cough     Patient complains of cough, congestion starting Friday  States she has tried over the counter meds with no relief  The cough is hurting chest  Did rapid covid at home which was negative, has child at home with croup  History of Present Illness       Douglas Andrews is a 24-year-old female who presents to clinic complaining of cough and nasal congestion x3 days  She describes the cough is nonproductive but feels tight like she needs to cough something up but can not  She states that her symptoms are worsening since beginning 3 days ago  She also notes rhinorrhea, bilateral ear pain, and fatigue  She denies any fever, chills, shortness of breath, sore throat, nausea, vomiting, myalgias, loss of taste or smell, recent travel, or exposure to anyone known COVID positive  She had a rapid COVID test yesterday which was negative  She had her 2nd COVID vaccine on   Review of Systems   Review of Systems   Constitutional: Positive for fatigue  Negative for chills and fever  HENT: Positive for congestion and ear pain  Negative for sinus pressure, sinus pain and sore throat  Respiratory: Positive for cough and chest tightness  Negative for shortness of breath      Gastrointestinal: Negative for diarrhea, nausea and vomiting  Musculoskeletal: Negative for myalgias  Neurological: Negative for headaches       Current Medications       Current Outpatient Medications:     busPIRone (BUSPAR) 15 mg tablet, TAKE 1 TABLET BY MOUTH 3 TIMES A DAY , Disp: 270 tablet, Rfl: 0    cetirizine (ZyrTEC) 10 mg tablet, Take 1 tablet (10 mg total) by mouth daily, Disp: , Rfl: 0    cloNIDine (CATAPRES) 0 1 mg tablet, TAKE 1 TAB BY MOUTH IN THE MORNING, 1 TAB BY MOUTH IN THE AFTERNOON, AND 2 TABS BY MOUTH AT BEDTIME, Disp: 120 tablet, Rfl: 2    escitalopram (LEXAPRO) 20 mg tablet, Take 1 tablet (20 mg total) by mouth daily, Disp: 90 tablet, Rfl: 0    fluticasone (FLONASE) 50 mcg/act nasal spray, 1 spray into each nostril daily, Disp: , Rfl:     hydrOXYzine HCL (ATARAX) 50 mg tablet, TAKE 1/2 TO 1 TAB BY MOUTH EVERY 8 HOURS AS NEEDED FOR ANXIETY, Disp: 60 tablet, Rfl: 0    Melatonin ER 10 MG TBCR, Take 1 tablet (10 mg total) by mouth daily, Disp: , Rfl:     omeprazole (PriLOSEC) 20 mg delayed release capsule, Take 1 capsule (20 mg total) by mouth daily, Disp: 21 capsule, Rfl: 0    therapeutic multivitamin-minerals (THERAGRAN-M) tablet, Take 1 tablet by mouth daily, Disp: , Rfl:     meclizine (ANTIVERT) 25 mg tablet, Take 1 tablet (25 mg total) by mouth 3 (three) times a day as needed for dizziness, Disp: 15 tablet, Rfl: 0    ondansetron (ZOFRAN) 4 mg tablet, Take 1 tablet (4 mg total) by mouth every 8 (eight) hours as needed for nausea or vomiting, Disp: 30 tablet, Rfl: 0    SUMAtriptan (IMITREX) 100 mg tablet, Take 1 tablet (100 mg total) by mouth once as needed for migraine for up to 1 dose, Disp: 12 tablet, Rfl: 0    Current Allergies     Allergies as of 05/30/2021    (No Known Allergies)            The following portions of the patient's history were reviewed and updated as appropriate: allergies, current medications, past family history, past medical history, past social history, past surgical history and problem list      Past Medical History:   Diagnosis Date    Abnormal Pap smear of cervix     Anxiety     BRCA1 negative     BRCA2 negative     Breast injury     Pt states scar tissue around nipple from breastfeeding    Depression     HPV (human papilloma virus) infection     Kidney stone     H/O PYELONEPHRITIS    Migraine     Seasonal allergies     Sleep difficulties     Varicella     POS HX       Past Surgical History:   Procedure Laterality Date    CHOLECYSTECTOMY      FINGER SURGERY      left hand     GYNECOLOGIC CRYOSURGERY      HAND SURGERY Left     Phelebitis in left hand  post IV infilitration, had sx for clot removal     LYMPH NODE BIOPSY      of neck    LYMPH NODE DISSECTION Right 2011    cervical    [place]  DELIVERY ONLY N/A 2018    Procedure:  SECTION ();   Surgeon: Patricia Arredondo MD;  Location: [facility] ;  Service: Obstetrics       Family History   Problem Relation Age of Onset    Hypertension Mother     Heart disease Mother     Depression Mother     Coronary artery disease Mother     Hypertension Father     Hyperlipidemia Father     Heart disease Father         MI    Stroke Paternal Grandmother     Cancer Paternal Grandmother         breast    Breast cancer Paternal Grandmother     Cancer Paternal Aunt         colon    Colon cancer Paternal Aunt     Early death Maternal Uncle     Cancer Maternal Uncle         lymphoma    Early death Maternal Uncle         lymphoma    Birth defects Maternal Uncle     Undescended testes Son     Cystic fibrosis Other     Cystic fibrosis Other     Heart failure Maternal Grandmother     Stroke Maternal Grandfather     Alcohol abuse Maternal Grandfather     Alcohol abuse Brother     Drug abuse Brother          of overdose 2020    Anxiety disorder Sister     Post-traumatic stress disorder Sister     Developmental delay Son     Macrocephaly Son     Hydrocephalus Son     Breast cancer Other          Medications have been verified  Objective   /89 (BP Location: Right arm, Patient Position: Sitting, Cuff Size: Standard)   Pulse 68   Temp (!) 97 1 °F (36 2 °C) (Tympanic)   Resp 16   Ht 5' 2" (1 575 m)   Wt 96 6 kg (213 lb)   LMP 05/17/2021   SpO2 98%   BMI 38 96 kg/m²   Patient's last menstrual period was 05/17/2021  Physical Exam     Physical Exam  Vitals signs and nursing note reviewed  Constitutional:       General: She is not in acute distress  Appearance: Normal appearance  She is not ill-appearing  HENT:      Right Ear: Tympanic membrane, ear canal and external ear normal       Left Ear: Tympanic membrane, ear canal and external ear normal       Nose: Congestion present  Mouth/Throat:      Mouth: Mucous membranes are moist       Pharynx: Posterior oropharyngeal erythema present  No oropharyngeal exudate  Cardiovascular:      Rate and Rhythm: Normal rate and regular rhythm  Heart sounds: Normal heart sounds  Pulmonary:      Effort: Pulmonary effort is normal  No respiratory distress  Breath sounds: Normal breath sounds  No stridor  No wheezing, rhonchi or rales  Lymphadenopathy:      Cervical: No cervical adenopathy  Neurological:      Mental Status: She is alert and oriented to person, place, and time     Psychiatric:         Mood and Affect: Mood normal          Behavior: Behavior normal

## 2021-06-03 ENCOUNTER — APPOINTMENT (OUTPATIENT)
Dept: PHYSICAL THERAPY | Facility: CLINIC | Age: 36
End: 2021-06-03
Payer: COMMERCIAL

## 2021-06-08 ENCOUNTER — OFFICE VISIT (OUTPATIENT)
Dept: PHYSICAL THERAPY | Facility: CLINIC | Age: 36
End: 2021-06-08
Payer: COMMERCIAL

## 2021-06-08 DIAGNOSIS — N81.89 PELVIC FLOOR WEAKNESS IN FEMALE: ICD-10-CM

## 2021-06-08 DIAGNOSIS — M62.08 DIASTASIS RECTI: Primary | ICD-10-CM

## 2021-06-08 PROCEDURE — 97110 THERAPEUTIC EXERCISES: CPT

## 2021-06-08 PROCEDURE — 97140 MANUAL THERAPY 1/> REGIONS: CPT

## 2021-06-08 PROCEDURE — 97112 NEUROMUSCULAR REEDUCATION: CPT

## 2021-06-08 NOTE — PROGRESS NOTES
Daily Note     Today's date: 2021  Patient name: Kanu Lewis  : 1985  MRN: [id number]  Referring provider: Girma Horne DO  Dx:   Encounter Diagnosis     ICD-10-CM    1  Diastasis recti  M62 08    2  Pelvic floor weakness in female  N81 89                   Subjective: Pt presents with low back pain, complaining of abdominal separation, and urinary leakage with changes in her intra-abdominal pressure  Objective: See treatment diary below      Assessment: Tolerated treatment well  Patient demonstrated fatigue post treatment, exhibited good technique with therapeutic exercises  Pt presents with increase tightness/tension in pelvic floor muscles, decrease endurance, power, and abnormal coordination  Would benefit from continued PT  Plan: Continue per plan of care        Precautions:standard       Manuals 21            PFM assessment  IM                                                    Neuro Re-Ed             TA progression  5s x 20             Bridge progression  5s x20             Clamshells  3s x20                                                                 Ther Ex             Chelo stretch  20s x3             Happy baby    25s x3             Child pose  2 0s x 3                                                                              Ther Activity                                       Gait Training                                       Modalities

## 2021-06-09 ENCOUNTER — APPOINTMENT (EMERGENCY)
Dept: RADIOLOGY | Facility: HOSPITAL | Age: 36
End: 2021-06-09
Payer: COMMERCIAL

## 2021-06-09 ENCOUNTER — HOSPITAL ENCOUNTER (EMERGENCY)
Facility: HOSPITAL | Age: 36
Discharge: HOME | End: 2021-06-09
Attending: EMERGENCY MEDICINE | Admitting: EMERGENCY MEDICINE
Payer: COMMERCIAL

## 2021-06-09 VITALS
HEART RATE: 74 BPM | RESPIRATION RATE: 18 BRPM | BODY MASS INDEX: 39.32 KG/M2 | OXYGEN SATURATION: 99 % | WEIGHT: 215 LBS | TEMPERATURE: 97 F | SYSTOLIC BLOOD PRESSURE: 132 MMHG | DIASTOLIC BLOOD PRESSURE: 80 MMHG

## 2021-06-09 DIAGNOSIS — F41.1 GENERALIZED ANXIETY DISORDER: ICD-10-CM

## 2021-06-09 DIAGNOSIS — S63.601A SPRAIN OF RIGHT THUMB: Primary | ICD-10-CM

## 2021-06-09 DIAGNOSIS — F43.10 PTSD (POST-TRAUMATIC STRESS DISORDER): ICD-10-CM

## 2021-06-09 PROCEDURE — 99283 EMERGENCY DEPT VISIT LOW MDM: CPT

## 2021-06-09 PROCEDURE — 73140 X-RAY EXAM OF FINGER(S): CPT

## 2021-06-09 PROCEDURE — 99284 EMERGENCY DEPT VISIT MOD MDM: CPT | Performed by: EMERGENCY MEDICINE

## 2021-06-09 RX ORDER — CLONIDINE HYDROCHLORIDE 0.1 MG/1
TABLET ORAL
Qty: 120 TABLET | Refills: 2 | Status: SHIPPED | OUTPATIENT
Start: 2021-06-09 | End: 2021-08-26 | Stop reason: SDUPTHER

## 2021-06-09 NOTE — DISCHARGE INSTRUCTIONS
Return to the ER for further concerns or worsening symptoms  Follow up with your primary care physician and orthopedic surgeon in 1-2 days  Keep splint in place while awake  Take tylenol or motrin for pain as needed

## 2021-06-09 NOTE — ED PROVIDER NOTES
History  Chief Complaint   Patient presents with    Thumb Pain     felt a pop in her right thumb  now has pain and swelling     Patient here complaint of right thumb pain worsened this morning  She is unsure of trauma  Patient states that she was at [employer] all day yesterday with her child and was carrying a backpack, however she she denies any direct trauma  Patient took a dose of Motrin this morning prior to coming to the ED  Patient has a prior medical history of migraine, anxiety, depression  History provided by:  Patient   used: No    Hand Pain  Location:  Right thumb  Severity:  Mild  Onset quality:  Sudden  Timing:  Constant  Progression:  Worsening  Chronicity:  New  Associated symptoms: no abdominal pain, no cough, no diarrhea, no fever, no nausea, no shortness of breath and no vomiting        Prior to Admission Medications   Prescriptions Last Dose Informant Patient Reported? Taking? Melatonin ER 10 MG TBCR 6/9/2021 at Unknown time  No Yes   Sig: Take 1 tablet (10 mg total) by mouth daily   SUMAtriptan (IMITREX) 100 mg tablet Past Week at Unknown time  No Yes   Sig: Take 1 tablet (100 mg total) by mouth once as needed for migraine for up to 1 dose   benzonatate (TESSALON) 200 MG capsule Not Taking at Unknown time  No No   Sig: Take 1 capsule (200 mg total) by mouth 3 (three) times a day as needed for cough   Patient not taking: Reported on 6/9/2021   busPIRone (BUSPAR) 15 mg tablet 6/9/2021 at Unknown time  No Yes   Sig: TAKE 1 TABLET BY MOUTH 3 TIMES A DAY     cetirizine (ZyrTEC) 10 mg tablet 6/9/2021 at Unknown time  No Yes   Sig: Take 1 tablet (10 mg total) by mouth daily   cloNIDine (CATAPRES) 0 1 mg tablet 6/9/2021 at Unknown time  No Yes   Sig: TAKE 1 TAB BY MOUTH IN THE MORNING, 1 TAB BY MOUTH IN THE AFTERNOON, AND 2 TABS BY MOUTH AT BEDTIME   escitalopram (LEXAPRO) 20 mg tablet 6/9/2021 at Unknown time  No Yes   Sig: Take 1 tablet (20 mg total) by mouth daily   fluticasone (FLONASE) 50 mcg/act nasal spray 2021 at Unknown time  Yes Yes   Si spray into each nostril daily   hydrOXYzine HCL (ATARAX) 50 mg tablet 2021 at Unknown time  No Yes   Sig: TAKE 1/2 TO 1 TAB BY MOUTH EVERY 8 HOURS AS NEEDED FOR ANXIETY   meclizine (ANTIVERT) 25 mg tablet 2021 at Unknown time  No Yes   Sig: Take 1 tablet (25 mg total) by mouth 3 (three) times a day as needed for dizziness   omeprazole (PriLOSEC) 20 mg delayed release capsule 2021 at Unknown time  No Yes   Sig: Take 1 capsule (20 mg total) by mouth daily   ondansetron (ZOFRAN) 4 mg tablet 2021 at Unknown time  No Yes   Sig: Take 1 tablet (4 mg total) by mouth every 8 (eight) hours as needed for nausea or vomiting   therapeutic multivitamin-minerals (THERAGRAN-M) tablet 2021 at Unknown time  Yes Yes   Sig: Take 1 tablet by mouth daily      Facility-Administered Medications: None       Past Medical History:   Diagnosis Date    Abnormal Pap smear of cervix     Anxiety     BRCA1 negative     BRCA2 negative     Breast injury     Pt states scar tissue around nipple from breastfeeding    Depression     HPV (human papilloma virus) infection     Kidney stone     H/O PYELONEPHRITIS    Migraine     Seasonal allergies     Sleep difficulties     Varicella     POS HX       Past Surgical History:   Procedure Laterality Date    CHOLECYSTECTOMY      FINGER SURGERY      left hand     GYNECOLOGIC CRYOSURGERY      HAND SURGERY Left     Phelebitis in left hand  post IV infilitration, had sx for clot removal     LYMPH NODE BIOPSY      of neck    LYMPH NODE DISSECTION Right     cervical    [place]  DELIVERY ONLY N/A 2018    Procedure:  SECTION ();   Surgeon: Candelaria Vallejo MD;  Location: [facility] ;  Service: Obstetrics       Family History   Problem Relation Age of Onset    Hypertension Mother     Heart disease Mother     Depression Mother     Coronary artery disease Mother  Hypertension Father     Hyperlipidemia Father     Heart disease Father         MI    Stroke Paternal Grandmother     Cancer Paternal Grandmother         breast    Breast cancer Paternal Grandmother     Cancer Paternal Aunt         colon    Colon cancer Paternal Aunt     Early death Maternal Uncle     Cancer Maternal Uncle         lymphoma    Early death Maternal Uncle         lymphoma    Birth defects Maternal Uncle     Undescended testes Son     Cystic fibrosis Other     Cystic fibrosis Other     Heart failure Maternal Grandmother     Stroke Maternal Grandfather     Alcohol abuse Maternal Grandfather     Alcohol abuse Brother     Drug abuse Brother          of overdose 2020    Anxiety disorder Sister     Post-traumatic stress disorder Sister     Developmental delay Son     Macrocephaly Son     Hydrocephalus Son     Breast cancer Other      I have reviewed and agree with the history as documented  E-Cigarette/Vaping    E-Cigarette Use Never User      E-Cigarette/Vaping Substances    Nicotine No     THC No     CBD No     Flavoring No     Other No     Unknown No      Social History     Tobacco Use    Smoking status: Former Smoker     Packs/day: 0 50     Years: 3 00     Pack years: 1 50     Types: Cigarettes     Quit date:      Years since quittin 4    Smokeless tobacco: Never Used   Substance Use Topics    Alcohol use: Not Currently    Drug use: No       Review of Systems   Constitutional: Negative for chills and fever  Respiratory: Negative for cough, chest tightness and shortness of breath  Gastrointestinal: Negative for abdominal pain, diarrhea, nausea and vomiting  Genitourinary: Negative for dysuria, frequency, hematuria and urgency  Musculoskeletal: Positive for joint swelling  Negative for back pain, neck pain and neck stiffness  Skin: Negative for color change, pallor and wound  All other systems reviewed and are negative        Physical Exam  Physical Exam  Vitals signs and nursing note reviewed  Constitutional:       Appearance: Normal appearance  HENT:      Head: Normocephalic and atraumatic  Eyes:      Extraocular Movements: Extraocular movements intact  Conjunctiva/sclera: Conjunctivae normal       Pupils: Pupils are equal, round, and reactive to light  Neck:      Musculoskeletal: Normal range of motion and neck supple  Cardiovascular:      Rate and Rhythm: Normal rate and regular rhythm  Pulmonary:      Effort: Pulmonary effort is normal  No respiratory distress  Musculoskeletal:         General: Tenderness present  No swelling, deformity or signs of injury  Right hand: She exhibits tenderness and bony tenderness  She exhibits normal range of motion, normal two-point discrimination, normal capillary refill, no deformity, no laceration and no swelling  Normal sensation noted  Normal strength noted  Hands:    Skin:     Capillary Refill: Capillary refill takes less than 2 seconds  Neurological:      General: No focal deficit present  Mental Status: She is alert and oriented to person, place, and time  Vital Signs  ED Triage Vitals [06/09/21 0809]   Temperature Pulse Respirations Blood Pressure SpO2   (!) 97 °F (36 1 °C) 74 18 132/80 99 %      Temp src Heart Rate Source Patient Position - Orthostatic VS BP Location FiO2 (%)   -- -- -- -- --      Pain Score       5           Vitals:    06/09/21 0809   BP: 132/80   Pulse: 74         Visual Acuity      ED Medications  Medications - No data to display    Diagnostic Studies  Results Reviewed     None                 XR thumb first digit-min 2 views RIGHT   ED Interpretation by Odette Eden DO (06/09 5936)   nad      Final Result by Broderick Piedra MD (06/09 1334)      No acute osseous abnormality              Workstation performed: [id number]                    Procedures  Orthopedic injury treatment    Date/Time: 6/9/2021 8:50 AM  Performed by: Mita Colon DO  Authorized by: Mita Colon DO     Patient Location:  ED  Other Assisting Provider: No    Verbal consent obtained?: Yes    Risks and benefits: Risks, benefits and alternatives were discussed    Consent given by:  Patient  Patient states understanding of procedure being performed: Yes    Patient's understanding of procedure matches consent: Yes    Procedure consent matches procedure scheduled: Yes    Radiology Images displayed and confirmed  If images not available, report reviewed: Yes    Required items: Required blood products, implants, devices and special equipment available    Patient identity confirmed:  Verbally with patient  Injury location:  Finger  Location details:  Right thumb  Injury type: Soft tissue  Neurovascular status: Neurovascularly intact    Distal perfusion: normal    Neurological function: normal    Range of motion: reduced    Local anesthesia used?: No    General anesthesia used?: No    Immobilization:  Splint  Splint type:  Finger splint, static  Supplies used:  Aluminum splint  Neurovascular status: Neurovascularly intact    Distal perfusion: normal    Neurological function: normal    Range of motion: unchanged    Patient tolerance:  Patient tolerated the procedure well with no immediate complications             ED Course                                           MDM  Number of Diagnoses or Management Options  Sprain of right thumb: new and requires workup  Diagnosis management comments: Patient here with complaint of right thumb pain of unknown etiology  X-ray reviewed and negative for fractures  Patient placed in the thumb splint  Patient to follow-up with hand surgeon as an outpatient  Patient to return to emergency room for worsening symptoms  Patient will continue ibuprofen for pain relief         Amount and/or Complexity of Data Reviewed  Tests in the radiology section of CPT®: ordered and reviewed    Risk of Complications, Morbidity, and/or Mortality  Presenting problems: high  Diagnostic procedures: high  Management options: high    Patient Progress  Patient progress: improved      Disposition  Final diagnoses:   Sprain of right thumb     Time reflects when diagnosis was documented in both MDM as applicable and the Disposition within this note     Time User Action Codes Description Comment    6/9/2021  9:02 AM Briannabrianna De Leond Add [Y30 892] Thumb pain, right     6/9/2021  9:02 AM Brianna Edward O Add [S63 601A] Sprain of right thumb     6/9/2021  9:02 AM Brianna Edward O Modify [S63 601A] Sprain of right thumb     6/9/2021  9:02 AM Brianna Edward Remove [Z01 785] Thumb pain, right       ED Disposition     ED Disposition Condition Date/Time Comment    Discharge Stable Wed Jun 9, 2021  9:02 AM Ulisses Wall discharge to home/self care              Follow-up Information     Follow up With Specialties Details Why Contact Lyn Hopper DO Family Medicine, Wound Care Schedule an appointment as soon as possible for a visit in 2 days for follow up [address],  Orthopedic Surgery, Hand Surgery Schedule an appointment as soon as possible for a visit in 2 days for follow up [address] [facility], [facility]  [phone]            Discharge Medication List as of 6/9/2021  9:03 AM      CONTINUE these medications which have NOT CHANGED    Details   busPIRone (BUSPAR) 15 mg tablet TAKE 1 TABLET BY MOUTH 3 TIMES A DAY , Normal      cetirizine (ZyrTEC) 10 mg tablet Take 1 tablet (10 mg total) by mouth daily, Starting Tue 12/22/2020, No Print      cloNIDine (CATAPRES) 0 1 mg tablet TAKE 1 TAB BY MOUTH IN THE MORNING, 1 TAB BY MOUTH IN THE AFTERNOON, AND 2 TABS BY MOUTH AT BEDTIME, Normal      escitalopram (LEXAPRO) 20 mg tablet Take 1 tablet (20 mg total) by mouth daily, Starting Wed 4/14/2021, Until Tue 7/13/2021, Normal      fluticasone (FLONASE) 50 mcg/act nasal spray 1 spray into each nostril daily, Historical Med      hydrOXYzine HCL (ATARAX) 50 mg tablet TAKE 1/2 TO 1 TAB BY MOUTH EVERY 8 HOURS AS NEEDED FOR ANXIETY, Normal      meclizine (ANTIVERT) 25 mg tablet Take 1 tablet (25 mg total) by mouth 3 (three) times a day as needed for dizziness, Starting Tue 12/22/2020, Normal      Melatonin ER 10 MG TBCR Take 1 tablet (10 mg total) by mouth daily, Starting Mon 7/20/2020, No Print      omeprazole (PriLOSEC) 20 mg delayed release capsule Take 1 capsule (20 mg total) by mouth daily, Starting Wed 4/21/2021, Normal      ondansetron (ZOFRAN) 4 mg tablet Take 1 tablet (4 mg total) by mouth every 8 (eight) hours as needed for nausea or vomiting, Starting Tue 12/22/2020, Normal      SUMAtriptan (IMITREX) 100 mg tablet Take 1 tablet (100 mg total) by mouth once as needed for migraine for up to 1 dose, Starting Tue 12/22/2020, Normal      therapeutic multivitamin-minerals (THERAGRAN-M) tablet Take 1 tablet by mouth daily, Historical Med      benzonatate (TESSALON) 200 MG capsule Take 1 capsule (200 mg total) by mouth 3 (three) times a day as needed for cough, Starting Sun 5/30/2021, Normal           No discharge procedures on file      PDMP Review     None          ED Provider  Electronically Signed by           Pedro Harrington DO  06/09/21 1520

## 2021-06-14 ENCOUNTER — APPOINTMENT (OUTPATIENT)
Dept: PHYSICAL THERAPY | Facility: CLINIC | Age: 36
End: 2021-06-14
Payer: COMMERCIAL

## 2021-06-16 ENCOUNTER — OFFICE VISIT (OUTPATIENT)
Dept: PHYSICAL THERAPY | Facility: CLINIC | Age: 36
End: 2021-06-16
Payer: COMMERCIAL

## 2021-06-16 DIAGNOSIS — M62.08 DIASTASIS RECTI: Primary | ICD-10-CM

## 2021-06-16 DIAGNOSIS — N81.89 PELVIC FLOOR WEAKNESS IN FEMALE: ICD-10-CM

## 2021-06-16 PROCEDURE — 97112 NEUROMUSCULAR REEDUCATION: CPT

## 2021-06-16 PROCEDURE — 97110 THERAPEUTIC EXERCISES: CPT

## 2021-06-16 NOTE — PROGRESS NOTES
Daily Note   Update 21: Pt will be officially discharged from therapy at this time due to poor attendance  Pt may resume with therapy at a later date with a referral from MD  Notified patient via voicemail  Today's date: 2021  Patient name: Jarred Molina  : 1985  MRN: [id number]  Referring provider: Florentino Franks DO  Dx:   Encounter Diagnosis     ICD-10-CM    1  Diastasis recti  M62 08    2  Pelvic floor weakness in female  N81 89                   Subjective: Pt reports feeling well and denies any discomfort since last session  Objective: See treatment diary below      Assessment: Tolerated treatment well  Patient demonstrated fatigue post treatment, exhibited good technique with therapeutic exercises and would benefit from continued PT      Plan: Continue per plan of care  Precautions:standard       Manuals 21           PFM assessment  IM                                                    Neuro Re-Ed             TA progression  5s x 20  5s x 20 ea       Ball sq x 20     Hip ER x 20            Bridge progression  5s x20  5s x 20     Ball Sq x 20     Hip ER x 20            Clamshells  3s x20  X 15 w/ TA B            PFM iso supine, sitting, and standing   -->                                                   Ther Ex             Chelo stretch  20s x3  20s x 5            Happy baby    20s x3  20s x 5            Child pose  2 0s x 3  20s x 5              Knee to chest 20s x 4              LTR x 20 x 5s                                                   Ther Activity                                       Gait Training                                       Modalities

## 2021-07-20 ENCOUNTER — TELEPHONE (OUTPATIENT)
Dept: OTHER | Facility: OTHER | Age: 36
End: 2021-07-20

## 2021-07-20 NOTE — TELEPHONE ENCOUNTER
Patient canceled today's appointment  Patient advised to call back during office hours to reschedule

## 2021-08-04 DIAGNOSIS — F43.10 PTSD (POST-TRAUMATIC STRESS DISORDER): ICD-10-CM

## 2021-08-04 DIAGNOSIS — F41.1 GENERALIZED ANXIETY DISORDER: ICD-10-CM

## 2021-08-05 RX ORDER — BUSPIRONE HYDROCHLORIDE 15 MG/1
TABLET ORAL
Qty: 90 TABLET | Refills: 2 | Status: SHIPPED | OUTPATIENT
Start: 2021-08-05 | End: 2021-11-15

## 2021-08-12 ENCOUNTER — TELEPHONE (OUTPATIENT)
Dept: PSYCHIATRY | Facility: CLINIC | Age: 36
End: 2021-08-12

## 2021-08-12 NOTE — LETTER
21     Edd Burk   : 1985   [address]  [address]       It is the policy of [facility] to monitor and manage appointments that have been no-showed or cancelled with less than 48-hour notice  This is necessary to ensure that we are able to provide timely access for all patients to our providers  Undue numbers of unutilized appointments delays necessary medical care for all patients  Dear Edd Burk :      We are sorry that you missed your appointment with Efra Rene PA-C on 2021  Your health and follow-up care are important to us  We want to make you aware that you do not have another appointment with PATO Reyez scheduled  Please be aware that our office policy states that if you 'no show' two Medication Management  appointments in a row without prior notice of cancellation, or three or more in a calendar year, you may be discharged from Medication Management  treatment  We want to bring this to your attention now to prevent an interruption of your care  If you have any questions about this policy, please call us at the number above  If we do not hear from you within 10 business days to make a follow up appointment, we will assume you are no longer interested in care here  Thank you in advance for your cooperation and assistance         Sincerely,      [address] [facility] [address]

## 2021-08-12 NOTE — TELEPHONE ENCOUNTER
----- Message from Erwin Moody sent at 8/12/2021 10:13 AM EDT -----  Regarding: Patient No Show  Mono Redd [[id number]]  No Showed 08/12/21  for Ole Severance, CRNP and did not call with proper notice to Cx appt   They are marked as a No Show for today's visit

## 2021-08-13 NOTE — TELEPHONE ENCOUNTER
NO-SHOW LETTER MAILED TO [facility]    ADDRESS: [address] [address]

## 2021-11-08 ENCOUNTER — HOSPITAL ENCOUNTER (OUTPATIENT)
Dept: RADIOLOGY | Facility: HOSPITAL | Age: 36
Discharge: HOME | End: 2021-11-08
Attending: FAMILY MEDICINE
Payer: COMMERCIAL

## 2021-11-08 ENCOUNTER — OFFICE VISIT (OUTPATIENT)
Dept: FAMILY MEDICINE CLINIC | Facility: CLINIC | Age: 36
End: 2021-11-08
Payer: COMMERCIAL

## 2021-11-08 ENCOUNTER — OFFICE VISIT (OUTPATIENT)
Dept: PHYSICAL THERAPY | Facility: CLINIC | Age: 36
End: 2021-11-08
Payer: COMMERCIAL

## 2021-11-08 VITALS
HEIGHT: 62 IN | HEART RATE: 73 BPM | BODY MASS INDEX: 40.85 KG/M2 | DIASTOLIC BLOOD PRESSURE: 78 MMHG | OXYGEN SATURATION: 97 % | RESPIRATION RATE: 18 BRPM | TEMPERATURE: 96.9 F | SYSTOLIC BLOOD PRESSURE: 102 MMHG | WEIGHT: 222 LBS

## 2021-11-08 DIAGNOSIS — M54.6 ACUTE RIGHT-SIDED THORACIC BACK PAIN: ICD-10-CM

## 2021-11-08 DIAGNOSIS — M54.6 THORACIC SPINE PAIN: Primary | ICD-10-CM

## 2021-11-08 DIAGNOSIS — R07.81 RIB PAIN: Primary | ICD-10-CM

## 2021-11-08 DIAGNOSIS — E66.01 MORBID OBESITY WITH BMI OF 40.0-44.9, ADULT (HCC): ICD-10-CM

## 2021-11-08 DIAGNOSIS — R07.81 RIB PAIN ON RIGHT SIDE: ICD-10-CM

## 2021-11-08 DIAGNOSIS — M54.6 THORACIC SPINE PAIN: ICD-10-CM

## 2021-11-08 PROCEDURE — 72072 X-RAY EXAM THORAC SPINE 3VWS: CPT

## 2021-11-08 PROCEDURE — 71100 X-RAY EXAM RIBS UNI 2 VIEWS: CPT

## 2021-11-08 PROCEDURE — 99213 OFFICE O/P EST LOW 20 MIN: CPT | Performed by: FAMILY MEDICINE

## 2021-11-08 PROCEDURE — 97162 PT EVAL MOD COMPLEX 30 MIN: CPT

## 2021-11-08 RX ORDER — PREDNISONE 20 MG/1
TABLET ORAL
Qty: 32 TABLET | Refills: 0 | Status: SHIPPED | OUTPATIENT
Start: 2021-11-08 | End: 2021-12-03 | Stop reason: ALTCHOICE

## 2021-11-08 RX ORDER — CYCLOBENZAPRINE HCL 10 MG
10 TABLET ORAL
Qty: 21 TABLET | Refills: 0 | Status: SHIPPED | OUTPATIENT
Start: 2021-11-08 | End: 2021-12-03 | Stop reason: SDUPTHER

## 2021-11-10 ENCOUNTER — APPOINTMENT (OUTPATIENT)
Dept: PHYSICAL THERAPY | Facility: CLINIC | Age: 36
End: 2021-11-10
Payer: COMMERCIAL

## 2021-11-15 DIAGNOSIS — F41.1 GENERALIZED ANXIETY DISORDER: ICD-10-CM

## 2021-11-15 DIAGNOSIS — F43.10 PTSD (POST-TRAUMATIC STRESS DISORDER): ICD-10-CM

## 2021-11-15 RX ORDER — BUSPIRONE HYDROCHLORIDE 15 MG/1
TABLET ORAL
Qty: 90 TABLET | Refills: 2 | Status: SHIPPED | OUTPATIENT
Start: 2021-11-15 | End: 2022-02-23 | Stop reason: SDUPTHER

## 2021-11-15 RX ORDER — CLONIDINE HYDROCHLORIDE 0.1 MG/1
TABLET ORAL
Qty: 120 TABLET | Refills: 2 | Status: SHIPPED | OUTPATIENT
Start: 2021-11-15 | End: 2022-03-08 | Stop reason: SDUPTHER

## 2021-11-16 ENCOUNTER — TELEPHONE (OUTPATIENT)
Dept: PSYCHIATRY | Facility: CLINIC | Age: 36
End: 2021-11-16

## 2021-11-17 ENCOUNTER — OFFICE VISIT (OUTPATIENT)
Dept: PHYSICAL THERAPY | Facility: CLINIC | Age: 36
End: 2021-11-17
Payer: COMMERCIAL

## 2021-11-17 ENCOUNTER — OFFICE VISIT (OUTPATIENT)
Dept: PSYCHIATRY | Facility: CLINIC | Age: 36
End: 2021-11-17
Payer: COMMERCIAL

## 2021-11-17 VITALS — HEIGHT: 62 IN | WEIGHT: 221.8 LBS | BODY MASS INDEX: 40.82 KG/M2

## 2021-11-17 DIAGNOSIS — F43.10 PTSD (POST-TRAUMATIC STRESS DISORDER): ICD-10-CM

## 2021-11-17 DIAGNOSIS — F41.1 GENERALIZED ANXIETY DISORDER: ICD-10-CM

## 2021-11-17 DIAGNOSIS — R07.81 RIB PAIN: Primary | ICD-10-CM

## 2021-11-17 DIAGNOSIS — F32.1 CURRENT MODERATE EPISODE OF MAJOR DEPRESSIVE DISORDER WITHOUT PRIOR EPISODE (HCC): ICD-10-CM

## 2021-11-17 DIAGNOSIS — M54.6 ACUTE RIGHT-SIDED THORACIC BACK PAIN: ICD-10-CM

## 2021-11-17 PROCEDURE — 1036F TOBACCO NON-USER: CPT | Performed by: NURSE PRACTITIONER

## 2021-11-17 PROCEDURE — 97112 NEUROMUSCULAR REEDUCATION: CPT

## 2021-11-17 PROCEDURE — 99213 OFFICE O/P EST LOW 20 MIN: CPT | Performed by: NURSE PRACTITIONER

## 2021-11-17 PROCEDURE — 3008F BODY MASS INDEX DOCD: CPT | Performed by: NURSE PRACTITIONER

## 2021-11-17 PROCEDURE — 97110 THERAPEUTIC EXERCISES: CPT

## 2021-11-17 RX ORDER — ESCITALOPRAM OXALATE 20 MG/1
20 TABLET ORAL DAILY
Qty: 90 TABLET | Refills: 0 | Status: SHIPPED | OUTPATIENT
Start: 2021-11-17 | End: 2022-02-23 | Stop reason: SDUPTHER

## 2021-11-19 ENCOUNTER — TELEPHONE (OUTPATIENT)
Dept: BARIATRICS | Facility: CLINIC | Age: 36
End: 2021-11-19

## 2021-11-26 ENCOUNTER — APPOINTMENT (OUTPATIENT)
Dept: PHYSICAL THERAPY | Facility: CLINIC | Age: 36
End: 2021-11-26
Payer: COMMERCIAL

## 2021-12-03 ENCOUNTER — OFFICE VISIT (OUTPATIENT)
Dept: FAMILY MEDICINE CLINIC | Facility: CLINIC | Age: 36
End: 2021-12-03
Payer: COMMERCIAL

## 2021-12-03 VITALS
BODY MASS INDEX: 41.22 KG/M2 | TEMPERATURE: 97.8 F | HEART RATE: 75 BPM | SYSTOLIC BLOOD PRESSURE: 140 MMHG | WEIGHT: 224 LBS | DIASTOLIC BLOOD PRESSURE: 80 MMHG | HEIGHT: 62 IN | RESPIRATION RATE: 18 BRPM | OXYGEN SATURATION: 98 %

## 2021-12-03 DIAGNOSIS — M54.6 THORACIC SPINE PAIN: ICD-10-CM

## 2021-12-03 DIAGNOSIS — R07.81 RIB PAIN ON RIGHT SIDE: ICD-10-CM

## 2021-12-03 PROCEDURE — 99213 OFFICE O/P EST LOW 20 MIN: CPT | Performed by: FAMILY MEDICINE

## 2021-12-03 RX ORDER — CYCLOBENZAPRINE HCL 10 MG
10 TABLET ORAL
Qty: 21 TABLET | Refills: 0 | Status: SHIPPED | OUTPATIENT
Start: 2021-12-03 | End: 2022-03-11

## 2021-12-29 ENCOUNTER — OFFICE VISIT (OUTPATIENT)
Dept: FAMILY MEDICINE CLINIC | Facility: CLINIC | Age: 36
End: 2021-12-29
Payer: COMMERCIAL

## 2021-12-29 VITALS
OXYGEN SATURATION: 98 % | RESPIRATION RATE: 18 BRPM | WEIGHT: 223 LBS | HEIGHT: 62 IN | HEART RATE: 76 BPM | SYSTOLIC BLOOD PRESSURE: 130 MMHG | DIASTOLIC BLOOD PRESSURE: 80 MMHG | TEMPERATURE: 97.2 F | BODY MASS INDEX: 41.04 KG/M2

## 2021-12-29 DIAGNOSIS — M79.89 SOFT TISSUE MASS: ICD-10-CM

## 2021-12-29 DIAGNOSIS — M54.6 CHRONIC RIGHT-SIDED THORACIC BACK PAIN: ICD-10-CM

## 2021-12-29 DIAGNOSIS — G89.29 CHRONIC RIGHT-SIDED THORACIC BACK PAIN: ICD-10-CM

## 2021-12-29 DIAGNOSIS — R07.81 RIB PAIN ON RIGHT SIDE: Primary | ICD-10-CM

## 2021-12-29 PROCEDURE — 99214 OFFICE O/P EST MOD 30 MIN: CPT | Performed by: NURSE PRACTITIONER

## 2021-12-29 PROCEDURE — 3725F SCREEN DEPRESSION PERFORMED: CPT | Performed by: NURSE PRACTITIONER

## 2021-12-29 PROCEDURE — 3008F BODY MASS INDEX DOCD: CPT | Performed by: NURSE PRACTITIONER

## 2021-12-29 PROCEDURE — 1036F TOBACCO NON-USER: CPT | Performed by: NURSE PRACTITIONER

## 2022-01-04 ENCOUNTER — HOSPITAL ENCOUNTER (OUTPATIENT)
Dept: RADIOLOGY | Facility: HOSPITAL | Age: 37
Discharge: HOME | End: 2022-01-04
Payer: COMMERCIAL

## 2022-01-04 DIAGNOSIS — G89.29 CHRONIC RIGHT-SIDED THORACIC BACK PAIN: ICD-10-CM

## 2022-01-04 DIAGNOSIS — R07.81 RIB PAIN ON RIGHT SIDE: ICD-10-CM

## 2022-01-04 DIAGNOSIS — M54.6 CHRONIC RIGHT-SIDED THORACIC BACK PAIN: ICD-10-CM

## 2022-01-04 DIAGNOSIS — M79.89 SOFT TISSUE MASS: ICD-10-CM

## 2022-01-04 PROCEDURE — 76705 ECHO EXAM OF ABDOMEN: CPT

## 2022-01-06 ENCOUNTER — TELEPHONE (OUTPATIENT)
Dept: FAMILY MEDICINE CLINIC | Facility: CLINIC | Age: 37
End: 2022-01-06

## 2022-01-06 DIAGNOSIS — D17.1 LIPOMA OF BACK: Primary | ICD-10-CM

## 2022-01-06 NOTE — TELEPHONE ENCOUNTER
Spoke w/ pt regarding US results, she has 2 lipomas in her back  She has continued pain  Referral placed for general surgery consult for evaluation   Deya Sanchez

## 2022-01-18 ENCOUNTER — CONSULT (OUTPATIENT)
Dept: SURGERY | Facility: CLINIC | Age: 37
End: 2022-01-18
Payer: COMMERCIAL

## 2022-01-18 VITALS
WEIGHT: 226 LBS | BODY MASS INDEX: 41.59 KG/M2 | TEMPERATURE: 96.7 F | OXYGEN SATURATION: 98 % | HEART RATE: 76 BPM | SYSTOLIC BLOOD PRESSURE: 118 MMHG | DIASTOLIC BLOOD PRESSURE: 70 MMHG | HEIGHT: 62 IN

## 2022-01-18 DIAGNOSIS — D17.1 LIPOMA OF BACK: ICD-10-CM

## 2022-01-18 PROCEDURE — 3008F BODY MASS INDEX DOCD: CPT | Performed by: SURGERY

## 2022-01-18 PROCEDURE — 99213 OFFICE O/P EST LOW 20 MIN: CPT | Performed by: SURGERY

## 2022-01-18 PROCEDURE — 1036F TOBACCO NON-USER: CPT | Performed by: SURGERY

## 2022-01-18 RX ORDER — CEFAZOLIN SODIUM 2 G/50ML
2000 SOLUTION INTRAVENOUS ONCE
Status: CANCELLED | OUTPATIENT
Start: 2022-01-26 | End: 2022-01-18

## 2022-01-18 NOTE — H&P (VIEW-ONLY)
[facility] History and Physical Note:    Assessment:  Small subcutaneous lipoma right back which is symptomatic  I explained to the patient that some, or all, of her pain symptoms could persist after removal and that some of her symptoms are not consistent with discomfort from lipomas  She understands and desires to proceed with elective removal       Pertinent images and available reads personally reviewed   I reviewed the ultrasound images as well as ultrasound report from 4 January 2022  Pertinent notes reviewed   reviewed the PCP note by Jasper Dowd dated 29 December 2021   I read Dr Nomi Barker note dated  8 November 2021  I read the physical therapy note dated 8 November 2021    Plan:  Excision of subcutaneous lipoma right posterior back  2 are in close proximity    Chief Complaint:  Lipoma of the right back    HPI    Patient is a 66-year-old woman who has chronic posterior right thoracic/ "rib" pain  She states the pain started approximately November 2021  It is a constant, "burning", "stabbing", pain located in the right mid back  It is worsened with certain movements  It is also associated with tingling and paresthesias  Patient denies any mechanism of injury  She has had x-rays as well as physical therapy consultation  Physical therapy  evaluation considered this to be most consistent with costochondritis  She saw her PCP, PATO Love, last month who felt a subcutaneous mass an ultrasound was ordered  Ultrasound was performed on 4 January and revealed:   FINDINGS:  An oblong echogenic measuring 2 3 x 0 5 x 1 3 cm mass in the region of palpable finding demonstrates sonographic characteristics typical of lipoma  A 2nd oblong echogenic mass measuring 3 0 x 0 8 x 4 5 cm also demonstrates sonographic   characteristics typical of lipoma  IMPRESSION:  Lipomata in the subcutaneous tissues of the back as described above    The largest 2 of these appear to correspond to the area of palpable finding      PMH:  Past Medical History:   Diagnosis Date    Abnormal Pap smear of cervix     Anxiety     BRCA1 negative     BRCA2 negative     Breast injury     Pt states scar tissue around nipple from breastfeeding    Depression     HPV (human papilloma virus) infection     Kidney stone     H/O PYELONEPHRITIS    Migraine     Seasonal allergies     Sleep difficulties     Varicella     POS HX       PSH:  Past Surgical History:   Procedure Laterality Date    CHOLECYSTECTOMY      FINGER SURGERY      left hand     GYNECOLOGIC CRYOSURGERY      HAND SURGERY Left     Phelebitis in left hand  post IV infilitration, had sx for clot removal     LYMPH NODE BIOPSY      of neck    LYMPH NODE DISSECTION Right     cervical    [place]  DELIVERY ONLY N/A 2018    Procedure:  SECTION (); Surgeon: Consuelo Swanson MD;  Location: [facility];  Service: Obstetrics       Home Meds:  Current Outpatient Medications on File Prior to Visit   Medication Sig Dispense Refill    benzonatate (TESSALON) 200 MG capsule Take 1 capsule (200 mg total) by mouth 3 (three) times a day as needed for cough (Patient not taking: Reported on 2021) 20 capsule 0    busPIRone (BUSPAR) 15 mg tablet TAKE 1 TABLET BY MOUTH 3 TIMES A DAY   90 tablet 2    cetirizine (ZyrTEC) 10 mg tablet Take 1 tablet (10 mg total) by mouth daily  0    cloNIDine (CATAPRES) 0 1 mg tablet TAKE 1 TAB BY MOUTH IN THE MORNING, 1 TAB BY MOUTH IN THE AFTERNOON AND 2 TABS BY MOUTH AT BEDTIME 120 tablet 2    cyclobenzaprine (FLEXERIL) 10 mg tablet Take 1 tablet (10 mg total) by mouth daily at bedtime for 21 days 21 tablet 0    escitalopram (LEXAPRO) 20 mg tablet Take 1 tablet (20 mg total) by mouth daily 90 tablet 0    fluticasone (FLONASE) 50 mcg/act nasal spray 1 spray into each nostril daily      hydrOXYzine HCL (ATARAX) 50 mg tablet TAKE 1/2 TO 1 TAB BY MOUTH EVERY 8 HOURS AS NEEDED FOR ANXIETY 60 tablet 0    meclizine (ANTIVERT) 25 mg tablet Take 1 tablet (25 mg total) by mouth 3 (three) times a day as needed for dizziness 15 tablet 0    Melatonin ER 10 MG TBCR Take 1 tablet (10 mg total) by mouth daily      omeprazole (PriLOSEC) 20 mg delayed release capsule Take 1 capsule (20 mg total) by mouth daily 21 capsule 0    ondansetron (ZOFRAN) 4 mg tablet Take 1 tablet (4 mg total) by mouth every 8 (eight) hours as needed for nausea or vomiting 30 tablet 0    SUMAtriptan (IMITREX) 100 mg tablet Take 1 tablet (100 mg total) by mouth once as needed for migraine for up to 1 dose 12 tablet 0    therapeutic multivitamin-minerals (THERAGRAN-M) tablet Take 1 tablet by mouth daily       No current facility-administered medications on file prior to visit         Allergies:  No Known Allergies    Social Hx:  Social History     Socioeconomic History    Marital status: /Civil Union     Spouse name: Delfina Cedillo Number of children: 2    Years of education: Not on file    Highest education level: Associate degree: academic program   Occupational History    Occupation: unemployed   Tobacco Use    Smoking status: Former Smoker     Packs/day: 0 50     Years: 3 00     Pack years: 1 50     Types: Cigarettes     Quit date:      Years since quittin 0    Smokeless tobacco: Never Used   Vaping Use    Vaping Use: Never used   Substance and Sexual Activity    Alcohol use: Not Currently    Drug use: Never    Sexual activity: Yes     Partners: Male     Birth control/protection: None   Other Topics Concern    Not on file   Social History Narrative    Not on file     Social Determinants of Health     Financial Resource Strain: Low Risk     Difficulty of Paying Living Expenses: Not very hard   Food Insecurity: No Food Insecurity    Worried About Running Out of Food in the Last Year: Never true    Michelle of Food in the Last Year: Never true   Transportation Needs: No Transportation Needs    Lack of Transportation (Medical): No    Lack of Transportation (Non-Medical): No   Physical Activity: Insufficiently Active    Days of Exercise per Week: 4 days    Minutes of Exercise per Session: 20 min   Stress: Not on file   Social Connections: Moderately Isolated    Frequency of Communication with Friends and Family: More than three times a week    Frequency of Social Gatherings with Friends and Family: Never    Attends [employer] Services: Never    Active Member of Clubs or Organizations: No    Attends Club or Organization Meetings: Never    Marital Status:    Intimate Partner Violence: Not At Risk    Fear of Current or Ex-Partner: No    Emotionally Abused: No    Physically Abused: No    Sexually Abused: No   Housing Stability: Not on file        Family Hx:    Family History   Problem Relation Age of Onset    Hypertension Mother     Heart disease Mother     Depression Mother     Coronary artery disease Mother     Hypertension Father     Hyperlipidemia Father     Heart disease Father         MI    Stroke Paternal Grandmother     Cancer Paternal Grandmother         breast    Breast cancer Paternal Grandmother     Cancer Paternal Aunt         colon    Colon cancer Paternal Aunt     Early death Maternal Uncle     Cancer Maternal Uncle         lymphoma    Early death Maternal Uncle         lymphoma    Birth defects Maternal Uncle     Undescended testes Son     Cystic fibrosis Other     Cystic fibrosis Other     Heart failure Maternal Grandmother     Stroke Maternal Grandfather     Alcohol abuse Maternal Grandfather     Alcohol abuse Brother     Drug abuse Brother          of overdose 2020    Anxiety disorder Sister     Post-traumatic stress disorder Sister     Developmental delay Son     Macrocephaly Son     Hydrocephalus Son     Breast cancer Other          Review of Systems   Constitutional: Negative for chills and fever     HENT: Negative for ear pain and sore throat  Eyes: Negative for pain and visual disturbance  Respiratory: Negative  Negative for cough and shortness of breath  Cardiovascular: Negative  Negative for chest pain and palpitations  Gastrointestinal: Negative  Negative for abdominal pain and vomiting  Genitourinary: Negative for dysuria and hematuria  Musculoskeletal: Positive for back pain (right sided pain radiates to the anterior ribs on the right)  Negative for arthralgias  Skin: Negative for color change and rash  Neurological: Negative  Negative for seizures and syncope  Hematological: Negative  Psychiatric/Behavioral:         Anxiety and PTSD documented in history   All other systems reviewed and are negative  LMP 12/20/2021 (Exact Date)     Physical Exam  Vitals reviewed  Constitutional:       General: She is not in acute distress  Appearance: She is obese  HENT:      Head: Normocephalic  Mouth/Throat:      Pharynx: Oropharynx is clear  Eyes:      Pupils: Pupils are equal, round, and reactive to light  Cardiovascular:      Rate and Rhythm: Normal rate and regular rhythm  Pulmonary:      Effort: Pulmonary effort is normal  No respiratory distress  Breath sounds: Normal breath sounds  Abdominal:      General: There is no distension  Palpations: Abdomen is soft  Musculoskeletal:         General: Normal range of motion  Arms:       Cervical back: Normal range of motion and neck supple  Lymphadenopathy:      Cervical: No cervical adenopathy  Skin:     General: Skin is warm and dry  Neurological:      General: No focal deficit present  Mental Status: She is alert and oriented to person, place, and time           Pertinent labs reviewed    Pertinent images and available reads personally reviewed   I reviewed the ultrasound images as well as ultrasound report from 4 January 2022  Pertinent notes reviewed   reviewed the PCP note by Ariane Klein dated 29 December 2021   I read Dr Bean Lindo note dated  8 November 2021      I read the physical therapy note dated 8 November 2021     Shree Kamara MD [address] [facility]  [phone]

## 2022-01-18 NOTE — H&P
[facility] History and Physical Note:    Assessment:  Small subcutaneous lipoma right back which is symptomatic  I explained to the patient that some, or all, of her pain symptoms could persist after removal and that some of her symptoms are not consistent with discomfort from lipomas  She understands and desires to proceed with elective removal       Pertinent images and available reads personally reviewed   I reviewed the ultrasound images as well as ultrasound report from 4 January 2022  Pertinent notes reviewed   reviewed the PCP note by Jaden Dowling dated 29 December 2021   I read Dr Charanjit Harris note dated  8 November 2021  I read the physical therapy note dated 8 November 2021    Plan:  Excision of subcutaneous lipoma right posterior back  2 are in close proximity    Chief Complaint:  Lipoma of the right back    HPI    Patient is a 51-year-old woman who has chronic posterior right thoracic/ "rib" pain  She states the pain started approximately November 2021  It is a constant, "burning", "stabbing", pain located in the right mid back  It is worsened with certain movements  It is also associated with tingling and paresthesias  Patient denies any mechanism of injury  She has had x-rays as well as physical therapy consultation  Physical therapy  evaluation considered this to be most consistent with costochondritis  She saw her PCP, PATO Wynn, last month who felt a subcutaneous mass an ultrasound was ordered  Ultrasound was performed on 4 January and revealed:   FINDINGS:  An oblong echogenic measuring 2 3 x 0 5 x 1 3 cm mass in the region of palpable finding demonstrates sonographic characteristics typical of lipoma  A 2nd oblong echogenic mass measuring 3 0 x 0 8 x 4 5 cm also demonstrates sonographic   characteristics typical of lipoma  IMPRESSION:  Lipomata in the subcutaneous tissues of the back as described above    The largest 2 of these appear to correspond to the area of palpable finding      PMH:  Past Medical History:   Diagnosis Date    Abnormal Pap smear of cervix     Anxiety     BRCA1 negative     BRCA2 negative     Breast injury     Pt states scar tissue around nipple from breastfeeding    Depression     HPV (human papilloma virus) infection     Kidney stone     H/O PYELONEPHRITIS    Migraine     Seasonal allergies     Sleep difficulties     Varicella     POS HX       PSH:  Past Surgical History:   Procedure Laterality Date    CHOLECYSTECTOMY      FINGER SURGERY      left hand     GYNECOLOGIC CRYOSURGERY      HAND SURGERY Left     Phelebitis in left hand  post IV infilitration, had sx for clot removal     LYMPH NODE BIOPSY      of neck    LYMPH NODE DISSECTION Right     cervical    [place]  DELIVERY ONLY N/A 2018    Procedure:  SECTION (); Surgeon: Juan Swann MD;  Location: [facility];  Service: Obstetrics       Home Meds:  Current Outpatient Medications on File Prior to Visit   Medication Sig Dispense Refill    benzonatate (TESSALON) 200 MG capsule Take 1 capsule (200 mg total) by mouth 3 (three) times a day as needed for cough (Patient not taking: Reported on 2021) 20 capsule 0    busPIRone (BUSPAR) 15 mg tablet TAKE 1 TABLET BY MOUTH 3 TIMES A DAY   90 tablet 2    cetirizine (ZyrTEC) 10 mg tablet Take 1 tablet (10 mg total) by mouth daily  0    cloNIDine (CATAPRES) 0 1 mg tablet TAKE 1 TAB BY MOUTH IN THE MORNING, 1 TAB BY MOUTH IN THE AFTERNOON AND 2 TABS BY MOUTH AT BEDTIME 120 tablet 2    cyclobenzaprine (FLEXERIL) 10 mg tablet Take 1 tablet (10 mg total) by mouth daily at bedtime for 21 days 21 tablet 0    escitalopram (LEXAPRO) 20 mg tablet Take 1 tablet (20 mg total) by mouth daily 90 tablet 0    fluticasone (FLONASE) 50 mcg/act nasal spray 1 spray into each nostril daily      hydrOXYzine HCL (ATARAX) 50 mg tablet TAKE 1/2 TO 1 TAB BY MOUTH EVERY 8 HOURS AS NEEDED FOR ANXIETY 60 tablet 0    meclizine (ANTIVERT) 25 mg tablet Take 1 tablet (25 mg total) by mouth 3 (three) times a day as needed for dizziness 15 tablet 0    Melatonin ER 10 MG TBCR Take 1 tablet (10 mg total) by mouth daily      omeprazole (PriLOSEC) 20 mg delayed release capsule Take 1 capsule (20 mg total) by mouth daily 21 capsule 0    ondansetron (ZOFRAN) 4 mg tablet Take 1 tablet (4 mg total) by mouth every 8 (eight) hours as needed for nausea or vomiting 30 tablet 0    SUMAtriptan (IMITREX) 100 mg tablet Take 1 tablet (100 mg total) by mouth once as needed for migraine for up to 1 dose 12 tablet 0    therapeutic multivitamin-minerals (THERAGRAN-M) tablet Take 1 tablet by mouth daily       No current facility-administered medications on file prior to visit         Allergies:  No Known Allergies    Social Hx:  Social History     Socioeconomic History    Marital status: /Civil Union     Spouse name: Godwin Stringer Number of children: 2    Years of education: Not on file    Highest education level: Associate degree: academic program   Occupational History    Occupation: unemployed   Tobacco Use    Smoking status: Former Smoker     Packs/day: 0 50     Years: 3 00     Pack years: 1 50     Types: Cigarettes     Quit date:      Years since quittin 0    Smokeless tobacco: Never Used   Vaping Use    Vaping Use: Never used   Substance and Sexual Activity    Alcohol use: Not Currently    Drug use: Never    Sexual activity: Yes     Partners: Male     Birth control/protection: None   Other Topics Concern    Not on file   Social History Narrative    Not on file     Social Determinants of Health     Financial Resource Strain: Low Risk     Difficulty of Paying Living Expenses: Not very hard   Food Insecurity: No Food Insecurity    Worried About Running Out of Food in the Last Year: Never true    Michelle of Food in the Last Year: Never true   Transportation Needs: No Transportation Needs    Lack of Transportation (Medical): No    Lack of Transportation (Non-Medical): No   Physical Activity: Insufficiently Active    Days of Exercise per Week: 4 days    Minutes of Exercise per Session: 20 min   Stress: Not on file   Social Connections: Moderately Isolated    Frequency of Communication with Friends and Family: More than three times a week    Frequency of Social Gatherings with Friends and Family: Never    Attends Shinto Services: Never    Active Member of Clubs or Organizations: No    Attends Club or Organization Meetings: Never    Marital Status:    Intimate Partner Violence: Not At Risk    Fear of Current or Ex-Partner: No    Emotionally Abused: No    Physically Abused: No    Sexually Abused: No   Housing Stability: Not on file        Family Hx:    Family History   Problem Relation Age of Onset    Hypertension Mother     Heart disease Mother     Depression Mother     Coronary artery disease Mother     Hypertension Father     Hyperlipidemia Father     Heart disease Father         MI    Stroke Paternal Grandmother     Cancer Paternal Grandmother         breast    Breast cancer Paternal Grandmother     Cancer Paternal Aunt         colon    Colon cancer Paternal Aunt     Early death Maternal Uncle     Cancer Maternal Uncle         lymphoma    Early death Maternal Uncle         lymphoma    Birth defects Maternal Uncle     Undescended testes Son     Cystic fibrosis Other     Cystic fibrosis Other     Heart failure Maternal Grandmother     Stroke Maternal Grandfather     Alcohol abuse Maternal Grandfather     Alcohol abuse Brother     Drug abuse Brother          of overdose 2020    Anxiety disorder Sister     Post-traumatic stress disorder Sister     Developmental delay Son     Macrocephaly Son     Hydrocephalus Son     Breast cancer Other          Review of Systems   Constitutional: Negative for chills and fever     HENT: Negative for ear pain and sore throat  Eyes: Negative for pain and visual disturbance  Respiratory: Negative  Negative for cough and shortness of breath  Cardiovascular: Negative  Negative for chest pain and palpitations  Gastrointestinal: Negative  Negative for abdominal pain and vomiting  Genitourinary: Negative for dysuria and hematuria  Musculoskeletal: Positive for back pain (right sided pain radiates to the anterior ribs on the right)  Negative for arthralgias  Skin: Negative for color change and rash  Neurological: Negative  Negative for seizures and syncope  Hematological: Negative  Psychiatric/Behavioral:         Anxiety and PTSD documented in history   All other systems reviewed and are negative  LMP 12/20/2021 (Exact Date)     Physical Exam  Vitals reviewed  Constitutional:       General: She is not in acute distress  Appearance: She is obese  HENT:      Head: Normocephalic  Mouth/Throat:      Pharynx: Oropharynx is clear  Eyes:      Pupils: Pupils are equal, round, and reactive to light  Cardiovascular:      Rate and Rhythm: Normal rate and regular rhythm  Pulmonary:      Effort: Pulmonary effort is normal  No respiratory distress  Breath sounds: Normal breath sounds  Abdominal:      General: There is no distension  Palpations: Abdomen is soft  Musculoskeletal:         General: Normal range of motion  Arms:       Cervical back: Normal range of motion and neck supple  Lymphadenopathy:      Cervical: No cervical adenopathy  Skin:     General: Skin is warm and dry  Neurological:      General: No focal deficit present  Mental Status: She is alert and oriented to person, place, and time           Pertinent labs reviewed    Pertinent images and available reads personally reviewed   I reviewed the ultrasound images as well as ultrasound report from 4 January 2022  Pertinent notes reviewed   reviewed the PCP note by Laverne Boeck dated 29 December 2021   I read Dr Nomi Barker note dated  8 November 2021      I read the physical therapy note dated 8 November 2021     Charlene Guerrero MD [address] [facility]  [phone]

## 2022-01-18 NOTE — PROGRESS NOTES
[facility] History and Physical Note:    Assessment:  Small subcutaneous lipoma right back which is symptomatic  I explained to the patient that some, or all, of her pain symptoms could persist after removal and that some of her symptoms are not consistent with discomfort from lipomas  She understands and desires to proceed with elective removal       Pertinent images and available reads personally reviewed   I reviewed the ultrasound images as well as ultrasound report from 4 January 2022  Pertinent notes reviewed   reviewed the PCP note by Cintia Aguilera dated 29 December 2021   I read Dr Victorino Herrera note dated  8 November 2021  I read the physical therapy note dated 8 November 2021    Plan:  Excision of subcutaneous lipoma right posterior back  2 are in close proximity    Chief Complaint:  Lipoma of the right back    HPI    Patient is a 59-year-old woman who has chronic posterior right thoracic/ "rib" pain  She states the pain started approximately November 2021  It is a constant, "burning", "stabbing", pain located in the right mid back  It is worsened with certain movements  It is also associated with tingling and paresthesias  Patient denies any mechanism of injury  She has had x-rays as well as physical therapy consultation  Physical therapy  evaluation considered this to be most consistent with costochondritis  She saw her PCP, CR[facility], last month who felt a subcutaneous mass an ultrasound was ordered  Ultrasound was performed on 4 January and revealed:   FINDINGS:  An oblong echogenic measuring 2 3 x 0 5 x 1 3 cm mass in the region of palpable finding demonstrates sonographic characteristics typical of lipoma  A 2nd oblong echogenic mass measuring 3 0 x 0 8 x 4 5 cm also demonstrates sonographic   characteristics typical of lipoma  IMPRESSION:  Lipomata in the subcutaneous tissues of the back as described above    The largest 2 of these appear to correspond to the area of palpable finding      PMH:  Past Medical History:   Diagnosis Date    Abnormal Pap smear of cervix     Anxiety     BRCA1 negative     BRCA2 negative     Breast injury     Pt states scar tissue around nipple from breastfeeding    Depression     HPV (human papilloma virus) infection     Kidney stone     H/O PYELONEPHRITIS    Migraine     Seasonal allergies     Sleep difficulties     Varicella     POS HX       PSH:  Past Surgical History:   Procedure Laterality Date    CHOLECYSTECTOMY      FINGER SURGERY      left hand     GYNECOLOGIC CRYOSURGERY      HAND SURGERY Left     Phelebitis in left hand  post IV infilitration, had sx for clot removal     LYMPH NODE BIOPSY      of neck    LYMPH NODE DISSECTION Right     cervical    RI  DELIVERY ONLY N/A 2018    Procedure:  SECTION (); Surgeon: Nishi Boss MD;  Location: [facility];  Service: Obstetrics       Home Meds:  Current Outpatient Medications on File Prior to Visit   Medication Sig Dispense Refill    benzonatate (TESSALON) 200 MG capsule Take 1 capsule (200 mg total) by mouth 3 (three) times a day as needed for cough (Patient not taking: Reported on 2021) 20 capsule 0    busPIRone (BUSPAR) 15 mg tablet TAKE 1 TABLET BY MOUTH 3 TIMES A DAY   90 tablet 2    cetirizine (ZyrTEC) 10 mg tablet Take 1 tablet (10 mg total) by mouth daily  0    cloNIDine (CATAPRES) 0 1 mg tablet TAKE 1 TAB BY MOUTH IN THE MORNING, 1 TAB BY MOUTH IN THE AFTERNOON AND 2 TABS BY MOUTH AT BEDTIME 120 tablet 2    cyclobenzaprine (FLEXERIL) 10 mg tablet Take 1 tablet (10 mg total) by mouth daily at bedtime for 21 days 21 tablet 0    escitalopram (LEXAPRO) 20 mg tablet Take 1 tablet (20 mg total) by mouth daily 90 tablet 0    fluticasone (FLONASE) 50 mcg/act nasal spray 1 spray into each nostril daily      hydrOXYzine HCL (ATARAX) 50 mg tablet TAKE 1/2 TO 1 TAB BY MOUTH EVERY 8 HOURS AS NEEDED FOR ANXIETY 60 tablet 0    meclizine (ANTIVERT) 25 mg tablet Take 1 tablet (25 mg total) by mouth 3 (three) times a day as needed for dizziness 15 tablet 0    Melatonin ER 10 MG TBCR Take 1 tablet (10 mg total) by mouth daily      omeprazole (PriLOSEC) 20 mg delayed release capsule Take 1 capsule (20 mg total) by mouth daily 21 capsule 0    ondansetron (ZOFRAN) 4 mg tablet Take 1 tablet (4 mg total) by mouth every 8 (eight) hours as needed for nausea or vomiting 30 tablet 0    SUMAtriptan (IMITREX) 100 mg tablet Take 1 tablet (100 mg total) by mouth once as needed for migraine for up to 1 dose 12 tablet 0    therapeutic multivitamin-minerals (THERAGRAN-M) tablet Take 1 tablet by mouth daily       No current facility-administered medications on file prior to visit         Allergies:  No Known Allergies    Social Hx:  Social History     Socioeconomic History    Marital status: /Civil Union     Spouse name: Donato aHrt Number of children: 2    Years of education: Not on file    Highest education level: Associate degree: academic program   Occupational History    Occupation: unemployed   Tobacco Use    Smoking status: Former Smoker     Packs/day: 0 50     Years: 3 00     Pack years: 1 50     Types: Cigarettes     Quit date:      Years since quittin 0    Smokeless tobacco: Never Used   Vaping Use    Vaping Use: Never used   Substance and Sexual Activity    Alcohol use: Not Currently    Drug use: Never    Sexual activity: Yes     Partners: Male     Birth control/protection: None   Other Topics Concern    Not on file   Social History Narrative    Not on file     Social Determinants of Health     Financial Resource Strain: Low Risk     Difficulty of Paying Living Expenses: Not very hard   Food Insecurity: No Food Insecurity    Worried About Running Out of Food in the Last Year: Never true    Michelle of Food in the Last Year: Never true   Transportation Needs: No Transportation Needs    Lack of Transportation (Medical): No    Lack of Transportation (Non-Medical): No   Physical Activity: Insufficiently Active    Days of Exercise per Week: 4 days    Minutes of Exercise per Session: 20 min   Stress: Not on file   Social Connections: Moderately Isolated    Frequency of Communication with Friends and Family: More than three times a week    Frequency of Social Gatherings with Friends and Family: Never    Attends Buddhist Services: Never    Active Member of Clubs or Organizations: No    Attends Club or Organization Meetings: Never    Marital Status:    Intimate Partner Violence: Not At Risk    Fear of Current or Ex-Partner: No    Emotionally Abused: No    Physically Abused: No    Sexually Abused: No   Housing Stability: Not on file        Family Hx:    Family History   Problem Relation Age of Onset    Hypertension Mother     Heart disease Mother     Depression Mother     Coronary artery disease Mother     Hypertension Father     Hyperlipidemia Father     Heart disease Father         MI    Stroke Paternal Grandmother     Cancer Paternal Grandmother         breast    Breast cancer Paternal Grandmother     Cancer Paternal Aunt         colon    Colon cancer Paternal Aunt     Early death Maternal Uncle     Cancer Maternal Uncle         lymphoma    Early death Maternal Uncle         lymphoma    Birth defects Maternal Uncle     Undescended testes Son     Cystic fibrosis Other     Cystic fibrosis Other     Heart failure Maternal Grandmother     Stroke Maternal Grandfather     Alcohol abuse Maternal Grandfather     Alcohol abuse Brother     Drug abuse Brother          of overdose 2020    Anxiety disorder Sister     Post-traumatic stress disorder Sister     Developmental delay Son     Macrocephaly Son     Hydrocephalus Son     Breast cancer Other          Review of Systems   Constitutional: Negative for chills and fever     HENT: Negative for ear pain and sore throat  Eyes: Negative for pain and visual disturbance  Respiratory: Negative  Negative for cough and shortness of breath  Cardiovascular: Negative  Negative for chest pain and palpitations  Gastrointestinal: Negative  Negative for abdominal pain and vomiting  Genitourinary: Negative for dysuria and hematuria  Musculoskeletal: Positive for back pain (right sided pain radiates to the anterior ribs on the right)  Negative for arthralgias  Skin: Negative for color change and rash  Neurological: Negative  Negative for seizures and syncope  Hematological: Negative  Psychiatric/Behavioral:         Anxiety and PTSD documented in history   All other systems reviewed and are negative  LMP 12/20/2021 (Exact Date)     Physical Exam  Vitals reviewed  Constitutional:       General: She is not in acute distress  Appearance: She is obese  HENT:      Head: Normocephalic  Mouth/Throat:      Pharynx: Oropharynx is clear  Eyes:      Pupils: Pupils are equal, round, and reactive to light  Cardiovascular:      Rate and Rhythm: Normal rate and regular rhythm  Pulmonary:      Effort: Pulmonary effort is normal  No respiratory distress  Breath sounds: Normal breath sounds  Abdominal:      General: There is no distension  Palpations: Abdomen is soft  Musculoskeletal:         General: Normal range of motion  Arms:       Cervical back: Normal range of motion and neck supple  Lymphadenopathy:      Cervical: No cervical adenopathy  Skin:     General: Skin is warm and dry  Neurological:      General: No focal deficit present  Mental Status: She is alert and oriented to person, place, and time           Pertinent labs reviewed    Pertinent images and available reads personally reviewed   I reviewed the ultrasound images as well as ultrasound report from 4 January 2022  Pertinent notes reviewed   reviewed the PCP note by Ariella Guallpa dated 29 December 2021   I read Dr Rigoberto Srivastava note dated  8 November 2021      I read the physical therapy note dated 8 November 2021     Remi Lesches, MD [id number] [facility]  [phone]

## 2022-01-20 NOTE — PRE-PROCEDURE INSTRUCTIONS
My Surgical Experience    The following information was developed to assist you to prepare for your operation  What do I need to do before coming to the hospital?   Arrange for a responsible person to drive you to and from the hospital    Arrange care for your children at home  Children are not allowed in the recovery areas of the hospital   Plan to wear clothing that is easy to put on and take off  If you are having shoulder surgery, wear a shirt that buttons or zippers in the front  Bathing  o Shower the evening before and the morning of your surgery with an antibacterial soap  Please refer to the Pre Op Showering Instructions for Surgery Patients Sheet   o Remove nail polish and all body piercing jewelry  o Do not shave any body part for at least 24 hours before surgery-this includes face, arms, legs and upper body  Food  o Nothing to eat or drink after midnight the night before your surgery  This includes candy and chewing gum  o Exception: If your surgery is after 12:00pm (noon), you may have clear liquids such as 7-Up®, ginger ale, apple or cranberry juice, Jell-O®, water, or clear broth until 8:00 am  o Do not drink milk or juice with pulp on the morning before surgery  o Do not drink alcohol 24 hours before surgery  Medicine  o Follow instructions you received from your surgeon about which medicines you may take on the day of surgery  o If instructed to take medicine on the morning of surgery, take pills with just a small sip of water  Call your prescribing doctor for specific infroamtion on what to do if you take insulin    What should I bring to the hospital?    Bring:  Patience Golden or a walker, if you have them, for foot or knee surgery   A list of the daily medicines, vitamins, minerals, herbals and nutritional supplements you take   Include the dosages of medicines and the time you take them each day   Glasses, dentures or hearing aids   Minimal clothing; you will be wearing hospital sleepwear   Photo ID; required to verify your identity   If you have a Living Will or Power of , bring a copy of the documents   If you have an ostomy, bring an extra pouch and any supplies you use    Do not bring   Medicines or inhalers   Money, valuables or jewelry    What other information should I know about the day of surgery?  Notify your surgeons if you develop a cold, sore throat, cough, fever, rash or any other illness   Report to the Ambulatory Surgical/Same Day Surgery Unit   You will be instructed to stop at Registration only if you have not been pre-registered   Inform your  fi they do not stay that they will be asked by the staff to leave a phone number where they can be reached   Be available to be reached before surgery  In the event the operating room schedule changes, you may be asked to come in earlier or later than expected    *It is important to tell your doctor and others involved in your health care if you are taking or have been taking any non-prescription drugs, vitamins, minerals, herbals or other nutritional supplements  Any of these may interact with some food or medicines and cause a reaction      Pre-Surgery Instructions:   Medication Instructions    busPIRone (BUSPAR) 15 mg tablet Instructed patient per Anesthesia Guidelines   cetirizine (ZyrTEC) 10 mg tablet Instructed patient per Anesthesia Guidelines   cloNIDine (CATAPRES) 0 1 mg tablet Instructed patient per Anesthesia Guidelines   cyclobenzaprine (FLEXERIL) 10 mg tablet Instructed patient per Anesthesia Guidelines   escitalopram (LEXAPRO) 20 mg tablet Instructed patient per Anesthesia Guidelines   fluticasone (FLONASE) 50 mcg/act nasal spray Instructed patient per Anesthesia Guidelines   hydrOXYzine HCL (ATARAX) 50 mg tablet Instructed patient per Anesthesia Guidelines   meclizine (ANTIVERT) 25 mg tablet Instructed patient per Anesthesia Guidelines      Melatonin ER 10 MG TBCR Instructed patient per Anesthesia Guidelines   omeprazole (PriLOSEC) 20 mg delayed release capsule Instructed patient per Anesthesia Guidelines   ondansetron (ZOFRAN) 4 mg tablet Instructed patient per Anesthesia Guidelines   SUMAtriptan (IMITREX) 100 mg tablet Instructed patient per Anesthesia Guidelines   therapeutic multivitamin-minerals (THERAGRAN-M) tablet Instructed patient per Anesthesia Guidelines  To take buspar, zyrtec, clonidine, lexapro, atarax, omeprazole and flonase nasal spray a m  of surgery

## 2022-01-25 ENCOUNTER — ANESTHESIA EVENT (OUTPATIENT)
Dept: PERIOP | Facility: HOSPITAL | Age: 37
End: 2022-01-25
Payer: COMMERCIAL

## 2022-01-26 ENCOUNTER — HOSPITAL ENCOUNTER (OUTPATIENT)
Facility: HOSPITAL | Age: 37
Setting detail: OUTPATIENT SURGERY
Discharge: HOME | End: 2022-01-26
Attending: SURGERY | Admitting: SURGERY
Payer: COMMERCIAL

## 2022-01-26 ENCOUNTER — ANESTHESIA (OUTPATIENT)
Dept: PERIOP | Facility: HOSPITAL | Age: 37
End: 2022-01-26
Payer: COMMERCIAL

## 2022-01-26 VITALS
BODY MASS INDEX: 40.15 KG/M2 | WEIGHT: 218.2 LBS | SYSTOLIC BLOOD PRESSURE: 115 MMHG | OXYGEN SATURATION: 96 % | DIASTOLIC BLOOD PRESSURE: 64 MMHG | TEMPERATURE: 96.9 F | RESPIRATION RATE: 18 BRPM | HEIGHT: 62 IN | HEART RATE: 95 BPM

## 2022-01-26 DIAGNOSIS — D17.1 LIPOMA OF BACK: ICD-10-CM

## 2022-01-26 DIAGNOSIS — G89.18 POSTOPERATIVE PAIN: Primary | ICD-10-CM

## 2022-01-26 LAB
EXT PREGNANCY TEST URINE: NEGATIVE
EXT. CONTROL: NORMAL

## 2022-01-26 PROCEDURE — 88304 TISSUE EXAM BY PATHOLOGIST: CPT | Performed by: PATHOLOGY

## 2022-01-26 PROCEDURE — 21931 EXC BACK LES SC 3 CM/>: CPT | Performed by: SURGERY

## 2022-01-26 PROCEDURE — 81025 URINE PREGNANCY TEST: CPT | Performed by: ANESTHESIOLOGY

## 2022-01-26 RX ORDER — FENTANYL CITRATE/PF 50 MCG/ML
50 SYRINGE (ML) INJECTION
Status: DISCONTINUED | OUTPATIENT
Start: 2022-01-26 | End: 2022-01-26 | Stop reason: HOSPADM

## 2022-01-26 RX ORDER — PROPOFOL 10 MG/ML
INJECTION, EMULSION INTRAVENOUS CONTINUOUS PRN
Status: DISCONTINUED | OUTPATIENT
Start: 2022-01-26 | End: 2022-01-26

## 2022-01-26 RX ORDER — MIDAZOLAM HYDROCHLORIDE 2 MG/2ML
INJECTION, SOLUTION INTRAMUSCULAR; INTRAVENOUS AS NEEDED
Status: DISCONTINUED | OUTPATIENT
Start: 2022-01-26 | End: 2022-01-26

## 2022-01-26 RX ORDER — PROPOFOL 10 MG/ML
INJECTION, EMULSION INTRAVENOUS AS NEEDED
Status: DISCONTINUED | OUTPATIENT
Start: 2022-01-26 | End: 2022-01-26

## 2022-01-26 RX ORDER — OXYCODONE HYDROCHLORIDE AND ACETAMINOPHEN 5; 325 MG/1; MG/1
1 TABLET ORAL EVERY 4 HOURS PRN
Qty: 8 TABLET | Refills: 0 | Status: SHIPPED | OUTPATIENT
Start: 2022-01-26 | End: 2022-02-03

## 2022-01-26 RX ORDER — BUPIVACAINE HYDROCHLORIDE AND EPINEPHRINE 5; 5 MG/ML; UG/ML
INJECTION, SOLUTION EPIDURAL; INTRACAUDAL; PERINEURAL AS NEEDED
Status: DISCONTINUED | OUTPATIENT
Start: 2022-01-26 | End: 2022-01-26 | Stop reason: HOSPADM

## 2022-01-26 RX ORDER — SODIUM CHLORIDE, SODIUM LACTATE, POTASSIUM CHLORIDE, CALCIUM CHLORIDE 600; 310; 30; 20 MG/100ML; MG/100ML; MG/100ML; MG/100ML
125 INJECTION, SOLUTION INTRAVENOUS CONTINUOUS
Status: DISCONTINUED | OUTPATIENT
Start: 2022-01-26 | End: 2022-01-26 | Stop reason: HOSPADM

## 2022-01-26 RX ORDER — SODIUM CHLORIDE, SODIUM LACTATE, POTASSIUM CHLORIDE, CALCIUM CHLORIDE 600; 310; 30; 20 MG/100ML; MG/100ML; MG/100ML; MG/100ML
100 INJECTION, SOLUTION INTRAVENOUS CONTINUOUS
Status: CANCELLED | OUTPATIENT
Start: 2022-01-26

## 2022-01-26 RX ORDER — DEXAMETHASONE SODIUM PHOSPHATE 10 MG/ML
INJECTION, SOLUTION INTRAMUSCULAR; INTRAVENOUS AS NEEDED
Status: DISCONTINUED | OUTPATIENT
Start: 2022-01-26 | End: 2022-01-26

## 2022-01-26 RX ORDER — LIDOCAINE HYDROCHLORIDE AND EPINEPHRINE 10; 10 MG/ML; UG/ML
INJECTION, SOLUTION INFILTRATION; PERINEURAL AS NEEDED
Status: DISCONTINUED | OUTPATIENT
Start: 2022-01-26 | End: 2022-01-26 | Stop reason: HOSPADM

## 2022-01-26 RX ORDER — ONDANSETRON 2 MG/ML
4 INJECTION INTRAMUSCULAR; INTRAVENOUS ONCE AS NEEDED
Status: DISCONTINUED | OUTPATIENT
Start: 2022-01-26 | End: 2022-01-26 | Stop reason: HOSPADM

## 2022-01-26 RX ORDER — CEFAZOLIN SODIUM 2 G/50ML
2000 SOLUTION INTRAVENOUS ONCE
Status: DISCONTINUED | OUTPATIENT
Start: 2022-01-26 | End: 2022-01-26 | Stop reason: HOSPADM

## 2022-01-26 RX ORDER — ONDANSETRON 2 MG/ML
INJECTION INTRAMUSCULAR; INTRAVENOUS AS NEEDED
Status: DISCONTINUED | OUTPATIENT
Start: 2022-01-26 | End: 2022-01-26

## 2022-01-26 RX ADMIN — PROPOFOL 50 MG: 10 INJECTION, EMULSION INTRAVENOUS at 10:16

## 2022-01-26 RX ADMIN — MIDAZOLAM 2 MG: 1 INJECTION INTRAMUSCULAR; INTRAVENOUS at 10:08

## 2022-01-26 RX ADMIN — PROPOFOL 100 MCG/KG/MIN: 10 INJECTION, EMULSION INTRAVENOUS at 10:16

## 2022-01-26 RX ADMIN — DEXAMETHASONE SODIUM PHOSPHATE 8 MG: 10 INJECTION, SOLUTION INTRAMUSCULAR; INTRAVENOUS at 10:28

## 2022-01-26 RX ADMIN — ONDANSETRON 4 MG: 2 INJECTION INTRAMUSCULAR; INTRAVENOUS at 10:28

## 2022-01-26 RX ADMIN — SODIUM CHLORIDE, SODIUM LACTATE, POTASSIUM CHLORIDE, AND CALCIUM CHLORIDE 125 ML/HR: .6; .31; .03; .02 INJECTION, SOLUTION INTRAVENOUS at 08:19

## 2022-01-26 NOTE — ANESTHESIA PREPROCEDURE EVALUATION
Procedure:  EXCISION  BIOPSY LESION/MASS BACK (Right Back)    Relevant Problems   ANESTHESIA (within normal limits)      CARDIO (within normal limits)   (+) Migraine with aura and without status migrainosus, not intractable      NEURO/PSYCH   (+) Current moderate episode of major depressive disorder without prior episode (HCC)   (+) Generalized anxiety disorder   (+) Migraine with aura and without status migrainosus, not intractable   (+) PTSD (post-traumatic stress disorder)      PULMONARY (within normal limits)        Physical Exam    Airway    Mallampati score: I  TM Distance: >3 FB  Neck ROM: full     Dental   No notable dental hx     Cardiovascular  Rhythm: regular, Rate: normal,     Pulmonary  Breath sounds clear to auscultation,     Other Findings        Anesthesia Plan  ASA Score- 2     Anesthesia Type- general with ASA Monitors  Additional Monitors:   Airway Plan: ETT  Plan Factors-Exercise tolerance (METS): >4 METS  Chart reviewed  Existing labs reviewed  Patient summary reviewed  Patient is not a current smoker  Induction- intravenous  Postoperative Plan- Plan for postoperative opioid use  Planned trial extubation    Informed Consent- Anesthetic plan and risks discussed with patient  I personally reviewed this patient with the CRNA  Discussed and agreed on the Anesthesia Plan with the CRNA  Chai Damon

## 2022-01-26 NOTE — ANESTHESIA POSTPROCEDURE EVALUATION
Post-Op Assessment Note    CV Status:  Stable  Pain Score: 0    Pain management: adequate     Mental Status:  Alert and awake   Hydration Status:  Euvolemic   PONV Controlled:  Controlled   Airway Patency:  Patent      Post Op Vitals Reviewed: Yes      Staff: CRNA, Anesthesiologist   Comments: vss        No complications documented      /56 (01/26/22 1045)    Temp (!) 96 9 °F (36 1 °C) (01/26/22 1045)    Pulse 70 (01/26/22 1045)   Resp 16 (01/26/22 1045)    SpO2 96 % (01/26/22 1045)

## 2022-01-26 NOTE — INTERVAL H&P NOTE
H&P reviewed  After examining the patient I find no changes in the patients condition since the H&P had been written      Vitals:    01/26/22 0802   BP: 136/75   Pulse: 72   Resp: 18   Temp: (!) 97 1 °F (36 2 °C)   SpO2: 97%

## 2022-01-26 NOTE — OP NOTE
PERATIVE REPORT  PATIENT NAME: Maria Teresa Sheehan    :  1985  MRN: [id number]  Pt Location: [facility] OR ROOM 03    SURGERY DATE: 2022    Surgeon(s) and Role:     * Kayy Mackey MD - Primary     * Josie Wilson MD - Assisting    Preop Diagnosis:  Lipoma of back [D17 1]    Post-Op Diagnosis Codes:     * Lipoma of back [D17 1]    Procedure(s) (LRB):  EXCISION  BIOPSY LESION/MASS BACK (Right)    Specimen(s):  ID Type Source Tests Collected by Time Destination   1 : lipoma right back Tissue Mass TISSUE EXAM Kayy Mackey MD 2022 1031        Estimated Blood Loss:   Minimal    Drains:  * No LDAs found *    Anesthesia Type:   General    Operative Indications:  Lipoma of back [D17 1]      Operative Findings:  Benign mature lipoma      Complications:   None    Procedure and Technique:  Excision lipoma from back  Patient was taken back to main operating room, placed prone on the OR table, sedated per the anesthesia record, and the right back was prepped and draped in normal fashion  Utilizing an incision overlying the palpable mass, skin was incised  Soft tissue was divided Bovie cautery  A benign appearing lipoma was dissected free from the surrounding subcutaneous tissue  The size of the mass was:  4 cm long by 3 cm wide by 2 cm deep  Size of the defect was 6 cm long by 4 cm wide by 2 cm deep  The wound was copiously irrigated strict hemostasis was obtained with Bovie cautery  The wound was closed in 2 layers utilizing 3-0 Vicryl suture to approximate the dermis and 4-0 Monocryl to approximate skin edges  Benzoin and Steri-Strips were placed as well as a sterile dressing  The patient awoke from her sedation, placed supine on the stretcher and sent to the PACU in stable condition      Dr Devin Soulier, a 4th year General surgery resident, was required for technical assistance and retraction   I was present for the entire procedure    Patient Disposition:  PACU       SIGNATURE: Kayy Mackey MD  DATE: January 26, 2022  TIME: 10:35 AM

## 2022-01-26 NOTE — DISCHARGE INSTR - AVS FIRST PAGE
1  Take ibuprofen, 600 mg, 3 times a day regularly  2  Take Percocet, in addition to the ibuprofen, if you need further pain control  3  Keep dressing on, clean and dry, for 2 days  After 2 days, remove the dressing and the incision may get wet in the shower  No dressing is required  4  If you do not already have an appointment, call my office at [phone] for appointment to be seen in about 10-14 days

## 2022-02-08 ENCOUNTER — OFFICE VISIT (OUTPATIENT)
Dept: SURGERY | Facility: CLINIC | Age: 37
End: 2022-02-08
Payer: COMMERCIAL

## 2022-02-08 VITALS — HEIGHT: 62 IN | BODY MASS INDEX: 40.04 KG/M2 | WEIGHT: 217.6 LBS | TEMPERATURE: 97.2 F

## 2022-02-08 DIAGNOSIS — Z09 SURGICAL FOLLOW-UP CARE: Primary | ICD-10-CM

## 2022-02-08 PROCEDURE — 3008F BODY MASS INDEX DOCD: CPT | Performed by: SURGERY

## 2022-02-08 PROCEDURE — 1036F TOBACCO NON-USER: CPT | Performed by: SURGERY

## 2022-02-08 PROCEDURE — 99213 OFFICE O/P EST LOW 20 MIN: CPT | Performed by: SURGERY

## 2022-02-16 ENCOUNTER — TELEMEDICINE (OUTPATIENT)
Dept: PSYCHIATRY | Facility: CLINIC | Age: 37
End: 2022-02-16
Payer: COMMERCIAL

## 2022-02-16 DIAGNOSIS — F41.1 GENERALIZED ANXIETY DISORDER: Primary | ICD-10-CM

## 2022-02-16 PROCEDURE — 90792 PSYCH DIAG EVAL W/MED SRVCS: CPT | Performed by: NURSE PRACTITIONER

## 2022-02-16 NOTE — BH TREATMENT PLAN
TREATMENT PLAN (Medication Management Only)        [place]    Name and Date of Birth:  Kanu Lewis 39 y o  1985  Date of Treatment Plan: February 16, 2022  Diagnosis/Diagnoses:    1  Generalized anxiety disorder      Strengths/Personal Resources for Self-Care: supportive family, supportive friends, taking medications as prescribed, ability to communicate well, ability to listen, ability to reason, average or above intelligence, general fund of knowledge, motivation for treatment  Area/Areas of need (in own words): anxiety, depression  1  Long Term Goal: continue improvement in depression  Target Date:3 months - 5/16/2022  Person/Persons responsible for completion of goal: Malia  2  Short Term Objective (s) - How will we reach this goal?:   A  Provider new recommended medication/dosage changes and/or continue medication(s): continue current medications as prescribed  B  N/A   C  N/A  Target Date:3 months - 5/16/2022  Person/Persons Responsible for Completion of Goal: Malia  Progress Towards Goals: continuing treatment  Treatment Modality: medication management every 3 months  Review due 180 days from date of this plan: 3 months - 5/16/2022  Expected length of service: ongoing treatment  My Physician/PA/NP and I have developed this plan together and I agree to work on the goals and objectives  I understand the treatment goals that were developed for my treatment

## 2022-02-16 NOTE — PSYCH
Virtual Regular Visit    Problem List Items Addressed This Visit        Other    Generalized anxiety disorder - Primary        Reason for visit is   Chief Complaint   Patient presents with    Establish Care     Encounter provider Sol Damon    Provider located at [address] [place] [address]  [address]  [place] [address]  [phone]    Recent Visits  No visits were found meeting these conditions  Showing recent visits within past 7 days and meeting all other requirements  Today's Visits  Date Type Provider Dept   02/16/22 Telemedicine Sol Damon [facility]   Showing today's visits and meeting all other requirements  Future Appointments  No visits were found meeting these conditions  Showing future appointments within next 150 days and meeting all other requirements     This note was not shared with the patient due to reasonable likelihood of causing patient harm    After connecting through televideo, the patient was identified by name and date of birth  Franki Simmonds was informed that this is a telemedicine visit and that the visit is being conducted through [address] [place] [address] Now and patient was informed that this is a secure, HIPAA-compliant platform  She agrees to proceed  which may not be secure and therefore, might not be HIPAA-compliant  My office door was closed  No one else was in the room  She acknowledged consent and understanding of privacy and security of the video platform  The patient has agreed to participate and understands they can discontinue the visit at any time  SUBJECTIVE:    Franki Simmonds is a 39 y o  female with a history of anxiety, depression and PTSD seen for an initial evaluation to determine medication management/ she was casually dressed, alert and oriented 3 X, and maintained good eye contact  Her speech was clear and of normal rate rhythm and volume    Thought processes were clear and goal directed  He presents with a history of anxiety and depression, and her previous prescriber had diagnosed her with posttraumatic stress disorder associated with the deaths her father and brother, as well as the genetic issues surrounding and her 1year-old  She describes having anxiety most of the time, which she explained in terms of shaking, loss of appetite and sudden episodes of crying  Her depression also come renders her very pessimistic and socially withdrawn, to the extent of only focusing on her 1year-old  She described having several emotional triggers, and often these lead to flashbacks to the traumatic events  Times she has episodes of seeing myself as if I were above my body which seem to be episodes of depersonalization        HPI ROS Appetite Changes and Sleep: increased appetite, normal energy level and normal number of sleep hours    Review Of Systems:     Mood Anxiety, Depression and Emotional Lability   Behavior Normal    Thought Content Disturbing Thoughts, Feelings   General Normal    Personality Normal   Other Psych Symptoms Normal   Constitutional As Noted in HPI   ENT As Noted in HPI   Cardiovascular As Noted in HPI   Respiratory As Noted in HPI   Gastrointestinal As Noted in HPI   Genitourinary As Noted in HPI   Musculoskeletal As Noted in HPI   Integumentary As Noted in HPI   Neurological As Noted in HPI   Endocrine Normal    Other Symptoms Normal        Substance Abuse History:    Social History     Substance and Sexual Activity   Drug Use Never       Family Psychiatric History:     Family History   Problem Relation Age of Onset    Hypertension Mother     Heart disease Mother     Depression Mother     Coronary artery disease Mother     Hypertension Father     Hyperlipidemia Father     Heart disease Father         MI    Stroke Paternal Grandmother     Cancer Paternal Grandmother         breast    Breast cancer Paternal Grandmother     Cancer Paternal Aunt         colon    Colon cancer Paternal Aunt     Early death Maternal Uncle     Cancer Maternal Uncle         lymphoma    Early death Maternal Uncle         lymphoma    Birth defects Maternal Uncle     Undescended testes Son     Cystic fibrosis Other     Cystic fibrosis Other     Heart failure Maternal Grandmother     Stroke Maternal Grandfather     Alcohol abuse Maternal Grandfather     Alcohol abuse Brother     Drug abuse Brother          of overdose 2020    Anxiety disorder Sister     Post-traumatic stress disorder Sister     Developmental delay Son     Macrocephaly Son     Hydrocephalus Son     Breast cancer Other        Social History     Socioeconomic History    Marital status: /Civil Union     Spouse name: Hilary Fuentes Number of children: 2    Years of education: Not on file    Highest education level: Associate degree: academic program   Occupational History    Occupation: unemployed   Tobacco Use    Smoking status: Former Smoker     Packs/day: 0 50     Years: 3 00     Pack years: 1 50     Types: Cigarettes     Quit date:      Years since quittin 1    Smokeless tobacco: Never Used   Vaping Use    Vaping Use: Never used   Substance and Sexual Activity    Alcohol use: Not Currently    Drug use: Never    Sexual activity: Yes     Partners: Male     Birth control/protection: None   Other Topics Concern    Not on file   Social History Narrative    Not on file     Social Determinants of Health     Financial Resource Strain: Low Risk     Difficulty of Paying Living Expenses: Not very hard   Food Insecurity: No Food Insecurity    Worried About Running Out of Food in the Last Year: Never true    Michelle of Food in the Last Year: Never true   Transportation Needs: No Transportation Needs    Lack of Transportation (Medical): No    Lack of Transportation (Non-Medical):  No   Physical Activity: Insufficiently Active    Days of Exercise per Week: 4 days    Minutes of Exercise per Session: 20 min   Stress: Not on file   Social Connections: Moderately Isolated    Frequency of Communication with Friends and Family: More than three times a week    Frequency of Social Gatherings with Friends and Family: Never    Attends Adventism Services: Never    Active Member of Clubs or Organizations: No    Attends Club or Organization Meetings: Never    Marital Status:    Intimate Partner Violence: Not At Risk    Fear of Current or Ex-Partner: No    Emotionally Abused: No    Physically Abused: No    Sexually Abused: No   Housing Stability: Not on file       Past Medical History:   Diagnosis Date    Abnormal Pap smear of cervix     Anxiety     BRCA1 negative     BRCA2 negative     Breast injury     Pt states scar tissue around nipple from breastfeeding    Depression     GERD (gastroesophageal reflux disease)     HPV (human papilloma virus) infection     Kidney stone     H/O PYELONEPHRITIS    Migraine     Seasonal allergies     Sleep difficulties     Varicella     POS HX       Past Surgical History:   Procedure Laterality Date    CHOLECYSTECTOMY      FINGER SURGERY      left hand     GYNECOLOGIC CRYOSURGERY      HAND SURGERY Left     Phelebitis in left hand  post IV infilitration, had sx for clot removal     LYMPH NODE BIOPSY      of neck    LYMPH NODE DISSECTION Right     cervical    [place]  DELIVERY ONLY N/A 2018    Procedure:  SECTION ();   Surgeon: Alam Coats MD;  Location: [facility];  Service: Obstetrics    NE EXC SKIN BENIG >4 CM TRUNK,ARM,LEG Right 2022    Procedure: EXCISION  BIOPSY LESION/MASS BACK;  Surgeon: Vivian [place] MD;  Location: [address] [place] [address];  Service: General       Current Outpatient Medications   Medication Sig Dispense Refill    benzonatate (TESSALON) 200 MG capsule Take 1 capsule (200 mg total) by mouth 3 (three) times a day as needed for cough (Patient not taking: Reported on 2021) 20 capsule 0    busPIRone (BUSPAR) 15 mg tablet TAKE 1 TABLET BY MOUTH 3 TIMES A DAY  90 tablet 2    cetirizine (ZyrTEC) 10 mg tablet Take 1 tablet (10 mg total) by mouth daily  0    cloNIDine (CATAPRES) 0 1 mg tablet TAKE 1 TAB BY MOUTH IN THE MORNING, 1 TAB BY MOUTH IN THE AFTERNOON AND 2 TABS BY MOUTH AT BEDTIME 120 tablet 2    cyclobenzaprine (FLEXERIL) 10 mg tablet Take 1 tablet (10 mg total) by mouth daily at bedtime for 21 days 21 tablet 0    escitalopram (LEXAPRO) 20 mg tablet Take 1 tablet (20 mg total) by mouth daily 90 tablet 0    fluticasone (FLONASE) 50 mcg/act nasal spray 1 spray into each nostril daily      hydrOXYzine HCL (ATARAX) 50 mg tablet TAKE 1/2 TO 1 TAB BY MOUTH EVERY 8 HOURS AS NEEDED FOR ANXIETY 60 tablet 0    meclizine (ANTIVERT) 25 mg tablet Take 1 tablet (25 mg total) by mouth 3 (three) times a day as needed for dizziness 15 tablet 0    Melatonin ER 10 MG TBCR Take 1 tablet (10 mg total) by mouth daily      omeprazole (PriLOSEC) 20 mg delayed release capsule Take 1 capsule (20 mg total) by mouth daily 21 capsule 0    ondansetron (ZOFRAN) 4 mg tablet Take 1 tablet (4 mg total) by mouth every 8 (eight) hours as needed for nausea or vomiting 30 tablet 0    SUMAtriptan (IMITREX) 100 mg tablet Take 1 tablet (100 mg total) by mouth once as needed for migraine for up to 1 dose 12 tablet 0    therapeutic multivitamin-minerals (THERAGRAN-M) tablet Take 1 tablet by mouth daily       No current facility-administered medications for this visit          No Known Allergies    The following portions of the patient's history were reviewed and updated as appropriate: allergies, current medications, past family history, past medical history, past social history, past surgical history and problem list     OBJECTIVE:     Mental Status Examination:    Appearance calm and cooperative , adequate hygiene and grooming and good eye contact    Mood dysphoric   Affect affect appropriate    Speech a normal rate   Thought Processes normal thought processes   Hallucinations no hallucinations present    Thought Content no delusions   Abnormal Thoughts no suicidal thoughts  and no homicidal thoughts    Orientation  oriented to person and place and time   Remote Memory short term memory intact and long term memory intact   Attention Span concentration intact   Intellect Appears to be Above Average Intelligence   Insight Insight intact   Judgement judgment was intact   Muscle Strength CRAIG   Language intact   Fund of Knowledge intact   Pain moderate to severe   Pain Scale 5       Laboratory Results: No results found for this or any previous visit  Assessment/Plan:       Diagnoses and all orders for this visit:    Generalized anxiety disorder          Treatment Recommendations- Risks Benefits      Immediate Medical/Psychiatric/Psychotherapy Treatments and Any Precautions: Continue present medications    Risks, Benefits And Possible Side Effects Of Medications:  {PSYCH RISK, BENEFITS AND POSSIBLE SIDE EFFECTS (Optional):90460    Controlled Medication Discussion: Discussed with patient the risks of sedation, respiratory depression, impairment of ability to drive and potential for abuse and addiction related to treatment with benzodiazepine medications  The patient understands risk of treatment with benzodiazepine medications, agrees to not drive if feels impaired and agrees to take medications as prescribed  Psychotherapy Provided: no    Goals discussed in session:Establish care and maintain/improve level of functioning    Counseling provided:      Treatment Plan:    Completed and signed during the session: Yes - Treatment Plan done but not signed at time of office visit due to:  Plan reviewed by video and verbal consent given due to [address] distancing    I spent 45 minutes with the patient during this visit      Sol Treadwell 02/16/22

## 2022-02-23 DIAGNOSIS — F32.1 CURRENT MODERATE EPISODE OF MAJOR DEPRESSIVE DISORDER WITHOUT PRIOR EPISODE (HCC): ICD-10-CM

## 2022-02-23 DIAGNOSIS — F41.1 GENERALIZED ANXIETY DISORDER: ICD-10-CM

## 2022-02-23 DIAGNOSIS — F43.10 PTSD (POST-TRAUMATIC STRESS DISORDER): ICD-10-CM

## 2022-02-23 RX ORDER — BUSPIRONE HYDROCHLORIDE 15 MG/1
15 TABLET ORAL 3 TIMES DAILY
Qty: 90 TABLET | Refills: 2 | Status: SHIPPED | OUTPATIENT
Start: 2022-02-23 | End: 2022-04-23

## 2022-02-23 RX ORDER — ESCITALOPRAM OXALATE 20 MG/1
20 TABLET ORAL DAILY
Qty: 90 TABLET | Refills: 0 | Status: SHIPPED | OUTPATIENT
Start: 2022-02-23 | End: 2022-03-03

## 2022-02-23 NOTE — TELEPHONE ENCOUNTER
----- Message from [address] [place] [address] sent at 2/23/2022  9:31 AM EST -----  Regarding: refill  busPIRone (BUSPAR) 15 mg tablet    cloNIDine (CATAPRES) 0 1 mg tablet    escitalopram (LEXAPRO) 20 mg tablet     [facility]/pharmacy #[id number] Chintan Guaman  Last appt 2/16 GM

## 2022-03-02 DIAGNOSIS — F43.10 PTSD (POST-TRAUMATIC STRESS DISORDER): ICD-10-CM

## 2022-03-02 DIAGNOSIS — F41.1 GENERALIZED ANXIETY DISORDER: ICD-10-CM

## 2022-03-02 DIAGNOSIS — F32.1 CURRENT MODERATE EPISODE OF MAJOR DEPRESSIVE DISORDER WITHOUT PRIOR EPISODE (HCC): ICD-10-CM

## 2022-03-03 RX ORDER — ESCITALOPRAM OXALATE 20 MG/1
TABLET ORAL
Qty: 90 TABLET | Refills: 0 | Status: SHIPPED | OUTPATIENT
Start: 2022-03-03 | End: 2022-08-02

## 2022-03-08 DIAGNOSIS — F43.10 PTSD (POST-TRAUMATIC STRESS DISORDER): ICD-10-CM

## 2022-03-08 DIAGNOSIS — F41.1 GENERALIZED ANXIETY DISORDER: ICD-10-CM

## 2022-03-08 RX ORDER — CLONIDINE HYDROCHLORIDE 0.1 MG/1
TABLET ORAL
Qty: 120 TABLET | Refills: 0 | Status: SHIPPED | OUTPATIENT
Start: 2022-03-08 | End: 2022-04-04

## 2022-03-08 NOTE — TELEPHONE ENCOUNTER
LS 2/16    New patient to you needs a refill on her clonidine 0 1 mg  Pt would like a decrease in the medication   Her anxiety is better now and feels she needs a lower dose

## 2022-03-11 ENCOUNTER — OFFICE VISIT (OUTPATIENT)
Dept: FAMILY MEDICINE CLINIC | Facility: CLINIC | Age: 37
End: 2022-03-11
Payer: COMMERCIAL

## 2022-03-11 VITALS
RESPIRATION RATE: 16 BRPM | WEIGHT: 209 LBS | DIASTOLIC BLOOD PRESSURE: 80 MMHG | HEIGHT: 62 IN | OXYGEN SATURATION: 98 % | SYSTOLIC BLOOD PRESSURE: 120 MMHG | TEMPERATURE: 97.5 F | HEART RATE: 73 BPM | BODY MASS INDEX: 38.46 KG/M2

## 2022-03-11 DIAGNOSIS — F43.10 PTSD (POST-TRAUMATIC STRESS DISORDER): ICD-10-CM

## 2022-03-11 DIAGNOSIS — F41.1 GENERALIZED ANXIETY DISORDER: ICD-10-CM

## 2022-03-11 DIAGNOSIS — Z13.6 SCREENING FOR CARDIOVASCULAR CONDITION: ICD-10-CM

## 2022-03-11 DIAGNOSIS — Z00.00 WELL ADULT EXAM: Primary | ICD-10-CM

## 2022-03-11 DIAGNOSIS — F32.1 CURRENT MODERATE EPISODE OF MAJOR DEPRESSIVE DISORDER WITHOUT PRIOR EPISODE (HCC): ICD-10-CM

## 2022-03-11 PROCEDURE — 3725F SCREEN DEPRESSION PERFORMED: CPT | Performed by: FAMILY MEDICINE

## 2022-03-11 PROCEDURE — 99395 PREV VISIT EST AGE 18-39: CPT | Performed by: FAMILY MEDICINE

## 2022-03-11 PROCEDURE — 1036F TOBACCO NON-USER: CPT | Performed by: FAMILY MEDICINE

## 2022-03-11 NOTE — PROGRESS NOTES
FAMILY PRACTICE HEALTH MAINTENANCE OFFICE VISIT  [facility]    NAME: Be Oh  AGE: 39 y o  SEX: female  : 1985     DATE: 3/11/2022    Assessment and Plan     1  Well adult exam    2  Screening for cardiovascular condition  -     Comprehensive metabolic panel; Future  -     Lipid Panel with Direct LDL reflex; Future    3  Current moderate episode of major depressive disorder without prior episode ([address] )    4  Generalized anxiety disorder    5  PTSD (post-traumatic stress disorder)        · Patient Counseling:   · Nutrition: Stressed importance of a well balanced diet, moderation of sodium/saturated fat, caloric balance and sufficient intake of fiber  · Exercise: Stressed the importance of regular exercise with a goal of 150 minutes per week  · Dental Health: Discussed daily flossing and brushing and regular dental visits     · Immunizations reviewed: Up To Date  · Discussed benefits of:  Cervical Cancer screening and Screening labs   BMI Counseling: Body mass index is 37 92 kg/m²  Discussed with patient's BMI with her  The BMI is above normal  Nutrition recommendations include reducing portion sizes  Return for Recheck          Chief Complaint     Chief Complaint   Patient presents with    Physical Exam     [id number]       History of Present Illness     Pt is here for a full physical  Pt has an [place] for women      Well Adult Physical   Patient here for a comprehensive physical exam       Diet and Physical Activity  Diet: well balanced diet  Exercise: intermittently      Depression Screen  PHQ-2/9 Depression Screening    Little interest or pleasure in doing things: 0 - not at all  Feeling down, depressed, or hopeless: 0 - not at all  Trouble falling or staying asleep, or sleeping too much: 3 - nearly every day  Feeling tired or having little energy: 0 - not at all  Poor appetite or overeatin - not at all  Feeling bad about yourself - or that you are a failure or have let yourself or your family down: 0 - not at all  Trouble concentrating on things, such as reading the newspaper or watching television: 0 - not at all  Moving or speaking so slowly that other people could have noticed  Or the opposite - being so fidgety or restless that you have been moving around a lot more than usual: 3 - nearly every day  Thoughts that you would be better off dead, or of hurting yourself in some way: 0 - not at all  PHQ-9 Score: 6   PHQ-9 Interpretation: Mild depression           General Health  Hearing: Normal:  bilateral  Vision: no vision problems  Dental: regular dental visits    Reproductive Health  No issues       The following portions of the patient's history were reviewed and updated as appropriate: allergies, current medications, past family history, past medical history, past social history, past surgical history and problem list     Review of Systems     Review of Systems   Constitutional: Negative  Negative for activity change, appetite change, chills, diaphoresis and fatigue  HENT: Negative  Negative for dental problem, ear pain, sinus pressure and sore throat  Eyes: Negative  Negative for photophobia, pain, discharge, redness, itching and visual disturbance  Respiratory: Negative for apnea and chest tightness  Cardiovascular: Negative  Negative for chest pain, palpitations and leg swelling  Gastrointestinal: Negative  Negative for abdominal distention, abdominal pain, constipation and diarrhea  Endocrine: Negative  Negative for cold intolerance and heat intolerance  Genitourinary: Negative  Negative for difficulty urinating and dyspareunia  Musculoskeletal: Negative  Negative for arthralgias and back pain  Skin: Negative  Allergic/Immunologic: Negative for environmental allergies  Neurological: Negative  Negative for dizziness  Psychiatric/Behavioral: Negative  Negative for agitation         Past Medical History     Past Medical History:   Diagnosis Date    Abnormal Pap smear of cervix     Anxiety     BRCA1 negative     BRCA2 negative     Breast injury     Pt states scar tissue around nipple from breastfeeding    Depression     GERD (gastroesophageal reflux disease)     HPV (human papilloma virus) infection     Kidney stone     H/O PYELONEPHRITIS    Migraine     Seasonal allergies     Sleep difficulties     Varicella     POS HX       Past Surgical History     Past Surgical History:   Procedure Laterality Date    CHOLECYSTECTOMY      FINGER SURGERY      left hand     GYNECOLOGIC CRYOSURGERY      HAND SURGERY Left     Phelebitis in left hand  post IV infilitration, had sx for clot removal     LYMPH NODE BIOPSY      of neck    LYMPH NODE DISSECTION Right     cervical    [place]  DELIVERY ONLY N/A 2018    Procedure:  SECTION ();   Surgeon: Anisha Adams MD;  Location: [facility];  Service: Obstetrics    [place] EXC SKIN BENIG >4 CM TRUNK,ARM,LEG Right 2022    Procedure: EXCISION  BIOPSY LESION/MASS BACK;  Surgeon: Alpesh Ballard MD;  Location[facility];  Service: General       Social History     Social History     Socioeconomic History    Marital status: /Civil Union     Spouse name: Sharona Rivera Number of children: 2    Years of education: None    Highest education level: Associate degree: academic program   Occupational History    Occupation: unemployed   Tobacco Use    Smoking status: Former Smoker     Packs/day: 0 50     Years: 3 00     Pack years: 1 50     Types: Cigarettes     Quit date:      Years since quittin 1    Smokeless tobacco: Never Used   Vaping Use    Vaping Use: Never used   Substance and Sexual Activity    Alcohol use: Not Currently    Drug use: Never    Sexual activity: Yes     Partners: Male     Birth control/protection: None   Other Topics Concern    None   Social History Narrative    None     Social Determinants of Health     Financial Resource Strain: Low Risk     Difficulty of Paying Living Expenses: Not very hard   Food Insecurity: No Food Insecurity    Worried About Running Out of Food in the Last Year: Never true    Michelle of Food in the Last Year: Never true   Transportation Needs: No Transportation Needs    Lack of Transportation (Medical): No    Lack of Transportation (Non-Medical): No   Physical Activity: Insufficiently Active    Days of Exercise per Week: 4 days    Minutes of Exercise per Session: 20 min   Stress: Not on file   Social Connections:  Moderately Isolated    Frequency of Communication with Friends and Family: More than three times a week    Frequency of Social Gatherings with Friends and Family: Never    Attends Scientologist Services: Never    Active Member of Clubs or Organizations: No    Attends Club or Organization Meetings: Never    Marital Status:    Intimate Partner Violence: Not At Risk    Fear of Current or Ex-Partner: No    Emotionally Abused: No    Physically Abused: No    Sexually Abused: No   Housing Stability: Not on file       Family History     Family History   Problem Relation Age of Onset    Hypertension Mother     Heart disease Mother     Depression Mother     Coronary artery disease Mother     Hypertension Father     Hyperlipidemia Father     Heart disease Father         MI    Stroke Paternal Grandmother     Cancer Paternal Grandmother         breast    Breast cancer Paternal Grandmother     Cancer Paternal Aunt         colon    Colon cancer Paternal Aunt     Early death Maternal Uncle     Cancer Maternal Uncle         lymphoma    Early death Maternal Uncle         lymphoma    Birth defects Maternal Uncle     Undescended testes Son     Cystic fibrosis Other     Cystic fibrosis Other     Heart failure Maternal Grandmother     Stroke Maternal Grandfather     Alcohol abuse Maternal Grandfather     Alcohol abuse Brother     Drug abuse Brother          of overdose 06/2020    Anxiety disorder Sister     Post-traumatic stress disorder Sister     Developmental delay Son     Macrocephaly Son     Hydrocephalus Son     Breast cancer Other        Current Medications       Current Outpatient Medications:     busPIRone (BUSPAR) 15 mg tablet, Take 1 tablet (15 mg total) by mouth 3 (three) times a day, Disp: 90 tablet, Rfl: 2    cetirizine (ZyrTEC) 10 mg tablet, Take 1 tablet (10 mg total) by mouth daily, Disp: , Rfl: 0    cloNIDine (CATAPRES) 0 1 mg tablet, TAKE 1 TAB BY MOUTH IN THE MORNING, 1 TAB BY MOUTH IN THE AFTERNOON AND 2 TABS BY MOUTH AT BEDTIME,, Disp: 120 tablet, Rfl: 0    cyclobenzaprine (FLEXERIL) 10 mg tablet, Take 1 tablet (10 mg total) by mouth daily at bedtime for 21 days, Disp: 21 tablet, Rfl: 0    escitalopram (LEXAPRO) 20 mg tablet, TAKE 1 TABLET BY MOUTH EVERY DAY, Disp: 90 tablet, Rfl: 0    fluticasone (FLONASE) 50 mcg/act nasal spray, 1 spray into each nostril daily, Disp: , Rfl:     hydrOXYzine HCL (ATARAX) 50 mg tablet, TAKE 1/2 TO 1 TAB BY MOUTH EVERY 8 HOURS AS NEEDED FOR ANXIETY, Disp: 60 tablet, Rfl: 0    meclizine (ANTIVERT) 25 mg tablet, Take 1 tablet (25 mg total) by mouth 3 (three) times a day as needed for dizziness, Disp: 15 tablet, Rfl: 0    Melatonin ER 10 MG TBCR, Take 1 tablet (10 mg total) by mouth daily, Disp: , Rfl:     omeprazole (PriLOSEC) 20 mg delayed release capsule, Take 1 capsule (20 mg total) by mouth daily, Disp: 21 capsule, Rfl: 0    ondansetron (ZOFRAN) 4 mg tablet, Take 1 tablet (4 mg total) by mouth every 8 (eight) hours as needed for nausea or vomiting, Disp: 30 tablet, Rfl: 0    SUMAtriptan (IMITREX) 100 mg tablet, Take 1 tablet (100 mg total) by mouth once as needed for migraine for up to 1 dose, Disp: 12 tablet, Rfl: 0    therapeutic multivitamin-minerals (THERAGRAN-M) tablet, Take 1 tablet by mouth daily, Disp: , Rfl:     benzonatate (TESSALON) 200 MG capsule, Take 1 capsule (200 mg total) by mouth 3 (three) times a day as needed for cough (Patient not taking: Reported on 6/9/2021), Disp: 20 capsule, Rfl: 0     Allergies     No Known Allergies    Objective     /80   Pulse 73   Temp 97 5 °F (36 4 °C)   Resp 16   Ht 5' 2 25" (1 581 m)   Wt 94 8 kg (209 lb)   SpO2 98%   BMI 37 92 kg/m²      Physical Exam  Vitals and nursing note reviewed  Constitutional:       General: She is not in acute distress  Appearance: She is well-developed  She is not diaphoretic  HENT:      Head: Normocephalic and atraumatic  Right Ear: External ear normal       Left Ear: External ear normal       Nose: Nose normal       Mouth/Throat:      Pharynx: No oropharyngeal exudate  Eyes:      General: No scleral icterus  Right eye: No discharge  Left eye: No discharge  Pupils: Pupils are equal, round, and reactive to light  Neck:      Thyroid: No thyromegaly  Cardiovascular:      Rate and Rhythm: Normal rate  Heart sounds: Normal heart sounds  No murmur heard  Pulmonary:      Effort: Pulmonary effort is normal  No respiratory distress  Breath sounds: Normal breath sounds  No wheezing  Abdominal:      General: Bowel sounds are normal  There is no distension  Palpations: Abdomen is soft  There is no mass  Tenderness: There is no abdominal tenderness  There is no guarding or rebound  Musculoskeletal:         General: Normal range of motion  Skin:     General: Skin is warm and dry  Findings: No erythema or rash  Neurological:      Mental Status: She is alert        Coordination: Coordination normal       Deep Tendon Reflexes: Reflexes normal    Psychiatric:         Behavior: Behavior normal             Visual Acuity Screening    Right eye Left eye Both eyes   Without correction: 20/13 20/13 20/13   With correction:              Lisa Coyne DO  [address]  Depression Screening Follow-up Plan: Patient's depression screening was positive with a PHQ-2 score of   Their PHQ-9 score was 6  Patient assessed for underlying major depression  They have no active suicidal ideations  Brief counseling provided and recommend additional follow-up/re-evaluation next office visit

## 2022-03-25 ENCOUNTER — OFFICE VISIT (OUTPATIENT)
Dept: URGENT CARE | Facility: CLINIC | Age: 37
End: 2022-03-25
Payer: COMMERCIAL

## 2022-03-25 VITALS
RESPIRATION RATE: 16 BRPM | BODY MASS INDEX: 38.64 KG/M2 | DIASTOLIC BLOOD PRESSURE: 71 MMHG | OXYGEN SATURATION: 99 % | HEART RATE: 70 BPM | SYSTOLIC BLOOD PRESSURE: 132 MMHG | HEIGHT: 62 IN | WEIGHT: 210 LBS | TEMPERATURE: 97.6 F

## 2022-03-25 DIAGNOSIS — M25.561 PATELLOFEMORAL ARTHRALGIA OF RIGHT KNEE: Primary | ICD-10-CM

## 2022-03-25 PROCEDURE — 99213 OFFICE O/P EST LOW 20 MIN: CPT | Performed by: PHYSICIAN ASSISTANT

## 2022-03-25 PROCEDURE — 3008F BODY MASS INDEX DOCD: CPT | Performed by: FAMILY MEDICINE

## 2022-03-25 NOTE — PATIENT INSTRUCTIONS
Patellofemoral Pain Syndrome   WHAT YOU NEED TO KNOW:   Patellofemoral pain syndrome (PFPS) is pain in or around your patella (kneecap)  PFPS is also called runner's knee or jumper's knee and is common in athletes  Rest, ice, and elevate your knee  You may need tape or brace to support your kneecap  Wear shoe inserts as directed and go to physical therapy  DISCHARGE INSTRUCTIONS:   Call your doctor if:   · You have trouble walking  · You have a fever  · Your knee brace or sleeve is too tight  · Your symptoms are not getting better, or they get worse  · Your pain and swelling increase even after you take pain medicine  · You have questions or concerns about your condition or care  Manage your symptoms:       · Change your activity  You may need to rest your knee  You may need to change your exercise routine to low-impact activities  · Apply ice  on your knee for 15 to 20 minutes every hour or as directed  Use an ice pack, or put crushed ice in a plastic bag  Cover it with a towel before you apply it  Ice helps prevent tissue damage and decreases swelling and pain  · Elevate  your knee above the level of your heart as often as you can  This will help decrease swelling and pain  Prop your leg on pillows or blankets to keep it elevated comfortably  Do not  put a pillow directly under your knee  · Support  your knee by wrapping it with tape or an elastic bandage  You may need a brace for more support  This will help decrease swelling and keep your kneecap in the correct spot  · Wear shoe inserts as directed  Orthotics or arch supports help keep your foot and ankle stable and in line to decrease stress on your knee  · Go to physical therapy as directed  A physical therapist will teach you exercises to help improve movement and strength, and to decrease pain  · Maintain a healthy weight  Ask your healthcare provider what a healthy weight is for you   Ask him or her to help you create a weight loss plan if you are overweight  Weight loss can help decrease pressure on your knee  Medicines: You may need the following:  · Acetaminophen  decreases pain and fever  It is available without a doctor's order  Ask how much to take and how often to take it  Follow directions  Read the labels of all other medicines you are using to see if they also contain acetaminophen, or ask your doctor or pharmacist  Acetaminophen can cause liver damage if not taken correctly  Do not use more than 4 grams (4,000 milligrams) total of acetaminophen in one day  · NSAIDs , such as ibuprofen, help decrease swelling, pain, and fever  This medicine is available with or without a doctor's order  NSAIDs can cause stomach bleeding or kidney problems in certain people  If you take blood thinner medicine, always ask your healthcare provider if NSAIDs are safe for you  Always read the medicine label and follow directions  · Take your medicine as directed  Contact your healthcare provider if you think your medicine is not helping or if you have side effects  Tell him or her if you are allergic to any medicine  Keep a list of the medicines, vitamins, and herbs you take  Include the amounts, and when and why you take them  Bring the list or the pill bottles to follow-up visits  Carry your medicine list with you in case of an emergency  Prevent another episode:   · Wear the right shoes for your activities  Increase activity gradually  · Warm up before you exercise  Stretch your leg muscles before and after activity  Follow up with your doctor as directed: You may be referred to an orthopedic surgeon  Write down your questions so you remember to ask them during your visits  © Copyright [address] 2022 Information is for End User's use only and may not be sold, redistributed or otherwise used for commercial purposes   All illustrations and images included in CareNotes® are the copyrighted property of A  [employer]  or [address]  The above information is an  only  It is not intended as medical advice for individual conditions or treatments  Talk to your doctor, nurse or pharmacist before following any medical regimen to see if it is safe and effective for you

## 2022-03-25 NOTE — PROGRESS NOTES
[facility]        NAME: Damien Swift is a 39 y o  female  : 1985    MRN: [id number]  DATE: 2022  TIME: 7:41 PM    Assessment and Plan   Patellofemoral arthralgia of right knee [M25 561]  1  Patellofemoral arthralgia of right knee           Patient Instructions     Patient Instructions       Patellofemoral Pain Syndrome   WHAT YOU NEED TO KNOW:   Patellofemoral pain syndrome (PFPS) is pain in or around your patella (kneecap)  PFPS is also called runner's knee or jumper's knee and is common in athletes  Rest, ice, and elevate your knee  You may need tape or brace to support your kneecap  Wear shoe inserts as directed and go to physical therapy  DISCHARGE INSTRUCTIONS:   Call your doctor if:   · You have trouble walking  · You have a fever  · Your knee brace or sleeve is too tight  · Your symptoms are not getting better, or they get worse  · Your pain and swelling increase even after you take pain medicine  · You have questions or concerns about your condition or care  Manage your symptoms:       · Change your activity  You may need to rest your knee  You may need to change your exercise routine to low-impact activities  · Apply ice  on your knee for 15 to 20 minutes every hour or as directed  Use an ice pack, or put crushed ice in a plastic bag  Cover it with a towel before you apply it  Ice helps prevent tissue damage and decreases swelling and pain  · Elevate  your knee above the level of your heart as often as you can  This will help decrease swelling and pain  Prop your leg on pillows or blankets to keep it elevated comfortably  Do not  put a pillow directly under your knee  · Support  your knee by wrapping it with tape or an elastic bandage  You may need a brace for more support  This will help decrease swelling and keep your kneecap in the correct spot  · Wear shoe inserts as directed    Orthotics or arch supports help keep your foot and ankle stable and in line to decrease stress on your knee  · Go to physical therapy as directed  A physical therapist will teach you exercises to help improve movement and strength, and to decrease pain  · Maintain a healthy weight  Ask your healthcare provider what a healthy weight is for you  Ask him or her to help you create a weight loss plan if you are overweight  Weight loss can help decrease pressure on your knee  Medicines: You may need the following:  · Acetaminophen  decreases pain and fever  It is available without a doctor's order  Ask how much to take and how often to take it  Follow directions  Read the labels of all other medicines you are using to see if they also contain acetaminophen, or ask your doctor or pharmacist  Acetaminophen can cause liver damage if not taken correctly  Do not use more than 4 grams (4,000 milligrams) total of acetaminophen in one day  · NSAIDs , such as ibuprofen, help decrease swelling, pain, and fever  This medicine is available with or without a doctor's order  NSAIDs can cause stomach bleeding or kidney problems in certain people  If you take blood thinner medicine, always ask your healthcare provider if NSAIDs are safe for you  Always read the medicine label and follow directions  · Take your medicine as directed  Contact your healthcare provider if you think your medicine is not helping or if you have side effects  Tell him or her if you are allergic to any medicine  Keep a list of the medicines, vitamins, and herbs you take  Include the amounts, and when and why you take them  Bring the list or the pill bottles to follow-up visits  Carry your medicine list with you in case of an emergency  Prevent another episode:   · Wear the right shoes for your activities  Increase activity gradually  · Warm up before you exercise  Stretch your leg muscles before and after activity  Follow up with your doctor as directed:   You may be referred to an orthopedic surgeon  Write down your questions so you remember to ask them during your visits  © Copyright [employer] 2022 Information is for End User's use only and may not be sold, redistributed or otherwise used for commercial purposes  All illustrations and images included in CareNotes® are the copyrighted property of A [employer]  or Gladys [employer]  The above information is an  only  It is not intended as medical advice for individual conditions or treatments  Talk to your doctor, nurse or pharmacist before following any medical regimen to see if it is safe and effective for you  Follow up with PCP in 3-5 days  Proceed to  ER if symptoms worsen  Chief Complaint     Chief Complaint   Patient presents with    Knee Pain     Pt reports of right knee pain without injury started approx 1 week ago  History of Present Illness       Patient is a 35-year-old female presenting today with right knee pain x1 week  Patient notes over the last few days she has been experiencing some pain and discomfort of her right knee around her kneecap and on the inside of her right knee, notes pain and discomfort is made worse with long periods of standing or when physically active, has been taking over-the-counter ibuprofen which she states provides some temporary resolution of her discomfort  Denies any trauma or injury to the knee  Denies bruising, swelling, numbness, tingling, loss range of motion, instability  Review of Systems   Review of Systems   Constitutional: Negative for chills and fever  HENT: Negative for ear pain and sore throat  Eyes: Negative for pain and visual disturbance  Respiratory: Negative for cough and shortness of breath  Cardiovascular: Negative for chest pain and palpitations  Gastrointestinal: Negative for abdominal pain and vomiting  Genitourinary: Negative for dysuria and hematuria     Musculoskeletal:        Right knee pain   Skin: Negative for color change and rash  Neurological: Negative for seizures and syncope  All other systems reviewed and are negative          Current Medications       Current Outpatient Medications:     benzonatate (TESSALON) 200 MG capsule, Take 1 capsule (200 mg total) by mouth 3 (three) times a day as needed for cough (Patient not taking: Reported on 6/9/2021), Disp: 20 capsule, Rfl: 0    busPIRone (BUSPAR) 15 mg tablet, Take 1 tablet (15 mg total) by mouth 3 (three) times a day, Disp: 90 tablet, Rfl: 2    cetirizine (ZyrTEC) 10 mg tablet, Take 1 tablet (10 mg total) by mouth daily, Disp: , Rfl: 0    cloNIDine (CATAPRES) 0 1 mg tablet, TAKE 1 TAB BY MOUTH IN THE MORNING, 1 TAB BY MOUTH IN THE AFTERNOON AND 2 TABS BY MOUTH AT BEDTIME,, Disp: 120 tablet, Rfl: 0    cyclobenzaprine (FLEXERIL) 10 mg tablet, Take 1 tablet (10 mg total) by mouth daily at bedtime for 21 days, Disp: 21 tablet, Rfl: 0    escitalopram (LEXAPRO) 20 mg tablet, TAKE 1 TABLET BY MOUTH EVERY DAY, Disp: 90 tablet, Rfl: 0    fluticasone (FLONASE) 50 mcg/act nasal spray, 1 spray into each nostril daily, Disp: , Rfl:     hydrOXYzine HCL (ATARAX) 50 mg tablet, TAKE 1/2 TO 1 TAB BY MOUTH EVERY 8 HOURS AS NEEDED FOR ANXIETY, Disp: 60 tablet, Rfl: 0    meclizine (ANTIVERT) 25 mg tablet, Take 1 tablet (25 mg total) by mouth 3 (three) times a day as needed for dizziness, Disp: 15 tablet, Rfl: 0    Melatonin ER 10 MG TBCR, Take 1 tablet (10 mg total) by mouth daily, Disp: , Rfl:     omeprazole (PriLOSEC) 20 mg delayed release capsule, Take 1 capsule (20 mg total) by mouth daily, Disp: 21 capsule, Rfl: 0    ondansetron (ZOFRAN) 4 mg tablet, Take 1 tablet (4 mg total) by mouth every 8 (eight) hours as needed for nausea or vomiting, Disp: 30 tablet, Rfl: 0    SUMAtriptan (IMITREX) 100 mg tablet, Take 1 tablet (100 mg total) by mouth once as needed for migraine for up to 1 dose, Disp: 12 tablet, Rfl: 0    therapeutic multivitamin-minerals (THERAGRAN-M) tablet, Take 1 tablet by mouth daily, Disp: , Rfl:     Current Allergies     Allergies as of 2022    (No Known Allergies)            The following portions of the patient's history were reviewed and updated as appropriate: allergies, current medications, past family history, past medical history, past social history, past surgical history and problem list      Past Medical History:   Diagnosis Date    Abnormal Pap smear of cervix     Anxiety     BRCA1 negative     BRCA2 negative     Breast injury     Pt states scar tissue around nipple from breastfeeding    Depression     GERD (gastroesophageal reflux disease)     HPV (human papilloma virus) infection     Kidney stone     H/O PYELONEPHRITIS    Migraine     Seasonal allergies     Sleep difficulties     Varicella     POS HX       Past Surgical History:   Procedure Laterality Date    CHOLECYSTECTOMY      FINGER SURGERY      left hand     GYNECOLOGIC CRYOSURGERY      HAND SURGERY Left     Phelebitis in left hand  post IV infilitration, had sx for clot removal     LYMPH NODE BIOPSY      of neck    LYMPH NODE DISSECTION Right     cervical    MD  DELIVERY ONLY N/A 2018    Procedure:  SECTION ();   Surgeon: Nishi Boss MD;  Location: [facility];  Service: Obstetrics    MD EXC SKIN BENIG >4 CM TRUNK,ARM,LEG Right 2022    Procedure: EXCISION  BIOPSY LESION/MASS BACK;  Surgeon: Nithin Romano MD;  Location[facility];  Service: General       Family History   Problem Relation Age of Onset    Hypertension Mother     Heart disease Mother     Depression Mother     Coronary artery disease Mother     Hypertension Father     Hyperlipidemia Father     Heart disease Father         MI    Stroke Paternal Grandmother     Cancer Paternal Grandmother         breast    Breast cancer Paternal Grandmother     Cancer Paternal Aunt         colon    Colon cancer Paternal Aunt     Early death Maternal Uncle     Cancer Maternal Uncle         lymphoma    Early death Maternal Uncle         lymphoma    Birth defects Maternal Uncle     Undescended testes Son     Cystic fibrosis Other     Cystic fibrosis Other     Heart failure Maternal Grandmother     Stroke Maternal Grandfather     Alcohol abuse Maternal Grandfather     Alcohol abuse Brother     Drug abuse Brother          of overdose 2020    Anxiety disorder Sister     Post-traumatic stress disorder Sister     Developmental delay Son     Macrocephaly Son     Hydrocephalus Son     Breast cancer Other          Medications have been verified  Objective   /71   Pulse 70   Temp 97 6 °F (36 4 °C)   Resp 16   Ht 5' 2" (1 575 m)   Wt 95 3 kg (210 lb)   SpO2 99%   BMI 38 41 kg/m²        Physical Exam     Physical Exam  Vitals reviewed  Constitutional:       Appearance: Normal appearance  HENT:      Head: Normocephalic and atraumatic  Right Ear: External ear normal       Left Ear: External ear normal    Eyes:      Conjunctiva/sclera: Conjunctivae normal    Cardiovascular:      Rate and Rhythm: Normal rate  Pulses: Normal pulses  Pulmonary:      Effort: Pulmonary effort is normal    Musculoskeletal:      Comments: No obvious deformity of right knee, no bruising, no swelling, mild TTP of right patella and to medial aspect of right patella around joint space, negative Alycia, pain upon full extension of right leg is area of patella, no valgus or varus tenderness, negative Lachman's, negative posterior drawer, findings consistent with patellofemoral syndrome   Skin:     General: Skin is warm  Capillary Refill: Capillary refill takes less than 2 seconds  Neurological:      General: No focal deficit present  Mental Status: She is alert and oriented to person, place, and time

## 2022-04-04 DIAGNOSIS — F43.10 PTSD (POST-TRAUMATIC STRESS DISORDER): ICD-10-CM

## 2022-04-04 DIAGNOSIS — F41.1 GENERALIZED ANXIETY DISORDER: ICD-10-CM

## 2022-04-04 RX ORDER — CLONIDINE HYDROCHLORIDE 0.1 MG/1
TABLET ORAL
Qty: 120 TABLET | Refills: 0 | Status: SHIPPED | OUTPATIENT
Start: 2022-04-04 | End: 2022-04-07 | Stop reason: SDUPTHER

## 2022-04-07 ENCOUNTER — TELEMEDICINE (OUTPATIENT)
Dept: PSYCHIATRY | Facility: CLINIC | Age: 37
End: 2022-04-07
Payer: COMMERCIAL

## 2022-04-07 DIAGNOSIS — F41.1 GENERALIZED ANXIETY DISORDER: Primary | ICD-10-CM

## 2022-04-07 DIAGNOSIS — F43.10 PTSD (POST-TRAUMATIC STRESS DISORDER): ICD-10-CM

## 2022-04-07 PROCEDURE — 90833 PSYTX W PT W E/M 30 MIN: CPT | Performed by: NURSE PRACTITIONER

## 2022-04-07 PROCEDURE — 99214 OFFICE O/P EST MOD 30 MIN: CPT | Performed by: NURSE PRACTITIONER

## 2022-04-07 RX ORDER — CLONIDINE HYDROCHLORIDE 0.1 MG/1
TABLET ORAL
Qty: 120 TABLET | Refills: 3 | Status: SHIPPED | OUTPATIENT
Start: 2022-04-07

## 2022-04-07 NOTE — PSYCH
This note was not shared with the patient due to reasonable likelihood of causing patient harm      PROGRESS NOTE        6 [facility]      Name and Date of Birth:  Jennie Fairchild 40 y o  1985    Date of Visit: 04/07/22     After connecting through [employer], the patient was identified by name and date of birth  Jennie Fairchild was informed that this is a telemedicine visit and that the visit is being conducted through [address] [place] [address] Road Now and patient was informed that this is a secure, HIPAA-compliant platform  She agrees to proceed  which may not be secure and therefore, might not be HIPAA-compliant  My office door was closed  No one else was in the room  She acknowledged consent and understanding of privacy and security of the video platform  The patient has agreed to participate and understands they can discontinue the visit at any time  SUBJECTIVE:    Eugenia Anguiano was seen today for follow up and medication  Management  She was casually dressed, alert and oriented 3x and maintained good eye contact  Her speech was clear and or normal r/r/v  Thought processes were clear and goal directed  She denied any anxiety or depression and thought her medications were working well  She has been busy taking care of her son Shadia Lucio, who has been speaking a few words together  Sleep and appetite were described as good, and she denied any new sxs or side effects from her medications  She denies suicidal ideation, intent or plan at present, has no suicidal ideation, intent or plan at present  She denies any auditory hallucinations and visual hallucinations, denies any other delusional thinking, denies any delusional thinking  She denies any side effects from medications      HPI ROS Appetite Changes and Sleep: normal appetite, normal sleep    Review Of Systems:      Constitutional As noted in HPI   ENT As noted in HPI   Cardiovascular As noted in HPI   Respiratory As noted in HPI Gastrointestinal As noted in HPI   Genitourinary As noted in HPI   Musculoskeletal As noted in HPI   Integumentary As noted in HPI   Neurological As noted in HPI   Endocrine As noted in HPI   Other Symptoms Negative and None       Laboratory Results: No results found for this or any previous visit      Substance Abuse History:    Social History     Substance and Sexual Activity   Drug Use Never       Family Psychiatric History:     Family History   Problem Relation Age of Onset    Hypertension Mother     Heart disease Mother     Depression Mother     Coronary artery disease Mother     Hypertension Father     Hyperlipidemia Father     Heart disease Father         MI    Stroke Paternal Grandmother     Cancer Paternal Grandmother         breast    Breast cancer Paternal Grandmother     Cancer Paternal Aunt         colon    Colon cancer Paternal Aunt     Early death Maternal Uncle     Cancer Maternal Uncle         lymphoma    Early death Maternal Uncle         lymphoma    Birth defects Maternal Uncle     Undescended testes Son     Cystic fibrosis Other     Cystic fibrosis Other     Heart failure Maternal Grandmother     Stroke Maternal Grandfather     Alcohol abuse Maternal Grandfather     Alcohol abuse Brother     Drug abuse Brother          of overdose 2020    Anxiety disorder Sister     Post-traumatic stress disorder Sister     Developmental delay Son     Macrocephaly Son     Hydrocephalus Son     Breast cancer Other        The following portions of the patient's history were reviewed and updated as appropriate: past family history, past medical history, past social history, past surgical history and problem list     Social History     Socioeconomic History    Marital status: /Civil Union     Spouse name: Regis Blair Number of children: 2    Years of education: Not on file    Highest education level: Associate degree: academic program   Occupational History    Occupation: unemployed   Tobacco Use    Smoking status: Former Smoker     Packs/day: 0 50     Years: 3 00     Pack years: 1 50     Types: Cigarettes     Quit date:      Years since quittin 2    Smokeless tobacco: Never Used   Vaping Use    Vaping Use: Never used   Substance and Sexual Activity    Alcohol use: Not Currently    Drug use: Never    Sexual activity: Yes     Partners: Male     Birth control/protection: None   Other Topics Concern    Not on file   Social History Narrative    Not on file     Social Determinants of Health     Financial Resource Strain: Low Risk     Difficulty of Paying Living Expenses: Not very hard   Food Insecurity: No Food Insecurity    Worried About Running Out of Food in the Last Year: Never true    Michelle of Food in the Last Year: Never true   Transportation Needs: No Transportation Needs    Lack of Transportation (Medical): No    Lack of Transportation (Non-Medical): No   Physical Activity: Insufficiently Active    Days of Exercise per Week: 4 days    Minutes of Exercise per Session: 20 min   Stress: Not on file   Social Connections:  Moderately Isolated    Frequency of Communication with Friends and Family: More than three times a week    Frequency of Social Gatherings with Friends and Family: Never    Attends Catholic Services: Never    Active Member of Clubs or Organizations: No    Attends Club or Organization Meetings: Never    Marital Status:    Intimate Partner Violence: Not At Risk    Fear of Current or Ex-Partner: No    Emotionally Abused: No    Physically Abused: No    Sexually Abused: No   Housing Stability: Not on file     Social History     Social History Narrative    Not on file        Social History     Tobacco History     Smoking Status  Former Smoker Quit date  2013 Smoking Frequency  0 5 packs/day for 3 years (1 5 pk yrs) Smoking Tobacco Type  Cigarettes    Smokeless Tobacco Use  Never Used          Alcohol History     Alcohol Use Status  Not Currently          Drug Use     Drug Use Status  Never          Sexual Activity     Sexually Active  Yes Partners  Male Birth Control/Protection  None          Activities of Daily Living    Not Asked               Additional Substance Use Detail     Questions Responses    Cannabis frequency Never used    Comment: Never used on 7/22/2019     Cocaine frequency Never used    Comment: Never used on 7/22/2019     Amphetamine frequency Never used    Comment: Never used on 7/22/2019     Inhalant frequency Never used    Comment: Never used on 7/22/2019     Hallucinogen frequency Never used    Comment: Never used on 7/22/2019     Ecstasy frequency Never used    Comment: Never used on 7/22/2019     Other drug frequency Never used    Comment: Never used on 7/22/2019     Last reviewed by Sol Alexander on 4/7/2022            OBJECTIVE:     Mental Status Evaluation:    Appearance age appropriate, casually dressed   Behavior pleasant, cooperative   Speech normal volume, normal pitch   Mood appropriate   Affect F&A   Thought Processes logical   Associations intact associations   Thought Content normal   Perceptual Disturbances: none   Abnormal Thoughts  Risk Potential Suicidal ideation - None  Homicidal ideation - None  Potential for aggression - No   Orientation oriented to person, place, time/date and situation   Memory recent and remote memory grossly intact   Cosciousness alert and awake   Attention Span attention span and concentration are age appropriate   Intellect Appears to be of Average Intelligence   Insight age appropriate    Judgement good    Muscle Strength and  Gait muscle strength and tone were normal   Language Intact   Fund of Knowledge Intact   Pain none   Pain Scale 0       Assessment/Plan:       Diagnoses and all orders for this visit:    Generalized anxiety disorder  -     cloNIDine (CATAPRES) 0 1 mg tablet;  Take one tab in am, one in the afternoon, and two at bedtime    PTSD (post-traumatic stress disorder)  -     cloNIDine (CATAPRES) 0 1 mg tablet; Take one tab in am, one in the afternoon, and two at bedtime    Post-partum depression  -     cloNIDine (CATAPRES) 0 1 mg tablet; Take one tab in am, one in the afternoon, and two at bedtime          Treatment Recommendations/Precautions:    Continue current medications:    Risks/Benefits      Risks, Benefits And Possible Side Effects Of Medications:    Risks, benefits, and possible side effects of medications explained to patient and patient verbalizes understanding and agreement for treatment  Controlled Medication Discussion:         Psychotherapy Provided:     Individual psychotherapy provided: Today I spent 20 min counseling with this patient

## 2022-04-12 ENCOUNTER — APPOINTMENT (OUTPATIENT)
Dept: RADIOLOGY | Facility: CLINIC | Age: 37
End: 2022-04-12
Payer: COMMERCIAL

## 2022-04-12 ENCOUNTER — OFFICE VISIT (OUTPATIENT)
Dept: OBGYN CLINIC | Facility: CLINIC | Age: 37
End: 2022-04-12
Payer: COMMERCIAL

## 2022-04-12 VITALS
HEIGHT: 62 IN | BODY MASS INDEX: 38.55 KG/M2 | WEIGHT: 209.5 LBS | HEART RATE: 63 BPM | SYSTOLIC BLOOD PRESSURE: 108 MMHG | DIASTOLIC BLOOD PRESSURE: 75 MMHG

## 2022-04-12 DIAGNOSIS — M25.561 RIGHT KNEE PAIN, UNSPECIFIED CHRONICITY: ICD-10-CM

## 2022-04-12 DIAGNOSIS — M25.561 RIGHT KNEE PAIN, UNSPECIFIED CHRONICITY: Primary | ICD-10-CM

## 2022-04-12 DIAGNOSIS — M22.2X1 PATELLOFEMORAL SYNDROME OF RIGHT KNEE: ICD-10-CM

## 2022-04-12 PROCEDURE — 3008F BODY MASS INDEX DOCD: CPT | Performed by: PHYSICIAN ASSISTANT

## 2022-04-12 PROCEDURE — 73562 X-RAY EXAM OF KNEE 3: CPT

## 2022-04-12 PROCEDURE — 1036F TOBACCO NON-USER: CPT | Performed by: PHYSICIAN ASSISTANT

## 2022-04-12 PROCEDURE — 99214 OFFICE O/P EST MOD 30 MIN: CPT | Performed by: PHYSICIAN ASSISTANT

## 2022-04-12 NOTE — PROGRESS NOTES
Assessment/Plan:  1  Right knee pain, unspecified chronicity  XR knee 3 vw right non injury   2  Patellofemoral syndrome of right knee  Ambulatory Referral to Physical Therapy     The patient does appear to have patellofemoral syndrome  We will start with conservative treatment for this  I did prescribe her physical therapy today  She may ice and take over-the-counter medications as needed for discomfort  She may ice where a simple neoprene sleeve, however I do not advise a large knee brace, as her knee does feel stable on examination today  She will follow up in 6 weeks for repeat evaluation  If she fails to make progress, we did discuss possible MRI of the shoulder  Subjective:   Jeff Mejía is a 40 y o  female who presents today for evaluation of her right knee  She notes that this started to bother her proximally 1 month ago, with no inciting event prior to the onset of her symptoms  She simply woke up with pain about the knee  Most of her pain is about the anterior aspect and is worse with activity and better with rest   She notes intermittent clicking about the knee, but no locking  She also notes intermittent feelings of instability  She notes good sensation of the right lower extremity and notes good range of motion  She notes initially she did have some swelling, however this has since resolved  Review of Systems   Constitutional: Negative  Negative for chills and fever  HENT: Negative  Negative for ear pain and sore throat  Eyes: Negative  Negative for pain and redness  Respiratory: Negative  Negative for shortness of breath and wheezing  Cardiovascular: Negative for chest pain and palpitations  Gastrointestinal: Negative  Negative for abdominal pain and blood in stool  Endocrine: Negative  Negative for polydipsia and polyuria  Genitourinary: Negative  Negative for difficulty urinating and dysuria  Musculoskeletal:        As noted in HPI   Skin: Negative  Negative for pallor and rash  Neurological: Negative  Negative for dizziness and numbness  Hematological: Negative  Negative for adenopathy  Does not bruise/bleed easily  Psychiatric/Behavioral: Negative  Negative for confusion and suicidal ideas  Past Medical History:   Diagnosis Date    Abnormal Pap smear of cervix     Anxiety     BRCA1 negative     BRCA2 negative     Breast injury     Pt states scar tissue around nipple from breastfeeding    Depression     GERD (gastroesophageal reflux disease)     HPV (human papilloma virus) infection     Kidney stone     H/O PYELONEPHRITIS    Migraine     Seasonal allergies     Sleep difficulties     Varicella     POS HX       Past Surgical History:   Procedure Laterality Date    CHOLECYSTECTOMY      FINGER SURGERY      left hand     GYNECOLOGIC CRYOSURGERY      HAND SURGERY Left     Phelebitis in left hand  post IV infilitration, had sx for clot removal     LYMPH NODE BIOPSY      of neck    LYMPH NODE DISSECTION Right     cervical    MI  DELIVERY ONLY N/A 2018    Procedure:  SECTION ();   Surgeon: An Michaels MD;  Location: [facility];  Service: Obstetrics    MI EXC SKIN BENIG >4 CM TRUNK,ARM,LEG Right 2022    Procedure: EXCISION  BIOPSY LESION/MASS BACK;  Surgeon: Jose Frank MD;  Location: [facility];  Service: General       Family History   Problem Relation Age of Onset    Hypertension Mother     Heart disease Mother     Depression Mother     Coronary artery disease Mother     Hypertension Father     Hyperlipidemia Father     Heart disease Father         MI    Stroke Paternal Grandmother     Cancer Paternal Grandmother         breast    Breast cancer Paternal Grandmother     Cancer Paternal Aunt         colon    Colon cancer Paternal Aunt     Early death Maternal Uncle     Cancer Maternal Uncle         lymphoma    Early death Maternal Uncle         lymphoma    Birth defects Maternal Uncle     Undescended testes Son     Cystic fibrosis Other     Cystic fibrosis Other     Heart failure Maternal Grandmother     Stroke Maternal Grandfather     Alcohol abuse Maternal Grandfather     Alcohol abuse Brother     Drug abuse Brother          of overdose 2020    Anxiety disorder Sister     Post-traumatic stress disorder Sister     Developmental delay Son     Macrocephaly Son     Hydrocephalus Son     Breast cancer Other        Social History     Occupational History    Occupation: unemployed   Tobacco Use    Smoking status: Former Smoker     Packs/day: 0 50     Years: 3 00     Pack years: 1 50     Types: Cigarettes     Quit date:      Years since quittin 2    Smokeless tobacco: Never Used   Vaping Use    Vaping Use: Never used   Substance and Sexual Activity    Alcohol use: Not Currently    Drug use: Never    Sexual activity: Yes     Partners: Male     Birth control/protection: None         Current Outpatient Medications:     busPIRone (BUSPAR) 15 mg tablet, Take 1 tablet (15 mg total) by mouth 3 (three) times a day, Disp: 90 tablet, Rfl: 2    cetirizine (ZyrTEC) 10 mg tablet, Take 1 tablet (10 mg total) by mouth daily, Disp: , Rfl: 0    cloNIDine (CATAPRES) 0 1 mg tablet, Take one tab in am, one in the afternoon, and two at bedtime, Disp: 120 tablet, Rfl: 3    escitalopram (LEXAPRO) 20 mg tablet, TAKE 1 TABLET BY MOUTH EVERY DAY, Disp: 90 tablet, Rfl: 0    fluticasone (FLONASE) 50 mcg/act nasal spray, 1 spray into each nostril daily, Disp: , Rfl:     hydrOXYzine HCL (ATARAX) 50 mg tablet, TAKE 1/2 TO 1 TAB BY MOUTH EVERY 8 HOURS AS NEEDED FOR ANXIETY, Disp: 60 tablet, Rfl: 0    meclizine (ANTIVERT) 25 mg tablet, Take 1 tablet (25 mg total) by mouth 3 (three) times a day as needed for dizziness, Disp: 15 tablet, Rfl: 0    Melatonin ER 10 MG TBCR, Take 1 tablet (10 mg total) by mouth daily, Disp: , Rfl:     omeprazole (PriLOSEC) 20 mg delayed release capsule, Take 1 capsule (20 mg total) by mouth daily, Disp: 21 capsule, Rfl: 0    ondansetron (ZOFRAN) 4 mg tablet, Take 1 tablet (4 mg total) by mouth every 8 (eight) hours as needed for nausea or vomiting, Disp: 30 tablet, Rfl: 0    SUMAtriptan (IMITREX) 100 mg tablet, Take 1 tablet (100 mg total) by mouth once as needed for migraine for up to 1 dose, Disp: 12 tablet, Rfl: 0    therapeutic multivitamin-minerals (THERAGRAN-M) tablet, Take 1 tablet by mouth daily, Disp: , Rfl:     benzonatate (TESSALON) 200 MG capsule, Take 1 capsule (200 mg total) by mouth 3 (three) times a day as needed for cough (Patient not taking: Reported on 6/9/2021), Disp: 20 capsule, Rfl: 0    cyclobenzaprine (FLEXERIL) 10 mg tablet, Take 1 tablet (10 mg total) by mouth daily at bedtime for 21 days, Disp: 21 tablet, Rfl: 0    No Known Allergies    Objective:  Vitals:    04/12/22 0837   BP: 108/75   Pulse: 63       Right Knee Exam     Tenderness   The patient is experiencing no tenderness  Range of Motion   Extension: 0   Flexion: 130     Tests   Varus: negative Valgus: negative  Lachman:  Anterior - negative      Drawer:  Anterior - negative    Posterior - negative    Other   Erythema: absent  Sensation: normal  Pulse: present  Swelling: none  Effusion: no effusion present          Observations     Right Knee   Negative for effusion  Physical Exam  Constitutional:       General: She is not in acute distress  Appearance: She is well-developed  HENT:      Head: Normocephalic and atraumatic  Eyes:      General: No scleral icterus  Conjunctiva/sclera: Conjunctivae normal    Neck:      Vascular: No JVD  Cardiovascular:      Rate and Rhythm: Normal rate  Pulmonary:      Effort: Pulmonary effort is normal  No respiratory distress  Musculoskeletal:      Right knee: No effusion  Skin:     General: Skin is warm  Neurological:      Mental Status: She is alert and oriented to person, place, and time  Coordination: Coordination normal          I have personally reviewed pertinent films in PACS and my interpretation is as follows:  Right knee xrays:  Negative

## 2022-04-23 DIAGNOSIS — F41.1 GENERALIZED ANXIETY DISORDER: ICD-10-CM

## 2022-04-23 DIAGNOSIS — F43.10 PTSD (POST-TRAUMATIC STRESS DISORDER): ICD-10-CM

## 2022-04-23 RX ORDER — BUSPIRONE HYDROCHLORIDE 15 MG/1
TABLET ORAL
Qty: 90 TABLET | Refills: 2 | Status: SHIPPED | OUTPATIENT
Start: 2022-04-23 | End: 2022-08-10

## 2022-04-27 ENCOUNTER — EVALUATION (OUTPATIENT)
Dept: PHYSICAL THERAPY | Facility: CLINIC | Age: 37
End: 2022-04-27
Payer: COMMERCIAL

## 2022-04-27 DIAGNOSIS — M22.2X1 PATELLOFEMORAL SYNDROME OF RIGHT KNEE: Primary | ICD-10-CM

## 2022-04-27 PROCEDURE — 97161 PT EVAL LOW COMPLEX 20 MIN: CPT | Performed by: PHYSICAL THERAPIST

## 2022-04-27 NOTE — PROGRESS NOTES
PT Evaluation     Today's date: 2022  Patient name: Mariah Boyer  : 1985  MRN: [id number]  Referring provider: Josy Mercer  Dx:   Encounter Diagnosis     ICD-10-CM    1  Patellofemoral syndrome of right knee  M22 2X1 Ambulatory Referral to Physical Therapy                  Assessment  Assessment details: Mariah Boyer is a 40 y o  female who presents to physical therapy with pain, decreased LE range of motion, decreased LE strength, fair balance, impaired function and decreased activity tolerance  Patient's clinical presentation is consistent with their referring diagnosis of Patellofemoral syndrome of right knee  (primary encounter diagnosis)  The pt presents with functional limitations of ADLs, recreational activities, ambulation, performing household chores and stair negotiation  Pt would benefit from physical therapy services to address these limitations and maximize function  Pt was instructed and educated on home exercise program today and demonstrates understanding  Impairments: abnormal gait, abnormal muscle firing, abnormal muscle tone, abnormal or restricted ROM, abnormal movement, activity intolerance, impaired balance, impaired physical strength, lacks appropriate home exercise program, pain with function, weight-bearing intolerance, poor posture  and poor body mechanics  Understanding of Dx/Px/POC: good   Prognosis: good    Goals  Short term goals  (3 weeks)  1  Patient will have no pain right knee  2   Patient will have full range of motion right knee  3  Patient will report a 50% improvement with activities of daily living   4  Patient will decrease girth of right knee by 1 to 2 cm  Long term goals - (6 weeks)  1  Patient will be independent with home exercise program  2  Patient will have no gait deviations ambulating on level surfaces  3   Patient will report 80 % improvement with activity of daily living function     4   Patient will negotiate stairs up and down pain free with use of one railing  Plan  Patient would benefit from: PT eval and skilled physical therapy  Planned modality interventions: cryotherapy  Planned therapy interventions: ADL training, balance/weight bearing training, joint mobilization, manual therapy, massage, neuromuscular re-education, patient education, postural training, strengthening, stretching, functional ROM exercises, therapeutic activities, therapeutic exercise, gait training and home exercise program  Frequency: 2x week  Duration in visits: 12  Duration in weeks: 6  Treatment plan discussed with: patient        Subjective Evaluation    History of Present Illness  Mechanism of injury: She reports in March she began feeling unstable in her right knee and began having pain  She notes on/off right knee problems for multiple years  She followed up with Dr Carmen Silva  She was then referred to PT  Pain  Current pain rating: 3  At best pain ratin  At worst pain rating: 10  Location: Medial Right knee  Quality: sharp, dull ache, burning, grinding and needle-like  Relieving factors: rest, change in position and heat  Aggravating factors: walking, standing and stair climbing    Social Support    Employment status: not working  Exercise history: Housework ,    Patient Goals  Patient goals for therapy: decreased pain and independence with ADLs/IADLs          Objective     Palpation     Right   Hypertonic in the distal biceps femoris, distal semimembranosus, distal semitendinosus and rectus femoris  Tenderness of the distal semimembranosus, distal semitendinosus and rectus femoris  Tenderness     Right Knee   Tenderness in the medial joint line, patellar tendon and popliteal fossa  Neurological Testing     Additional Neurological Details  She was positive for sciatic nerve tension    Nerve floss to the same improved SLR and complaints of tingling during SLR    Active Range of Motion   Left Knee   Flexion: 131 degrees Extension: 0 degrees     Right Knee   Flexion: 119 degrees   Extension: -4 degrees     Strength/Myotome Testing     Left Knee   Flexion: 5  Extension: 5    Right Knee   Flexion: 4-  Extension: 4    Swelling     Left Knee Girth Measurement (cm)   Joint line: 35 cm    Right Knee Girth Measurement (cm)   Joint line: 35 4 cm    Ambulation     Observational Gait   Gait: antalgic              Precautions: Anxiety / Depression    Specialty Daily Treatment Diary     Manuals 5/27/22       Visit # 1       PF mobs        Stretch HS Quad        Knee PROM        Sciatic N floss                Warm-up        NuStep        Neuro Re-Ed        Qset        SLS                                Ther Ex        Mini squat        Side step        Knee flex        Knee ext        SLR        Clamshell                        Ther Activity                        Gait Training                        Modalities        CP

## 2022-05-03 ENCOUNTER — OFFICE VISIT (OUTPATIENT)
Dept: PHYSICAL THERAPY | Facility: CLINIC | Age: 37
End: 2022-05-03
Payer: COMMERCIAL

## 2022-05-03 DIAGNOSIS — M22.2X1 PATELLOFEMORAL SYNDROME OF RIGHT KNEE: Primary | ICD-10-CM

## 2022-05-03 PROCEDURE — 97110 THERAPEUTIC EXERCISES: CPT | Performed by: PHYSICAL THERAPIST

## 2022-05-03 NOTE — PROGRESS NOTES
Daily Note     Today's date: 5/3/2022  Patient name: Audelia Stockton  : 1985  MRN: [id number]  Referring provider: Bianca Horan  Dx:   Encounter Diagnosis     ICD-10-CM    1  Patellofemoral syndrome of right knee  M22 2X1                   Subjective:  She notes relatively no pain since the IE but she has been inactive compared to normal       Objective: See treatment diary below      Assessment: Tolerated treatment well  Patient would benefit from continued PT      Plan: Continue per plan of care  Progress treatment as tolerated         Precautions: Anxiety / Depression    Specialty Daily Treatment Diary     Manuals 4/27/22 5/3/22      Visit # 1 2      PF mobs  2'      Stretch HS Quad  4'      Knee PROM  3'      Sciatic N floss  1'              Warm-up        NuStep  7 min      Neuro Re-Ed        Qset  20      SLS                                Ther Ex        Mini squat  20      Side step  4 x 20 ft      Knee flex  20      Knee ext w df  20      SLR  10x      Clamshell  20  Red loop      Heel slides  30              Ther Activity                        Gait Training                        Modalities        CP  --

## 2022-05-05 ENCOUNTER — OFFICE VISIT (OUTPATIENT)
Dept: PHYSICAL THERAPY | Facility: CLINIC | Age: 37
End: 2022-05-05
Payer: COMMERCIAL

## 2022-05-05 DIAGNOSIS — M22.2X1 PATELLOFEMORAL SYNDROME OF RIGHT KNEE: Primary | ICD-10-CM

## 2022-05-05 PROCEDURE — 97140 MANUAL THERAPY 1/> REGIONS: CPT | Performed by: PHYSICAL THERAPIST

## 2022-05-05 PROCEDURE — 97110 THERAPEUTIC EXERCISES: CPT | Performed by: PHYSICAL THERAPIST

## 2022-05-05 NOTE — PROGRESS NOTES
Daily Note     Today's date: 2022  Patient name: Irma Hsu  : 1985  MRN: [id number]  Referring provider: Garcia Rogers  Dx:   Encounter Diagnosis     ICD-10-CM    1  Patellofemoral syndrome of right knee  M22 2X1                   Subjective:  She notes that she had some pain last Tuesday night in the lower back/hip, but is better today with no pain  Objective: See treatment diary below    Assessment: Patient tolerated today's physical therapy treatment well  Added standing hip abduction and glute bridges today with no adverse reactions  Patient would benefit from continued PT  Plan: Continue per plan of care  Progress treatment as tolerated         Precautions: Anxiety / Depression    Specialty Daily Treatment Diary     Manuals 4/27/22 5/3/22 5/5/22     Visit # 1 2 3     PF mobs  2'      Stretch HS Quad  4' 4'     Knee PROM  3' 3'     Sciatic N floss  1' 1'             Warm-up        NuStep  7 min 7 min     Neuro Re-Ed        Qset  20 20x     SLS                                Ther Ex        Mini squat  20 20x     Side step  4 x 20 ft 4 x 20ft     Knee flex  20 20x     Knee ext w df  20 20x     SLR  10x 10x     Clamshell  20  Red loop 20x Red Loop     Heel slides  30 30x     Stand Hip ABd   20x ea     Glute Bridge   20x     Ther Activity                        Gait Training                        Modalities        CP  --

## 2022-05-10 ENCOUNTER — OFFICE VISIT (OUTPATIENT)
Dept: PHYSICAL THERAPY | Facility: CLINIC | Age: 37
End: 2022-05-10
Payer: COMMERCIAL

## 2022-05-10 DIAGNOSIS — M22.2X1 PATELLOFEMORAL SYNDROME OF RIGHT KNEE: Primary | ICD-10-CM

## 2022-05-10 PROCEDURE — 97110 THERAPEUTIC EXERCISES: CPT | Performed by: PHYSICAL THERAPIST

## 2022-05-10 NOTE — PROGRESS NOTES
Daily Note     Today's date: 5/10/2022  Patient name: Marquis Casillas  : 1985  MRN: [id number]  Referring provider: Ashely Hill  Dx:   Encounter Diagnosis     ICD-10-CM    1  Patellofemoral syndrome of right knee  M22 2X1                   Subjective:  She notes that she has 5/10 right knee pain  Objective: See treatment diary below    Assessment: Patient completed modified exercises today where weight bearing exercises were held  This is due to increased symptoms today  She had a positive luis test for right quad tightness  Plan: Continue per plan of care  Progress treatment as tolerated         Precautions: Anxiety / Depression    Specialty Daily Treatment Diary     Manuals 4/27/22 5/3/22 5/5/22 5/10/22    Visit # 1 2 3 4    PF mobs  2'      Stretch HS Quad  4' 4' 4'    Knee PROM  3' 3' 3'    Sciatic N floss  1' 1' 1'            Warm-up        NuStep  7 min 7 min 5 min    Neuro Re-Ed        Qset  20 20x 20    SLS                                Ther Ex        Mini squat  20 20x --    Side step  4 x 20 ft 4 x 20ft --    Knee flex  20 20x 20    Knee ext w df  20 20x 20    SLR  10x 10x 20    Clamshell  20  Red loop 20x Red Loop 20  Red loop    Heel slides  30 30x 20    Stand Hip ABd   20x ea     Glute Bridge   20x     Ther Activity                        Gait Training                        Modalities        CP  --

## 2022-05-12 ENCOUNTER — OFFICE VISIT (OUTPATIENT)
Dept: PHYSICAL THERAPY | Facility: CLINIC | Age: 37
End: 2022-05-12
Payer: COMMERCIAL

## 2022-05-12 DIAGNOSIS — M22.2X1 PATELLOFEMORAL SYNDROME OF RIGHT KNEE: Primary | ICD-10-CM

## 2022-05-12 PROCEDURE — 97110 THERAPEUTIC EXERCISES: CPT | Performed by: PHYSICAL THERAPIST

## 2022-05-12 PROCEDURE — 97140 MANUAL THERAPY 1/> REGIONS: CPT | Performed by: PHYSICAL THERAPIST

## 2022-05-12 NOTE — PROGRESS NOTES
Daily Note     Today's date: 2022  Patient name: Arlene Friedman  : 1985  MRN: [id number]  Referring provider: Charanjit Carbone  Dx:   Encounter Diagnosis     ICD-10-CM    1  Patellofemoral syndrome of right knee  M22 2X1                   Subjective: Patient reports muscle spasms in her right medial thigh and knee likely attributed to overdoing it yesterday - occurred after mowing her lawn at the end of the day yesterday  Feeling okay otherwise  Objective: See treatment diary below  Assessment: Patient tolerated today's physical therapy treatment well  Demonstrated and provided handout of stretches to perform at home to help improve patient flexibility - HS/Hip Flexor/Gastroc/Soleus  Resumed weight bearing exercises today with no adverse reactions  Skilled PT is still recommended to continue working towards patient goals  Plan: Continue per plan of care  Progress treatment as tolerated         Precautions: Anxiety / Depression    Specialty Daily Treatment Diary     Manuals 4/27/22 5/3/22 5/5/22 5/10/22 5/12/22   Visit # 1 2 3 4 5   PF mobs  2'      Stretch HS Quad  4' 4' 4' 4'   Knee PROM  3' 3' 3' 4'   Sciatic N floss  1' 1' 1'            Warm-up        NuStep  7 min 7 min 5 min 7 min   Neuro Re-Ed        Qset  20 20x 20 20x   SLS                                Ther Ex        Mini squat  20 20x -- 20x   Side step  4 x 20 ft 4 x 20ft -- 4 x 20ft   Knee flex  20 20x 20 20x   Knee ext w df  20 20x 20 20x    SLR  10x 10x 20 20x   Clamshell  20  Red loop 20x Red Loop 20  Red loop 30x Red Loop   Heel slides  30 30x 20 30x   Stand Hip ABd   20x ea     Glute Bridge   20x  20x    Ther Activity                        Gait Training                        Modalities        CP  --                 HEP  Hamstring Stair Stretch - 10 x 10 sec - 3x/day  Gastrocnemius Stair Stretch - 10 x 10 sec - 3x/day  Soleus Stair Stretch - 10 x 10 sec - 3x/day  Hip Flexor Stair Stretch - 10 x 10 sec - 3x/day

## 2022-05-15 ENCOUNTER — NURSE TRIAGE (OUTPATIENT)
Dept: OTHER | Facility: OTHER | Age: 37
End: 2022-05-15

## 2022-05-15 DIAGNOSIS — Z11.59 SPECIAL SCREENING EXAMINATION FOR VIRAL DISEASE: Primary | ICD-10-CM

## 2022-05-15 DIAGNOSIS — Z11.59 SPECIAL SCREENING EXAMINATION FOR VIRAL DISEASE: ICD-10-CM

## 2022-05-15 PROCEDURE — U0005 INFEC AGEN DETEC AMPLI PROBE: HCPCS | Performed by: FAMILY MEDICINE

## 2022-05-15 PROCEDURE — U0003 INFECTIOUS AGENT DETECTION BY NUCLEIC ACID (DNA OR RNA); SEVERE ACUTE RESPIRATORY SYNDROME CORONAVIRUS 2 (SARS-COV-2) (CORONAVIRUS DISEASE [COVID-19]), AMPLIFIED PROBE TECHNIQUE, MAKING USE OF HIGH THROUGHPUT TECHNOLOGIES AS DESCRIBED BY CMS-2020-01-R: HCPCS | Performed by: FAMILY MEDICINE

## 2022-05-15 NOTE — TELEPHONE ENCOUNTER
Reason for Disposition   [1] COVID-19 infection suspected by caller or triager AND [2] mild symptoms (cough, fever, or others) AND [3] negative COVID-19 rapid test    Answer Assessment - Initial Assessment Questions  1  Were you within 6 feet or less, for up to 15 minutes or more with a person that has a confirmed COVID-19 test?   No    2  What was the date of your exposure? N/a    3  Are you experiencing any symptoms attributed to the virus? (Assess for SOB, cough, fever, difficulty breathing)   Fever, congested, headache, and body aches  Symptoms started 5/13    4  HIGH RISK: Do you have any history heart or lung conditions, weakened immune system, diabetes, Asthma, CHF, HIV, COPD, Chemo, renal failure, sickle cell, etc?   no    5  PREGNANCY: Are you pregnant or did you recently give birth?  no    Protocols used: CORONAVIRUS (COVID-19) DIAGNOSED OR SUSPECTED-ADULT-AH    Patient requesting a covid swab and [place] [facility] [place]  Order placed  Informed pt of closest [address] now site, address, hours of operation, and to stay in the car  Pt verbalized understanding

## 2022-05-15 NOTE — TELEPHONE ENCOUNTER
Regarding: [facility]-COVID-Symptomatic- fever, congested, headache  ----- Message from Namita Parker sent at 5/15/2022  1:11 PM EDT -----  " I have a fever, congested, headache and body aches "

## 2022-05-16 ENCOUNTER — TELEPHONE (OUTPATIENT)
Dept: FAMILY MEDICINE CLINIC | Facility: CLINIC | Age: 37
End: 2022-05-16

## 2022-05-16 LAB — SARS-COV-2 RNA RESP QL NAA+PROBE: NEGATIVE

## 2022-05-16 NOTE — TELEPHONE ENCOUNTER
Patient went for a covid test however its not final   Sending as FYI   Can close note once your done reviewing    Radha Garcia MA

## 2022-05-17 ENCOUNTER — OFFICE VISIT (OUTPATIENT)
Dept: PHYSICAL THERAPY | Facility: CLINIC | Age: 37
End: 2022-05-17
Payer: COMMERCIAL

## 2022-05-17 ENCOUNTER — TELEPHONE (OUTPATIENT)
Dept: PLASTIC SURGERY | Facility: CLINIC | Age: 37
End: 2022-05-17

## 2022-05-17 DIAGNOSIS — M22.2X1 PATELLOFEMORAL SYNDROME OF RIGHT KNEE: Primary | ICD-10-CM

## 2022-05-17 PROCEDURE — 97110 THERAPEUTIC EXERCISES: CPT

## 2022-05-17 PROCEDURE — 97112 NEUROMUSCULAR REEDUCATION: CPT

## 2022-05-17 NOTE — PROGRESS NOTES
Daily Note     Today's date: 2022  Patient name: Valentina Marvin  : 1985  MRN: [id number]  Referring provider: Roly Dean  Dx:   Encounter Diagnosis     ICD-10-CM    1  Patellofemoral syndrome of right knee  M22 2X1                   Subjective: Patient reports that her R knee has been bothering her less, but describes a muscle spasms in her R paraspinal region  Feeling okay otherwise  Objective: See treatment diary below  Assessment: Patient tolerated today's physical therapy treatment well  Patient able to be progressed with increased weight and resistance this session and illustrated good overall tolerance  Patient reporting no pain with previous session and continued with POC  Patient reporting increased pain in lumbar paraspinal region as well as R hip musculature  Potential benefit in upcoming sessions with addition to core stability if primary PT feels this is appropriate  Skilled PT is still recommended to continue working towards patient goals  Plan: Continue per plan of care  Progress treatment as tolerated         Precautions: Anxiety / Depression    Specialty Daily Treatment Diary     Manuals 5/3/22 5/5/22 5/10/22 5/12/22 5/17/22   Visit # 2 3 4 5 6   PF mobs 2'       Stretch HS Quad 4' 4' 4' 4'    Knee PROM 3' 3' 3' 4'    Sciatic N floss 1' 1' 1'     TPR-Lspine + hip muscualture     8   Warm-up        NuStep 7 min 7 min 5 min 7 min 6 min   Neuro Re-Ed        Qset 20 20x 20 20x 20x   SLS                                Ther Ex        Mini squat 20 20x -- 20x 25x    Side step 4 x 20 ft 4 x 20ft -- 4 x 20ft 7 x10 ft , yellow small loop   Knee flex 20 20x 20 20x 30x, 2# ankle weight   Knee ext w df 20 20x 20 20x  30x, 2# ankle weight   SLR 10x 10x 20 20x 30x, 2# ankle weight   Clamshell 20  Red loop 20x Red Loop 20  Red loop 30x Red Loop + modified side plank + red loop   Heel slides 30 30x 20 30x On red peanut  10 x 2 sets   Stand Hip ABd  20x ea      Glute Bridge  20x  20x 30x + 10# weight at pelvis   Ther Activity                        Gait Training                        Modalities        CP --                  HEP  Hamstring Stair Stretch - 10 x 10 sec - 3x/day  Gastrocnemius Stair Stretch - 10 x 10 sec - 3x/day  Soleus Stair Stretch - 10 x 10 sec - 3x/day  Hip Flexor Stair Stretch - 10 x 10 sec - 3x/day

## 2022-05-17 NOTE — TELEPHONE ENCOUNTER
Spoke to patient about criteria for breast reduction, emailed letter and when patient has required physical therapy and documentation will let me know to make appt

## 2022-05-19 ENCOUNTER — OFFICE VISIT (OUTPATIENT)
Dept: PHYSICAL THERAPY | Facility: CLINIC | Age: 37
End: 2022-05-19
Payer: COMMERCIAL

## 2022-05-19 DIAGNOSIS — M22.2X1 PATELLOFEMORAL SYNDROME OF RIGHT KNEE: Primary | ICD-10-CM

## 2022-05-19 PROCEDURE — 97140 MANUAL THERAPY 1/> REGIONS: CPT | Performed by: PHYSICAL THERAPIST

## 2022-05-19 PROCEDURE — 97110 THERAPEUTIC EXERCISES: CPT | Performed by: PHYSICAL THERAPIST

## 2022-05-19 PROCEDURE — 97112 NEUROMUSCULAR REEDUCATION: CPT | Performed by: PHYSICAL THERAPIST

## 2022-05-19 NOTE — PROGRESS NOTES
Daily Note     Today's date: 2022  Patient name: Sudheer Allen  : 1985  MRN: [id number]  Referring provider: Wilda Klinefelter  Dx:   Encounter Diagnosis     ICD-10-CM    1  Patellofemoral syndrome of right knee  M22 2X1                   Subjective: Patient reports no knee pain today    Objective: See treatment diary below  Assessment: Patient tolerated today's physical therapy treatment well  She had no pain with all activities but felt right patella tightness during squats  Plan: Continue per plan of care  Progress treatment as tolerated         Precautions: Anxiety / Depression    Specialty Daily Treatment Diary     Manuals 5/5/22 5/10/22 5/12/22 5/17/22 5/19/22   Visit # 3 4 5 6 7   PF mobs        Stretch HS Quad 4' 4' 4'  4'   Knee PROM 3' 3' 4'  4'   Sciatic N floss 1' 1'      TPR-Lspine + hip muscualture    8    Warm-up        NuStep 7 min 5 min 7 min 6 min Bike 10 min   Neuro Re-Ed        Qset 20x 20 20x 20x 20   SLS        Sep ups     20    6"                   Ther Ex        Mini squat 20x -- 20x 25x  20   Side step 4 x 20ft -- 4 x 20ft 7 x10 ft , yellow small loop 8 x 10 ft   Knee flex 20x 20 20x 30x, 2# ankle weight 30  2#   Knee ext w df 20x 20 20x  30x, 2# ankle weight 30  2#   SLR 10x 20 20x 30x, 2# ankle weight 30   Clamshell 20x Red Loop 20  Red loop 30x Red Loop + modified side plank + red loop 30  Red loop   Heel slides 30x 20 30x On red peanut  10 x 2 sets 30   Stand Hip ABd 20x ea       Glute Bridge 20x  20x  30x + 10# weight at pelvis 30x   Ther Activity                        Gait Training                        Modalities        CP                   HEP  Hamstring Stair Stretch - 10 x 10 sec - 3x/day  Gastrocnemius Stair Stretch - 10 x 10 sec - 3x/day  Soleus Stair Stretch - 10 x 10 sec - 3x/day  Hip Flexor Stair Stretch - 10 x 10 sec - 3x/day

## 2022-05-20 NOTE — TELEPHONE ENCOUNTER
DR LINO    Patient called with severe dizziness  Patient agreed to go to the ER  SUZANNE to return call to clinic, but will also send message through [employer] [address] [employer] [address]. Added folate to existing labs. Please ask patient how she would like to proceed with B 12 injections.

## 2022-05-24 ENCOUNTER — APPOINTMENT (OUTPATIENT)
Dept: PHYSICAL THERAPY | Facility: CLINIC | Age: 37
End: 2022-05-24
Payer: COMMERCIAL

## 2022-05-25 ENCOUNTER — OFFICE VISIT (OUTPATIENT)
Dept: OBGYN CLINIC | Facility: CLINIC | Age: 37
End: 2022-05-25
Payer: COMMERCIAL

## 2022-05-25 VITALS
WEIGHT: 209 LBS | BODY MASS INDEX: 38.46 KG/M2 | DIASTOLIC BLOOD PRESSURE: 89 MMHG | HEART RATE: 71 BPM | SYSTOLIC BLOOD PRESSURE: 143 MMHG | HEIGHT: 62 IN

## 2022-05-25 DIAGNOSIS — M22.2X1 PATELLOFEMORAL SYNDROME OF RIGHT KNEE: Primary | ICD-10-CM

## 2022-05-25 PROCEDURE — 99213 OFFICE O/P EST LOW 20 MIN: CPT | Performed by: PHYSICIAN ASSISTANT

## 2022-05-25 PROCEDURE — 1036F TOBACCO NON-USER: CPT | Performed by: PHYSICIAN ASSISTANT

## 2022-05-25 NOTE — PROGRESS NOTES
Assessment/Plan:  1  Patellofemoral syndrome of right knee       The patient has improved significantly with physical therapy for her right knee  She would like to continue physical therapy, and I did renew this prescription today  It is my hope that her back pain will resolve as she strengthens for core more  If she fails to make progress of the next 4-6 weeks we can certainly see her back to assess her for her back  If doing well, she will follow up as needed  Subjective:   Valentina Marvin is a 40 y o  female who presents today for follow-up of her right knee  We have been treating her conservatively for patellofemoral syndrome  She has been doing physical therapy with significant progress  She notes excellent range of motion and improving strength about the knee  She notes minimal discomfort at this point  She denies any instability about the knee, but still complains of some grinding at times  She notes more left-sided mid to low back pain now as they have been doing some core strengthening with physical therapy  They are working on this with therapy exercises though  She notes good sensation of the right lower extremity  Review of Systems   Constitutional: Negative  Negative for chills and fever  HENT: Negative  Negative for ear pain and sore throat  Eyes: Negative  Negative for pain and redness  Respiratory: Negative  Negative for shortness of breath and wheezing  Cardiovascular: Negative for chest pain and palpitations  Gastrointestinal: Negative  Negative for abdominal pain and blood in stool  Endocrine: Negative  Negative for polydipsia and polyuria  Genitourinary: Negative  Negative for difficulty urinating and dysuria  Musculoskeletal:        As noted in HPI   Skin: Negative  Negative for pallor and rash  Neurological: Negative  Negative for dizziness and numbness  Hematological: Negative  Negative for adenopathy  Does not bruise/bleed easily  Psychiatric/Behavioral: Negative  Negative for confusion and suicidal ideas  Past Medical History:   Diagnosis Date    Abnormal Pap smear of cervix     Anxiety     BRCA1 negative     BRCA2 negative     Breast injury     Pt states scar tissue around nipple from breastfeeding    Depression     GERD (gastroesophageal reflux disease)     HPV (human papilloma virus) infection     Kidney stone     H/O PYELONEPHRITIS    Migraine     Seasonal allergies     Sleep difficulties     Varicella     POS HX       Past Surgical History:   Procedure Laterality Date    CHOLECYSTECTOMY      FINGER SURGERY      left hand     GYNECOLOGIC CRYOSURGERY      HAND SURGERY Left     Phelebitis in left hand  post IV infilitration, had sx for clot removal     LYMPH NODE BIOPSY      of neck    LYMPH NODE DISSECTION Right     cervical    [place]  DELIVERY ONLY N/A 2018    Procedure:  SECTION ();   Surgeon: Jeanine Valencia MD;  Location: [facility];  Service: Obstetrics    MN EXC SKIN BENIG >4 CM TRUNK,ARM,LEG Right 2022    Procedure: EXCISION  BIOPSY LESION/MASS BACK;  Surgeon: Caroline Vital MD;  Location: [address] [place] [address];  Service: General       Family History   Problem Relation Age of Onset    Hypertension Mother     Heart disease Mother     Depression Mother     Coronary artery disease Mother     Hypertension Father     Hyperlipidemia Father     Heart disease Father         MI    Stroke Paternal Grandmother     Cancer Paternal Grandmother         breast    Breast cancer Paternal Grandmother     Cancer Paternal Aunt         colon    Colon cancer Paternal Aunt     Early death Maternal Uncle     Cancer Maternal Uncle         lymphoma    Early death Maternal Uncle         lymphoma    Birth defects Maternal Uncle     Undescended testes Son     Cystic fibrosis Other     Cystic fibrosis Other     Heart failure Maternal Grandmother     Stroke Maternal Grandfather     Alcohol abuse Maternal Grandfather     Alcohol abuse Brother     Drug abuse Brother          of overdose 2020    Anxiety disorder Sister     Post-traumatic stress disorder Sister     Developmental delay Son     Macrocephaly Son     Hydrocephalus Son     Breast cancer Other        Social History     Occupational History    Occupation: unemployed   Tobacco Use    Smoking status: Former Smoker     Packs/day: 0 50     Years: 3 00     Pack years: 1 50     Types: Cigarettes     Quit date:      Years since quittin 4    Smokeless tobacco: Never Used   Vaping Use    Vaping Use: Never used   Substance and Sexual Activity    Alcohol use: Not Currently    Drug use: Never    Sexual activity: Yes     Partners: Male     Birth control/protection: None         Current Outpatient Medications:     busPIRone (BUSPAR) 15 mg tablet, TAKE 1 TABLET BY MOUTH 3 TIMES A DAY , Disp: 90 tablet, Rfl: 2    cetirizine (ZyrTEC) 10 mg tablet, Take 1 tablet (10 mg total) by mouth daily, Disp: , Rfl: 0    escitalopram (LEXAPRO) 20 mg tablet, TAKE 1 TABLET BY MOUTH EVERY DAY, Disp: 90 tablet, Rfl: 0    fluticasone (FLONASE) 50 mcg/act nasal spray, 1 spray into each nostril daily, Disp: , Rfl:     hydrOXYzine HCL (ATARAX) 50 mg tablet, TAKE 1/2 TO 1 TAB BY MOUTH EVERY 8 HOURS AS NEEDED FOR ANXIETY, Disp: 60 tablet, Rfl: 0    meclizine (ANTIVERT) 25 mg tablet, Take 1 tablet (25 mg total) by mouth 3 (three) times a day as needed for dizziness, Disp: 15 tablet, Rfl: 0    Melatonin ER 10 MG TBCR, Take 1 tablet (10 mg total) by mouth daily, Disp: , Rfl:     omeprazole (PriLOSEC) 20 mg delayed release capsule, Take 1 capsule (20 mg total) by mouth daily, Disp: 21 capsule, Rfl: 0    ondansetron (ZOFRAN) 4 mg tablet, Take 1 tablet (4 mg total) by mouth every 8 (eight) hours as needed for nausea or vomiting, Disp: 30 tablet, Rfl: 0    SUMAtriptan (IMITREX) 100 mg tablet, Take 1 tablet (100 mg total) by mouth once as needed for migraine for up to 1 dose, Disp: 12 tablet, Rfl: 0    therapeutic multivitamin-minerals (THERAGRAN-M) tablet, Take 1 tablet by mouth daily, Disp: , Rfl:     benzonatate (TESSALON) 200 MG capsule, Take 1 capsule (200 mg total) by mouth 3 (three) times a day as needed for cough (Patient not taking: No sig reported), Disp: 20 capsule, Rfl: 0    cloNIDine (CATAPRES) 0 1 mg tablet, Take one tab in am, one in the afternoon, and two at bedtime (Patient not taking: Reported on 5/25/2022), Disp: 120 tablet, Rfl: 3    cyclobenzaprine (FLEXERIL) 10 mg tablet, Take 1 tablet (10 mg total) by mouth daily at bedtime for 21 days, Disp: 21 tablet, Rfl: 0    No Known Allergies    Objective:  Vitals:    05/25/22 0834   BP: 143/89   Pulse: 71       Right Knee Exam     Tenderness   The patient is experiencing no tenderness  Range of Motion   Extension: 0   Flexion: 140     Tests   Varus: negative Valgus: negative  Lachman:  Anterior - negative      Drawer:  Anterior - negative    Posterior - negative  Patellar apprehension: negative    Other   Erythema: absent  Sensation: normal  Pulse: present  Swelling: none  Effusion: no effusion present          Observations     Right Knee   Negative for effusion  Physical Exam  Constitutional:       General: She is not in acute distress  Appearance: She is well-developed  HENT:      Head: Normocephalic and atraumatic  Eyes:      General: No scleral icterus  Conjunctiva/sclera: Conjunctivae normal    Neck:      Vascular: No JVD  Cardiovascular:      Rate and Rhythm: Normal rate  Pulmonary:      Effort: Pulmonary effort is normal  No respiratory distress  Musculoskeletal:      Right knee: No effusion  Skin:     General: Skin is warm  Neurological:      Mental Status: She is alert and oriented to person, place, and time        Coordination: Coordination normal

## 2022-05-26 ENCOUNTER — APPOINTMENT (OUTPATIENT)
Dept: PHYSICAL THERAPY | Facility: CLINIC | Age: 37
End: 2022-05-26
Payer: COMMERCIAL

## 2022-05-29 ENCOUNTER — OFFICE VISIT (OUTPATIENT)
Dept: URGENT CARE | Facility: CLINIC | Age: 37
End: 2022-05-29
Payer: COMMERCIAL

## 2022-05-29 VITALS
HEIGHT: 62 IN | HEART RATE: 78 BPM | BODY MASS INDEX: 34.96 KG/M2 | SYSTOLIC BLOOD PRESSURE: 157 MMHG | RESPIRATION RATE: 18 BRPM | WEIGHT: 190 LBS | TEMPERATURE: 98.2 F | DIASTOLIC BLOOD PRESSURE: 91 MMHG | OXYGEN SATURATION: 98 %

## 2022-05-29 DIAGNOSIS — S91.115A LACERATION OF LESSER TOE OF LEFT FOOT WITHOUT FOREIGN BODY PRESENT OR DAMAGE TO NAIL, INITIAL ENCOUNTER: Primary | ICD-10-CM

## 2022-05-29 PROCEDURE — 3008F BODY MASS INDEX DOCD: CPT | Performed by: PHYSICIAN ASSISTANT

## 2022-05-29 PROCEDURE — 99213 OFFICE O/P EST LOW 20 MIN: CPT | Performed by: PHYSICIAN ASSISTANT

## 2022-05-29 NOTE — PROGRESS NOTES
[facility]        NAME: Len Lazcano is a 40 y o  female  : 1985    MRN: [id number]  DATE: May 29, 2022  TIME: 11:34 AM    Assessment and Plan   Laceration of lesser toe of left foot without foreign body present or damage to nail, initial encounter [S91 115A]  1  Laceration of lesser toe of left foot without foreign body present or damage to nail, initial encounter           Patient Instructions   Superficial laceration  Wound cleansed and dressed with antibiotic ointment  tdap up to date  Daily dressing changes and cleaning  Monitor for symptoms of infection    Follow up with PCP in 3-5 days  Proceed to  ER if symptoms worsen  Chief Complaint     Chief Complaint   Patient presents with    Wound Check     Left pinkie toe injury occurred today  History of Present Illness       Sue Perez is a 79-year-old female who presents to clinic complaining laceration on her left pinky toe x1 hour  She states her son open the door and the bottom of the door grazed the top of her left pinky toe and caused a laceration  She denies any aspirin usage or anticoagulant use  Her last Tdap was in 2018  She denies any pain, swelling, or bruising  Review of Systems   Review of Systems   Constitutional: Negative  Musculoskeletal: Negative for arthralgias, gait problem and joint swelling  Skin: Positive for wound  Negative for color change  Neurological: Negative for numbness           Current Medications       Current Outpatient Medications:     benzonatate (TESSALON) 200 MG capsule, Take 1 capsule (200 mg total) by mouth 3 (three) times a day as needed for cough (Patient not taking: No sig reported), Disp: 20 capsule, Rfl: 0    busPIRone (BUSPAR) 15 mg tablet, TAKE 1 TABLET BY MOUTH 3 TIMES A DAY , Disp: 90 tablet, Rfl: 2    cetirizine (ZyrTEC) 10 mg tablet, Take 1 tablet (10 mg total) by mouth daily, Disp: , Rfl: 0    cloNIDine (CATAPRES) 0 1 mg tablet, Take one tab in am, one in the afternoon, and two at bedtime (Patient not taking: Reported on 5/25/2022), Disp: 120 tablet, Rfl: 3    cyclobenzaprine (FLEXERIL) 10 mg tablet, Take 1 tablet (10 mg total) by mouth daily at bedtime for 21 days, Disp: 21 tablet, Rfl: 0    escitalopram (LEXAPRO) 20 mg tablet, TAKE 1 TABLET BY MOUTH EVERY DAY, Disp: 90 tablet, Rfl: 0    fluticasone (FLONASE) 50 mcg/act nasal spray, 1 spray into each nostril daily, Disp: , Rfl:     hydrOXYzine HCL (ATARAX) 50 mg tablet, TAKE 1/2 TO 1 TAB BY MOUTH EVERY 8 HOURS AS NEEDED FOR ANXIETY, Disp: 60 tablet, Rfl: 0    meclizine (ANTIVERT) 25 mg tablet, Take 1 tablet (25 mg total) by mouth 3 (three) times a day as needed for dizziness, Disp: 15 tablet, Rfl: 0    Melatonin ER 10 MG TBCR, Take 1 tablet (10 mg total) by mouth daily, Disp: , Rfl:     omeprazole (PriLOSEC) 20 mg delayed release capsule, Take 1 capsule (20 mg total) by mouth daily, Disp: 21 capsule, Rfl: 0    ondansetron (ZOFRAN) 4 mg tablet, Take 1 tablet (4 mg total) by mouth every 8 (eight) hours as needed for nausea or vomiting, Disp: 30 tablet, Rfl: 0    SUMAtriptan (IMITREX) 100 mg tablet, Take 1 tablet (100 mg total) by mouth once as needed for migraine for up to 1 dose, Disp: 12 tablet, Rfl: 0    therapeutic multivitamin-minerals (THERAGRAN-M) tablet, Take 1 tablet by mouth daily, Disp: , Rfl:     Current Allergies     Allergies as of 05/29/2022    (No Known Allergies)            The following portions of the patient's history were reviewed and updated as appropriate: allergies, current medications, past family history, past medical history, past social history, past surgical history and problem list      Past Medical History:   Diagnosis Date    Abnormal Pap smear of cervix     Anxiety     BRCA1 negative     BRCA2 negative     Breast injury     Pt states scar tissue around nipple from breastfeeding    Depression     GERD (gastroesophageal reflux disease)     HPV (human papilloma virus) infection     Kidney stone     H/O PYELONEPHRITIS    Migraine     Seasonal allergies     Sleep difficulties     Varicella     POS HX       Past Surgical History:   Procedure Laterality Date    CHOLECYSTECTOMY      FINGER SURGERY      left hand     GYNECOLOGIC CRYOSURGERY      HAND SURGERY Left     Phelebitis in left hand  post IV infilitration, had sx for clot removal     LYMPH NODE BIOPSY      of neck    LYMPH NODE DISSECTION Right     cervical    [place]  DELIVERY ONLY N/A 2018    Procedure:  SECTION (); Surgeon: Man Roberts MD;  Location[facility];  Service: Obstetrics    NV EXC SKIN BENIG >4 CM TRUNK,ARM,LEG Right 2022    Procedure: EXCISION  BIOPSY LESION/MASS BACK;  Surgeon: Daya Garibay MD;  Location: [address] [place] [address];  Service: General       Family History   Problem Relation Age of Onset    Hypertension Mother     Heart disease Mother     Depression Mother     Coronary artery disease Mother     Hypertension Father     Hyperlipidemia Father     Heart disease Father         MI    Stroke Paternal Grandmother     Cancer Paternal Grandmother         breast    Breast cancer Paternal Grandmother     Cancer Paternal Aunt         colon    Colon cancer Paternal Aunt     Early death Maternal Uncle     Cancer Maternal Uncle         lymphoma    Early death Maternal Uncle         lymphoma    Birth defects Maternal Uncle     Undescended testes Son     Cystic fibrosis Other     Cystic fibrosis Other     Heart failure Maternal Grandmother     Stroke Maternal Grandfather     Alcohol abuse Maternal Grandfather     Alcohol abuse Brother     Drug abuse Brother          of overdose 2020    Anxiety disorder Sister     Post-traumatic stress disorder Sister     Developmental delay Son     Macrocephaly Son     Hydrocephalus Son     Breast cancer Other          Medications have been verified          Objective   /91   Pulse 78   Temp 98 2 °F (36 8 °C)   Resp 18   Ht 5' 2" (1 575 m)   Wt 86 2 kg (190 lb)   SpO2 98%   BMI 34 75 kg/m²   No LMP recorded  Physical Exam     Physical Exam  Vitals and nursing note reviewed  Constitutional:       General: She is not in acute distress  Appearance: Normal appearance  She is not ill-appearing  Cardiovascular:      Rate and Rhythm: Normal rate and regular rhythm  Heart sounds: Normal heart sounds  Pulmonary:      Effort: Pulmonary effort is normal       Breath sounds: Normal breath sounds  Musculoskeletal:      Left foot: Normal range of motion and normal capillary refill  Laceration (superficial laceration of pinky toe, no FB, no active bleeding, ointment and dressing applied) present  No swelling, deformity, tenderness, bony tenderness or crepitus  Neurological:      Mental Status: She is alert and oriented to person, place, and time     Psychiatric:         Mood and Affect: Mood normal          Behavior: Behavior normal

## 2022-05-31 ENCOUNTER — OFFICE VISIT (OUTPATIENT)
Dept: PHYSICAL THERAPY | Facility: CLINIC | Age: 37
End: 2022-05-31
Payer: COMMERCIAL

## 2022-05-31 DIAGNOSIS — M22.2X1 PATELLOFEMORAL SYNDROME OF RIGHT KNEE: Primary | ICD-10-CM

## 2022-05-31 PROCEDURE — 97112 NEUROMUSCULAR REEDUCATION: CPT

## 2022-05-31 PROCEDURE — 97110 THERAPEUTIC EXERCISES: CPT

## 2022-05-31 NOTE — PROGRESS NOTES
Daily Note      Today's date: 2022  Patient name: Jayy Castro  : 1985  MRN: [id number]  Referring provider: Carolanne Kayser  Dx:   Encounter Diagnosis     ICD-10-CM    1  Patellofemoral syndrome of right knee  M22 2X1        Subjective: Pt reports that her right knee feels "good" currently  Pt states that she followed up with orthopedics and was provided with a script to continue PT  Pt states that she also injured her toe, which still hurts, but is still able to do therapy today  Pt states that she has continued low back pain, which she attributes to carrying and rocking her son, who weighs about 45 lbs  Objective: See treatment diary below    Assessment: Pt tolerated treatment well  Pt introduced to forward step downs on 6" step and required minimal use of hands to stabilize balance  Pt was educated on controlling dynamic knee valgus and demonstrated good carryover  Pt noted fatigue of B/L glute med with lateral walks, performed on the sidewalk today  Pt was introduced to prone press ups and instructed that she may benefit from performing this prior to or after carrying her son  Plan: Continue per plan of care  Progress treatment as tolerated  Insurance:  A/CMS Eval/ Re-eval POC expires FOTO Auth Status Total   Visits  Start date  Expiration date Extension  Visit limitation? PT only or  PT+OT?  Co-Insurance   CMS - BCBS 22  Authorized  [id number]      No                                                                 AUTH Status: [id number] Date   5/5 5/10 5/12 5/17 5/19 5/31       Visits  Authed: 12 Visit Count 1 2 3 4 5 6 7 8        Remaining  11 10 9 8 7 6 5 4         Precautions: Anxiety / Depression    Specialty Daily Treatment Diary     Manuals 5/5/22 5/10/22 5/12/22 5/17/22 5/19/22 5/31/22   Visit # 3 4 5 6 7 8   PF mobs         Stretch HS Quad 4' 4' 4'  4'    Knee PROM 3' 3' 4'  4'    Sciatic N floss 1' 1'       TPR-Lspine + hip muscualture    8 Warm-up         NuStep 7 min 5 min 7 min 6 min Bike 10 min Bike 10'    Neuro Re-Ed         Qset 20x 20 20x 20x 20 20x   SLS         Step ups     20    6" 2x10 w/ L hip drive 6"                      Ther Ex         Mini squat 20x -- 20x 25x  20 20x    Side step 4 x 20ft -- 4 x 20ft 7 x10 ft , yellow small loop 8 x 10 ft    Knee flex 20x 20 20x 30x, 2# ankle weight 30  2# 30x 2#   Knee ext w df 20x 20 20x  30x, 2# ankle weight 30  2# 30x 2#   SLR 10x 20 20x 30x, 2# ankle weight 30 30x 2#    Clamshell 20x Red Loop 20  Red loop 30x Red Loop + modified side plank + red loop 30  Red loop 30x red loop B/L sidelying   Heel slides 30x 20 30x On red peanut  10 x 2 sets 30    Stand Hip ABd 20x ea        Glute Bridge 20x  20x  30x + 10# weight at pelvis 30x 30x 10#    Step down      2x10 6"    Prone press up      10x   Ther Activity                           Gait Training                           Modalities         CP                     HEP  Hamstring Stair Stretch - 10 x 10 sec - 3x/day  Gastrocnemius Stair Stretch - 10 x 10 sec - 3x/day  Soleus Stair Stretch - 10 x 10 sec - 3x/day  Hip Flexor Stair Stretch - 10 x 10 sec - 3x/day

## 2022-06-20 DIAGNOSIS — B37.9 CANDIDIASIS: Primary | ICD-10-CM

## 2022-06-20 RX ORDER — NYSTATIN 100000 [USP'U]/G
POWDER TOPICAL 3 TIMES DAILY
Qty: 60 G | Refills: 0 | Status: SHIPPED | OUTPATIENT
Start: 2022-06-20 | End: 2022-10-04 | Stop reason: ALTCHOICE

## 2022-07-13 NOTE — PROGRESS NOTES
PT Discharge    Patient has not attended scheduled PT sessions  Goal status in unknown  D/C at this time

## 2022-08-01 DIAGNOSIS — F43.10 PTSD (POST-TRAUMATIC STRESS DISORDER): ICD-10-CM

## 2022-08-01 DIAGNOSIS — F41.1 GENERALIZED ANXIETY DISORDER: ICD-10-CM

## 2022-08-01 DIAGNOSIS — F32.1 CURRENT MODERATE EPISODE OF MAJOR DEPRESSIVE DISORDER WITHOUT PRIOR EPISODE (HCC): ICD-10-CM

## 2022-08-02 RX ORDER — ESCITALOPRAM OXALATE 20 MG/1
TABLET ORAL
Qty: 90 TABLET | Refills: 0 | Status: SHIPPED | OUTPATIENT
Start: 2022-08-02

## 2022-08-10 DIAGNOSIS — F41.1 GENERALIZED ANXIETY DISORDER: ICD-10-CM

## 2022-08-10 DIAGNOSIS — F43.10 PTSD (POST-TRAUMATIC STRESS DISORDER): ICD-10-CM

## 2022-08-10 RX ORDER — BUSPIRONE HYDROCHLORIDE 15 MG/1
TABLET ORAL
Qty: 90 TABLET | Refills: 2 | Status: SHIPPED | OUTPATIENT
Start: 2022-08-10

## 2022-09-01 ENCOUNTER — TELEPHONE (OUTPATIENT)
Dept: PSYCHIATRY | Facility: CLINIC | Age: 37
End: 2022-09-01

## 2022-09-01 DIAGNOSIS — F33.41 RECURRENT MAJOR DEPRESSIVE DISORDER, IN PARTIAL REMISSION (HCC): Primary | ICD-10-CM

## 2022-09-01 RX ORDER — ESCITALOPRAM OXALATE 10 MG/1
10 TABLET ORAL DAILY
Qty: 30 TABLET | Refills: 3 | Status: SHIPPED | OUTPATIENT
Start: 2022-09-01 | End: 2022-11-02

## 2022-09-01 NOTE — TELEPHONE ENCOUNTER
Called pt and informed her Dr Jayy Emery called over 10 mg of lexapro to be taken with her 20 mg pt understood directions

## 2022-09-01 NOTE — TELEPHONE ENCOUNTER
Patient called and wanted to know if there is anything she can take for her racing thoughts? She thinks this  Might be from her maybe getting a divorce   She has an up coming appt on 9/16 but wanted to know if she can be given something prior

## 2022-09-12 ENCOUNTER — VBI (OUTPATIENT)
Dept: ADMINISTRATIVE | Facility: OTHER | Age: 37
End: 2022-09-12

## 2022-09-12 NOTE — TELEPHONE ENCOUNTER
09/12/22 3:02 PM     See documentation in the [employer] SmartForm  09/12/22 3:03 PM     See documentation in the [employer] SmartForm       Chrissy Weiss

## 2022-09-15 DIAGNOSIS — Z13.71 SCREENING FOR GENETIC DISEASE CARRIER STATUS: Primary | ICD-10-CM

## 2022-09-16 ENCOUNTER — OFFICE VISIT (OUTPATIENT)
Dept: PSYCHIATRY | Facility: CLINIC | Age: 37
End: 2022-09-16
Payer: COMMERCIAL

## 2022-09-16 DIAGNOSIS — F32.1 CURRENT MODERATE EPISODE OF MAJOR DEPRESSIVE DISORDER WITHOUT PRIOR EPISODE (HCC): Primary | ICD-10-CM

## 2022-09-16 DIAGNOSIS — F51.01 PRIMARY INSOMNIA: ICD-10-CM

## 2022-09-16 DIAGNOSIS — F43.10 PTSD (POST-TRAUMATIC STRESS DISORDER): ICD-10-CM

## 2022-09-16 DIAGNOSIS — F41.1 GENERALIZED ANXIETY DISORDER: ICD-10-CM

## 2022-09-16 PROCEDURE — 90833 PSYTX W PT W E/M 30 MIN: CPT | Performed by: NURSE PRACTITIONER

## 2022-09-16 PROCEDURE — 99214 OFFICE O/P EST MOD 30 MIN: CPT | Performed by: NURSE PRACTITIONER

## 2022-09-16 NOTE — PSYCH
This note was not shared with the patient due to reasonable likelihood of causing patient harm    PROGRESS NOTE        [address] [place] [address]      Name and Date of Birth:  Chito Rizvi 40 y o  1985    Date of Visit: 09/16/22    SUBJECTIVE:    Sue Aguirre was seen today for follow-up and medication management  She was casually dressed, alert and oriented 3 X and maintained good eye contact  Her speech was clear and of normal rate, rhythm and volume  Thought processes were clear and goal-directed  Today she came accompanied by her 3year-old son Rose Costa who has multiple disabilities  Patient reports that she is in the process of separation she is not getting his support from her  and has to beg for money for such simple things as diapers  She has also started individual counseling and has found that to be very helpful  Today she reports anxiety associated with the whole marital discord but no depression  Sleep is limited given the conditions of her son but appetite is good  No other new symptoms or side effects were reported today home      She denies suicidal ideation, intent or plan at present, has no suicidal ideation, intent or plan at present  She denies any auditory hallucinations and visual hallucinations, denies any other delusional thinking, denies any delusional thinking  She denies any side effects from medications    HPI ROS Appetite Changes and Sleep: normal appetite, normal sleep    Review Of Systems:      Constitutional As noted in HPI   ENT As noted in HPI   Cardiovascular As noted in HPI   Respiratory As noted in HPI   Gastrointestinal As noted in HPI   Genitourinary As noted in HPI   Musculoskeletal As noted in HPI   Integumentary As noted in HPI   Neurological As noted in HPI   Endocrine As noted in HPI   Other Symptoms Negative and None       Laboratory Results: No results found for this or any previous visit      Substance Abuse History:    Social History     Substance and Sexual Activity   Drug Use Never       Family Psychiatric History:     Family History   Problem Relation Age of Onset    Hypertension Mother     Heart disease Mother     Depression Mother     Coronary artery disease Mother     Hypertension Father     Hyperlipidemia Father     Heart disease Father         MI    Stroke Paternal Grandmother     Cancer Paternal Grandmother         breast    Breast cancer Paternal Grandmother     Cancer Paternal Aunt         colon    Colon cancer Paternal Aunt     Early death Maternal Uncle     Cancer Maternal Uncle         lymphoma    Early death Maternal Uncle         lymphoma    Birth defects Maternal Uncle     Undescended testes Son     Cystic fibrosis Other     Cystic fibrosis Other     Heart failure Maternal Grandmother     Stroke Maternal Grandfather     Alcohol abuse Maternal Grandfather     Alcohol abuse Brother     Drug abuse Brother          of overdose 2020    Anxiety disorder Sister     Post-traumatic stress disorder Sister     Developmental delay Son     Macrocephaly Son     Hydrocephalus Son     Breast cancer Other        The following portions of the patient's history were reviewed and updated as appropriate: past family history, past medical history, past social history, past surgical history and problem list     Social History     Socioeconomic History    Marital status: /Civil Union     Spouse name: Godwin Stringer Number of children: 2    Years of education: Not on file    Highest education level: Associate degree: academic program   Occupational History    Occupation: unemployed   Tobacco Use    Smoking status: Former Smoker     Packs/day: 0 50     Years: 3 00     Pack years: 1 50     Types: Cigarettes     Quit date:      Years since quittin 7    Smokeless tobacco: Never Used   Vaping Use    Vaping Use: Never used   Substance and Sexual Activity    Alcohol use: Not Currently    Drug use: Never    Sexual activity: Yes     Partners: Male     Birth control/protection: None   Other Topics Concern    Not on file   Social History Narrative    Not on file     Social Determinants of Health     Financial Resource Strain: Not on file   Food Insecurity: Not on file   Transportation Needs: Not on file   Physical Activity: Not on file   Stress: Not on file   Social Connections: Not on file   Intimate Partner Violence: Not on file   Housing Stability: Not on file     Social History     Social History Narrative    Not on file        Social History     Tobacco History     Smoking Status  Former Smoker Quit date  1/1/2013 Smoking Frequency  0 5 packs/day for 3 years (1 5 pk yrs) Smoking Tobacco Type  Cigarettes    Smokeless Tobacco Use  Never Used          Alcohol History     Alcohol Use Status  Not Currently          Drug Use     Drug Use Status  Never          Sexual Activity     Sexually Active  Yes Partners  Male Birth Control/Protection  None          Activities of Daily Living    Not Asked               Additional Substance Use Detail     Questions Responses    Cannabis frequency Never used    Comment: Never used on 7/22/2019     Cocaine frequency Never used    Comment: Never used on 7/22/2019     Amphetamine frequency Never used    Comment: Never used on 7/22/2019     Inhalant frequency Never used    Comment: Never used on 7/22/2019     Hallucinogen frequency Never used    Comment: Never used on 7/22/2019     Ecstasy frequency Never used    Comment: Never used on 7/22/2019     Other drug frequency Never used    Comment: Never used on 7/22/2019     Last reviewed by Sol Leslie on 9/16/2022            OBJECTIVE:     Mental Status Evaluation:    Appearance age appropriate, casually dressed   Behavior pleasant, cooperative   Speech normal volume, normal pitch   Mood Appropriate   Affect Full and appropriate   Thought Processes logical   Associations intact associations Thought Content normal   Perceptual Disturbances: none   Abnormal Thoughts  Risk Potential Suicidal ideation - None  Homicidal ideation - None  Potential for aggression - No   Orientation oriented to person, place, time/date and situation   Memory recent and remote memory grossly intact   Cosciousness alert and awake   Attention Span attention span and concentration are age appropriate   Intellect Appears to be of Average Intelligence   Insight age appropriate    Judgement good    Muscle Strength and  Gait muscle strength and tone were normal   Language Intact   Fund of Knowledge Intact   Pain none   Pain Scale 0       Assessment/Plan:       Diagnoses and all orders for this visit:    Current moderate episode of major depressive disorder without prior episode (HCC)    Primary insomnia    PTSD (post-traumatic stress disorder)          Treatment Recommendations/Precautions:    Continue current medications:    Risks/Benefits      Risks, Benefits And Possible Side Effects Of Medications:    Risks, benefits, and possible side effects of medications explained to patient and patient verbalizes understanding and agreement for treatment  Controlled Medication Discussion:   Discussed with patient the risks of sedation, respiratory depression, impairment of ability to drive and potential for abuse and addiction related to treatment with benzodiazepine medications  The patient understands risk of treatment with benzodiazepine medications, agrees to not drive if feels impaired and agrees to take medications as prescribed  And a      Psychotherapy Provided:     Individual psychotherapy provided:   Today I spent 20 minutes counseling with Rossy Snell

## 2022-09-16 NOTE — BH TREATMENT PLAN
TREATMENT PLAN (Medication Management Only)        [facility]    Name and Date of Birth:  Fahad Howard 40 y o  1985  Date of Treatment Plan: September 16, 2022  Diagnosis/Diagnoses:    1  Current moderate episode of major depressive disorder without prior episode ([address] )    2  Primary insomnia    3  PTSD (post-traumatic stress disorder)      Strengths/Personal Resources for Self-Care: supportive family, supportive friends, taking medications as prescribed, ability to communicate well, ability to listen, ability to reason, ability to understand psychiatric illness, average or above intelligence, general fund of knowledge, motivation for treatment  Area/Areas of need (in own words): anxiety symptoms, depressive symptoms  1  Long Term Goal: continue improvement in anxiety  Target Date:3 months - 12/16/2022  Person/Persons responsible for completion of goal: Malia  2  Short Term Objective (s) - How will we reach this goal?:   A  Provider new recommended medication/dosage changes and/or continue medication(s): continue current medications as prescribed  B  N/A   C  N/A  Target Date:3 months - 12/16/2022  Person/Persons Responsible for Completion of Goal: Malia  Progress Towards Goals: continuing treatment  Treatment Modality: medication management every 3 months  Review due 180 days from date of this plan: 3 months - 12/16/2022  Expected length of service: ongoing treatment  My Physician/PA/NP and I have developed this plan together and I agree to work on the goals and objectives  I understand the treatment goals that were developed for my treatment

## 2022-09-19 ENCOUNTER — TELEPHONE (OUTPATIENT)
Dept: SURGICAL ONCOLOGY | Facility: CLINIC | Age: 37
End: 2022-09-19

## 2022-09-19 ENCOUNTER — TELEPHONE (OUTPATIENT)
Dept: GENETICS | Facility: CLINIC | Age: 37
End: 2022-09-19

## 2022-09-19 NOTE — TELEPHONE ENCOUNTER
Patient called in requesting consult with genetics  Attempted calling genetics line to transfer her, however they were unavailable  Informed her I will send them a message and they will call her to schedule  She was agreeable  Please call patient at [phone] to schedule  Confirmed Referral in epic

## 2022-09-19 NOTE — TELEPHONE ENCOUNTER
I called Ramon Anguiano to schedule a new patient appointment with the [facility]       Outcome:   I left a voice message encouraging the patient to call the genetics team at [phone] to schedule this appointment  More information needed on hx of cancer     Follow-up:   At this time the referral will be closed and we will wait to hear back from the patient regarding scheduling this appointment

## 2022-09-20 ENCOUNTER — OFFICE VISIT (OUTPATIENT)
Dept: OBGYN CLINIC | Facility: CLINIC | Age: 37
End: 2022-09-20
Payer: COMMERCIAL

## 2022-09-20 VITALS — BODY MASS INDEX: 30.69 KG/M2 | SYSTOLIC BLOOD PRESSURE: 135 MMHG | DIASTOLIC BLOOD PRESSURE: 82 MMHG | WEIGHT: 167.8 LBS

## 2022-09-20 DIAGNOSIS — Z11.3 SCREEN FOR STD (SEXUALLY TRANSMITTED DISEASE): ICD-10-CM

## 2022-09-20 DIAGNOSIS — Z01.419 ENCNTR FOR GYN EXAM (GENERAL) (ROUTINE) W/O ABN FINDINGS: Primary | ICD-10-CM

## 2022-09-20 PROCEDURE — 99395 PREV VISIT EST AGE 18-39: CPT | Performed by: NURSE PRACTITIONER

## 2022-09-20 PROCEDURE — 0503F POSTPARTUM CARE VISIT: CPT | Performed by: NURSE PRACTITIONER

## 2022-09-20 NOTE — PROGRESS NOTES
Assessment/Plan   Diagnoses and all orders for this visit:    Encntr for gyn exam (general) (routine) w/o abn findings  -     IGP,CtNg,AptimaHPV,rfx16/18,45    Screen for STD (sexually transmitted disease)  -     Hepatitis B surface antigen; Future  -     Hepatitis C antibody  -     Human Immunodeficiency Virus 1/2 Antigen / Antibody ( Fourth Generation) with Reflex Testing  -     RPR  -     IGP,CtNg,AptimaHPV,rfx16/18,45      Discussion  Reviewed with patient normal exam today  Pap with HPV done today  GC/CT testing done  Rx for HIV, Hep B, Hep C, RPR given  Strongly encourage follow up with oncology genetics to get more information and insight to BRCA mutation genetics her home testing was positive for   Declines wanting anything to help with menses, manageable  Normal breast exam today  Monthly SBEs advised  Vitamin D and Calcim Supplements advised  Exercise most days of the week  Follow with PCP for regular check-ups and blood work  RTO 1 year for annual or sooner as needed  Subjective     Damien Swift is a 40 y o  female who presents for annual well woman exam    Last exam 3/13/2019 Pap 5/16/2017  Normal HPV negative  Pap guidelines reviewed with patient  Pt would like Pap today  Pt denies any abnormal vaginal discharge, itching, or odor  Pt in a mutually exclusive relationship () with a male partner and would like STD testing today  Pt states one episode of light pink discharge post intercourse, first episode  Pt states recent at home genetic testing she did showed up positive for two different BRCA gene mutations after original BRCA testing in 2017 showed negative  She has appt with oncology genetics  Will follow with them for testing and discussion  Menstrual Cycle:  LMP: 9/12/2022   Menses monthly, regular, 3-5 days  Heavy x 1-2 days, light the rest of the time  Pain increasing with menses  Cramping with menses  Pamprin for dysmenorrhea   She will also use medical marijuana for pain    OB History     G 2 P 2  Contraception: Coitus interruptus  Practices monthly SBEs, no breast complaints today  Denies any bowel or bladder issues  Pt follows with PCP for regular check-ups and blood work  Review of Systems   Genitourinary: Negative  The following portions of the patient's history were reviewed and updated as appropriate: allergies, current medications, past family history, past medical history, past social history, past surgical history and problem list     Past Medical History:   Diagnosis Date    Abnormal Pap smear of cervix     Anxiety     BRCA1 negative     BRCA2 negative     Breast injury     Pt states scar tissue around nipple from breastfeeding    Depression     GERD (gastroesophageal reflux disease)     HPV (human papilloma virus) infection     Kidney stone     H/O PYELONEPHRITIS    Migraine     Seasonal allergies     Sleep difficulties     Varicella     POS HX       Past Surgical History:   Procedure Laterality Date    CHOLECYSTECTOMY      FINGER SURGERY      left hand     GYNECOLOGIC CRYOSURGERY      HAND SURGERY Left     Phelebitis in left hand  post IV infilitration, had sx for clot removal     LYMPH NODE BIOPSY      of neck    LYMPH NODE DISSECTION Right     cervical    IA  DELIVERY ONLY N/A 2018    Procedure:  SECTION ();   Surgeon: Leonora Gill MD;  [facility];  Service: Obstetrics    IA EXC SKIN BENIG >4 CM TRUNK,ARM,LEG Right 2022    Procedure: EXCISION  BIOPSY LESION/MASS BACK;  Surgeon: Elizabeth Corona MD;  Location: [facility];  Service: General       Family History   Problem Relation Age of Onset    Hypertension Mother     Heart disease Mother     Depression Mother     Coronary artery disease Mother     Hypertension Father     Hyperlipidemia Father     Heart disease Father         MI    Stroke Paternal Grandmother     Cancer Paternal Grandmother         breast    Breast cancer Paternal Grandmother     Cancer Paternal Aunt         colon    Colon cancer Paternal Aunt     Early death Maternal Uncle     Cancer Maternal Uncle         lymphoma    Early death Maternal Uncle         lymphoma    Birth defects Maternal Uncle     Undescended testes Son     Cystic fibrosis Other     Cystic fibrosis Other     Heart failure Maternal Grandmother     Stroke Maternal Grandfather     Alcohol abuse Maternal Grandfather     Alcohol abuse Brother     Drug abuse Brother          of overdose 2020    Anxiety disorder Sister     Post-traumatic stress disorder Sister     Developmental delay Son     Macrocephaly Son     Hydrocephalus Son     Breast cancer Other        Social History     Socioeconomic History    Marital status: /Civil Union     Spouse name: Debbrah Baumgarten Number of children: 2    Years of education: Not on file    Highest education level: Associate degree: academic program   Occupational History    Occupation: unemployed   Tobacco Use    Smoking status: Former Smoker     Packs/day: 0 50     Years: 3 00     Pack years: 1 50     Types: Cigarettes     Quit date:      Years since quittin 7    Smokeless tobacco: Never Used   Vaping Use    Vaping Use: Never used   Substance and Sexual Activity    Alcohol use: Not Currently    Drug use: Never    Sexual activity: Yes     Partners: Male     Birth control/protection: None   Other Topics Concern    Not on file   Social History Narrative    Not on file     Social Determinants of Health     Financial Resource Strain: Not on file   Food Insecurity: Not on file   Transportation Needs: Not on file   Physical Activity: Not on file   Stress: Not on file   Social Connections: Not on file   Intimate Partner Violence: Not on file   Housing Stability: Not on file         Current Outpatient Medications:     escitalopram (Lexapro) 10 mg tablet, Take 1 tablet (10 mg total) by mouth daily, Disp: 30 tablet, Rfl: 3   benzonatate (TESSALON) 200 MG capsule, Take 1 capsule (200 mg total) by mouth 3 (three) times a day as needed for cough (Patient not taking: No sig reported), Disp: 20 capsule, Rfl: 0    busPIRone (BUSPAR) 15 mg tablet, TAKE 1 TABLET BY MOUTH THREE TIMES A DAY, Disp: 90 tablet, Rfl: 2    cetirizine (ZyrTEC) 10 mg tablet, Take 1 tablet (10 mg total) by mouth daily, Disp: , Rfl: 0    cloNIDine (CATAPRES) 0 1 mg tablet, Take one tab in am, one in the afternoon, and two at bedtime (Patient not taking: Reported on 5/25/2022), Disp: 120 tablet, Rfl: 3    cyclobenzaprine (FLEXERIL) 10 mg tablet, Take 1 tablet (10 mg total) by mouth daily at bedtime for 21 days, Disp: 21 tablet, Rfl: 0    escitalopram (LEXAPRO) 20 mg tablet, TAKE 1 TABLET BY MOUTH EVERY DAY, Disp: 90 tablet, Rfl: 0    fluticasone (FLONASE) 50 mcg/act nasal spray, 1 spray into each nostril daily, Disp: , Rfl:     hydrOXYzine HCL (ATARAX) 50 mg tablet, TAKE 1/2 TO 1 TAB BY MOUTH EVERY 8 HOURS AS NEEDED FOR ANXIETY, Disp: 60 tablet, Rfl: 0    meclizine (ANTIVERT) 25 mg tablet, Take 1 tablet (25 mg total) by mouth 3 (three) times a day as needed for dizziness, Disp: 15 tablet, Rfl: 0    Melatonin ER 10 MG TBCR, Take 1 tablet (10 mg total) by mouth daily, Disp: , Rfl:     nystatin (MYCOSTATIN) powder, Apply topically 3 (three) times a day, Disp: 60 g, Rfl: 0    omeprazole (PriLOSEC) 20 mg delayed release capsule, Take 1 capsule (20 mg total) by mouth daily, Disp: 21 capsule, Rfl: 0    ondansetron (ZOFRAN) 4 mg tablet, Take 1 tablet (4 mg total) by mouth every 8 (eight) hours as needed for nausea or vomiting, Disp: 30 tablet, Rfl: 0    SUMAtriptan (IMITREX) 100 mg tablet, Take 1 tablet (100 mg total) by mouth once as needed for migraine for up to 1 dose, Disp: 12 tablet, Rfl: 0    therapeutic multivitamin-minerals (THERAGRAN-M) tablet, Take 1 tablet by mouth daily, Disp: , Rfl:     No Known Allergies    Objective   Vitals:    09/20/22 0713   BP: 135/82   BP Location: Right arm   Patient Position: Sitting   Weight: 76 1 kg (167 lb 12 8 oz)     Physical Exam  Vitals and nursing note reviewed  Constitutional:       Appearance: She is well-developed  HENT:      Head: Normocephalic  Neck:      Thyroid: No thyromegaly  Trachea: No tracheal deviation  Cardiovascular:      Rate and Rhythm: Normal rate and regular rhythm  Heart sounds: Normal heart sounds  Pulmonary:      Effort: Pulmonary effort is normal       Breath sounds: Normal breath sounds  Chest:   Breasts: Breasts are symmetrical       Right: No inverted nipple, mass, nipple discharge, skin change or tenderness  Left: No inverted nipple, mass, nipple discharge, skin change or tenderness  Abdominal:      General: Bowel sounds are normal  There is no distension  Palpations: Abdomen is soft  There is no mass  Tenderness: There is no abdominal tenderness  There is no guarding or rebound  Genitourinary:     Labia:         Right: No rash, tenderness, lesion or injury  Left: No rash, tenderness, lesion or injury  Vagina: Normal       Cervix: Normal       Uterus: Normal        Adnexa: Right adnexa normal and left adnexa normal         Right: No mass, tenderness or fullness  Left: No mass, tenderness or fullness  Musculoskeletal:         General: Normal range of motion  Cervical back: Normal range of motion and neck supple  Skin:     General: Skin is warm and dry  Neurological:      Mental Status: She is alert and oriented to person, place, and time  Psychiatric:         Behavior: Behavior normal          Thought Content:  Thought content normal          Judgment: Judgment normal

## 2022-09-24 ENCOUNTER — LAB (OUTPATIENT)
Dept: LAB | Facility: HOSPITAL | Age: 37
End: 2022-09-24
Payer: COMMERCIAL

## 2022-09-24 DIAGNOSIS — Z11.3 SCREEN FOR STD (SEXUALLY TRANSMITTED DISEASE): ICD-10-CM

## 2022-09-24 DIAGNOSIS — Z13.6 SCREENING FOR CARDIOVASCULAR CONDITION: ICD-10-CM

## 2022-09-24 LAB
ALBUMIN SERPL BCP-MCNC: 3.5 G/DL (ref 3.5–5)
ALP SERPL-CCNC: 53 U/L (ref 46–116)
ALT SERPL W P-5'-P-CCNC: 25 U/L (ref 12–78)
ANION GAP SERPL CALCULATED.3IONS-SCNC: 8 MMOL/L (ref 4–13)
AST SERPL W P-5'-P-CCNC: 18 U/L (ref 5–45)
BILIRUB SERPL-MCNC: 0.4 MG/DL (ref 0.2–1)
BUN SERPL-MCNC: 10 MG/DL (ref 5–25)
C TRACH RRNA CVX QL NAA+PROBE: NEGATIVE
CALCIUM SERPL-MCNC: 9.5 MG/DL (ref 8.3–10.1)
CHLORIDE SERPL-SCNC: 109 MMOL/L (ref 96–108)
CHOLEST SERPL-MCNC: 164 MG/DL
CO2 SERPL-SCNC: 21 MMOL/L (ref 21–32)
CREAT SERPL-MCNC: 1.03 MG/DL (ref 0.6–1.3)
CYTOLOGIST CVX/VAG CYTO: NORMAL
DX ICD CODE: NORMAL
GFR SERPL CREATININE-BSD FRML MDRD: 69 ML/MIN/1.73SQ M
GLUCOSE P FAST SERPL-MCNC: 82 MG/DL (ref 65–99)
HBV SURFACE AG SER QL: NORMAL
HCV AB SER QL: NORMAL
HDLC SERPL-MCNC: 21 MG/DL
HPV I/H RISK 4 DNA CVX QL PROBE+SIG AMP: NEGATIVE
LDLC SERPL CALC-MCNC: 124 MG/DL (ref 0–100)
Lab: NORMAL
N GONORRHOEA RRNA CVX QL NAA+PROBE: NEGATIVE
OTHER STN SPEC: NORMAL
PATH REPORT.FINAL DX SPEC: NORMAL
POTASSIUM SERPL-SCNC: 4 MMOL/L (ref 3.5–5.3)
PROT SERPL-MCNC: 7.3 G/DL (ref 6.4–8.4)
SL AMB NOTE:: NORMAL
SL AMB SPECIMEN ADEQUACY: NORMAL
SL AMB TEST METHODOLOGY: NORMAL
SODIUM SERPL-SCNC: 138 MMOL/L (ref 135–147)
TRIGL SERPL-MCNC: 94 MG/DL

## 2022-09-24 PROCEDURE — 87340 HEPATITIS B SURFACE AG IA: CPT

## 2022-09-24 PROCEDURE — 86592 SYPHILIS TEST NON-TREP QUAL: CPT | Performed by: NURSE PRACTITIONER

## 2022-09-24 PROCEDURE — 80061 LIPID PANEL: CPT

## 2022-09-24 PROCEDURE — 86803 HEPATITIS C AB TEST: CPT | Performed by: NURSE PRACTITIONER

## 2022-09-24 PROCEDURE — 87389 HIV-1 AG W/HIV-1&-2 AB AG IA: CPT

## 2022-09-24 PROCEDURE — 36415 COLL VENOUS BLD VENIPUNCTURE: CPT

## 2022-09-24 PROCEDURE — 80053 COMPREHEN METABOLIC PANEL: CPT

## 2022-09-25 LAB
HIV 1+2 AB+HIV1 P24 AG SERPL QL IA: NORMAL
RPR SER QL: NORMAL

## 2022-09-26 NOTE — RESULT ENCOUNTER NOTE
Looks like the ldl - bad cholesterol - are elevated    I would suggest a low fat low cholesterol duet and redraw numbers in 6 months

## 2022-10-04 ENCOUNTER — OFFICE VISIT (OUTPATIENT)
Dept: FAMILY MEDICINE CLINIC | Facility: CLINIC | Age: 37
End: 2022-10-04
Payer: COMMERCIAL

## 2022-10-04 VITALS
TEMPERATURE: 98.2 F | RESPIRATION RATE: 18 BRPM | DIASTOLIC BLOOD PRESSURE: 70 MMHG | OXYGEN SATURATION: 97 % | WEIGHT: 163 LBS | BODY MASS INDEX: 30 KG/M2 | SYSTOLIC BLOOD PRESSURE: 112 MMHG | HEART RATE: 65 BPM | HEIGHT: 62 IN

## 2022-10-04 DIAGNOSIS — Z82.49 FAMILY HISTORY OF PREMATURE CAD: ICD-10-CM

## 2022-10-04 DIAGNOSIS — Z01.818 PREOP EXAMINATION: Primary | ICD-10-CM

## 2022-10-04 DIAGNOSIS — F32.1 CURRENT MODERATE EPISODE OF MAJOR DEPRESSIVE DISORDER WITHOUT PRIOR EPISODE (HCC): ICD-10-CM

## 2022-10-04 DIAGNOSIS — S82.891A CLOSED RIGHT ANKLE FRACTURE, INITIAL ENCOUNTER: ICD-10-CM

## 2022-10-04 DIAGNOSIS — F41.1 GENERALIZED ANXIETY DISORDER: ICD-10-CM

## 2022-10-04 DIAGNOSIS — S82.54XA CLOSED NONDISPLACED FRACTURE OF MEDIAL MALLEOLUS OF RIGHT TIBIA, INITIAL ENCOUNTER: ICD-10-CM

## 2022-10-04 PROBLEM — S82.53XA CLOSED FRACTURE OF MEDIAL MALLEOLUS: Status: ACTIVE | Noted: 2022-10-03

## 2022-10-04 PROBLEM — M25.579 ANKLE PAIN: Status: ACTIVE | Noted: 2022-10-03

## 2022-10-04 PROBLEM — M79.671 PAIN IN RIGHT FOOT: Status: ACTIVE | Noted: 2022-10-03

## 2022-10-04 PROCEDURE — 99215 OFFICE O/P EST HI 40 MIN: CPT | Performed by: NURSE PRACTITIONER

## 2022-10-04 NOTE — PROGRESS NOTES
FAMILY [facility] PRE-OPERATIVE EVALUATION  [facility]    NAME: Laurence Spring  AGE: 40 y o  SEX: female  : 1985     DATE: 10/4/2022    Family Practice Pre-Operative Evaluation      Chief Complaint: Pre-operative Evaluation     Surgery: Ankle Open Reduction and Internal fixation of medial malleolus fracture (Right Foot)  Anticipated Date of Surgery: 10/6/2022  Surgeon: Dr Stacy Begum     History of Present Illness:     HPI  Patient is here for pre op clearance  Denies any chest pain, sob, headache and dizziness  Had blood work done in [address] [place] [address] and results reviewed  Discussed with patient that her GFR is in stage 2 range and should avoid NSAIDs and stay hydrated  Surgeon did not order any blood work  Patient has family h/o premature CAD and will get EKG done  Anesthesia:  local and general  Bleeding Risk: no recent abnormal bleeding, no remote history of abnormal bleeding and no use of Ca-channel blockers  Current Anti-platelet/anticoagulation medication: none    Assessment of Cardiac Risk:  Denies unstable or severe angina or MI in the last 6 weeks or history of stent placement in the last year   Denies decompensated heart failure (e g  New onset heart failure, NYHA functional class IV heart failure, or worsening existing heart failure)  Denies significant arrhythmias such as high grade AV block, symptomatic ventricular arrhythmia, newly recognized ventricular tachycardia, supraventricular tachycardia with resting heart rate >100, or symptomatic bradycardia  Denies severe heart valve disease including aortic stenosis or symptomatic mitral stenosis    Prior Anesthesia Reactions: No     Personal history of venous thromboembolic disease? No    History of steroid use for >2 weeks within last year? No         Review of Systems:     Review of Systems   Constitutional: Negative  Respiratory: Negative  Cardiovascular: Negative  Genitourinary: Negative  Musculoskeletal: Positive for arthralgias and joint swelling  Neurological: Negative  Psychiatric/Behavioral: Negative          Current Problem List:     Patient Active Problem List   Diagnosis    Retracted nipple in female    Seasonal allergic rhinitis due to pollen    Mallet finger    Generalized anxiety disorder    PTSD (post-traumatic stress disorder)    Current moderate episode of major depressive disorder without prior episode ([facility] [address] )    Primary insomnia    Raynaud's phenomenon without gangrene    Migraine with aura and without status migrainosus, not intractable    Chronic rhinitis    Other acute recurrent sinusitis    Ankle pain    Closed fracture of medial malleolus    Closed right ankle fracture, initial encounter    Pain in right foot    Family history of premature CAD       Allergies:     No Known Allergies    Current Medications:       Current Outpatient Medications:     busPIRone (BUSPAR) 15 mg tablet, TAKE 1 TABLET BY MOUTH THREE TIMES A DAY, Disp: 90 tablet, Rfl: 2    escitalopram (Lexapro) 10 mg tablet, Take 1 tablet (10 mg total) by mouth daily, Disp: 30 tablet, Rfl: 3    escitalopram (LEXAPRO) 20 mg tablet, TAKE 1 TABLET BY MOUTH EVERY DAY, Disp: 90 tablet, Rfl: 0    fluticasone (FLONASE) 50 mcg/act nasal spray, 1 spray into each nostril daily, Disp: , Rfl:     hydrOXYzine HCL (ATARAX) 50 mg tablet, TAKE 1/2 TO 1 TAB BY MOUTH EVERY 8 HOURS AS NEEDED FOR ANXIETY, Disp: 60 tablet, Rfl: 0    meclizine (ANTIVERT) 25 mg tablet, Take 1 tablet (25 mg total) by mouth 3 (three) times a day as needed for dizziness, Disp: 15 tablet, Rfl: 0    Melatonin ER 10 MG TBCR, Take 1 tablet (10 mg total) by mouth daily (Patient taking differently: Take 1 tablet by mouth if needed), Disp: , Rfl:     omeprazole (PriLOSEC) 20 mg delayed release capsule, Take 1 capsule (20 mg total) by mouth daily, Disp: 21 capsule, Rfl: 0    ondansetron (ZOFRAN) 4 mg tablet, Take 1 tablet (4 mg total) by mouth every 8 (eight) hours as needed for nausea or vomiting, Disp: 30 tablet, Rfl: 0    SUMAtriptan (IMITREX) 100 mg tablet, Take 1 tablet (100 mg total) by mouth once as needed for migraine for up to 1 dose, Disp: 12 tablet, Rfl: 0    therapeutic multivitamin-minerals (THERAGRAN-M) tablet, Take 1 tablet by mouth daily, Disp: , Rfl:     Past Medical History:       Past Medical History:   Diagnosis Date    Abnormal Pap smear of cervix     Anxiety     BRCA1 negative     BRCA2 negative     Breast injury     Pt states scar tissue around nipple from breastfeeding    Depression     GERD (gastroesophageal reflux disease)     HPV (human papilloma virus) infection     Kidney stone     H/O PYELONEPHRITIS    Migraine     Seasonal allergies     Sleep difficulties     Varicella     POS HX        Past Surgical History:   Procedure Laterality Date    CHOLECYSTECTOMY      FINGER SURGERY      left hand     GYNECOLOGIC CRYOSURGERY      HAND SURGERY Left     Phelebitis in left hand  post IV infilitration, had sx for clot removal     LYMPH NODE BIOPSY      of neck    LYMPH NODE DISSECTION Right     cervical    SD  DELIVERY ONLY N/A 2018    Procedure:  SECTION ();   Surgeon: Aurelia Elias MD;  Location: [facility];  Service: Obstetrics    SD EXC SKIN BENIG >4 CM TRUNK,ARM,LEG Right 2022    Procedure: EXCISION  BIOPSY LESION/MASS BACK;  Surgeon: Blair Rodriguez MD;  Location: [facility];  Service: General        Family History   Problem Relation Age of Onset    Hypertension Mother     Heart disease Mother     Depression Mother     Coronary artery disease Mother     Hypertension Father     Hyperlipidemia Father     Heart disease Father         MI    Stroke Paternal Grandmother     Cancer Paternal Grandmother         breast    Breast cancer Paternal Grandmother     Cancer Paternal Aunt         colon    Colon cancer Paternal Aunt     Early death Maternal Uncle     Cancer Maternal Uncle         lymphoma    Early death Maternal Uncle         lymphoma    Birth defects Maternal Uncle     Undescended testes Son     Cystic fibrosis Other     Cystic fibrosis Other     Heart failure Maternal Grandmother     Stroke Maternal Grandfather     Alcohol abuse Maternal Grandfather     Alcohol abuse Brother     Drug abuse Brother          of overdose 2020    Anxiety disorder Sister     Post-traumatic stress disorder Sister     Developmental delay Son     Macrocephaly Son     Hydrocephalus Son     Breast cancer Other         Social History     Socioeconomic History    Marital status: /Civil Union     Spouse name: Valerie Ivory Number of children: 2    Years of education: Not on file    Highest education level: Associate degree: academic program   Occupational History    Occupation: unemployed   Tobacco Use    Smoking status: Former Smoker     Packs/day: 0 50     Years: 3 00     Pack years: 1 50     Types: Cigarettes     Quit date:      Years since quittin 7    Smokeless tobacco: Never Used   Vaping Use    Vaping Use: Every day    Substances: THC   Substance and Sexual Activity    Alcohol use: Not Currently    Drug use: Yes     Types: Marijuana    Sexual activity: Yes     Partners: Male     Birth control/protection: None   Other Topics Concern    Not on file   Social History Narrative    Not on file     Social Determinants of Health     Financial Resource Strain: Not on file   Food Insecurity: Not on file   Transportation Needs: Not on file   Physical Activity: Not on file   Stress: Not on file   Social Connections: Not on file   Intimate Partner Violence: Not on file   Housing Stability: Not on file        Physical Exam:     /70   Pulse 65   Temp 98 2 °F (36 8 °C)   Resp 18   Ht 5' 2" (1 575 m)   Wt 73 9 kg (163 lb)   LMP 10/02/2022 (Exact Date)   SpO2 97%   BMI 29 81 kg/m²     Physical Exam     Data:     Pre-operative work-up    Laboratory Results: I have personally reviewed the pertinent laboratory results/reports      EKG: I have personally reviewed pertinent reports  EKG done in office today and no acute changes noted  No arrhythmias noted  No ischemic changes noted  SB and stated that doing extensive cardio and weight lifting indurance work up from last 6 months    Recent Results (from the past 672 hour(s))   IGP,CtNg,AptimaHPV,rfx16/18,45    Collection Time: 09/20/22  7:45 AM   Result Value Ref Range    Diagnosis: Comment     Specimen Adequacy Comment     Clinician Provided ICD10 Comment     Performed by: Comment     QC reviewed by: Comment     SL AMB   Fabricio Douglas Note: Comment     Test Methodology: Comment     HPV Aptima Negative Negative    Chlamydia, Nuc   Acid Amp Negative Negative    Gonococcus bY Nucleic Acid Amp Negative Negative   Hepatitis C antibody    Collection Time: 09/24/22  7:26 AM   Result Value Ref Range    Hepatitis C Ab Non-reactive Non-reactive   RPR    Collection Time: 09/24/22  7:26 AM   Result Value Ref Range    RPR Non-Reactive Non-Reactive   Comprehensive metabolic panel    Collection Time: 09/24/22  7:26 AM   Result Value Ref Range    Sodium 138 135 - 147 mmol/L    Potassium 4 0 3 5 - 5 3 mmol/L    Chloride 109 (H) 96 - 108 mmol/L    CO2 21 21 - 32 mmol/L    ANION GAP 8 4 - 13 mmol/L    BUN 10 5 - 25 mg/dL    Creatinine 1 03 0 60 - 1 30 mg/dL    Glucose, Fasting 82 65 - 99 mg/dL    Calcium 9 5 8 3 - 10 1 mg/dL    AST 18 5 - 45 U/L    ALT 25 12 - 78 U/L    Alkaline Phosphatase 53 46 - 116 U/L    Total Protein 7 3 6 4 - 8 4 g/dL    Albumin 3 5 3 5 - 5 0 g/dL    Total Bilirubin 0 40 0 20 - 1 00 mg/dL    eGFR 69 ml/min/1 73sq m   Lipid Panel with Direct LDL reflex    Collection Time: 09/24/22  7:26 AM   Result Value Ref Range    Cholesterol 164 See Comment mg/dL    Triglycerides 94 See Comment mg/dL    HDL, Direct 21 (L) >=50 mg/dL    LDL Calculated 124 (H) 0 - 100 mg/dL   Hepatitis B surface antigen Collection Time: 09/24/22  7:26 AM   Result Value Ref Range    Hepatitis B Surface Ag Non-reactive Non-reactive, NonReactive - Confirmed   HIV 1/2 ANTIGEN/ANTIBODY (4TH GENERATION) W REFLEX [facility]    Collection Time: 09/24/22  7:26 AM   Result Value Ref Range    HIV-1/HIV-2 Ab Non-Reactive Non-Reactive           Assessment & Recommendations:     Problem List Items Addressed This Visit        Musculoskeletal and Integument    Closed fracture of medial malleolus    Closed right ankle fracture, initial encounter       Other    Generalized anxiety disorder     Managed by psychatrist         Current moderate episode of major depressive disorder without prior episode (HCC)     Complaint with current regimen and managed by psychatrist         Family history of premature CAD      Other Visit Diagnoses     Preop examination    -  Primary          Pre-Op Evaluation Assessment  40 y o  female with planned surgery: Ankle Open Reduction and Internal fixation of medial malleolus fracture (Right Foot)  Known risk factors for perioperative complications: None  Current medications which may produce withdrawal symptoms if withheld perioperatively: none  Pre-Op Evaluation Plan  1  Further preoperative workup as follows:   - None; no further preoperative work-up is required    2  Medication Management/Recommendations:   - Patient has been instructed to avoid herbs or non-directed vitamins the week prior to surgery to ensure no drug interactions with perioperative surgical and anesthetic medications  - Patient has been instructed to avoid aspirin containing medications or non-steroidal anti-inflammatory drugs for the week preceding surgery  3  Prophylaxis for cardiac events with perioperative beta-blockers: not indicated      4  Patient requires further consultation with: None    LUCRETIA Risk:  GFR:   eGFR   Date Value Ref Range Status   09/24/2022 69 ml/min/1 73sq m Final         For PCP:  If GFR>60, Hold ACE/ARB/Diuretic on the day of surgery, and NSAIDS 10 days before  If GFR<45, Consider PRE and POST op Nephrology Consult  If 46 <GFR> 59 : Has Patient had LUCRETIA in last 6 Months? no   If YES: Preop Nephrology consult   If No:  Oswaldo Thomas Nephrology consult  Clearance  Patient is CLEARED for surgery without any additional cardiac testing       Montez Ervin, [facility] [address] [address]  Phone#  [phone]  Fax#  [phone]

## 2022-10-04 NOTE — PRE-PROCEDURE INSTRUCTIONS
Pre-Surgery Instructions:   Medication Instructions    busPIRone (BUSPAR) 15 mg tablet Take this medication day of surgery if normally taken in the morning   escitalopram (Lexapro) 10 mg tablet Take this medication day of surgery if normally taken in the morning   escitalopram (LEXAPRO) 20 mg tablet Take this medication day of surgery if normally taken in the morning   fluticasone (FLONASE) 50 mcg/act nasal spray May use day of surgery if needed      hydrOXYzine HCL (ATARAX) 50 mg tablet Hold day of surgery      meclizine (ANTIVERT) 25 mg tablet Hold day of surgery      Melatonin ER 10 MG TBCR Normally takes at night   omeprazole (PriLOSEC) 20 mg delayed release capsule Take this medication day of surgery if normally taken in the morning   ondansetron (ZOFRAN) 4 mg tablet Hold day of surgery      SUMAtriptan (IMITREX) 100 mg tablet Hold day of surgery      therapeutic multivitamin-minerals (THERAGRAN-M) tablet Hold from now till after procedure unless prescribed by a Physician  My Surgical Experience    The following information was developed to assist you to prepare for your operation  What do I need to do before coming to the hospital?   Arrange for a responsible person to drive you to and from the hospital    Arrange care for your children at home  Children are not allowed in the recovery areas of the hospital   Plan to wear clothing that is easy to put on and take off  If you are having shoulder surgery, wear a shirt that buttons or zippers in the front  Bathing  o Shower the evening before and the morning of your surgery with an antibacterial soap  Please refer to the Pre Op Showering Instructions for Surgery Patients Sheet   o Remove nail polish and all body piercing jewelry  o Do not shave any body part for at least 24 hours before surgery-this includes face, arms, legs and upper body  Food  o Nothing to eat or drink after midnight the night before your surgery   This includes candy and chewing gum  o Exception: If your surgery is after 12:00pm (noon), you may have clear liquids such as 7-Up®, ginger ale, apple or cranberry juice, Jell-O®, water, or clear broth until 8:00 am  o Do not drink milk or juice with pulp on the morning before surgery  o Do not drink alcohol 24 hours before surgery  Medicine  o Follow instructions you received from your surgeon about which medicines you may take on the day of surgery  o If instructed to take medicine on the morning of surgery, take pills with just a small sip of water  Call your prescribing doctor for specific infroamtion on what to do if you take insulin    What should I bring to the hospital?    Bring:  Floydene Amour or a walker, if you have them, for foot or knee surgery   A list of the daily medicines, vitamins, minerals, herbals and nutritional supplements you take  Include the dosages of medicines and the time you take them each day   Glasses, dentures or hearing aids   Minimal clothing; you will be wearing hospital sleepwear   Photo ID; required to verify your identity   If you have a Living Will or Power of , bring a copy of the documents   If you have an ostomy, bring an extra pouch and any supplies you use    Do not bring   Medicines or inhalers   Money, valuables or jewelry    What other information should I know about the day of surgery?  Notify your surgeons if you develop a cold, sore throat, cough, fever, rash or any other illness   Report to the Ambulatory Surgical/Same Day Surgery Unit   You will be instructed to stop at Registration only if you have not been pre-registered   Inform your  fi they do not stay that they will be asked by the staff to leave a phone number where they can be reached   Be available to be reached before surgery   In the event the operating room schedule changes, you may be asked to come in earlier or later than expected    *It is important to tell your doctor and others involved in your health care if you are taking or have been taking any non-prescription drugs, vitamins, minerals, herbals or other nutritional supplements   Any of these may interact with some food or medicines and cause a reaction

## 2022-10-04 NOTE — PATIENT INSTRUCTIONS
- Patient has been instructed to avoid herbs or non-directed vitamins the week prior to surgery to ensure no drug interactions with perioperative surgical and anesthetic medications  - Patient has been instructed to avoid aspirin containing medications or non-steroidal anti-inflammatory drugs for the week preceding surgery

## 2022-10-05 ENCOUNTER — ANESTHESIA EVENT (OUTPATIENT)
Dept: PERIOP | Facility: HOSPITAL | Age: 37
End: 2022-10-05
Payer: COMMERCIAL

## 2022-10-06 ENCOUNTER — APPOINTMENT (OUTPATIENT)
Dept: RADIOLOGY | Facility: HOSPITAL | Age: 37
End: 2022-10-06
Payer: COMMERCIAL

## 2022-10-06 ENCOUNTER — ANESTHESIA (OUTPATIENT)
Dept: PERIOP | Facility: HOSPITAL | Age: 37
End: 2022-10-06
Payer: COMMERCIAL

## 2022-10-06 ENCOUNTER — HOSPITAL ENCOUNTER (OUTPATIENT)
Facility: HOSPITAL | Age: 37
Setting detail: OUTPATIENT SURGERY
Discharge: HOME | End: 2022-10-06
Attending: PODIATRIST | Admitting: PODIATRIST
Payer: COMMERCIAL

## 2022-10-06 ENCOUNTER — HOSPITAL ENCOUNTER (OUTPATIENT)
Dept: RADIOLOGY | Facility: HOSPITAL | Age: 37
Discharge: HOME | End: 2022-10-06
Payer: COMMERCIAL

## 2022-10-06 VITALS
HEIGHT: 62 IN | HEART RATE: 77 BPM | WEIGHT: 153 LBS | BODY MASS INDEX: 28.16 KG/M2 | DIASTOLIC BLOOD PRESSURE: 76 MMHG | OXYGEN SATURATION: 98 % | TEMPERATURE: 97.9 F | SYSTOLIC BLOOD PRESSURE: 134 MMHG | RESPIRATION RATE: 18 BRPM

## 2022-10-06 DIAGNOSIS — G89.18 POST-OPERATIVE PAIN: Primary | ICD-10-CM

## 2022-10-06 DIAGNOSIS — S82.51XA CLOSED DISPLACED FRACTURE OF MEDIAL MALLEOLUS OF RIGHT TIBIA, INITIAL ENCOUNTER: ICD-10-CM

## 2022-10-06 DIAGNOSIS — Z98.890 POST-OPERATIVE STATE: ICD-10-CM

## 2022-10-06 LAB
EXT PREGNANCY TEST URINE: NEGATIVE
EXT. CONTROL: NORMAL

## 2022-10-06 PROCEDURE — 73600 X-RAY EXAM OF ANKLE: CPT

## 2022-10-06 PROCEDURE — 73610 X-RAY EXAM OF ANKLE: CPT

## 2022-10-06 PROCEDURE — 81025 URINE PREGNANCY TEST: CPT | Performed by: PODIATRIST

## 2022-10-06 PROCEDURE — C1769 GUIDE WIRE: HCPCS | Performed by: PODIATRIST

## 2022-10-06 PROCEDURE — C1713 ANCHOR/SCREW BN/BN,TIS/BN: HCPCS | Performed by: PODIATRIST

## 2022-10-06 DEVICE — IMPLANTABLE DEVICE: Type: IMPLANTABLE DEVICE | Status: FUNCTIONAL

## 2022-10-06 RX ORDER — SODIUM CHLORIDE 9 MG/ML
125 INJECTION, SOLUTION INTRAVENOUS CONTINUOUS
Status: DISCONTINUED | OUTPATIENT
Start: 2022-10-06 | End: 2022-10-06 | Stop reason: HOSPADM

## 2022-10-06 RX ORDER — ACETAMINOPHEN 325 MG/1
650 TABLET ORAL EVERY 4 HOURS PRN
Status: DISCONTINUED | OUTPATIENT
Start: 2022-10-06 | End: 2022-10-06 | Stop reason: HOSPADM

## 2022-10-06 RX ORDER — OXYCODONE HYDROCHLORIDE AND ACETAMINOPHEN 5; 325 MG/1; MG/1
1 TABLET ORAL EVERY 4 HOURS PRN
Status: DISCONTINUED | OUTPATIENT
Start: 2022-10-06 | End: 2022-10-06 | Stop reason: HOSPADM

## 2022-10-06 RX ORDER — CEPHALEXIN 500 MG/1
500 CAPSULE ORAL EVERY 6 HOURS SCHEDULED
Qty: 20 CAPSULE | Refills: 0 | Status: SHIPPED | OUTPATIENT
Start: 2022-10-06 | End: 2022-10-11

## 2022-10-06 RX ORDER — ONDANSETRON 2 MG/ML
INJECTION INTRAMUSCULAR; INTRAVENOUS AS NEEDED
Status: DISCONTINUED | OUTPATIENT
Start: 2022-10-06 | End: 2022-10-06

## 2022-10-06 RX ORDER — OXYCODONE HYDROCHLORIDE AND ACETAMINOPHEN 5; 325 MG/1; MG/1
1 TABLET ORAL EVERY 4 HOURS PRN
Qty: 15 TABLET | Refills: 0 | Status: SHIPPED | OUTPATIENT
Start: 2022-10-06 | End: 2022-10-16

## 2022-10-06 RX ORDER — PROPOFOL 10 MG/ML
INJECTION, EMULSION INTRAVENOUS AS NEEDED
Status: DISCONTINUED | OUTPATIENT
Start: 2022-10-06 | End: 2022-10-06

## 2022-10-06 RX ORDER — FENTANYL CITRATE/PF 50 MCG/ML
25 SYRINGE (ML) INJECTION
Status: DISCONTINUED | OUTPATIENT
Start: 2022-10-06 | End: 2022-10-06 | Stop reason: HOSPADM

## 2022-10-06 RX ORDER — CEFAZOLIN SODIUM 1 G/50ML
1000 SOLUTION INTRAVENOUS ONCE
Status: COMPLETED | OUTPATIENT
Start: 2022-10-06 | End: 2022-10-06

## 2022-10-06 RX ORDER — DEXAMETHASONE SODIUM PHOSPHATE 10 MG/ML
INJECTION, SOLUTION INTRAMUSCULAR; INTRAVENOUS AS NEEDED
Status: DISCONTINUED | OUTPATIENT
Start: 2022-10-06 | End: 2022-10-06

## 2022-10-06 RX ORDER — LIDOCAINE HYDROCHLORIDE 10 MG/ML
INJECTION, SOLUTION EPIDURAL; INFILTRATION; INTRACAUDAL; PERINEURAL AS NEEDED
Status: DISCONTINUED | OUTPATIENT
Start: 2022-10-06 | End: 2022-10-06

## 2022-10-06 RX ORDER — ROPIVACAINE HYDROCHLORIDE 5 MG/ML
INJECTION, SOLUTION EPIDURAL; INFILTRATION; PERINEURAL AS NEEDED
Status: DISCONTINUED | OUTPATIENT
Start: 2022-10-06 | End: 2022-10-06

## 2022-10-06 RX ORDER — FENTANYL CITRATE 50 UG/ML
INJECTION, SOLUTION INTRAMUSCULAR; INTRAVENOUS AS NEEDED
Status: DISCONTINUED | OUTPATIENT
Start: 2022-10-06 | End: 2022-10-06

## 2022-10-06 RX ORDER — ONDANSETRON 2 MG/ML
4 INJECTION INTRAMUSCULAR; INTRAVENOUS ONCE AS NEEDED
Status: DISCONTINUED | OUTPATIENT
Start: 2022-10-06 | End: 2022-10-06 | Stop reason: HOSPADM

## 2022-10-06 RX ORDER — MIDAZOLAM HYDROCHLORIDE 2 MG/2ML
INJECTION, SOLUTION INTRAMUSCULAR; INTRAVENOUS AS NEEDED
Status: DISCONTINUED | OUTPATIENT
Start: 2022-10-06 | End: 2022-10-06

## 2022-10-06 RX ADMIN — ROPIVACAINE HYDROCHLORIDE 15 MG: 5 INJECTION, SOLUTION EPIDURAL; INFILTRATION; PERINEURAL at 13:31

## 2022-10-06 RX ADMIN — CEFAZOLIN SODIUM 1000 MG: 1 SOLUTION INTRAVENOUS at 13:46

## 2022-10-06 RX ADMIN — ONDANSETRON 4 MG: 2 INJECTION INTRAMUSCULAR; INTRAVENOUS at 14:37

## 2022-10-06 RX ADMIN — MIDAZOLAM 4 MG: 1 INJECTION INTRAMUSCULAR; INTRAVENOUS at 13:25

## 2022-10-06 RX ADMIN — LIDOCAINE HYDROCHLORIDE 100 MG: 10 INJECTION, SOLUTION EPIDURAL; INFILTRATION; INTRACAUDAL; PERINEURAL at 13:49

## 2022-10-06 RX ADMIN — PROPOFOL 200 MG: 10 INJECTION, EMULSION INTRAVENOUS at 13:49

## 2022-10-06 RX ADMIN — SODIUM CHLORIDE: 0.9 INJECTION, SOLUTION INTRAVENOUS at 14:29

## 2022-10-06 RX ADMIN — ROPIVACAINE HYDROCHLORIDE 25 MG: 5 INJECTION, SOLUTION EPIDURAL; INFILTRATION; PERINEURAL at 13:28

## 2022-10-06 RX ADMIN — DEXAMETHASONE SODIUM PHOSPHATE 10 MG: 10 INJECTION, SOLUTION INTRAMUSCULAR; INTRAVENOUS at 13:51

## 2022-10-06 RX ADMIN — SODIUM CHLORIDE 125 ML/HR: 0.9 INJECTION, SOLUTION INTRAVENOUS at 11:27

## 2022-10-06 RX ADMIN — OXYCODONE AND ACETAMINOPHEN 1 TABLET: 5; 325 TABLET ORAL at 15:50

## 2022-10-06 RX ADMIN — FENTANYL CITRATE 100 MCG: 50 INJECTION INTRAMUSCULAR; INTRAVENOUS at 13:25

## 2022-10-06 NOTE — ANESTHESIA PROCEDURE NOTES
Peripheral Block    Patient location during procedure: holding area  Start time: 10/6/2022 1:33 PM  Reason for block: at surgeon's request and post-op pain management  Staffing  Performed: Anesthesiologist   Anesthesiologist: Loco Sharp DO  Preanesthetic Checklist  Completed: patient identified, IV checked, site marked, risks and benefits discussed, surgical consent, monitors and equipment checked, pre-op evaluation and timeout performed  Peripheral Block  Patient position: left lateral decubitus  Prep: ChloraPrep  Patient monitoring: heart rate, cardiac monitor and frequent blood pressure checks  Block type: adductor canal block and popliteal  Laterality: right  Injection technique: single-shot  Procedures: ultrasound guided, Ultrasound guidance required for the procedure to increase accuracy and safety of medication placement and decrease risk of complications   and nerve stimulator  Ultrasound permanent image saved  Needle  Needle type: Stimuplex   Needle gauge: 22 G  Needle length: 10 cm  Needle localization: anatomical landmarks, nerve stimulator and ultrasound guidance  Test dose: negative  Assessment  Injection assessment: incremental injection, local visualized surrounding nerve on ultrasound, negative aspiration for heme, no paresthesia on injection and transient paresthesias  Paresthesia pain: none  Heart rate change: no  Slow fractionated injection: yes  Post-procedure:  site cleaned  patient tolerated the procedure well with no immediate complications

## 2022-10-06 NOTE — OP NOTE
OPERATIVE REPORT - Podiatry  PATIENT NAME: Gomez Connors    :  1985  MRN: [id number]  Pt Location: [facility] OR ROOM 07    SURGERY DATE: 10/6/2022    Surgeon(s) and Role: Leopold Hausen, DPM - Primary     * Hao Fry DPM - Assisting    Pre-op Diagnosis:  Closed displaced fracture of medial malleolus of right tibia, initial encounter [S82 51XA]    Post-Op Diagnosis Codes:     * Closed displaced fracture of medial malleolus of right tibia, initial encounter [S82 51XA]    Procedure(s) (LRB): Ankle ORIFmedial malleolus (Right)    Specimen(s):  * No specimens in log *    Estimated Blood Loss:   Minimal    Drains:  * No LDAs found *    Anesthesia Type:   General w/ Popliteal Block     Hemostasis:  Pneumatic ankle tourniquet set at 300 mmHg for 30 mins  Direct compression    Materials:  Implant Name Type Inv  Item Serial No   Lot No  LRB No  Used Action   SCREW DOMENIC 4 X 50MM SHRT THRD LOPRFL - [id number]  SCREW DOMENIC 4 X 50MM SHRT THRD LOPRFL  [employer]  Right 2 Implanted     3-0 PDS  staples    Injectables:  None    Operative Findings:  Consistent with Diagnosis    Complications:   None    Procedure and Technique:     Under mild sedation, the patient was brought into the operating room and placed on the operating room table in the supine position  IV sedation was achieved by anesthesia team and a universal timeout was performed where all parties are in agreement of correct patient, correct procedure and correct site  A pneumatic tourniquet was then placed over the patient's right lower extremity with ample padding  The foot was then prepped and draped in the usual aseptic manner  An esmarch bandage was used to exsangunate the foot and the pneumatic tourniquet was then inflated to 300mmHg  Attention was directed to the medial aspect right ankle where the medial malleolus and distal aspect of the tibia were marked    Utilizing 15 blade a incision was made through skin subcutaneous tissue overlying the medial malleolus  All vital neurovascular structures were retracted as surgical field  Utilizing 15 blade an incision was made down to bone overlying the medial malleolus  Periosteal structures were then reflected to expose the underlying fracture  Hematoma and impinge periosteal structures debrided utilizing a curette  The fracture was then reduced utilizing a reduction clamp and reduction was confirmed on C-arm in multiple planes  Following this to guidewires were inserted in the medial malleolus and into the tibia  Proper pin placement was confirmed on C-arm and measurements were taken  Two 50 mm screws as above were then drilled and inserted over the guidewires  Proper screw placement and excellent reduction of fracture then confirmed on C-arm in multiple planes     The surgical incision was irrigated with copious amounts of normal sterile saline  The periosteal and capsular structures were reapproximated using 3-0 PDS  Subcutaneous closure was obtained utilizing 3-0 PDS  Skin edges were reapproximated and closure was obtained utilizing skin staples  The foot was then cleansed and dried  The incision site was dressed with Xeroform and a dry sterile dressing with fiberglass posterior splint  The tourniquet was deflated and normal hyperemic flush was noted to all digits  The patient tolerated the procedure and anesthesia well and was transported to the PACU with vital signs stable  Dr Noy Carrington performed all lobo aspects of the surgery  SIGNATURE: Joesph Grubbs  DATE: October 6, 2022  TIME: 2:55 PM      Portions of the record may have been created with voice recognition software  Occasional wrong word or "sound a like" substitutions may have occurred due to the inherent limitations of voice recognition software  Read the chart carefully and recognize, using context, where substitutions have occurred

## 2022-10-06 NOTE — ANESTHESIA PREPROCEDURE EVALUATION
Procedure: Ankle ORIFmedial malleolus FX (Right Foot)    Relevant Problems   ANESTHESIA (within normal limits)      CARDIO (within normal limits)   (+) Migraine with aura and without status migrainosus, not intractable      HEMATOLOGY (within normal limits)      MUSCULOSKELETAL (within normal limits)      NEURO/PSYCH  marijuana use    (+) Current moderate episode of major depressive disorder without prior episode (HCC)   (+) Generalized anxiety disorder   (+) Migraine with aura and without status migrainosus, not intractable   (+) PTSD (post-traumatic stress disorder)      PULMONARY (within normal limits)        Physical Exam    Airway    Mallampati score: II  TM Distance: >3 FB  Neck ROM: full     Dental   No notable dental hx     Cardiovascular  Rhythm: regular, Rate: normal, Cardiovascular exam normal    Pulmonary  Pulmonary exam normal Breath sounds clear to auscultation,     Other Findings        Anesthesia Plan  ASA Score- 2     Anesthesia Type- general with ASA Monitors  Additional Monitors:   Airway Plan:     Comment: Nerve block   Plan Factors-    Chart reviewed  Existing labs reviewed  Patient summary reviewed  Patient is not a current smoker  Induction- intravenous  Postoperative Plan-     Informed Consent- Anesthetic plan and risks discussed with patient

## 2022-10-06 NOTE — DISCHARGE INSTRUCTIONS
Dr Huyen Clark    1  Take your prescribed medication as directed  2  Upon arrival at home, lie down and elevate your surgical foot on 2 pillows  3  Stay off your feet as much as possible for the first 24-48 hours  This is when your feet first swell and may become painful  After 48 hours you may begin limited walking following these restrictions:   Nonweightbearing to surgical foot     4  Drink large quantities of water  Consume no alcohol  Continue a well-balanced diet  5  Report any unusual discomfort or fever to this office  6  A limited amount of discomfort and swelling is to be expected  In some cases the skin may take on a bruised appearance  The surgical cleansing solution that was applied to your foot prior to the operation is dark in color and the operation site may appear to be oozing when it actually is not  7  A slight amount of blood is to be expected, and is no cause for alarm  Do not remove the dressings  If there is active bleeding and if the bleeding persists, add additional gauze to the bandage, apply direct pressure, elevate your feet and call this office  8  Do not get the dressings wet  As regular bathing may be inconvenient, sponge baths are recommended  9  When anesthesia wears off and if any discomfort should be present, apply an ice pack directly over the operated area for 15 minute intervals for several hours or until the pain leaves  (USE IN EXCESS OF 15 MINUTES COULD CAUSE FROSTBITE)  Do not use hot water bags or electric pads  A convenient icepack can be made by placing ice cubes in a plastic bag and covering this with a towel  10  Take over-the-counter laxative for constipation, this is common with use of narcotic medications

## 2022-10-06 NOTE — DISCHARGE SUMMARY
Discharge Summary Outpatient Procedure Podiatry -   Evacameron Kay 40 y o  female MRN: [id number]  Unit/Bed#: [facility] Encounter: [id number]    Admission Date: 10/6/2022     Admitting Diagnosis: Closed displaced fracture of medial malleolus of right tibia, initial encounter [S82 51XA]    Discharge Diagnosis: same    Procedures Performed: Ankle ORIFmedial malleolus: 51048 (CPT®)    Complications: none    Condition at Discharge: stable    Discharge instructions/Information to patient and family:   See after visit summary for information provided to patient and family  Provisions for Follow-Up Care/Important appointments:  See after visit summary for information related to follow-up care and any pertinent home health orders  Discharge Medications:  See after visit summary for reconciled discharge medications provided to patient and family

## 2022-10-06 NOTE — ANESTHESIA POSTPROCEDURE EVALUATION
Post-Op Assessment Note    CV Status:  Stable  Pain Score: 0    Pain management: adequate     Mental Status:  Alert and awake   Hydration Status:  Euvolemic   PONV Controlled:  Controlled   Airway Patency:  Patent      Post Op Vitals Reviewed: Yes      Staff: Anesthesiologist         No complications documented      BP      Temp      Pulse     Resp      SpO2      /76   Pulse 77   Temp 97 9 °F (36 6 °C) (Temporal)   Resp 18   Ht 5' 2" (1 575 m)   Wt 69 4 kg (153 lb)   LMP 10/02/2022 (Exact Date)   SpO2 98%   BMI 27 98 kg/m²

## 2022-10-10 ENCOUNTER — EVALUATION (OUTPATIENT)
Dept: PHYSICAL THERAPY | Facility: CLINIC | Age: 37
End: 2022-10-10
Payer: COMMERCIAL

## 2022-10-10 DIAGNOSIS — S82.51XD CLOSED DISPLACED FRACTURE OF MEDIAL MALLEOLUS OF RIGHT TIBIA WITH ROUTINE HEALING: Primary | ICD-10-CM

## 2022-10-10 PROCEDURE — 97161 PT EVAL LOW COMPLEX 20 MIN: CPT | Performed by: PHYSICAL THERAPIST

## 2022-10-10 NOTE — PROGRESS NOTES
PT Evaluation     Today's date: 10/10/2022  Patient name: Gloria Sawyer  : 1985  MRN: [id number]  Referring provider: Ramirez Nevarez DPM  Dx:   Encounter Diagnosis     ICD-10-CM    1  Closed displaced fracture of medial malleolus of right tibia with routine healing  S82  51XD                   Assessment  Assessment details: Gloria Sawyer is a 40 y o  female who presents to physical therapy with pain, decreased LE range of motion, decreased LE strength, impaired function, decreased activity tolerance, poor balance, swelling  and poor body mechanics  Patient's clinical presentation is consistent with their referring diagnosis of Closed displaced fracture of medial malleolus of right tibia with routine healing  (primary encounter diagnosis)  The pt presents with functional limitations of ADLs, recreational activities, ambulation, performing household chores, self-care and stair negotiation  Pt would benefit from physical therapy services to address these limitations and maximize function  Pt was instructed and educated on home exercise program today and demonstrates understanding  Impairments: abnormal gait, abnormal muscle firing, abnormal muscle tone, abnormal or restricted ROM, abnormal movement, activity intolerance, impaired balance, impaired physical strength, lacks appropriate home exercise program, pain with function, weight-bearing intolerance, poor posture  and poor body mechanics  Understanding of Dx/Px/POC: good   Prognosis: good    Goals  Short term goals  (4 weeks)  1  Patient will have no pain right ankle  2   Patient will have full range of motion right ankle  3  Patient will report a 50% improvement with activities of daily living   4  Patient will decrease swelling of right ankle from mod to min    Long term goals - (8 weeks)  1  Patient will be independent with home exercise program  2  Patient will have no gait deviations ambulating on level surfaces     3   Patient will report 80 % improvement with activity of daily living function  4   Patient will negotiate stairs up and down pain free with use of one railing  5  Patient SLS to be equal bilaterally      Plan  Patient would benefit from: PT eval and skilled physical therapy  Planned modality interventions: thermotherapy: hydrocollator packs and cryotherapy  Planned therapy interventions: ADL training, balance/weight bearing training, joint mobilization, manual therapy, massage, neuromuscular re-education, patient education, postural training, strengthening, stretching, functional ROM exercises, therapeutic activities, therapeutic exercise, gait training and home exercise program  Frequency: 2x week  Duration in visits: 16  Duration in weeks: 8  Treatment plan discussed with: patient        Subjective Evaluation    History of Present Illness  Date of onset: 10/1/2022  Date of surgery: 10/6/2022  Mechanism of injury: She reports she was indoor rock climbing  She went to [facility] ER  An X-ray was taken  She was given a splint  She went to [facility]  She had ORIF this past Thursday  She is NWB on right for 4 weeks  She was then referred to PT  Pain  Current pain rating: 3  At best pain rating: 3  At worst pain rating: 10  Location: Medial right ankle  Quality: throbbing, sharp, dull ache and cramping (pins and needles)    Social Support    Employment status: not working  Exercise history: Housework    Patient Goals  Patient goals for therapy: increased motion, increased strength and independence with ADLs/IADLs          Objective     Observations     Additional Observation Details  She had a follow up visit with Dr Javier Wisdom today and dressing was changed  Her dressing was not removed during this visit due to recent dressing change      Neurological Testing     Sensation     Ankle/Foot     Right Ankle/Foot   Intact: light touch     Active Range of Motion   Left Ankle/Foot   Dorsiflexion (ke): 12 degrees   Plantar flexion: 68 degrees Inversion: 38 degrees   Eversion: 20 degrees     Right Ankle/Foot   Dorsiflexion (ke): -35 degrees   Plantar flexion: 38 degrees   Inversion: 5 degrees   Eversion: 0 degrees     Strength/Myotome Testing     Left Ankle/Foot   Dorsiflexion: 5  Plantar flexion: 5  Inversion: 5  Eversion: 5    Right Ankle/Foot   Dorsiflexion: 2+  Plantar flexion: 2+  Inversion: 2+  Eversion: 2+    Ambulation   Weight-Bearing Status   Weight-Bearing Status (Right): non-weight-bearing    Assistive device used: crutches    General Comments: Ankle/Foot Comments   She has moderate edema throughout right ankle  Newly wrapped ACE bandage was in place so girth measurement was not taken this visit                 Eval/ Re-eval Auth #/ Referral # Total visits Start date  Expiration date Total active units  Total manual units  PT only or  PT+OT?   10/10/22                                     Date: 10/10          Total units authorized  Active:            Manual:           Total units remaining  Active:               Manual:                Date:           Total units authorized  Active:            Manual:           Total units remaining  Active:               Manual:               Precautions:  NWB on Right leg with boot and crutches    Specialty Daily Treatment Diary       Manuals 10/10/22       Visit # 1       STM Gastroc/ soleus/ Peroneals        PROM ankle        Stretch soleus gastroc                Warm-up                Neuro Re-Ed        Sheet stretch                                                Ther Ex        Knee ext w df        SLR        Towel scrunch        Rockerboard in sitting         Digit flex/ ext        AROM DF/PF                Ther Activity                        Gait Training                        Modalities        CP

## 2022-10-10 NOTE — LETTER
2022    [address]ley [address] [address]    Patient: Karina Medina   YOB: 1985   Date of Visit: 10/10/2022     Encounter Diagnosis     ICD-10-CM    1  Closed displaced fracture of medial malleolus of right tibia with routine healing  S82  51XD        Dear Dr Suazo Laughter: Thank you for your recent referral of Karina Medina  Please review the attached evaluation summary from [facility] recent visit  Please verify that you agree with the plan of care by signing the attached order  If you have any questions or concerns, please do not hesitate to call  I sincerely appreciate the opportunity to share in the care of one of your patients and hope to have another opportunity to work with you in the near future  Sincerely,    Syeda Manzo, PT      Referring Provider:      I certify that I have read the below Plan of Care and certify the need for these services furnished under this plan of treatment while under my care  Yelitza Barnes DPM  [address]  Via Fax: [phone]          PT Evaluation     Today's date: 10/10/2022  Patient name: Karina Medina  : 1985  MRN: [id number]  Referring provider: Pamela Garcia DPM  Dx:   Encounter Diagnosis     ICD-10-CM    1  Closed displaced fracture of medial malleolus of right tibia with routine healing  S82  51XD                   Assessment  Assessment details: Karina Medina is a 40 y o  female who presents to physical therapy with pain, decreased LE range of motion, decreased LE strength, impaired function, decreased activity tolerance, poor balance, swelling  and poor body mechanics  Patient's clinical presentation is consistent with their referring diagnosis of Closed displaced fracture of medial malleolus of right tibia with routine healing  (primary encounter diagnosis)   The pt presents with functional limitations of ADLs, recreational activities, ambulation, performing household chores, self-care and stair negotiation  Pt would benefit from physical therapy services to address these limitations and maximize function  Pt was instructed and educated on home exercise program today and demonstrates understanding  Impairments: abnormal gait, abnormal muscle firing, abnormal muscle tone, abnormal or restricted ROM, abnormal movement, activity intolerance, impaired balance, impaired physical strength, lacks appropriate home exercise program, pain with function, weight-bearing intolerance, poor posture  and poor body mechanics  Understanding of Dx/Px/POC: good   Prognosis: good    Goals  Short term goals  (4 weeks)  1  Patient will have no pain right ankle  2   Patient will have full range of motion right ankle  3  Patient will report a 50% improvement with activities of daily living   4  Patient will decrease swelling of right ankle from mod to min    Long term goals - (8 weeks)  1  Patient will be independent with home exercise program  2  Patient will have no gait deviations ambulating on level surfaces  3   Patient will report 80 % improvement with activity of daily living function  4   Patient will negotiate stairs up and down pain free with use of one railing     5  Patient SLS to be equal bilaterally      Plan  Patient would benefit from: PT eval and skilled physical therapy  Planned modality interventions: thermotherapy: hydrocollator packs and cryotherapy  Planned therapy interventions: ADL training, balance/weight bearing training, joint mobilization, manual therapy, massage, neuromuscular re-education, patient education, postural training, strengthening, stretching, functional ROM exercises, therapeutic activities, therapeutic exercise, gait training and home exercise program  Frequency: 2x week  Duration in visits: 16  Duration in weeks: 8  Treatment plan discussed with: patient        Subjective Evaluation    History of Present Illness  Date of onset: 10/1/2022  Date of surgery: 10/6/2022  Mechanism of injury: She reports she was indoor rock climbing  She went to [address] [place] [address] ER  An X-ray was taken  She was given a splint  She went to [facility]  She had ORIF this past Thursday  She is NWB on right for 4 weeks  She was then referred to PT  Pain  Current pain rating: 3  At best pain rating: 3  At worst pain rating: 10  Location: Medial right ankle  Quality: throbbing, sharp, dull ache and cramping (pins and needles)    Social Support    Employment status: not working  Exercise history: Housework    Patient Goals  Patient goals for therapy: increased motion, increased strength and independence with ADLs/IADLs          Objective     Observations     Additional Observation Details  She had a follow up visit with Dr Terryl Mcburney today and dressing was changed  Her dressing was not removed during this visit due to recent dressing change  Neurological Testing     Sensation     Ankle/Foot     Right Ankle/Foot   Intact: light touch     Active Range of Motion   Left Ankle/Foot   Dorsiflexion (ke): 12 degrees   Plantar flexion: 68 degrees   Inversion: 38 degrees   Eversion: 20 degrees     Right Ankle/Foot   Dorsiflexion (ke): -35 degrees   Plantar flexion: 38 degrees   Inversion: 5 degrees   Eversion: 0 degrees     Strength/Myotome Testing     Left Ankle/Foot   Dorsiflexion: 5  Plantar flexion: 5  Inversion: 5  Eversion: 5    Right Ankle/Foot   Dorsiflexion: 2+  Plantar flexion: 2+  Inversion: 2+  Eversion: 2+    Ambulation   Weight-Bearing Status   Weight-Bearing Status (Right): non-weight-bearing    Assistive device used: crutches    General Comments: Ankle/Foot Comments   She has moderate edema throughout right ankle  Newly wrapped ACE bandage was in place so girth measurement was not taken this visit                 Eval/ Re-eval Auth #/ Referral # Total visits Start date  Expiration date Total active units  Total manual units  PT only or  PT+OT?   10/10/22                                     Date: 10/10          Total units authorized  Active:            Manual:           Total units remaining  Active:               Manual:                Date:           Total units authorized  Active:            Manual:           Total units remaining  Active:               Manual:               Precautions:  NWB on Right leg with boot and crutches    Specialty Daily Treatment Diary       Manuals 10/10/22       Visit # 1       STM Gastroc/ soleus/ Peroneals        PROM ankle        Stretch soleus gastroc                Warm-up                Neuro Re-Ed        Sheet stretch                                                Ther Ex        Knee ext w df        SLR        Towel scrunch        Rockerboard in sitting         Digit flex/ ext        AROM DF/PF                Ther Activity                        Gait Training                        Modalities        CP

## 2022-10-12 ENCOUNTER — APPOINTMENT (OUTPATIENT)
Dept: PHYSICAL THERAPY | Facility: CLINIC | Age: 37
End: 2022-10-12

## 2022-10-12 PROBLEM — J01.81 OTHER ACUTE RECURRENT SINUSITIS: Status: RESOLVED | Noted: 2021-01-05 | Resolved: 2022-10-12

## 2022-10-16 ENCOUNTER — HOSPITAL ENCOUNTER (EMERGENCY)
Facility: HOSPITAL | Age: 37
Discharge: HOME | End: 2022-10-17
Attending: EMERGENCY MEDICINE
Payer: COMMERCIAL

## 2022-10-16 DIAGNOSIS — U07.1 COVID: ICD-10-CM

## 2022-10-16 DIAGNOSIS — R45.851 SUICIDAL IDEATIONS: Primary | ICD-10-CM

## 2022-10-16 LAB
ALBUMIN SERPL BCP-MCNC: 3.9 G/DL (ref 3.5–5)
ALP SERPL-CCNC: 58 U/L (ref 46–116)
ALT SERPL W P-5'-P-CCNC: 30 U/L (ref 12–78)
AMPHETAMINES SERPL QL SCN: NEGATIVE
ANION GAP SERPL CALCULATED.3IONS-SCNC: 9 MMOL/L (ref 4–13)
APAP SERPL-MCNC: <2 UG/ML (ref 10–20)
AST SERPL W P-5'-P-CCNC: 19 U/L (ref 5–45)
BACTERIA UR QL AUTO: NORMAL /HPF
BARBITURATES UR QL: NEGATIVE
BASOPHILS # BLD AUTO: 0.04 THOUSANDS/ÂΜL (ref 0–0.1)
BASOPHILS NFR BLD AUTO: 0 % (ref 0–1)
BENZODIAZ UR QL: NEGATIVE
BILIRUB SERPL-MCNC: 0.27 MG/DL (ref 0.2–1)
BILIRUB UR QL STRIP: NEGATIVE
BUN SERPL-MCNC: 7 MG/DL (ref 5–25)
CALCIUM SERPL-MCNC: 9.2 MG/DL (ref 8.3–10.1)
CHLORIDE SERPL-SCNC: 104 MMOL/L (ref 96–108)
CLARITY UR: CLEAR
CO2 SERPL-SCNC: 24 MMOL/L (ref 21–32)
COCAINE UR QL: NEGATIVE
COLOR UR: ABNORMAL
CREAT SERPL-MCNC: 0.86 MG/DL (ref 0.6–1.3)
EOSINOPHIL # BLD AUTO: 0.08 THOUSAND/ÂΜL (ref 0–0.61)
EOSINOPHIL NFR BLD AUTO: 1 % (ref 0–6)
ERYTHROCYTE [DISTWIDTH] IN BLOOD BY AUTOMATED COUNT: 12.6 % (ref 11.6–15.1)
ETHANOL SERPL-MCNC: <3 MG/DL (ref 0–3)
EXT PREG TEST URINE: NEGATIVE
EXT. CONTROL ED NAV: NORMAL
GFR SERPL CREATININE-BSD FRML MDRD: 86 ML/MIN/1.73SQ M
GLUCOSE SERPL-MCNC: 87 MG/DL (ref 65–140)
GLUCOSE UR STRIP-MCNC: NEGATIVE MG/DL
HCT VFR BLD AUTO: 41.2 % (ref 34.8–46.1)
HGB BLD-MCNC: 14 G/DL (ref 11.5–15.4)
HGB UR QL STRIP.AUTO: ABNORMAL
IMM GRANULOCYTES # BLD AUTO: 0.03 THOUSAND/UL (ref 0–0.2)
IMM GRANULOCYTES NFR BLD AUTO: 0 % (ref 0–2)
KETONES UR STRIP-MCNC: NEGATIVE MG/DL
LEUKOCYTE ESTERASE UR QL STRIP: ABNORMAL
LYMPHOCYTES # BLD AUTO: 1.39 THOUSANDS/ÂΜL (ref 0.6–4.47)
LYMPHOCYTES NFR BLD AUTO: 12 % (ref 14–44)
MCH RBC QN AUTO: 29.2 PG (ref 26.8–34.3)
MCHC RBC AUTO-ENTMCNC: 34 G/DL (ref 31.4–37.4)
MCV RBC AUTO: 86 FL (ref 82–98)
METHADONE UR QL: NEGATIVE
MONOCYTES # BLD AUTO: 0.56 THOUSAND/ÂΜL (ref 0.17–1.22)
MONOCYTES NFR BLD AUTO: 5 % (ref 4–12)
NEUTROPHILS # BLD AUTO: 9.62 THOUSANDS/ÂΜL (ref 1.85–7.62)
NEUTS SEG NFR BLD AUTO: 82 % (ref 43–75)
NITRITE UR QL STRIP: NEGATIVE
NON-SQ EPI CELLS URNS QL MICRO: NORMAL /HPF
NRBC BLD AUTO-RTO: 0 /100 WBCS
OPIATES UR QL SCN: NEGATIVE
OXYCODONE+OXYMORPHONE UR QL SCN: NEGATIVE
PCP UR QL: NEGATIVE
PH UR STRIP.AUTO: 5.5 [PH]
PLATELET # BLD AUTO: 441 THOUSANDS/UL (ref 149–390)
PMV BLD AUTO: 8.9 FL (ref 8.9–12.7)
POTASSIUM SERPL-SCNC: 4.3 MMOL/L (ref 3.5–5.3)
PROT SERPL-MCNC: 7 G/DL (ref 6.4–8.4)
PROT UR STRIP-MCNC: NEGATIVE MG/DL
RBC # BLD AUTO: 4.8 MILLION/UL (ref 3.81–5.12)
RBC #/AREA URNS AUTO: NORMAL /HPF
SALICYLATES SERPL-MCNC: <3 MG/DL (ref 3–20)
SODIUM SERPL-SCNC: 137 MMOL/L (ref 135–147)
SP GR UR STRIP.AUTO: <=1.005 (ref 1–1.03)
THC UR QL: POSITIVE
UROBILINOGEN UR QL STRIP.AUTO: 0.2 E.U./DL
WBC # BLD AUTO: 11.72 THOUSAND/UL (ref 4.31–10.16)
WBC #/AREA URNS AUTO: NORMAL /HPF

## 2022-10-16 PROCEDURE — 99285 EMERGENCY DEPT VISIT HI MDM: CPT | Performed by: EMERGENCY MEDICINE

## 2022-10-16 PROCEDURE — 36415 COLL VENOUS BLD VENIPUNCTURE: CPT | Performed by: EMERGENCY MEDICINE

## 2022-10-16 PROCEDURE — 85025 COMPLETE CBC W/AUTO DIFF WBC: CPT | Performed by: EMERGENCY MEDICINE

## 2022-10-16 PROCEDURE — 81001 URINALYSIS AUTO W/SCOPE: CPT | Performed by: EMERGENCY MEDICINE

## 2022-10-16 PROCEDURE — 93005 ELECTROCARDIOGRAM TRACING: CPT

## 2022-10-16 PROCEDURE — 82077 ASSAY SPEC XCP UR&BREATH IA: CPT | Performed by: EMERGENCY MEDICINE

## 2022-10-16 PROCEDURE — 80143 DRUG ASSAY ACETAMINOPHEN: CPT | Performed by: EMERGENCY MEDICINE

## 2022-10-16 PROCEDURE — 99284 EMERGENCY DEPT VISIT MOD MDM: CPT

## 2022-10-16 PROCEDURE — 80179 DRUG ASSAY SALICYLATE: CPT | Performed by: EMERGENCY MEDICINE

## 2022-10-16 PROCEDURE — 81025 URINE PREGNANCY TEST: CPT | Performed by: EMERGENCY MEDICINE

## 2022-10-16 PROCEDURE — 80053 COMPREHEN METABOLIC PANEL: CPT | Performed by: EMERGENCY MEDICINE

## 2022-10-16 PROCEDURE — 80307 DRUG TEST PRSMV CHEM ANLYZR: CPT | Performed by: EMERGENCY MEDICINE

## 2022-10-16 RX ORDER — ACETAMINOPHEN 325 MG/1
650 TABLET ORAL EVERY 6 HOURS PRN
Status: DISCONTINUED | OUTPATIENT
Start: 2022-10-16 | End: 2022-10-17 | Stop reason: HOSPADM

## 2022-10-16 RX ORDER — BUSPIRONE HYDROCHLORIDE 5 MG/1
15 TABLET ORAL 2 TIMES DAILY
Status: DISCONTINUED | OUTPATIENT
Start: 2022-10-16 | End: 2022-10-17

## 2022-10-16 RX ORDER — IBUPROFEN 600 MG/1
600 TABLET ORAL EVERY 6 HOURS PRN
Status: DISCONTINUED | OUTPATIENT
Start: 2022-10-16 | End: 2022-10-17 | Stop reason: HOSPADM

## 2022-10-16 RX ADMIN — IBUPROFEN 600 MG: 600 TABLET ORAL at 18:25

## 2022-10-16 RX ADMIN — ACETAMINOPHEN 650 MG: 325 TABLET, FILM COATED ORAL at 18:24

## 2022-10-16 RX ADMIN — BUSPIRONE HYDROCHLORIDE 15 MG: 5 TABLET ORAL at 18:32

## 2022-10-16 NOTE — ED NOTES
Met with patient to complete the crisis intake assessments  Patient walked into ER with thoughts of SI  Patient was cooperative, logical/choherent  Patient reported that she started having thoughts of harming herself  She reported that she would not allow herself to get as far as making a plan  She reported that she does not want to harm herself because of her children but was afraid that she would  Patient cried during the assessment  She reported relationship difficulties with her  surrounding the care of their ASD 3year old child  Patient had difficulty focusing on herself at times and often reverted to talking about her  which resulted in escalating her dysphoria and crying  Patient reported feeling overwhelmed and is concerned that she is not doing the best she can for her children  Patient reported that her mother lives in the home and is able to care for her children  Patient denied HI, AVH, paranoia, and substance abuse  Patient has a therapist and is taking Lexapro and Buspar  Patient had difficulty expressing herself at times and reported that she sometimes feels physical pain due to her current mental health condition  Patient is agreeable to a voluntary commitment

## 2022-10-16 NOTE — ED PROVIDER NOTES
History  Chief Complaint   Patient presents with   • Psychiatric Evaluation     PT presents to the ER this morning with mother at side  Pt reports feeling suicidal and feels in need of in patient help  Pt reports feeling suicidal but does not have a plan and does not wish to harm self or die  37yoF hx depression,anxiety,PTSD presents with suicidal ideation without a plan  Adherent to lexapro 30mg daily / buspar 15 TID and hydroxyzine 25-50mg QHS  No hallucinations, drug use, self harm  Prior to Admission Medications   Prescriptions Last Dose Informant Patient Reported? Taking?    Melatonin ER 10 MG TBCR   No No   Sig: Take 1 tablet (10 mg total) by mouth daily   Patient taking differently: Take 1 tablet by mouth if needed   SUMAtriptan (IMITREX) 100 mg tablet   No No   Sig: Take 1 tablet (100 mg total) by mouth once as needed for migraine for up to 1 dose   busPIRone (BUSPAR) 15 mg tablet   No No   Sig: TAKE 1 TABLET BY MOUTH THREE TIMES A DAY   escitalopram (LEXAPRO) 20 mg tablet   No No   Sig: TAKE 1 TABLET BY MOUTH EVERY DAY   escitalopram (Lexapro) 10 mg tablet   No No   Sig: Take 1 tablet (10 mg total) by mouth daily   fluticasone (FLONASE) 50 mcg/act nasal spray   Yes No   Si spray into each nostril daily   hydrOXYzine HCL (ATARAX) 50 mg tablet   No No   Sig: TAKE 1/2 TO 1 TAB BY MOUTH EVERY 8 HOURS AS NEEDED FOR ANXIETY   meclizine (ANTIVERT) 25 mg tablet   No No   Sig: Take 1 tablet (25 mg total) by mouth 3 (three) times a day as needed for dizziness   omeprazole (PriLOSEC) 20 mg delayed release capsule   No No   Sig: Take 1 capsule (20 mg total) by mouth daily   ondansetron (ZOFRAN) 4 mg tablet   No No   Sig: Take 1 tablet (4 mg total) by mouth every 8 (eight) hours as needed for nausea or vomiting   oxyCODONE-acetaminophen (Percocet) 5-325 mg per tablet   No No   Sig: Take 1 tablet by mouth every 4 (four) hours as needed for moderate pain for up to 10 days Max Daily Amount: 6 tablets therapeutic multivitamin-minerals (THERAGRAN-M) tablet   Yes No   Sig: Take 1 tablet by mouth daily      Facility-Administered Medications: None       Past Medical History:   Diagnosis Date   • Abnormal Pap smear of cervix    • Anxiety    • BRCA1 negative    • BRCA2 negative    • Breast injury     Pt states scar tissue around nipple from breastfeeding   • Depression    • GERD (gastroesophageal reflux disease)    • HPV (human papilloma virus) infection    • Kidney stone     H/O PYELONEPHRITIS   • Migraine    • Seasonal allergies    • Sleep difficulties    • Varicella     POS HX       Past Surgical History:   Procedure Laterality Date   • CHOLECYSTECTOMY     • FINGER SURGERY      left hand    • GYNECOLOGIC CRYOSURGERY     • HAND SURGERY Left     Phelebitis in left hand  post IV infilitration, had sx for clot removal    • LYMPH NODE BIOPSY      of neck   • LYMPH NODE DISSECTION Right     cervical   • SC  DELIVERY ONLY N/A 2018    Procedure:  SECTION (); Surgeon: Florentino Das MD;  Location: [facility];  Service: Obstetrics   • SC EXC SKIN BENIG >4 CM TRUNK,ARM,LEG Right 2022    Procedure: EXCISION  BIOPSY LESION/MASS BACK;  Surgeon: Mortimer Keeling, MD;  Location: [address] [place] [address];  Service: General   • SC OPEN TREATMENT MEDIAL MALLEOLUS FRACTURE Right 10/6/2022    Procedure:  Ankle ORIFmedial malleolus;  Surgeon: Yoon Alaniz DPM;  Location: [facility];  Service: Podiatry       Family History   Problem Relation Age of Onset   • Hypertension Mother    • Heart disease Mother    • Depression Mother    • Coronary artery disease Mother    • Hypertension Father    • Hyperlipidemia Father    • Heart disease Father         MI   • Stroke Paternal Grandmother    • Cancer Paternal Grandmother         breast   • Breast cancer Paternal Grandmother    • Cancer Paternal Aunt         colon   • Colon cancer Paternal Aunt    • Early death Maternal Uncle    • Cancer Maternal Uncle         lymphoma   • Early death Maternal Uncle         lymphoma   • Birth defects Maternal Uncle    • Undescended testes Son    • Cystic fibrosis Other    • Cystic fibrosis Other    • Heart failure Maternal Grandmother    • Stroke Maternal Grandfather    • Alcohol abuse Maternal Grandfather    • Alcohol abuse Brother    • Drug abuse Brother          of overdose 2020   • Anxiety disorder Sister    • Post-traumatic stress disorder Sister    • Developmental delay Son    • Macrocephaly Son    • Hydrocephalus Son    • Breast cancer Other      I have reviewed and agree with the history as documented  E-Cigarette/Vaping   • E-Cigarette Use Current Every Day User    • Comments medical [facility]      E-Cigarette/Vaping Substances   • Nicotine No    • THC Yes    • CBD No    • Flavoring No    • Other No    • Unknown No      Social History     Tobacco Use   • Smoking status: Former Smoker     Packs/day: 0 50     Years: 3 00     Pack years: 1 50     Types: Cigarettes     Quit date:      Years since quittin 7   • Smokeless tobacco: Never Used   Vaping Use   • Vaping Use: Every day   • Substances: THC   Substance Use Topics   • Alcohol use: Not Currently   • Drug use: Yes     Types: Marijuana     Comment: last used vape 10   0800 pt reports having medical marijuana card       Review of Systems   Psychiatric/Behavioral: Negative for hallucinations  All other systems reviewed and are negative  Physical Exam  Physical Exam  Vitals reviewed  Constitutional:       Appearance: She is well-developed  HENT:      Head: Normocephalic and atraumatic  Right Ear: External ear normal       Left Ear: External ear normal       Nose: Nose normal  No rhinorrhea  Mouth/Throat:      Mouth: Mucous membranes are moist    Eyes:      Conjunctiva/sclera: Conjunctivae normal    Cardiovascular:      Rate and Rhythm: Normal rate and regular rhythm     Pulmonary:      Effort: Pulmonary effort is normal       Breath sounds: Normal breath sounds  No wheezing or rales  Abdominal:      Palpations: Abdomen is soft  Tenderness: There is no abdominal tenderness  Musculoskeletal:      Cervical back: Neck supple  Right lower leg: No edema  Left lower leg: No edema  Skin:     General: Skin is warm and dry  Neurological:      Mental Status: She is alert and oriented to person, place, and time     Psychiatric:      Comments: Anxious tearful         Vital Signs  ED Triage Vitals   Temperature Pulse Respirations Blood Pressure SpO2   10/16/22 0828 10/16/22 0828 10/16/22 0828 10/16/22 0828 10/16/22 0828   98 9 °F (37 2 °C) 97 18 140/88 99 %      Temp Source Heart Rate Source Patient Position - Orthostatic VS BP Location FiO2 (%)   10/16/22 0828 10/16/22 0828 10/16/22 0828 10/16/22 0828 --   Tympanic Monitor Lying Left arm       Pain Score       10/16/22 0901       No Pain           Vitals:    10/16/22 0828   BP: 140/88   Pulse: 97   Patient Position - Orthostatic VS: Lying         Visual Acuity      ED Medications  Medications - No data to display    Diagnostic Studies  Results Reviewed     Procedure Component Value Units Date/Time    Urine Microscopic [[id number]]  (Normal) Collected: 10/16/22 1013    Lab Status: Final result Specimen: Urine, Clean Catch Updated: 10/16/22 1108     RBC, UA 0-1 /hpf      WBC, UA 0-1 /hpf      Epithelial Cells Occasional /hpf      Bacteria, UA Occasional /hpf     Acetaminophen level-"If concentration is detectable, please discuss with medical  on call " [[id number]]  (Abnormal) Collected: 10/16/22 1008    Lab Status: Final result Specimen: Blood from Arm, Left Updated: 10/16/22 1057     Acetaminophen Level <2 ug/mL     Comprehensive metabolic panel [[id number]] Collected: 10/16/22 1008    Lab Status: Final result Specimen: Blood from Arm, Left Updated: 10/16/22 1054     Sodium 137 mmol/L      Potassium 4 3 mmol/L      Chloride 104 mmol/L      CO2 24 mmol/L      ANION GAP 9 mmol/L      BUN 7 mg/dL Creatinine 0 86 mg/dL      Glucose 87 mg/dL      Calcium 9 2 mg/dL      AST 19 U/L      ALT 30 U/L      Alkaline Phosphatase 58 U/L      Total Protein 7 0 g/dL      Albumin 3 9 g/dL      Total Bilirubin 0 27 mg/dL      eGFR 86 ml/min/1 73sq m     Narrative:      [facility] guidelines for Chronic Kidney Disease (CKD):   •  Stage 1 with normal or high GFR (GFR > 90 mL/min/1 73 square meters)  •  Stage 2 Mild CKD (GFR = 60-89 mL/min/1 73 square meters)  •  Stage 3A Moderate CKD (GFR = 45-59 mL/min/1 73 square meters)  •  Stage 3B Moderate CKD (GFR = 30-44 mL/min/1 73 square meters)  •  Stage 4 Severe CKD (GFR = 15-29 mL/min/1 73 square meters)  •  Stage 5 End Stage CKD (GFR <15 mL/min/1 73 square meters)  Note: GFR calculation is accurate only with a steady state creatinine    Salicylate level [[id number]]  (Abnormal) Collected: 10/16/22 1008    Lab Status: Final result Specimen: Blood from Arm, Left Updated: 19/86/21 2160     Salicylate Lvl <3 mg/dL     Rapid drug screen, urine [[id number]]  (Abnormal) Collected: 10/16/22 1014    Lab Status: Final result Specimen: Urine, Clean Catch Updated: 10/16/22 1054     Amph/Meth UR Negative     Barbiturate Ur Negative     Benzodiazepine Urine Negative     Cocaine Urine Negative     Methadone Urine Negative     Opiate Urine Negative     PCP Ur Negative     THC Urine Positive     Oxycodone Urine Negative    Narrative:      Presumptive report  If requested, specimen will be sent to reference lab for confirmation  FOR MEDICAL PURPOSES ONLY  IF CONFIRMATION NEEDED PLEASE CONTACT THE LAB WITHIN 5 DAYS      Drug Screen Cutoff Levels:  AMPHETAMINE/METHAMPHETAMINES  1000 ng/mL  BARBITURATES     200 ng/mL  BENZODIAZEPINES     200 ng/mL  COCAINE      300 ng/mL  METHADONE      300 ng/mL  OPIATES      300 ng/mL  PHENCYCLIDINE     25 ng/mL  THC       50 ng/mL  OXYCODONE      100 ng/mL    Ethanol [[id number]]  (Normal) Collected: 10/16/22 1008    Lab Status: Final result Specimen: Blood from Arm, Left Updated: 10/16/22 1052     Ethanol Lvl <3 mg/dL     UA w Reflex to Microscopic w Reflex to Culture [[id number]]  (Abnormal) Collected: 10/16/22 1013    Lab Status: Final result Specimen: Urine, Clean Catch Updated: 10/16/22 1039     Color, UA Light Yellow     Clarity, UA Clear     Specific Gravity, UA <=1 005     pH, UA 5 5     Leukocytes, UA Trace     Nitrite, UA Negative     Protein, UA Negative mg/dl      Glucose, UA Negative mg/dl      Ketones, UA Negative mg/dl      Urobilinogen, UA 0 2 E U /dl      Bilirubin, UA Negative     Occult Blood, UA Trace-lysed    POCT pregnancy, urine [[id number]]  (Normal) Resulted: 10/16/22 1022    Lab Status: Final result Updated: 10/16/22 1022     EXT PREG TEST UR (Ref: Negative) negative     Control valid    CBC and differential [[id number]]  (Abnormal) Collected: 10/16/22 1008    Lab Status: Final result Specimen: Blood from Arm, Left Updated: 10/16/22 1020     WBC 11 72 Thousand/uL      RBC 4 80 Million/uL      Hemoglobin 14 0 g/dL      Hematocrit 41 2 %      MCV 86 fL      MCH 29 2 pg      MCHC 34 0 g/dL      RDW 12 6 %      MPV 8 9 fL      Platelets 075 Thousands/uL      nRBC 0 /100 WBCs      Neutrophils Relative 82 %      Immat GRANS % 0 %      Lymphocytes Relative 12 %      Monocytes Relative 5 %      Eosinophils Relative 1 %      Basophils Relative 0 %      Neutrophils Absolute 9 62 Thousands/µL      Immature Grans Absolute 0 03 Thousand/uL      Lymphocytes Absolute 1 39 Thousands/µL      Monocytes Absolute 0 56 Thousand/µL      Eosinophils Absolute 0 08 Thousand/µL      Basophils Absolute 0 04 Thousands/µL                  No orders to display              Procedures  Procedures         ED Course  ED Course as of 10/16/22 1259   Sun Oct 16, 2022   1258 Pt will be a voluntary bed search  MDM  Number of Diagnoses or Management Options  Diagnosis management comments: Routine psych labs sent     Pt is medically clear  PES saw patient who will be a voluntary inpt psych bed search  Disposition  Final diagnoses:   Suicidal ideations     Time reflects when diagnosis was documented in both MDM as applicable and the Disposition within this note     Time User Action Codes Description Comment    10/16/2022 12:59 PM Watson Slight Add [L35 [id number]] Suicidal ideations       ED Disposition     ED Disposition   Transfer to [address] [place] [address]   --    Date/Time   Sun Oct 16, 2022 12:59 PM    Comment   Lr Ego should be transferred out to [facility] and has been medically cleared  Follow-up Information    None         Patient's Medications   Discharge Prescriptions    No medications on file       No discharge procedures on file      PDMP Review     None          ED Provider  Electronically Signed by           Inessa Barger DO  10/16/22 6550

## 2022-10-16 NOTE — ED NOTES
Saundra villarreal provided  Concepcion, from PES, here to speak with patient       Bhanu Chaney, ADYNE  10/16/22 4030

## 2022-10-16 NOTE — ED NOTES
Registration removed pt spouse as emergency contact upon the request of pt  Pt resting quietly in bed  No distress noted  1:1 continues        [address]  10/16/22 5722

## 2022-10-16 NOTE — Clinical Note
Denis Domínguez should be transferred out to [address] [place] [address] and has been medically cleared

## 2022-10-17 VITALS
SYSTOLIC BLOOD PRESSURE: 164 MMHG | RESPIRATION RATE: 18 BRPM | TEMPERATURE: 99.2 F | WEIGHT: 150 LBS | HEIGHT: 62 IN | BODY MASS INDEX: 27.6 KG/M2 | HEART RATE: 91 BPM | DIASTOLIC BLOOD PRESSURE: 98 MMHG | OXYGEN SATURATION: 96 %

## 2022-10-17 LAB
ATRIAL RATE: 74 BPM
P AXIS: 7 DEGREES
PR INTERVAL: 162 MS
QRS AXIS: 53 DEGREES
QRSD INTERVAL: 88 MS
QT INTERVAL: 392 MS
QTC INTERVAL: 435 MS
SARS-COV-2 RNA RESP QL NAA+PROBE: POSITIVE
T WAVE AXIS: 71 DEGREES
VENTRICULAR RATE: 74 BPM

## 2022-10-17 PROCEDURE — U0003 INFECTIOUS AGENT DETECTION BY NUCLEIC ACID (DNA OR RNA); SEVERE ACUTE RESPIRATORY SYNDROME CORONAVIRUS 2 (SARS-COV-2) (CORONAVIRUS DISEASE [COVID-19]), AMPLIFIED PROBE TECHNIQUE, MAKING USE OF HIGH THROUGHPUT TECHNOLOGIES AS DESCRIBED BY CMS-2020-01-R: HCPCS | Performed by: EMERGENCY MEDICINE

## 2022-10-17 PROCEDURE — U0005 INFEC AGEN DETEC AMPLI PROBE: HCPCS | Performed by: EMERGENCY MEDICINE

## 2022-10-17 PROCEDURE — 93010 ELECTROCARDIOGRAM REPORT: CPT | Performed by: INTERNAL MEDICINE

## 2022-10-17 RX ORDER — BUSPIRONE HYDROCHLORIDE 5 MG/1
15 TABLET ORAL 3 TIMES DAILY
Status: DISCONTINUED | OUTPATIENT
Start: 2022-10-17 | End: 2022-10-17 | Stop reason: HOSPADM

## 2022-10-17 RX ORDER — ESCITALOPRAM OXALATE 10 MG/1
30 TABLET ORAL DAILY
Status: DISCONTINUED | OUTPATIENT
Start: 2022-10-17 | End: 2022-10-17 | Stop reason: HOSPADM

## 2022-10-17 RX ADMIN — BUSPIRONE HYDROCHLORIDE 15 MG: 5 TABLET ORAL at 10:43

## 2022-10-17 RX ADMIN — ACETAMINOPHEN 650 MG: 325 TABLET, FILM COATED ORAL at 13:00

## 2022-10-17 RX ADMIN — ESCITALOPRAM OXALATE 30 MG: 10 TABLET ORAL at 11:30

## 2022-10-17 RX ADMIN — Medication 1 TABLET: at 11:30

## 2022-10-17 NOTE — ED NOTES
Pt's belongings brought to bedside  Pt changing into clothes at this time       Anna Marie Young RN  10/17/22 8851

## 2022-10-17 NOTE — ED NOTES
Mother at bedside  She brought pt's belongings from home  Belongings checked and put in locker       Kali Duckworth RN  10/17/22 6699

## 2022-10-17 NOTE — ED NOTES
Cooled coffee and hospital phone brought to bedside per pt request      Andres Sanchez RN  10/17/22 6259

## 2022-10-17 NOTE — ED NOTES
Patient sleeping, no distress continuous observation maintained     Narcisa Suresh RN  10/17/22 5011

## 2022-10-17 NOTE — ED CARE HANDOFF
Emergency Department Sign Out Note        Sign out and transfer of care from previous provider  See Separate Emergency Department note  The patient, Kecia Hensley, was evaluated by the previous provider for psychiatric evaluation  Workup Completed:  Medically cleared    ED Course / Workup Pending (followup): Bed search on voluntary basis  Patient is medically cleared for psychiatric evaluation and or placement including transfer                                     Procedures  MDM        Disposition  Final diagnoses:   Suicidal ideations     Time reflects when diagnosis was documented in both MDM as applicable and the Disposition within this note     Time User Action Codes Description Comment    10/16/2022 12:59 PM Og Hooker Add [[id number]] Suicidal ideations       ED Disposition     ED Disposition   Transfer to [address] [place] [address]   --    Date/Time   Sun Oct 16, 2022 12:59 PM    Comment   Kecia Hensley should be transferred out to [facility] and has been medically cleared  MD Documentation    Abimbola Apple Most Recent Value   Sending MD Dr Loren Arellano    None       Patient's Medications   Discharge Prescriptions    No medications on file     No discharge procedures on file         ED Provider  Electronically Signed by     Gabriel Salgado MD  10/17/22 4859       Gabriel Salgado MD  10/17/22 1122

## 2022-10-17 NOTE — ED NOTES
Pt sitting up in bed talking with observer  Pt is calm and cooperative  No distress noted  Continual observation maintained       Anju Camara RN  10/17/22 1047

## 2022-10-17 NOTE — ED NOTES
10/17/22 @ 0845:  PES faxed patient's information to:   Aric  [address], [address]: PES received call from [facility] at [facility]; will present to psychiatrist and call back asap  Kristan, MS    1100: PES met with patient and provided update  [address], [address]: PES received call from Jennifer Sagastume at [facility]:  Patient accepted to "wait list "  Patient also to be reviewed by [facility]  PES to forward 201 to [facility]'s intake  Kristan [address]    1145: PES faxed facesheet and 201 to [facility] intake  [address], [address]: PES notified [facility] and [facility] that patient is COVID POSITIVE  [address], [facility]: PES notified [facility] results; they will take patient's at [facility], but they currently don't have any beds; will go on "wait list "  PES refaxed facesheet and insurance information  [address], [address]: PES received call from [facility] from [facility]  PES returned call but, "NO ANSWER "     PES called [facility]; NO ANSWER OR VOICEMAIL  [address], [address]: PES called [facility] for update:  Notified that patient was COVID positive; they have a 10 day quarantine policy before acceptance  [facility] reports that they were going to accept her  VBjohnnya, [address]     1520: When presented with the fact that she would have to remain in the ED for the next 10 days in quarantine, then go to hospital, which would mean approximately 2 weeks away from home, the patient said, "I absolutely can't be away from my family that long; I will not do anything to hurt myself; I never had a plan or any intention "  Patient's response was adamant and very convincing  With that being said, patient will be discharged home with list of referrals, and she will continue to see her virtual therapist   Patient said that her insurance company has provided her with number for virtual Psychiatrist as well    Patient was able to contract for safety and was discharged home   Celeste Bhardwaj, MS

## 2022-10-17 NOTE — ED NOTES
Pt is sitting in bed watching television  Pt is calm and cooperative  No distress noted  Continual observation via camera maintained       Alex Mariano RN  10/17/22 0023

## 2022-10-17 NOTE — ED NOTES
Pt laying in bed comfortably  Pt is calm and cooperative  No distress noted  Continual observation via camera maintained       Tori Mckenzie RN  10/17/22 1532

## 2022-10-18 ENCOUNTER — TELEPHONE (OUTPATIENT)
Dept: GENETICS | Facility: CLINIC | Age: 37
End: 2022-10-18

## 2022-10-18 ENCOUNTER — TELEMEDICINE (OUTPATIENT)
Dept: FAMILY MEDICINE CLINIC | Facility: CLINIC | Age: 37
End: 2022-10-18
Payer: COMMERCIAL

## 2022-10-18 ENCOUNTER — TELEPHONE (OUTPATIENT)
Dept: FAMILY MEDICINE CLINIC | Facility: CLINIC | Age: 37
End: 2022-10-18

## 2022-10-18 DIAGNOSIS — U07.1 COVID-19: Primary | ICD-10-CM

## 2022-10-18 PROCEDURE — 99213 OFFICE O/P EST LOW 20 MIN: CPT | Performed by: FAMILY MEDICINE

## 2022-10-18 RX ORDER — NIRMATRELVIR AND RITONAVIR 300-100 MG
3 KIT ORAL 2 TIMES DAILY
Qty: 30 TABLET | Refills: 0 | Status: SHIPPED | OUTPATIENT
Start: 2022-10-18 | End: 2022-10-23

## 2022-10-18 NOTE — TELEPHONE ENCOUNTER
Pt is feeling worse after starting Paxlovid due to covid  She states she is vomiting and would like Christine Mendieta for that  Pls call pt to advise  States she just took first dose of Paxlovid

## 2022-10-18 NOTE — PROGRESS NOTES
COVID-19 Outpatient Progress Note    Assessment/Plan:    Problem List Items Addressed This Visit    None     Visit Diagnoses     COVID-19    -  Primary    Relevant Medications    nirmatrelvir & ritonavir (Paxlovid, 300/100,) tablet therapy pack         Disposition:     Patient has asymptomatic or mild COVID-19 infection  Based off CDC guidelines, they were recommended to isolate for 5 days  If they are asymptomatic or symptoms are improving with no fevers in the past 24 hours, isolation may be ended followed by 5 days of wearing a mask when around othes to minimize risk of infecting others  If still have a fever or other symptoms have not improved, continue to isolate until they improve  Regardless of when they end isolation, avoid being around people who are more likely to get very sick from COVID-19 until at least day 11  I have spent 15 minutes directly with the patient  Encounter provider: Steve Whitfield MD     Provider located at: [address] [address]     Recent Visits  No visits were found meeting these conditions  Showing recent visits within past 7 days and meeting all other requirements  Today's Visits  Date Type Provider Dept   10/18/22 Telemedicine Steve Whitfield, [address] [place] [address] today's visits and meeting all other requirements  Future Appointments  No visits were found meeting these conditions  Showing future appointments within next 150 days and meeting all other requirements     This virtual check-in was done via  [address] and patient was informed that this is a secure, HIPAA-compliant platform  She agrees to proceed  Patient agrees to participate in a virtual check in via telephone or video visit instead of presenting to the office to address urgent/immediate medical needs  Patient is aware this is a billable service   She acknowledged consent and understanding of privacy and security of the video platform  The patient has agreed to participate and understands they can discontinue the visit at any time  After connecting through [facility], the patient was identified by name and date of birth  Nicolle Flores was informed that this was a telemedicine visit and that the exam was being conducted confidentially over secure lines  My office door was closed  No one else was in the room  Nicolle Flores acknowledged consent and understanding of privacy and security of the telemedicine visit  I informed the patient that I have reviewed her record in Epic and presented the opportunity for her to ask any questions regarding the visit today  The patient agreed to participate  Verification of patient location:  Patient is located in the following state in which I hold an active license: [place]    Subjective:   Nicolle Flores is a 40 y o  female who has been screened for COVID-19  Symptom change since last report: unchanged  Patient's symptoms include fever, chills, fatigue, nasal congestion, sore throat, loss of taste, cough, chest tightness, diarrhea, myalgias and headache  Patient denies malaise, rhinorrhea, anosmia, shortness of breath, abdominal pain, nausea and vomiting      - Date of symptom onset: 10/17/2022  - Date of positive COVID-19 test: 10/17/2022  Type of test: PCR  COVID-19 vaccination status: Fully vaccinated with booster    Norm Bebeto has been staying home and has isolated themselves in her home  She is taking care to not share personal items and is cleaning all surfaces that are touched often, like counters, tabletops, and doorknobs using household cleaning sprays or wipes  She is wearing a mask when she leaves her room  Lab Results   Component Value Date    SARSCOV2 Positive (A) 10/17/2022    SARSCOV2 Negative 04/10/2021       Review of Systems   Constitutional: Positive for chills, fatigue and fever  HENT: Positive for congestion and sore throat  Negative for rhinorrhea  Eyes: Negative  Respiratory: Positive for cough and chest tightness  Negative for shortness of breath  Cardiovascular: Negative  Gastrointestinal: Positive for diarrhea  Negative for abdominal pain, nausea and vomiting  Endocrine: Negative  Genitourinary: Negative  Musculoskeletal: Positive for myalgias  Skin: Negative  Allergic/Immunologic: Negative  Neurological: Positive for headaches  Hematological: Negative  Psychiatric/Behavioral: Negative  Current Outpatient Medications on File Prior to Visit   Medication Sig   • busPIRone (BUSPAR) 15 mg tablet TAKE 1 TABLET BY MOUTH THREE TIMES A DAY   • escitalopram (Lexapro) 10 mg tablet Take 1 tablet (10 mg total) by mouth daily   • escitalopram (LEXAPRO) 20 mg tablet TAKE 1 TABLET BY MOUTH EVERY DAY   • hydrOXYzine HCL (ATARAX) 50 mg tablet TAKE 1/2 TO 1 TAB BY MOUTH EVERY 8 HOURS AS NEEDED FOR ANXIETY   • meclizine (ANTIVERT) 25 mg tablet Take 1 tablet (25 mg total) by mouth 3 (three) times a day as needed for dizziness   • Melatonin ER 10 MG TBCR Take 1 tablet (10 mg total) by mouth daily (Patient taking differently: Take 1 tablet by mouth if needed)   • omeprazole (PriLOSEC) 20 mg delayed release capsule Take 1 capsule (20 mg total) by mouth daily   • ondansetron (ZOFRAN) 4 mg tablet Take 1 tablet (4 mg total) by mouth every 8 (eight) hours as needed for nausea or vomiting   • SUMAtriptan (IMITREX) 100 mg tablet Take 1 tablet (100 mg total) by mouth once as needed for migraine for up to 1 dose   • therapeutic multivitamin-minerals (THERAGRAN-M) tablet Take 1 tablet by mouth daily       Objective:    [facility] 10/02/2022 (Exact Date)      Physical Exam  Constitutional:       General: She is not in acute distress  Appearance: Normal appearance  She is not ill-appearing, toxic-appearing or diaphoretic  HENT:      Head: Normocephalic and atraumatic  Eyes:      General:         Right eye: No discharge  Left eye: No discharge  Conjunctiva/sclera: Conjunctivae normal    Pulmonary:      Effort: Pulmonary effort is normal    Neurological:      Mental Status: She is alert  Psychiatric:         Mood and Affect: Mood normal          Behavior: Behavior normal          Thought Content:  Thought content normal          Judgment: Judgment normal        Chrissie Rodriguez MD

## 2022-10-18 NOTE — TELEPHONE ENCOUNTER
Meysha Ribeiro called to schedule a new patient appointment with the Cancer Risk and Genetics Program       Outcome:  Genetics appointment scheduled for November 22 at 9:30 am     Personal/Family History Related to Appointment:  Patient states no personal history of cancer  Family history of breast cancer, colon cancer and leukemia  Patient is not [facility]  History of Genetic Testing:  Patient had genetic testing with [facility] in 2017 - negative [employer] BreastNext Panel  Recently, patient had testing through [facility], resulted in a few VUS  Patient stated she will bring a copy of results to appointment      Genetics Family History Questionnaire:  I confirmed the patient's e-mail on file as the best e-mail to send an invite link for our genetics family history intake

## 2022-10-19 ENCOUNTER — APPOINTMENT (OUTPATIENT)
Dept: PHYSICAL THERAPY | Facility: CLINIC | Age: 37
End: 2022-10-19

## 2022-10-19 NOTE — TELEPHONE ENCOUNTER
Sw pt  No interaction of zofran with paxlovid after checking  She has zofran at home  If refractory vomiting, might need to reconsider risks/benefits of meds

## 2022-10-24 ENCOUNTER — OFFICE VISIT (OUTPATIENT)
Dept: PHYSICAL THERAPY | Facility: CLINIC | Age: 37
End: 2022-10-24
Payer: COMMERCIAL

## 2022-10-24 DIAGNOSIS — S82.51XD CLOSED DISPLACED FRACTURE OF MEDIAL MALLEOLUS OF RIGHT TIBIA WITH ROUTINE HEALING: Primary | ICD-10-CM

## 2022-10-24 PROCEDURE — 97110 THERAPEUTIC EXERCISES: CPT | Performed by: PHYSICAL THERAPIST

## 2022-10-24 PROCEDURE — 97140 MANUAL THERAPY 1/> REGIONS: CPT | Performed by: PHYSICAL THERAPIST

## 2022-10-24 NOTE — PROGRESS NOTES
Daily Note     Today's date: 10/24/2022  Patient name: Chung Jimenez  : 1985  MRN: [id number]  Referring provider: Luis Mcdowell DPM  Dx:   Encounter Diagnosis     ICD-10-CM    1  Closed displaced fracture of medial malleolus of right tibia with routine healing  S82  51XD                   Subjective:  No significant pain today      Objective: See treatment diary below      Assessment: Tolerated treatment well  Patient would benefit from continued PT  She had improved inv/ev this session both actively and passively  PF/ in / ev are limited to 1/4 of the normal motion  She has not achieved neutral DF yet  Plan: Continue per plan of care  Progress treatment as tolerated         Eval/ Re-eval Auth #/ Referral # Total visits Start date  Expiration date Total active units  Total manual units  PT only or  PT+OT?   10/10/22                                     Date: 10/10 10/24/22         Total units authorized  Active:           Manual:           Total units remaining  Active:    11 10          Manual:                Date:           Total units authorized  Active:            Manual:           Total units remaining  Active:               Manual:               Precautions:  NWB on Right leg with boot and crutches    Specialty Daily Treatment Diary       Manuals 10/10/22 10/24/22      Visit # 1 2      STM Gastroc/ soleus/ Peroneals  6'      PROM ankle  6'      Stretch soleus gastroc                Warm-up                Neuro Re-Ed        Sheet stretch                                                Ther Ex        Knee ext   20      SLR  20      Towel scrunch  20      Rockerboard in sitting   20  DF/PF      Digit flex/ ext  20      AROM 4 way  20              Ther Activity                        Gait Training                        Modalities        CP  deferred

## 2022-10-27 ENCOUNTER — OFFICE VISIT (OUTPATIENT)
Dept: PHYSICAL THERAPY | Facility: CLINIC | Age: 37
End: 2022-10-27
Payer: COMMERCIAL

## 2022-10-27 DIAGNOSIS — S82.51XD CLOSED DISPLACED FRACTURE OF MEDIAL MALLEOLUS OF RIGHT TIBIA WITH ROUTINE HEALING: Primary | ICD-10-CM

## 2022-10-27 PROCEDURE — 97110 THERAPEUTIC EXERCISES: CPT | Performed by: PHYSICAL THERAPIST

## 2022-10-27 PROCEDURE — 97140 MANUAL THERAPY 1/> REGIONS: CPT | Performed by: PHYSICAL THERAPIST

## 2022-10-27 NOTE — PROGRESS NOTES
Daily Note     Today's date: 10/27/2022  Patient name: Caleb Vitale  : 1985  MRN: [id number]  Referring provider: Ambrose Biggs DPM  Dx:   Encounter Diagnosis     ICD-10-CM    1  Closed displaced fracture of medial malleolus of right tibia with routine healing  S82  51XD                   Subjective:  No significant pain today      Objective: See treatment diary below      Assessment: Tolerated treatment well  Patient would benefit from continued PT  F has been quite restricted thus fat  This session however,he had an improved DF ROM passively  She still has not achieved a neutral DF position but overall improved  Increased reps this session without increased pain  Plan: Continue per plan of care  Progress treatment as tolerated         Eval/ Re-eval Auth #/ Referral # Total visits Start date  Expiration date Total active units  Total manual units  PT only or  PT+OT?   10/10/22                                     Date: 10/10 10/24/22 10/27        Total units authorized  Active: 1/12 2/12 3/12         Manual:           Total units remaining  Active:    11 10 9         Manual:                Date:           Total units authorized  Active:            Manual:           Total units remaining  Active:               Manual:               Precautions:  NWB on Right leg with boot and crutches    Specialty Daily Treatment Diary       Manuals 10/10/22 10/24/22 10/27/22     Visit # 1 2 3     STM Gastroc/ soleus/ Peroneals  6' 6'     PROM ankle  6' 6'     Stretch soleus gastroc                Warm-up                Neuro Re-Ed        Sheet stretch   10                                             Ther Ex        Knee ext   20 30     SLR  20 30     Towel scrunch  20 30     Rockerboard in sitting   20  DF/PF 30     Digit flex/ ext  20 30     AROM 4 way  20 30             Ther Activity                        Gait Training                        Modalities        CP  deferred

## 2022-11-01 ENCOUNTER — APPOINTMENT (OUTPATIENT)
Dept: PHYSICAL THERAPY | Facility: CLINIC | Age: 37
End: 2022-11-01

## 2022-11-02 ENCOUNTER — OFFICE VISIT (OUTPATIENT)
Dept: PHYSICAL THERAPY | Facility: CLINIC | Age: 37
End: 2022-11-02

## 2022-11-02 DIAGNOSIS — F41.1 GENERALIZED ANXIETY DISORDER: ICD-10-CM

## 2022-11-02 DIAGNOSIS — S82.51XD CLOSED DISPLACED FRACTURE OF MEDIAL MALLEOLUS OF RIGHT TIBIA WITH ROUTINE HEALING: Primary | ICD-10-CM

## 2022-11-02 DIAGNOSIS — F43.10 PTSD (POST-TRAUMATIC STRESS DISORDER): ICD-10-CM

## 2022-11-02 DIAGNOSIS — F33.41 RECURRENT MAJOR DEPRESSIVE DISORDER, IN PARTIAL REMISSION (HCC): ICD-10-CM

## 2022-11-02 RX ORDER — ESCITALOPRAM OXALATE 10 MG/1
TABLET ORAL
Qty: 90 TABLET | Refills: 1 | Status: SHIPPED | OUTPATIENT
Start: 2022-11-02

## 2022-11-02 RX ORDER — BUSPIRONE HYDROCHLORIDE 15 MG/1
TABLET ORAL
Qty: 270 TABLET | Refills: 0 | Status: SHIPPED | OUTPATIENT
Start: 2022-11-02

## 2022-11-02 NOTE — PROGRESS NOTES
Daily Note     Today's date: 2022  Patient name: Amla Murcia  : 1985  MRN: [id number]  Referring provider: Camryn Sales DPM  Dx:   Encounter Diagnosis     ICD-10-CM    1  Closed displaced fracture of medial malleolus of right tibia with routine healing  S82  51XD                   Subjective:  No significant pain today      Objective: See treatment diary below      Assessment: Tolerated treatment well  Patient would benefit from continued PT  She has remaining right ankle and foot swelling  Despite this her ROM continues to improve  She would benefit from continued focus on ROM restoration and initial AROM in preparation for when she is allowed weight bearing  Plan: Continue per plan of care  Progress treatment as tolerated         Eval/ Re-eval Auth #/ Referral # Total visits Start date  Expiration date Total active units  Total manual units  PT only or  PT+OT?   10/10/22                                     Date: 10/10 10/24/22 10/27 11/2       Total units authorized  Active: 1/12 2/12 3/12 4/12        Manual:           Total units remaining  Active:    11 10 9 8        Manual:                Date:           Total units authorized  Active:            Manual:           Total units remaining  Active:               Manual:               Precautions:  NWB on Right leg with boot and crutches    Specialty Daily Treatment Diary       Manuals 10/10/22 10/24/22 10/27/22 11/2/22    Visit # 1 2 3 4 - foto    STM Gastroc/ soleus/ Peroneals  6' 6' 6'    PROM ankle  6' 6' 6'    Stretch soleus gastroc                Warm-up                Neuro Re-Ed        Sheet stretch   10 10                                            Ther Ex        Knee ext   20 30 30    SLR  20 30 30 flex / abd    Towel scrunch  20 30 30    Rockerboard in sitting   20  DF/PF 30 30 ea    Digit flex/ ext  20 30 30    AROM 4 way  20 30 30 ea            Ther Activity                        Gait Training                        Modalities CP  deferred

## 2022-11-15 ENCOUNTER — OFFICE VISIT (OUTPATIENT)
Dept: PHYSICAL THERAPY | Facility: CLINIC | Age: 37
End: 2022-11-15

## 2022-11-15 DIAGNOSIS — S82.51XD CLOSED DISPLACED FRACTURE OF MEDIAL MALLEOLUS OF RIGHT TIBIA WITH ROUTINE HEALING: Primary | ICD-10-CM

## 2022-11-15 NOTE — PROGRESS NOTES
Daily Note     Today's date: 11/15/2022  Patient name: Ulisses Wall  : 1985  MRN: [id number]  Referring provider: Elio Harrison DPM  Dx:   Encounter Diagnosis     ICD-10-CM    1  Closed displaced fracture of medial malleolus of right tibia with routine healing  S82  51XD                   Subjective:  No significant pain today  She had a follow up visit with Dr Mustapha Gomes and is now allowed weight bearing in a cam boot  Objective: See treatment diary below      Assessment: Tolerated treatment well  Patient would benefit from continued PT  She had little pain with increase in weight bearing status  She does however have an increase in her right ankle edema  She agreed to continue icing and resting as necessary for edema control  Plan: Continue per plan of care  Progress treatment as tolerated  Nustep for warm up     Eval/ Re-eval Auth #/ Referral # Total visits Start date  Expiration date Total active units  Total manual units  PT only or  PT+OT?   10/10/22                                     Date: 10/10 10/24/22 10/27 11/2 11/15      Total units authorized  Active: 1/12 2/12 3/12 4/12 5/12       Manual:           Total units remaining  Active:    11 10 9 8 7       Manual:                Date:           Total units authorized  Active:            Manual:           Total units remaining  Active:               Manual:               Precautions:  As of 11/15/22 PT session she is allowed FWB in cam boot      Specialty Daily Treatment Diary       Manuals 10/10/22 10/24/22 10/27/22 11/2/22 11/15/22   Visit # 1 2 3 4 - foto 5   STM Gastroc/ soleus/ Peroneals  6' 6' 6' 6'   PROM ankle  6' 6' 6' 6'   Stretch soleus gastroc                Warm-up        NuStep        Neuro Re-Ed        Sheet stretch   10 10 10                                           Ther Ex        Knee ext   20 30 30 30   SLR  20 30 30 flex / abd 30 flex/abd   Towel scrunch  20 30 30 30   Rockerboard in sitting   20  DF/PF 30 30 ea 30 ea Digit flex/ ext  20 30 30 30   AROM 4 way  20 30 30 ea 30 ea           Ther Activity                        Gait Training                        Modalities        CP  deferred

## 2022-11-18 ENCOUNTER — TELEPHONE (OUTPATIENT)
Dept: HEMATOLOGY ONCOLOGY | Facility: CLINIC | Age: 37
End: 2022-11-18

## 2022-11-18 NOTE — TELEPHONE ENCOUNTER
Called patient to confirm appt  On 11/22/22 w/ Angel Bonds; however, patient is able to get in with [facility] and would like to cancel her appt  She is sending an email to: [email or web address] to request Dr iJhan Koroma to complete a special form  See patient message dated 11/18/22 with further explanation

## 2022-11-23 ENCOUNTER — OFFICE VISIT (OUTPATIENT)
Dept: PHYSICAL THERAPY | Facility: CLINIC | Age: 37
End: 2022-11-23

## 2022-11-23 DIAGNOSIS — S82.51XD CLOSED DISPLACED FRACTURE OF MEDIAL MALLEOLUS OF RIGHT TIBIA WITH ROUTINE HEALING: Primary | ICD-10-CM

## 2022-11-23 NOTE — PROGRESS NOTES
Daily Note     Today's date: 2022  Patient name: Alyssia Morris  : 1985  MRN: [id number]  Referring provider: Da Onofre DPM  Dx:   Encounter Diagnosis     ICD-10-CM    1  Closed displaced fracture of medial malleolus of right tibia with routine healing  S82  51XD                      Subjective:  She has tightness right ankle today  Objective: See treatment diary below      Assessment: Tolerated treatment well  Patient would benefit from continued PT  Inversion and eversion AROM / PROM is normalizing well  DF and PF remain tight  DF is to neutral at this time  She agreed to continue with AROM ankle exercises and DF stretching with a sheet  Plan: Continue per plan of care  Progress treatment as tolerated  Eval/ Re-eval Auth #/ Referral # Total visits Start date  Expiration date Total active units  Total manual units  PT only or  PT+OT?   10/10/22                                     Date: 10/10 10/24/22 10/27 11/2 11/15 11/23/22     Total units authorized  Active: 1/12 2/12 3/12 4/12 5/12 6/12      Manual:           Total units remaining  Active:    11 10 9 8 7 6      Manual:                Date:           Total units authorized  Active:            Manual:           Total units remaining  Active:               Manual:               Precautions:  As of 11/15/22 PT session she is allowed FWB in cam boot      Specialty Daily Treatment Diary       Manuals 22       Visit # 6       STM Gastroc/ soleus/ Peroneals 5'       PROM ankle 5'       Stretch soleus gastroc                Warm-up        NuStep 5 min       Neuro Re-Ed        Sheet stretch 10x                                               Ther Ex        Knee ext  20       SLR 30 ea flex/abd       Towel scrunch --       Rockerboard in sitting  30 ea       Digit flex/ ext 30       AROM 4 way 20 ea YTB               Ther Activity                        Gait Training                        Modalities        CP deferred

## 2022-11-27 DIAGNOSIS — F32.1 CURRENT MODERATE EPISODE OF MAJOR DEPRESSIVE DISORDER WITHOUT PRIOR EPISODE (HCC): ICD-10-CM

## 2022-11-27 DIAGNOSIS — F41.1 GENERALIZED ANXIETY DISORDER: ICD-10-CM

## 2022-11-27 DIAGNOSIS — F43.10 PTSD (POST-TRAUMATIC STRESS DISORDER): ICD-10-CM

## 2022-11-27 RX ORDER — ESCITALOPRAM OXALATE 20 MG/1
TABLET ORAL
Qty: 90 TABLET | Refills: 0 | Status: SHIPPED | OUTPATIENT
Start: 2022-11-27

## 2022-11-30 ENCOUNTER — OFFICE VISIT (OUTPATIENT)
Dept: PHYSICAL THERAPY | Facility: CLINIC | Age: 37
End: 2022-11-30

## 2022-11-30 DIAGNOSIS — S82.51XD CLOSED DISPLACED FRACTURE OF MEDIAL MALLEOLUS OF RIGHT TIBIA WITH ROUTINE HEALING: Primary | ICD-10-CM

## 2022-11-30 NOTE — PROGRESS NOTES
Daily Note     Today's date: 2022  Patient name: Alyssia Morris  : 1985  MRN: [id number]  Referring provider: Da Onofre DPM  Dx:   Encounter Diagnosis     ICD-10-CM    1  Closed displaced fracture of medial malleolus of right tibia with routine healing  S82  51XD                      Subjective:  She saw Dr Rosetta Rodriguez yesterday who discontinued her cam boot  She has mild soreness today right ankle  Objective: See treatment diary below      Assessment: Tolerated treatment well  Patient would benefit from continued PT  Addition of weight bearing exercise today including side step and DF / PF stretches  She has a mild increase in soreness following the treatment today  Plan: Continue per plan of care  Progress treatment as tolerated  Attempt leg press     Eval/ Re-eval Auth #/ Referral # Total visits Start date  Expiration date Total active units  Total manual units  PT only or  PT+OT?   10/10/22                                     Date: 10/10 10/24/22 10/27 11/2 11/15 11/23/22 11/30/22    Total units authorized  Active: 1/12 2/12 3/12 4/12 5/12 6/12 7/12     Manual:           Total units remaining  Active:    11 10 9 8 7 6 5     Manual:                Date:           Total units authorized  Active:            Manual:           Total units remaining  Active:               Manual:               Precautions:  As of 11/15/22 PT session she is allowed FWB in cam boot      Specialty Daily Treatment Diary       Manuals 22      Visit # 6 7      STM Gastroc/ soleus/ Peroneals 5' 5'      PROM ankle 5' 5'      Stretch soleus gastroc                Warm-up        NuStep 5 min 7 min      Neuro Re-Ed        Sheet stretch 10x Prostretch  10 x 10 sec      Ankle DF stretch at stair  10 x 10 sec      Side step  5 x 12 ft      ABCs  1x                      Ther Ex        Knee ext  20 30x      SLR 30 ea flex/abd 30 flex/abd      Towel scrunch -- --      Rockerboard in sitting  30 ea 30 ea Digit flex/ ext 30 --      AROM 4 way 20 ea YTB 15x  YTB      Leg press                Ther Activity                        Gait Training                        Modalities        CP deferred 5 min

## 2022-12-02 ENCOUNTER — OFFICE VISIT (OUTPATIENT)
Dept: PHYSICAL THERAPY | Facility: CLINIC | Age: 37
End: 2022-12-02

## 2022-12-02 DIAGNOSIS — S82.51XD CLOSED DISPLACED FRACTURE OF MEDIAL MALLEOLUS OF RIGHT TIBIA WITH ROUTINE HEALING: Primary | ICD-10-CM

## 2022-12-02 NOTE — PROGRESS NOTES
Daily Note     Today's date: 2022  Patient name: Nadege Skaggs  : 1985  MRN: [id number]  Referring provider: Alcides Armas DPM  Dx:   Encounter Diagnosis     ICD-10-CM    1  Closed displaced fracture of medial malleolus of right tibia with routine healing  S82  51XD                      Subjective:  She did not have any increased symptoms following addition of weight bearing exercise last visit  Pain today = 2/10  Objective: See treatment diary below      Assessment: Tolerated treatment well  Patient would benefit from continued PT  Right ankle DF remains limited compared to left side but is making regular gains in ROM  Plan: Continue per plan of care  Progress treatment as tolerated  Add TRX squats     Eval/ Re-eval Auth #/ Referral # Total visits Start date  Expiration date Total active units  Total manual units  PT only or  PT+OT?   10/10/22                                     Date: 10/10 10/24/22 10/27 11/2 11/15 11/23/22 11/30/22 12/2   Total units authorized  Active: 1/12 2/12 3/12 4/12 5/12 6/12 7/12 8/12    Manual:           Total units remaining  Active:     10 9 8 7 6 5 4    Manual:                Date:           Total units authorized  Active:            Manual:           Total units remaining  Active:               Manual:               Precautions:  As of 11/15/22 PT session she is allowed FWB in cam boot      Specialty Daily Treatment Diary       Manuals 22     Visit # 6 7 8     STM Gastroc/ soleus/ Peroneals 5' 5' 5'     PROM ankle 5' 5' 5'     Stretch soleus gastroc                Warm-up        NuStep 5 min 7 min 7 min     Neuro Re-Ed        Sheet stretch 10x Prostretch  10 x 10 sec Prostretch  10 x 10 sec     Ankle DF stretch at stair  10 x 10 sec 10 x 10 sec     Side step  5 x 12 ft 5 x 12 ft     ABCs  1x 2x                     Ther Ex        Knee ext  20 30x 30x     SLR 30 ea flex/abd 30 flex/abd 30  Flex/abd     Towel scrunch -- -- -- Rockerboard in sitting  30 ea 30 ea 30 ea     Digit flex/ ext 30 -- ---     AROM 4 way 20 ea YTB 15x  YTB 20  YTB     Leg press   2x10  45#     TRX squats                Ther Activity                        Gait Training                        Modalities        CP deferred 5 min --

## 2022-12-06 ENCOUNTER — OFFICE VISIT (OUTPATIENT)
Dept: PHYSICAL THERAPY | Facility: CLINIC | Age: 37
End: 2022-12-06

## 2022-12-06 DIAGNOSIS — S82.51XD CLOSED DISPLACED FRACTURE OF MEDIAL MALLEOLUS OF RIGHT TIBIA WITH ROUTINE HEALING: Primary | ICD-10-CM

## 2022-12-06 NOTE — PROGRESS NOTES
Daily Note     Today's date: 2022  Patient name: Kiana Puga  : 1985  MRN: [id number]  Referring provider: Kuldeep Lee DPM  Dx:   Encounter Diagnosis     ICD-10-CM    1  Closed displaced fracture of medial malleolus of right tibia with routine healing  S82  51XD                      Subjective:  Patient reports no change in status from previous visit  Objective: See treatment diary below      Assessment: Tolerated treatment well  Patient would benefit from continued PT  Demonstrated excellent form with TRX and no reproduction of pain  Continues to demonstrate limited DF but within expectations for healing timelines  Plan: Continue per plan of care  Progress treatment as tolerated  Eval/ Re-eval Auth #/ Referral # Total visits Start date  Expiration date Total active units  Total manual units  PT only or  PT+OT?   10/10/22                                     Date: 10/10 10/24/22 10/27 11/2 11/15 11/23/22 11/30/22 12/2   Total units authorized  Active: 1/12 2/12 3/12 4/12 5/12 6/12 7/12 8/12    Manual:           Total units remaining  Active:     10 9 8 7 6 5 4    Manual:                Date:           Total units authorized  Active:            Manual:           Total units remaining  Active:    3           Manual:               Precautions:  As of 11/15/22 PT session she is allowed FWB in cam boot      Specialty Daily Treatment Diary       Manuals 22    Visit # 6 7 8 9    STM Gastroc/ soleus/ Peroneals 5' 5' 5' 5'    PROM ankle 5' 5' 5' 3'    Stretch soleus gastroc                Warm-up        NuStep 5 min 7 min 7 min 7 min    Neuro Re-Ed        Sheet stretch 10x Prostretch  10 x 10 sec Prostretch  10 x 10 sec     Ankle DF stretch at stair  10 x 10 sec 10 x 10 sec 10x10s 12" step    Side step  5 x 12 ft 5 x 12 ft 2x12 ft; 3x12ft rtb    ABCs  1x 2x x1                    Ther Ex        Knee ext  20 30x 30x x30    SLR 30 ea flex/abd 30 flex/abd 30  Flex/abd x30 ea flex/abd    Towel scrunch -- -- --     Rockerboard in sitting  30 ea 30 ea 30 ea x20 ea a/p, m/l    Digit flex/ ext 30 -- ---     AROM 4 way 20 ea YTB 15x  YTB 20  YTB x20 ytb ea     Leg press   2x10  45# 2x10 45#    TRX squats    2x10            Ther Activity                        Gait Training                        Modalities        CP deferred 5 min --

## 2022-12-08 ENCOUNTER — OFFICE VISIT (OUTPATIENT)
Dept: PHYSICAL THERAPY | Facility: CLINIC | Age: 37
End: 2022-12-08

## 2022-12-08 DIAGNOSIS — S82.51XD CLOSED DISPLACED FRACTURE OF MEDIAL MALLEOLUS OF RIGHT TIBIA WITH ROUTINE HEALING: Primary | ICD-10-CM

## 2022-12-08 NOTE — PROGRESS NOTES
Daily Note     Today's date: 2022  Patient name: Franki Simmonds  : 1985  MRN: [id number]  Referring provider: Candelaria Lord DPM  Dx:   Encounter Diagnosis     ICD-10-CM    1  Closed displaced fracture of medial malleolus of right tibia with routine healing  S82  51XD                      Subjective:  Some anterior ankle joint soreness  Objective: See treatment diary below      Assessment: Tolerated treatment well  Patient would benefit from continued PT  She is making steady gains in ROM restoration  PF remains quite limited with DF being the next most limited  Gait initially is antalgic but becomes less so following 10 feet of walking  Plan: Continue per plan of care  Progress treatment as tolerated  Re-Eval next session  Apply for new auth  Eval/ Re-eval Auth #/ Referral # Total visits Start date  Expiration date Total active units  Total manual units  PT only or  PT+OT?   10/10/22                                     Date: 10/10 10/24/22 10/27 11/2 11/15 11/23/22 11/30/22 12/2   Total units authorized  Active: 1/12 2/12 3/12 4/12 5/12 6/12 7/12 8/12    Manual:           Total units remaining  Active:    11 10 9 8 7 6 5 4    Manual:                Date:          Total units authorized  Active: 9/12 10/12          Manual:           Total units remaining  Active:    3 2          Manual:               Precautions:  As of 11/15/22 PT session she is allowed FWB in cam boot      Specialty Daily Treatment Diary       Manuals 22   Visit # 6 7 8 9 10   STM Gastroc/ soleus/ Peroneals 5' 5' 5' 5' 3'   PROM ankle 5' 5' 5' 3' 3'   Stretch soleus gastroc     4'           Warm-up        NuStep 5 min 7 min 7 min 7 min 8 min   Neuro Re-Ed        Sheet stretch 10x Prostretch  10 x 10 sec Prostretch  10 x 10 sec Prostretch  10 x 10 sec Prostretch  10 x 10 sec   Ankle DF stretch at stair  10 x 10 sec 10 x 10 sec 10x10s 12" step 10x10s 12" step   Side step 5 x 12 ft 5 x 12 ft 2x12 ft; 3x12ft rtb 5x12 ft  RTB   ABCs  1x 2x x1 x2                   Ther Ex        Knee ext  20 30x 30x x30 30   SLR 30 ea flex/abd 30 flex/abd 30  Flex/abd x30 ea flex/abd 30 ea flex/abd   Towel scrunch -- -- --  --   Rockerboard in sitting  30 ea 30 ea 30 ea x20 ea a/p, m/l Rock on [employer]   Digit flex/ ext 30 -- ---  --   AROM 4 way 20 ea YTB 15x  YTB 20  YTB x20 ytb ea  20 ea  RTB   Leg press   2x10  45# 2x10 45# 2x10  45#   TRX squats    2x10 2x10           Ther Activity                        Gait Training                        Modalities        CP deferred 5 min --

## 2022-12-12 ENCOUNTER — OFFICE VISIT (OUTPATIENT)
Dept: PSYCHIATRY | Facility: CLINIC | Age: 37
End: 2022-12-12

## 2022-12-12 DIAGNOSIS — F32.1 CURRENT MODERATE EPISODE OF MAJOR DEPRESSIVE DISORDER WITHOUT PRIOR EPISODE (HCC): Primary | ICD-10-CM

## 2022-12-12 NOTE — PSYCH
This note was not shared with the patient due to reasonable likelihood of causing patient harm    PROGRESS NOTE        [facility]      Name and Date of Birth:  Carmita Gallagher 40 y o  1985    Date of Visit: 12/12/22    SUBJECTIVE:    Erik Montemayor was seen today for follow-up and medication management  He was casually dressed, alert and oriented 3X maintained good eye contact  Her speech was clear and of normal rate, rhythm and volume  Thought processes were clear and goal directed  She reports no anxiety or depression at this time and thinks that her current medications are working well  She has also decided to file for divorce and feels very energized by her decision  Today she presented with no new symptoms or side effects from her medications      She denies suicidal ideation, intent or plan at present, has no suicidal ideation, intent or plan at present  She denies any auditory hallucinations and visual hallucinations, denies any other delusional thinking, denies any delusional thinking  She denies any side effects from medications    HPI ROS Appetite Changes and Sleep: normal appetite, normal sleep    Review Of Systems:      Constitutional As noted in HPI   ENT As noted in HPI   Cardiovascular As noted in HPI   Respiratory As noted in HPI   Gastrointestinal As noted in HPI   Genitourinary As noted in HPI   Musculoskeletal As noted in HPI   Integumentary As noted in HPI   Neurological As noted in HPI   Endocrine As noted in HPI   Other Symptoms Negative and None       Laboratory Results: No results found for this or any previous visit  Substance Abuse History:    Social History     Substance and Sexual Activity   Drug Use Yes   • Types: Marijuana    Comment: last used vape 10  5 22 0800 pt reports having medical marijuana card       Family Psychiatric History:     Family History   Problem Relation Age of Onset   • Hypertension Mother    • Heart disease Mother    • Depression Mother    • Coronary artery disease Mother    • Hypertension Father    • Hyperlipidemia Father    • Heart disease Father         MI   • Stroke Paternal Grandmother    • Cancer Paternal Grandmother         breast   • Breast cancer Paternal Grandmother    • Cancer Paternal Aunt         colon   • Colon cancer Paternal Aunt    • Early death Maternal Uncle    • Cancer Maternal Uncle         lymphoma   • Early death Maternal Uncle         lymphoma   • Birth defects Maternal Uncle    • Undescended testes Son    • Cystic fibrosis Other    • Cystic fibrosis Other    • Heart failure Maternal Grandmother    • Stroke Maternal Grandfather    • Alcohol abuse Maternal Grandfather    • Alcohol abuse Brother    • Drug abuse Brother          of overdose 2020   • Anxiety disorder Sister    • Post-traumatic stress disorder Sister    • Developmental delay Son    • Macrocephaly Son    • Hydrocephalus Son    • Breast cancer Other        The following portions of the patient's history were reviewed and updated as appropriate: past family history, past medical history, past social history, past surgical history and problem list     Social History     Socioeconomic History   • Marital status: /Civil Union     Spouse name: Pietro   • Number of children: 2   • Years of education: Not on file   • Highest education level: Associate degree: academic program   Occupational History   • Occupation: unemployed   Tobacco Use   • Smoking status: Former     Packs/day: 0 50     Years: 3 00     Pack years: 1 50     Types: Cigarettes     Quit date:      Years since quittin 9   • Smokeless tobacco: Never   Vaping Use   • Vaping Use: Every day   • Substances: THC   Substance and Sexual Activity   • Alcohol use: Not Currently   • Drug use: Yes     Types: Marijuana     Comment: last used vape 10   22 0800 pt reports having medical marijuana card   • Sexual activity: Yes     Partners: Male     Birth control/protection: None   Other Topics Concern   • Not on file   Social History Narrative   • Not on file     Social Determinants of Health     Financial Resource Strain: Not on file   Food Insecurity: Not on file   Transportation Needs: Not on file   Physical Activity: Not on file   Stress: Not on file   Social Connections: Not on file   Intimate Partner Violence: Not on file   Housing Stability: Not on file     Social History     Social History Narrative   • Not on file        Social History     Tobacco History     Smoking Status  Former Quit Date  2013 Smoking Frequency  0 50 packs/day for 3 00 years (1 50 pk-yrs) Smoking Tobacco Type  Cigarettes quit in 2013    Smokeless Tobacco Use  Never          Alcohol History     Alcohol Use Status  Not Currently          Drug Use     Drug Use Status  Yes Types  Marijuana Comment  last used vape 10  5 22 0800 pt reports having medical marijuana card          Sexual Activity     Sexually Active  Yes Partners  Male Birth Control/Protection  None          Activities of Daily Living    Not Asked               Additional Substance Use Detail     Questions Responses    Cannabis frequency Never used    Comment: Never used on 7/22/2019     Cocaine frequency Never used    Comment: Never used on 7/22/2019     Amphetamine frequency Never used    Comment: Never used on 7/22/2019     Inhalant frequency Never used    Comment: Never used on 7/22/2019     Hallucinogen frequency Never used    Comment: Never used on 7/22/2019     Ecstasy frequency Never used    Comment: Never used on 7/22/2019     Other drug frequency Never used    Comment: Never used on 7/22/2019     Not reviewed              OBJECTIVE:     Mental Status Evaluation:    Appearance age appropriate, casually dressed   Behavior pleasant, cooperative   Speech normal volume, normal pitch   Mood  appropriate   Affect  full and appropriate   Thought Processes logical   Associations intact associations   Thought Content normal Perceptual Disturbances: none   Abnormal Thoughts  Risk Potential Suicidal ideation - None  Homicidal ideation - None  Potential for aggression - No   Orientation oriented to person, place, time/date and situation   Memory recent and remote memory grossly intact   Cosciousness alert and awake   Attention Span attention span and concentration are age appropriate   Intellect Appears to be of Average Intelligence   Insight age appropriate    Judgement good    Muscle Strength and  Gait muscle strength and tone were normal   Language Intact   Fund of Knowledge Intact   Pain none   Pain Scale 0       Assessment/Plan:       Diagnoses and all orders for this visit:    Current moderate episode of major depressive disorder without prior episode ([facility] [address] )          Treatment Recommendations/Precautions:    Continue current medications:    Risks/Benefits      Risks, Benefits And Possible Side Effects Of Medications:    Risks, benefits, and possible side effects of medications explained to patient and patient verbalizes understanding and agreement for treatment  Controlled Medication Discussion:         Psychotherapy Provided:     Individual psychotherapy provided:  Today spent 20 minutes in counseling with the patient

## 2022-12-12 NOTE — BH TREATMENT PLAN
TREATMENT PLAN (Medication Management Only)        [facility]    Name and Date of Birth:  Pete Tripathi 40 y o  1985  Date of Treatment Plan: December 12, 2022  Diagnosis/Diagnoses:    1  Current moderate episode of major depressive disorder without prior episode [facility])      Strengths/Personal Resources for Self-Care: supportive family, supportive friends, ability to communicate well, ability to listen, ability to reason, average or above intelligence, general fund of knowledge, good understanding of illness, motivation for treatment  Area/Areas of need (in own words): anxiety symptoms, depressive symptoms  1  Long Term Goal: continue improvement in depression  Target Date:6 months - 6/12/2023  Person/Persons responsible for completion of goal: Malia  2  Short Term Objective (s) - How will we reach this goal?:   A  Provider new recommended medication/dosage changes and/or continue medication(s): continue current medications as prescribed  B  N/A   C  N/A  Target Date:6 months - 6/12/2023  Person/Persons Responsible for Completion of Goal: Malia  Progress Towards Goals: continuing treatment  Treatment Modality: medication management every 6 months  Review due 180 days from date of this plan: 6 months - 6/12/2023  Expected length of service: ongoing treatment  My Physician/PA/NP and I have developed this plan together and I agree to work on the goals and objectives  I understand the treatment goals that were developed for my treatment

## 2022-12-13 ENCOUNTER — EVALUATION (OUTPATIENT)
Dept: PHYSICAL THERAPY | Facility: CLINIC | Age: 37
End: 2022-12-13

## 2022-12-13 DIAGNOSIS — S82.51XD CLOSED DISPLACED FRACTURE OF MEDIAL MALLEOLUS OF RIGHT TIBIA WITH ROUTINE HEALING: Primary | ICD-10-CM

## 2022-12-13 NOTE — PROGRESS NOTES
PT Evaluation     Today's date: 2022  Patient name: Larry Mosquera  : 1985  MRN: [id number]  Referring provider: Luc Lion DPM  Dx:   Encounter Diagnosis     ICD-10-CM    1  Closed displaced fracture of medial malleolus of right tibia with routine healing  S82  51XD                      Assessment  Assessment details: Larry Mosquera has remaining pain, decreased LE range of motion, decreased LE strength, impaired function, decreased activity tolerance, poor balance, swelling  and poor body mechanics  Patient's clinical presentation is consistent with their referring diagnosis of Closed displaced fracture of medial malleolus of right tibia with routine healing  The pt presents with functional limitations of ADLs, recreational activities, ambulation, performing household chores, self-care and stair negotiation  Pt would benefit from continued physical therapy services to address these limitations and maximize function  Impairments: abnormal gait, abnormal muscle firing, abnormal muscle tone, abnormal or restricted ROM, abnormal movement, activity intolerance, impaired balance, impaired physical strength, lacks appropriate home exercise program, pain with function, weight-bearing intolerance, poor posture  and poor body mechanics  Understanding of Dx/Px/POC: good   Prognosis: good    Goals  Short term goals  (4 weeks)  1  Patient will have no pain right ankle  -- MET  2   Patient will have full range of motion right ankle -- MET  3  Patient will report a 50% improvement with activities of daily living --  MET  4  Patient will decrease swelling of right ankle from mod to min  --  MET    Long term goals - (8 weeks)  1  Patient will be independent with home exercise program  --  PARTIALLY MET  2  Patient will have no gait deviations ambulating on level surfaces  --  PARTIALLY MET  3  Patient will report 80 % improvement with activity of daily living function  --  PARTIALLY MET  4    Patient will negotiate stairs up and down pain free with use of one railing    --  PARTIALLY MET  5  Patient SLS to be equal bilaterally  --  PARTIALLY MET      Plan  Patient would benefit from: skilled physical therapy  Planned modality interventions: thermotherapy: hydrocollator packs and cryotherapy  Planned therapy interventions: ADL training, balance/weight bearing training, joint mobilization, manual therapy, massage, neuromuscular re-education, patient education, postural training, strengthening, stretching, functional ROM exercises, therapeutic activities, therapeutic exercise, gait training and home exercise program  Frequency: 2x week  Duration in visits: 8  Duration in weeks: 4  Treatment plan discussed with: patient        Subjective Evaluation    History of Present Illness  Date of onset: 10/1/2022  Date of surgery: 10/6/2022  Mechanism of injury: She reports overall improvement  She notes symptoms of soreness and swelling increase with prolonged weight bearing  Negotiating down stairs remains limited    Pain  Current pain ratin  At best pain ratin  At worst pain ratin  Location: Medial right ankle  Quality: throbbing and dull ache (pins and needles)    Social Support    Employment status: not working  Exercise history: Housework    Patient Goals  Patient goals for therapy: increased motion, increased strength and independence with ADLs/IADLs          Objective     Neurological Testing     Sensation     Ankle/Foot     Right Ankle/Foot   Intact: light touch     Active Range of Motion   Left Ankle/Foot   Dorsiflexion (ke): 12 degrees   Plantar flexion: 68 degrees   Inversion: 38 degrees   Eversion: 20 degrees     Right Ankle/Foot   Dorsiflexion (ke): 3 degrees   Plantar flexion: 42 degrees   Inversion: 14 degrees   Eversion: 8 degrees     Strength/Myotome Testing     Left Ankle/Foot   Dorsiflexion: 5  Plantar flexion: 5  Inversion: 5  Eversion: 5    Right Ankle/Foot   Dorsiflexion: 4-  Plantar flexion: 4-  Inversion: 4-  Eversion: 4-    Ambulation   Weight-Bearing Status   Weight-Bearing Status (Right): weight-bearing as tolerated    Assistive device used: none    Comments   She no longer uses a cam boot  General Comments: Ankle/Foot Comments   She has moderate edema throughout right ankle  Newly wrapped ACE bandage was in place so girth measurement was not taken this visit                 Eval/ Re-eval Auth #/ Referral # Total visits Start date  Expiration date Total active units  Total manual units  PT only or  PT+OT?   10/10/22                                                                Date: 10/10 10/24/22 10/27 11/2 11/15 11/23/22 11/30/22 12/2   Total units authorized  Active: 1/12 2/12 3/12 4/12 5/12 6/12 7/12 8/12     Manual:                   Total units remaining  Active:     11 10 9 8 7 6 5 4     Manual:                           Date: 12/6 12/8               Total units authorized  Active: 9/12 10/12                 Manual:                   Total units remaining  Active:     3 2                 Manual:                         Precautions:  As of 11/15/22 PT session she is allowed FWB in cam boot      Specialty Daily Treatment Diary         Manuals 11/30/22 12/2/22 12/6/22 12/8/22 12/13/22   Visit # 7 8 9 10 11   STM Gastroc/ soleus/ Peroneals 5' 5' 5' 3' 3'   PROM ankle 5' 5' 3' 3' 3'   Stretch soleus gastroc       4' 4'                Warm-up            NuStep 7 min 7 min 7 min 8 min 8 min   Neuro Re-Ed            Sheet stretch Prostretch  10 x 10 sec Prostretch  10 x 10 sec Prostretch  10 x 10 sec Prostretch  10 x 10 sec Prostretch  10 x 10 sec   Ankle DF stretch at stair 10 x 10 sec 10 x 10 sec 10x10s 12" step 10x10s 12" step 10x10s 12" step   Side step 5 x 12 ft 5 x 12 ft 2x12 ft; 3x12ft rtb 5x12 ft  RTB 4x20 ft  Red loop   ABCs 1x 2x x1 x2 x2                             Ther Ex            Knee ext  30x 30x x30 30 30   SLR 30 flex/abd 30  Flex/abd x30 ea flex/abd 30 ea flex/abd 30 ea flex/abd   Towel scrunch -- --   -- --   Rockerboard in sitting  30 ea 30 ea x20 ea a/p, m/l Rock on DYLON Castillo on [employer] DF/PF 2x10  In/Ev 2x10   Digit flex/ ext -- ---   --    AROM 4 way 15x  YTB 20  YTB x20 ytb ea  20 ea  RTB 20 ea GTB   Leg press   2x10  45# 2x10 45# 2x10  45# 2x10  85# bilat  2x10  45# right   TRX squats     2x10 2x10 2x10                Ther Activity                                      Gait Training                                      Modalities            CP 5 min --     --

## 2022-12-15 ENCOUNTER — OFFICE VISIT (OUTPATIENT)
Dept: PHYSICAL THERAPY | Facility: CLINIC | Age: 37
End: 2022-12-15

## 2022-12-15 DIAGNOSIS — S82.51XD CLOSED DISPLACED FRACTURE OF MEDIAL MALLEOLUS OF RIGHT TIBIA WITH ROUTINE HEALING: Primary | ICD-10-CM

## 2022-12-15 NOTE — PROGRESS NOTES
Daily Note     Today's date: 12/15/2022  Patient name: Laurence Copeland  : 1985  MRN: [id number]  Referring provider: Flo Estes DPM  Dx:   Encounter Diagnosis     ICD-10-CM    1  Closed displaced fracture of medial malleolus of right tibia with routine healing  S82  51XD           Start Time: 0845  Stop Time: 0930  Total time in clinic (min): 45 minutes    Subjective:  Some anterior ankle joint soreness 3-4/10  Pt wearing sneakers to PT clinic and is WBAT R LE      Objective: See treatment diary below      Assessment: Tolerated treatment well  Patient would benefit from continued PT  She is making steady gains in ROM restoration  Educated pt re: heel to toe gait pattern as pt has tendency to ambulate with decreased heel strike R foot  Added therex on total gym for LE strengthening with pt reporting increase in mm soreness and fatigue but no increase pain post PT session  Pain decreased 2/10 R ankle post PT  Plan: Continue per plan of care  Progress treatment as tolerated  Re-eval last session  Primary PT applied for new auth         Eval/ Re-eval Auth #/ Referral # Total visits Start date  Expiration date Total active units  Total manual units  PT only or  PT+OT?   10/10/22                                                                Date: 10/10 10/24/22 10/27 11/2 11/15 11/23/22 11/30/22 12/2   Total units authorized  Active: 1/12 2/12 3/12 4/12 5/12 6/12 7/12 8/12     Manual:                   Total units remaining  Active:     11 10 9 8 7 6 5 4     Manual:                           Date: 12/6 12/8  12/15             Total units authorized  Active: 9/12 10/12  11/12               Manual:                   Total units remaining  Active:     3 2  1               Manual:                         Precautions:  As of 11/15/22 PT session she is allowed FWB in cam boot      Specialty Daily Treatment Diary         Manuals 11/30/22 12/2/22 12/6/22 12/8/22 12/13/22 12/15   Visit # 7 8 9 10 11 12   STM Gastroc/ soleus/ Peroneals 5' 5' 5' 3' 3' 3'   PROM ankle 5' 5' 3' 3' 3' 3'   Stretch soleus gastroc       4' 4' 4'                 Warm-up             NuStep 7 min 7 min 7 min 8 min 8 min 8 min   Neuro Re-Ed             Sheet stretch Prostretch  10 x 10 sec Prostretch  10 x 10 sec Prostretch  10 x 10 sec Prostretch  10 x 10 sec Prostretch  10 x 10 sec prostretch 10x 10 sec   Ankle DF stretch at stair 10 x 10 sec 10 x 10 sec 10x10s 12" step 10x10s 12" step 10x10s 12" step 10x 10s 12" step   Side step 5 x 12 ft 5 x 12 ft 2x12 ft; 3x12ft rtb 5x12 ft  RTB 4x20 ft  Red loop 6 x 12' red loop   ABCs 1x 2x x1 x2 x2                                Ther Ex             Knee ext  30x 30x x30 30 30 x30 hold 5   SLR 30 flex/abd 30  Flex/abd x30 ea flex/abd 30 ea flex/abd 30 ea flex/abd x20 box flex/abd 2 positions   Towel scrunch -- --   -- --    Rockerboard in sitting  30 ea 30 ea x20 ea a/p, m/l Rock on H  J  Jonathan on [employer] DF/PF 2x10  In/Ev 2x10 hold   Digit flex/ ext -- ---   --     AROM 4 way 15x  YTB 20  YTB x20 ytb ea  20 ea  RTB 20 ea GTB hold   Leg press   2x10  45# 2x10 45# 2x10  45# 2x10  85# bilat  2x10  45# right -   TRX squats     2x10 2x10 2x10 -              Total gym lev 21, B x20, R SL x 10   Ther Activity                                         Gait Training                                         Modalities             CP 5 min --     -- -

## 2022-12-21 ENCOUNTER — OFFICE VISIT (OUTPATIENT)
Dept: PHYSICAL THERAPY | Facility: CLINIC | Age: 37
End: 2022-12-21

## 2022-12-21 DIAGNOSIS — S82.51XD CLOSED DISPLACED FRACTURE OF MEDIAL MALLEOLUS OF RIGHT TIBIA WITH ROUTINE HEALING: Primary | ICD-10-CM

## 2022-12-21 NOTE — PROGRESS NOTES
Daily Note     Today's date: 2022  Patient name: Shaila Grewal  : 1985  MRN: [id number]  Referring provider: Magdalena Closs, DPM  Dx:   Encounter Diagnosis     ICD-10-CM    1  Closed displaced fracture of medial malleolus of right tibia with routine healing  S82  51XD                      Subjective:   She has no pain currently  She complains of difficulty negotiating down stairs due to anterior ankle pain and tightness  Objective: See treatment diary below      Assessment: Tolerated treatment well  Patient would benefit from continued PT  DF remains limited functiionally  She would benefir from eccentric phase strengthening  Plan: Continue per plan of care  Progress treatment as tolerated  Attempt fwd, bwd and lat lunges  Attempt eccentric step downs           Eval/ Re-eval Auth #/ Referral # Total visits Start date  Expiration date Total active units  Total manual units  PT only or  PT+OT?   10/10/22                  12/13    12  12/20  12/30                                      Date: 10/10 10/24/22 10/27 11/2 11/15 11/23/22 11/30/22 12/2   Total units authorized  Active: 1/12 2/12 3/12 4/12 5/12 6/12 7/12 8/12     Manual:                   Total units remaining  Active:     11 10 9 8 7 6 5 4     Manual:                           Date: 12/6 12/8  12/13  12/15  12/21         Total units authorized  Active: 9/12 10/12  11/12  12/12  1/12           Manual:                   Total units remaining  Active:     3 2  1    11           Manual:                         Precautions:  As of 11/15/22 PT session she is allowed FWB in cam boot      Specialty Daily Treatment Diary         Manuals 12/6/22 12/8/22 12/13/22 12/15 12/21/22   Visit # 9 10 11 12 13   STM Gastroc/ soleus/ Peroneals 5' 3' 3' 3' 2'   PROM ankle 3' 3' 3' 3' 3'   Stretch soleus gastroc   4' 4' 4' 3'              Warm-up          NuStep 7 min 8 min 8 min 8 min 10 min   Neuro Re-Ed          Sheet stretch Prostretch  10 x 10 sec Prostretch  10 x 10 sec Prostretch  10 x 10 sec prostretch 10x 10 sec prostretch 10x 10 sec   Ankle DF stretch at stair 10x10s 12" step 10x10s 12" step 10x10s 12" step 10x 10s 12" step    Side step 2x12 ft; 3x12ft rtb 5x12 ft  RTB 4x20 ft  Red loop 6 x 12' red loop 4 x 20 ft' red loop   ABCs x1 x2 x2      Step downs           Lunges 3 way          Ther Ex          Knee ext  x30 30 30 x30 hold 5 30   SLR x30 ea flex/abd 30 ea flex/abd 30 ea flex/abd x20 box flex/abd 2 positions --   Towel scrunch   -- --     Rockerboard in sitting  x20 ea a/p, m/l Rock on H  J  Jonathan on [employer] DF/PF 2x10  In/Ev 2x10 hold Stand DF/PF  In/Ev  On woble board   Digit flex/ ext   --      AROM 4 way x20 ytb ea  20 ea  RTB 20 ea GTB hold    Leg press 2x10 45# 2x10  45# 2x10  85# bilat  2x10  45# right - 2x10  85# bilat  2x10  45# right   TRX squats 2x10 2x10 2x10 - 3x10          Total gym lev 21, B x20, R SL x 10    Ther Activity                                Gait Training                                Modalities          CP     -- -

## 2022-12-23 ENCOUNTER — APPOINTMENT (OUTPATIENT)
Dept: PHYSICAL THERAPY | Facility: CLINIC | Age: 37
End: 2022-12-23

## 2022-12-27 ENCOUNTER — APPOINTMENT (OUTPATIENT)
Dept: PHYSICAL THERAPY | Facility: CLINIC | Age: 37
End: 2022-12-27

## 2022-12-28 ENCOUNTER — OFFICE VISIT (OUTPATIENT)
Dept: PHYSICAL THERAPY | Facility: CLINIC | Age: 37
End: 2022-12-28

## 2022-12-28 DIAGNOSIS — S82.51XD CLOSED DISPLACED FRACTURE OF MEDIAL MALLEOLUS OF RIGHT TIBIA WITH ROUTINE HEALING: Primary | ICD-10-CM

## 2022-12-28 NOTE — PROGRESS NOTES
Daily Note     Today's date: 2022  Patient name: Debi Michael  : 1985  MRN: [id number]  Referring provider: Roxy Denver, DPM  Dx:   Encounter Diagnosis     ICD-10-CM    1  Closed displaced fracture of medial malleolus of right tibia with routine healing  S82  51XD           Start Time: 0845  Stop Time: 0930  Total time in clinic (min): 45 minutes    Subjective:  She reports having to ice more at night as she was "doing a lot" over the holiday weekend  Objective: See treatment diary below      Assessment: Tolerated treatment well  Patient would benefit from continued PT  Added step downs today and lunging progression  She continues to lack appropriate R ankle DF during new exercises, but denies increased pain with additional exercises DF remains limited functionally  Encouraged ankle mobility exercises at home with usage of belt for improvements in talocrural A-P glide for improved DF  Patient verbalized understanding  Plan: Continue per plan of care  Progress treatment as tolerated            Eval/ Re-eval Auth #/ Referral # Total visits Start date  Expiration date Total active units  Total manual units  PT only or  PT+OT?   10/10/22                  12/13    12  12/20  12/30                                      Date: 10/10 10/24/22 10/27 11/2 11/15 11/23/22 11/30/22 12/2   Total units authorized  Active: 1/12 2/12 3/12 4/12 5/12 6/12 7/12 8/12     Manual:                   Total units remaining  Active:     11 10 9 8 7 6 5 4     Manual:                           Date: 12/6 12/8  12/13  12/15  12/21  12/28       Total units authorized  Active: 9/12 10/12  11/12  12/12  1/12  2/12         Manual:                   Total units remaining  Active:     3 2  1    11  10         Manual:                         Precautions:  As of 11/15/22 PT session she is allowed FWB in cam boot      Specialty Daily Treatment Diary         Manuals 12/6/22 12/8/22 12/13/22 12/15 12/21/22 12/28/22   Visit # 9 10 11 12 13 14   STM Gastroc/ soleus/ Peroneals 5' 3' 3' 3' 2'    PROM ankle 3' 3' 3' 3' 3'    Stretch soleus gastroc   4' 4' 4' 3'                Warm-up           NuStep 7 min 8 min 8 min 8 min 10 min 10 min   Neuro Re-Ed           Sheet stretch Prostretch  10 x 10 sec Prostretch  10 x 10 sec Prostretch  10 x 10 sec prostretch 10x 10 sec prostretch 10x 10 sec prostretch 10x 10 sec   Ankle DF stretch at stair 10x10s 12" step 10x10s 12" step 10x10s 12" step 10x 10s 12" step     Side step 2x12 ft; 3x12ft rtb 5x12 ft  RTB 4x20 ft  Red loop 6 x 12' red loop 4 x 20 ft' red loop 4 x 20 ft' red loop   SLB on foam       5x10s    ABCs x1 x2 x2       Step downs        2x10 4"     Lunges 3 way        2x10 each    Ther Ex           Knee ext  x30 30 30 x30 hold 5 30    SLR x30 ea flex/abd 30 ea flex/abd 30 ea flex/abd x20 box flex/abd 2 positions --    Towel scrunch   -- --      Rockerboard in sitting  x20 ea a/p, m/l Rock on H  J  Jonathan on [employer] DF/PF 2x10  In/Ev 2x10 hold Stand DF/PF  In/Ev  On woble board    Digit flex/ ext   --    Ankle DF MWM at step 2x10    AROM 4 way x20 ytb ea  20 ea  RTB 20 ea GTB hold     Leg press 2x10 45# 2x10  45# 2x10  85# bilat  2x10  45# right - 2x10  85# bilat  2x10  45# right 2x10  85# bilat  2x10  45# right   TRX squats 2x10 2x10 2x10 - 3x10 3x10   HR      2x10           Total gym lev 21, B x20, R SL x 10     Ther Activity                                   Gait Training                                   Modalities           CP     -- -

## 2022-12-29 ENCOUNTER — APPOINTMENT (OUTPATIENT)
Dept: PHYSICAL THERAPY | Facility: CLINIC | Age: 37
End: 2022-12-29

## 2023-01-05 ENCOUNTER — OFFICE VISIT (OUTPATIENT)
Dept: PHYSICAL THERAPY | Facility: CLINIC | Age: 38
End: 2023-01-05

## 2023-01-05 DIAGNOSIS — S82.51XD CLOSED DISPLACED FRACTURE OF MEDIAL MALLEOLUS OF RIGHT TIBIA WITH ROUTINE HEALING: Primary | ICD-10-CM

## 2023-01-05 NOTE — PROGRESS NOTES
Daily Note     Today's date: 2023  Patient name: Rudy Cleaning  : 1985  MRN: [id number]  Referring provider: Dheeraj Iverson DPM  Dx:   Encounter Diagnosis     ICD-10-CM    1  Closed displaced fracture of medial malleolus of right tibia with routine healing  S82  51XD                      Subjective:  She reports she has been on her feet all day and all week so she notes a bit of swelling  Objective: See treatment diary below      Assessment: Tolerated treatment well  Patient would benefit from continued PT  Continued with previous program as patient is responding favorably  Modified step down to lateral step down with improved tolerance compared to straight step down  Manual interventions improved subjective reports of stiffness/tightness  Plan: Continue per plan of care    Progress per Primary PT       Eval/ Re-eval Auth #/ Referral # Total visits Start date  Expiration date Total active units  Total manual units  PT only or  PT+OT?   10/10/22                  12/13    12  12/20  12/30                                      Date: 10/10 10/24/22 10/27 11/2 11/15 11/23/22 11/30/22 12/2   Total units authorized  Active: 1/12 2/12 3/12 4/12 5/12 6/12 7/12 8/12     Manual:                   Total units remaining  Active:     11 10 9 8 7 6 5 4     Manual:                           Date: 12/6 12/8  12/13  12/15  12/21  12/28  1/5     Total units authorized  Active: 9/12 10/12  11/12  12/12  1/12  2/12  3/12       Manual:                   Total units remaining  Active:     3 2  1    11  10  9       Manual:                         Precautions:  As of 11/15/22 PT session she is allowed FWB in cam boot      Specialty Daily Treatment Diary         Manuals 12/6/22 12/8/22 12/13/22 12/15 12/21/22 12/28/22 1/5/23   Visit # 9 10 11 12 13 14 15   STM Gastroc/ soleus/ Peroneals 5' 3' 3' 3' 2'  3'   PROM ankle 3' 3' 3' 3' 3'     Stretch soleus gastroc   4' 4' 4' 3'  3'                Warm-up            NuStep 7 min 8 min 8 min 8 min 10 min 10 min 8 min   Neuro Re-Ed            Sheet stretch Prostretch  10 x 10 sec Prostretch  10 x 10 sec Prostretch  10 x 10 sec prostretch 10x 10 sec prostretch 10x 10 sec prostretch 10x 10 sec prostretch 10x 10 sec   Ankle DF stretch at stair 10x10s 12" step 10x10s 12" step 10x10s 12" step 10x 10s 12" step      Side step 2x12 ft; 3x12ft rtb 5x12 ft  RTB 4x20 ft  Red loop 6 x 12' red loop 4 x 20 ft' red loop 4 x 20 ft' red loop 4 x 20 ft' red loop   SLB on foam       5x10s     ABCs x1 x2 x2        Step downs        2x10 4"  2x10 6" lateral step down    Lunges 3 way        2x10 each  x10 each   Ther Ex            Knee ext  x30 30 30 x30 hold 5 30     SLR x30 ea flex/abd 30 ea flex/abd 30 ea flex/abd x20 box flex/abd 2 positions --     Towel scrunch   -- --       Rockerboard in sitting  x20 ea a/p, m/l Rock on H  J  Jonathan on [employer] DF/PF 2x10  In/Ev 2x10 hold Stand DF/PF  In/Ev  On woble board     Digit flex/ ext   --    Ankle DF MWM at step 2x10     AROM 4 way x20 ytb ea  20 ea  RTB 20 ea GTB hold      Leg press 2x10 45# 2x10  45# 2x10  85# bilat  2x10  45# right - 2x10  85# bilat  2x10  45# right 2x10  85# bilat  2x10  45# right 2x10  85# bilat  2x10  45# right   TRX squats 2x10 2x10 2x10 - 3x10 3x10 3x10   HR      2x10  x20          Total gym lev 21, B x20, R SL x 10      Ther Activity                                      Gait Training                                      Modalities            CP     -- -

## 2023-01-12 ENCOUNTER — OFFICE VISIT (OUTPATIENT)
Dept: PHYSICAL THERAPY | Facility: CLINIC | Age: 38
End: 2023-01-12

## 2023-01-12 DIAGNOSIS — S82.51XD CLOSED DISPLACED FRACTURE OF MEDIAL MALLEOLUS OF RIGHT TIBIA WITH ROUTINE HEALING: Primary | ICD-10-CM

## 2023-01-12 NOTE — PROGRESS NOTES
Daily Note     Today's date: 2023  Patient name: Lazarus Kand  : 1985  MRN: [id number]  Referring provider: Bernie Soto DPM  Dx:   Encounter Diagnosis     ICD-10-CM    1  Closed displaced fracture of medial malleolus of right tibia with routine healing  S82  51XD           Start Time: 9531  Stop Time: 0614  Total time in clinic (min): 40 minutes    Subjective:  She does not report any complaints prior to session       Objective: See treatment diary below      Assessment: Tolerated treatment well  Patient would benefit from continued PT  Pt tolerated progression of exercises well today, fatigue noted during session, no pain  Plan: Continue per plan of care    Progress per Primary PT       Eval/ Re-eval Auth #/ Referral # Total visits Start date  Expiration date Total active units  Total manual units  PT only or  PT+OT?   10/10/22                  12/13    12  12/20  12/30                                      Date: 10/10 10/24/22 10/27 11/2 11/15 11/23/22 11/30/22 12/2   Total units authorized  Active: 1/12 2/12 3/12 4/12 5/12 6/12 7/12 8/12     Manual:                   Total units remaining  Active:     11 10 9 8 7 6 5 4     Manual:                           Date: 12/6 12/8  12/13  12/15  12/21  12/28  1/5     Total units authorized  Active: 9/12 10/12  11/12  12/12  1/12  2/12  3/12       Manual:                   Total units remaining  Active:     3 2  1    11  10  9       Manual:                         Precautions:  As of 11/15/22 PT session she is allowed FWB in cam boot      Specialty Daily Treatment Diary         Manuals 12/13/22 12/15 12/21/22 12/28/22 1/5/23 1/12/23   Visit # 11 12 13 14 15 16   STM Gastroc/ soleus/ Peroneals 3' 3' 2'  3' --   PROM ankle 3' 3' 3'      Stretch soleus gastroc 4' 4' 3'  3' --             Warm-up         NuStep 8 min 8 min 10 min 10 min 8 min 10'   Neuro Re-Ed         Sheet stretch Prostretch  10 x 10 sec prostretch 10x 10 sec prostretch 10x 10 sec prostretch 10x 10 sec prostretch 10x 10 sec prostretch 10x10"   Ankle DF stretch at stair 10x10s 12" step 10x 10s 12" step       Side step 4x20 ft  Red loop 6 x 12' red loop 4 x 20 ft' red loop 4 x 20 ft' red loop 4 x 20 ft' red loop 5x20ft red loop   SLB on foam     5x10s   5x10s   ABCs x2         Step downs    2x10 4"  2x10 6" lateral step down 6" lat/fwd 2x10 ea    Lunges 3 way    2x10 each  x10 each Disc lunge 3 way 10x ea   Ther Ex         Knee ext  30 x30 hold 5 30      SLR 30 ea flex/abd x20 box flex/abd 2 positions --      Towel scrunch --        Rockerboard in sitting  Rock on BOSU DF/PF 2x10  In/Ev 2x10 hold Stand DF/PF  In/Ev  On woble board      Digit flex/ ext    Ankle DF MWM at step 2x10      AROM 4 way 20 ea GTB hold       Leg press 2x10  85# bilat  2x10  45# right - 2x10  85# bilat  2x10  45# right 2x10  85# bilat  2x10  45# right 2x10  85# bilat  2x10  45# right 2x10  95# bilat  2x10  55# right   TRX squats 2x10 - 3x10 3x10 3x10 3x10   HR    2x10  x20 Leg press HR with wedge 2x10 25#      Total gym lev 21, B x20, R SL x 10       Ther Activity                             Gait Training                             Modalities         CP -- -

## 2023-01-16 ENCOUNTER — OFFICE VISIT (OUTPATIENT)
Dept: PHYSICAL THERAPY | Facility: CLINIC | Age: 38
End: 2023-01-16

## 2023-01-16 DIAGNOSIS — S82.51XD CLOSED DISPLACED FRACTURE OF MEDIAL MALLEOLUS OF RIGHT TIBIA WITH ROUTINE HEALING: Primary | ICD-10-CM

## 2023-01-16 NOTE — PROGRESS NOTES
Daily Note     Today's date: 2023  Patient name: Luis Angel Simmons  : 1985  MRN: [id number]  Referring provider: Girma Mccoy DPM  Dx:   Encounter Diagnosis     ICD-10-CM    1  Closed displaced fracture of medial malleolus of right tibia with routine healing  S82  51XD           Start Time: 1535          Subjective:  She feels like she's getting more movement in the foot overall  She can't walk holding her son yet  Objective: See treatment diary below      Assessment: Tolerated treatment well  Patient would benefit from continued PT  Pt complaint of pain when carrying her child is likely due to the increase in weight and pressure through the foot, able to recreate with 26# KB immediately in clinic, unable to modulate with how she was carrying the weight  Mildly improved after manual interventions, but still persistance  Plan: Continue per plan of care    Progress per Primary PT       Eval/ Re-eval Auth #/ Referral # Total visits Start date  Expiration date Total active units  Total manual units  PT only or  PT+OT?   10/10/22                  12/13    12  12/20  12/30                                      Date: 10/10 10/24/22 10/27 11/2 11/15 11/23/22 11/30/22 12/2   Total units authorized  Active: 1/12 2/12 3/12 4/12 5/12 6/12 7/12 8/12     Manual:                   Total units remaining  Active:     11 10 9 8 7 6 5 4     Manual:                           Date: 12/6 12/8  12/13  12/15  12/21  12/28  1/5 1/12 1/16   Total units authorized  Active: 9/12 10/12  11/12  12/12  1/12  2/12  3/12 4/12 5/12     Manual:                   Total units remaining  Active:     3 2  1    11  10  9 8 7     Manual:                          Precautions:  As of 11/15/22 PT session she is allowed FWB in cam boot      Specialty Daily Treatment Diary         Manuals 12/13/22 12/15 12/21/22 12/28/22 1/5/23 1/12/23 1/16/23   Visit # 86 06 46 36 52 71 54   STM Gastroc/ soleus/ Peroneals 3' 3' 2'  3' --    PROM ankle 3' 3' 3'       Stretch soleus gastroc 4' 4' 3'  3' --     Ankle mobilization       TCJ gr 4  SJ gr 4  Navicular gr 5   Warm-up          NuStep 8 min 8 min 10 min 10 min 8 min 10' 10' PRE   Neuro Re-Ed          Sheet stretch Prostretch  10 x 10 sec prostretch 10x 10 sec prostretch 10x 10 sec prostretch 10x 10 sec prostretch 10x 10 sec prostretch 10x10" prostretch 10x10"   Ankle DF stretch at stair 10x10s 12" step 10x 10s 12" step        Side step 4x20 ft  Red loop 6 x 12' red loop 4 x 20 ft' red loop 4 x 20 ft' red loop 4 x 20 ft' red loop 5x20ft red loop 4 x 20 ft' green loop   SLB on foam     5x10s   5x10s 5x15s   ABCs x2          Step downs    2x10 4"  2x10 6" lateral step down 6" lat/fwd 2x10 ea 6" lat/fwd 2x10 ea    Lunges 3 way    2x10 each  x10 each Disc lunge 3 way 10x ea Disc lunge 3 way 10x ea   Ther Ex          Knee ext  30 x30 hold 5 30       SLR 30 ea flex/abd x20 box flex/abd 2 positions --       Towel scrunch --         Rockerboard in sitting  Rock on BOSU DF/PF 2x10  In/Ev 2x10 hold Stand DF/PF  In/Ev  On woble board       Digit flex/ ext    Ankle DF MWM at step 2x10       AROM 4 way 20 ea GTB hold        Leg press 2x10  85# bilat  2x10  45# right - 2x10  85# bilat  2x10  45# right 2x10  85# bilat  2x10  45# right 2x10  85# bilat  2x10  45# right 2x10  95# bilat  2x10  55# right 2x10  95# bilat  2x10  55# right   TRX squats 2x10 - 3x10 3x10 3x10 3x10 x10 B  2x10 kickstand   HR    2x10  x20 Leg press HR with wedge 2x10 25# x20      Total gym lev 21, B x20, R SL x 10        Ther Activity                                Gait Training                                Modalities          CP -- -

## 2023-01-17 ENCOUNTER — APPOINTMENT (OUTPATIENT)
Dept: PHYSICAL THERAPY | Facility: CLINIC | Age: 38
End: 2023-01-17

## 2023-01-18 ENCOUNTER — TELEPHONE (OUTPATIENT)
Dept: PSYCHIATRY | Facility: CLINIC | Age: 38
End: 2023-01-18

## 2023-01-18 NOTE — TELEPHONE ENCOUNTER
Pt called in and stated she received a letter from Dr Rashmi Kellogg office stating that he is leaving the practice  She is looking for a CORKY  Please reach out to her when you can    Her PH# [phone]

## 2023-01-19 ENCOUNTER — APPOINTMENT (OUTPATIENT)
Dept: PHYSICAL THERAPY | Facility: CLINIC | Age: 38
End: 2023-01-19

## 2023-01-24 ENCOUNTER — APPOINTMENT (OUTPATIENT)
Dept: PHYSICAL THERAPY | Facility: CLINIC | Age: 38
End: 2023-01-24

## 2023-01-24 ENCOUNTER — OFFICE VISIT (OUTPATIENT)
Dept: PHYSICAL THERAPY | Facility: CLINIC | Age: 38
End: 2023-01-24

## 2023-01-24 DIAGNOSIS — S82.51XD CLOSED DISPLACED FRACTURE OF MEDIAL MALLEOLUS OF RIGHT TIBIA WITH ROUTINE HEALING: Primary | ICD-10-CM

## 2023-01-24 NOTE — PROGRESS NOTES
Daily Note     Today's date: 2023  Patient name: eMr Mathias  : 1985  MRN: [id number]  Referring provider: Ania Rai DPM  Dx:   Encounter Diagnosis     ICD-10-CM    1  Closed displaced fracture of medial malleolus of right tibia with routine healing  S82  51XD                      Subjective:  She reports she has received full clearance from surgeon and he is not concerned about the cold from the hardware  Objective: See treatment diary below      Assessment: Tolerated treatment well  Patient would benefit from continued PT  Tolerated introduction of shuffling and jumping well with minimal pain and more hesitation due to new activity  Plan: Continue per plan of care    Progress per Primary PT       Eval/ Re-eval Auth #/ Referral # Total visits Start date  Expiration date Total active units  Total manual units  PT only or  PT+OT?   10/10/22                  12/13    12  12/20  12/30                                      Date: 10/10 10/24/22 10/27 11/2 11/15 11/23/22 11/30/22 12/2   Total units authorized  Active: 1/12 2/12 3/12 4/12 5/12 6/12 7/12 8/12     Manual:                   Total units remaining  Active:     11 10 9 8 7 6 5 4     Manual:                           Date: 12/6 12/8  12/13  12/15  12/21  12/28  1/5 1/12 1/16 1/24   Total units authorized  Active: 9/12 10/12  11/12  12/12  1/12  2/12  3/12 4/12 5/12 6/12     Manual:                    Total units remaining  Active:     3 2  1    11  10  9 8 7 6     Manual:                           Precautions:  As of 11/15/22 PT session she is allowed FWB in cam boot      Specialty Daily Treatment Diary         Manuals 12/15 12/21/22 12/28/22 1/5/23 1/12/23 1/16/23 1/24/23   Visit # 12 13 14 15 16 17 18   STM Gastroc/ soleus/ Peroneals 3' 2'  3' --     PROM ankle 3' 3'        Stretch soleus gastroc 4' 3'  3' --      Ankle mobilization      TCJ gr 4  SJ gr 4  Navicular gr 5    Warm-up          NuStep 8 min 10 min 10 min 8 min 10' 10' PRE 5' L4   Neuro Re-Ed          Sheet stretch prostretch 10x 10 sec prostretch 10x 10 sec prostretch 10x 10 sec prostretch 10x 10 sec prostretch 10x10" prostretch 10x10" prostretch 10x10"   Ankle DF stretch at stair 10x 10s 12" step         Side step 6 x 12' red loop 4 x 20 ft' red loop 4 x 20 ft' red loop 4 x 20 ft' red loop 5x20ft red loop 4 x 20 ft' green loop 4 x 20 ft' green loop   SLB on foam    5x10s   5x10s 5x15s Y balance w blaze pods 3x30s     ABCs           Step downs   2x10 4"  2x10 6" lateral step down 6" lat/fwd 2x10 ea 6" lat/fwd 2x10 ea 8" lat/fwd 2x10 ea    Lunges 3 way   2x10 each  x10 each Disc lunge 3 way 10x ea Disc lunge 3 way 10x ea Disc lunge 3 way 10x ea   Ther Ex          Knee ext  x30 hold 5 30        SLR x20 box flex/abd 2 positions --        Towel scrunch          Rockerboard in sitting  hold Stand DF/PF  In/Ev  On woble board        Digit flex/ ext   Ankle DF MWM at step 2x10        AROM 4 way hold         Leg press - 2x10  85# bilat  2x10  45# right 2x10  85# bilat  2x10  45# right 2x10  85# bilat  2x10  45# right 2x10  95# bilat  2x10  55# right 2x10  95# bilat  2x10  55# right 3x10  105# bilat  2x10  55# right   TRX squats - 3x10 3x10 3x10 3x10 x10 B  2x10 kickstand 2x10 kickstand   HR   2x10  x20 Leg press HR with wedge 2x10 25# x20 x20 w heel sq for inv     Total gym lev 21, B x20, R SL x 10         Ther Activity           Squat jump       x10  x10 jump down     Shuffle       4x20 ft   Gait Training                                Modalities          CP -

## 2023-01-26 ENCOUNTER — APPOINTMENT (OUTPATIENT)
Dept: PHYSICAL THERAPY | Facility: CLINIC | Age: 38
End: 2023-01-26

## 2023-01-26 DIAGNOSIS — F41.1 GENERALIZED ANXIETY DISORDER: ICD-10-CM

## 2023-01-26 DIAGNOSIS — F43.10 PTSD (POST-TRAUMATIC STRESS DISORDER): ICD-10-CM

## 2023-01-26 RX ORDER — BUSPIRONE HYDROCHLORIDE 15 MG/1
TABLET ORAL
Qty: 270 TABLET | Refills: 0 | Status: SHIPPED | OUTPATIENT
Start: 2023-01-26

## 2023-01-27 ENCOUNTER — TELEPHONE (OUTPATIENT)
Dept: PSYCHIATRY | Facility: CLINIC | Age: 38
End: 2023-01-27

## 2023-01-27 NOTE — TELEPHONE ENCOUNTER
Malia called and is have break through anxiety. It is getting so bad that she trew up yesterday from it. She also stated she can not use the atarax during the day because it makes her sleep and she has children to take care of.    Please advise?

## 2023-01-31 ENCOUNTER — APPOINTMENT (OUTPATIENT)
Dept: PHYSICAL THERAPY | Facility: CLINIC | Age: 38
End: 2023-01-31

## 2023-02-02 ENCOUNTER — APPOINTMENT (OUTPATIENT)
Dept: PHYSICAL THERAPY | Facility: CLINIC | Age: 38
End: 2023-02-02

## 2023-02-02 ENCOUNTER — OFFICE VISIT (OUTPATIENT)
Dept: PHYSICAL THERAPY | Facility: CLINIC | Age: 38
End: 2023-02-02

## 2023-02-02 DIAGNOSIS — S82.51XD CLOSED DISPLACED FRACTURE OF MEDIAL MALLEOLUS OF RIGHT TIBIA WITH ROUTINE HEALING: Primary | ICD-10-CM

## 2023-02-02 NOTE — PROGRESS NOTES
Daily Note     Today's date: 2023  Patient name: Vernetta Galeazzi  : 1985  MRN: [id number]  Referring provider: Kristina Alan DPM  Dx:   Encounter Diagnosis     ICD-10-CM    1  Closed displaced fracture of medial malleolus of right tibia with routine healing  S82  51XD                      Subjective:  She reports she has received full clearance from surgeon and he is not concerned about the cold from the hardware  Objective: See treatment diary below      Assessment: Tolerated treatment well  Patient would benefit from continued PT  Tolerated introduction of skipping and BW reduced jog well with no reports of pain and slight hesitation with new activity  Able to increase volume of jumping with improved form and confidence  Plan: Continue per plan of care    Jaime Mao return to running       Eval/ Re-eval Auth #/ Referral # Total visits Start date  Expiration date Total active units  Total manual units  PT only or  PT+OT?   10/10/22                  12/13    12  12/20  12/30                                      Date: 10/10 10/24/22 10/27 11/2 11/15 11/23/22 11/30/22 12/2   Total units authorized  Active: 1/12 2/12 3/12 4/12 5/12 6/12 7/12 8/12     Manual:                   Total units remaining  Active:     11 10 9 8 7 6 5 4     Manual:                           Date: 12/6 12/8  12/13  12/15  12/21  12/28  1/5 1/12 1/16 1/24 2/2   Total units authorized  Active: 9/12 10/12  11/12  12/12  1/12  2/12  3/12 4/12 5/12 6/12 7/12     Manual:                     Total units remaining  Active:     3 2  1    11  10  9 8 7 6 5     Manual:                            Precautions:  As of 23 patient is fully cleared     Specialty Daily Treatment Diary         Manuals 22   Visit # 13 14 15 16 17 18 23   STM Gastroc/ soleus/ Peroneals 2'  3' --      PROM ankle 3'         Stretch soleus gastroc 3'  3' --       Ankle mobilization     TCJ gr 4  SJ gr 4  Navicular gr 5     Warm-up          NuStep 10 min 10 min 8 min 10' 10' PRE 5' L4 URB 5'    Neuro Re-Ed          Sheet stretch prostretch 10x 10 sec prostretch 10x 10 sec prostretch 10x 10 sec prostretch 10x10" prostretch 10x10" prostretch 10x10" prostretch 10x10"   Ankle DF stretch at stair          Side step 4 x 20 ft' red loop 4 x 20 ft' red loop 4 x 20 ft' red loop 5x20ft red loop 4 x 20 ft' green loop 4 x 20 ft' green loop    SLB on foam   5x10s   5x10s 5x15s Y balance w blaze pods 3x30s   Y balance w blaze pods 3x30s   ABCs           Step downs  2x10 4"  2x10 6" lateral step down 6" lat/fwd 2x10 ea 6" lat/fwd 2x10 ea 8" lat/fwd 2x10 ea 8" lat 2x10 ea    Lunges 3 way  2x10 each  x10 each Disc lunge 3 way 10x ea Disc lunge 3 way 10x ea Disc lunge 3 way 10x ea Disc lunge 3 way 10x ea   Ther Ex          Knee ext  30         SLR --         Towel scrunch          Rockerboard in sitting  Stand DF/PF  In/Ev  On woble board         Digit flex/ ext  Ankle DF MWM at step 2x10         AROM 4 way          Leg press 2x10  85# bilat  2x10  45# right 2x10  85# bilat  2x10  45# right 2x10  85# bilat  2x10  45# right 2x10  95# bilat  2x10  55# right 2x10  95# bilat  2x10  55# right 3x10  105# bilat  2x10  55# right 3x10  115# bilat  2x10  55# right   TRX squats 3x10 3x10 3x10 3x10 x10 B  2x10 kickstand 2x10 kickstand 2x10 SL TRX   HR  2x10  x20 Leg press HR with wedge 2x10 25# x20 x20 w heel sq for inv x30 w heel sq for inv              Ther Activity           Squat jump      x10  x10 jump down  3x5 u/d, a/p, s/s  2x10 jump down     Shuffle      4x20 ft 4x20 ft   Gait Training           Skipping       4x20 ft    Alter G       5' walk-jog <3 7 mph 75% BW    Modalities          CP

## 2023-02-07 ENCOUNTER — APPOINTMENT (OUTPATIENT)
Dept: PHYSICAL THERAPY | Facility: CLINIC | Age: 38
End: 2023-02-07

## 2023-02-08 ENCOUNTER — OFFICE VISIT (OUTPATIENT)
Dept: PHYSICAL THERAPY | Facility: CLINIC | Age: 38
End: 2023-02-08

## 2023-02-08 DIAGNOSIS — S82.51XD CLOSED DISPLACED FRACTURE OF MEDIAL MALLEOLUS OF RIGHT TIBIA WITH ROUTINE HEALING: Primary | ICD-10-CM

## 2023-02-08 NOTE — PROGRESS NOTES
Daily Note     Today's date: 2023  Patient name: Mac April  : 1985  MRN: [id number]  Referring provider: Melvia Buerger, DPM  Dx:   Encounter Diagnosis     ICD-10-CM    1  Closed displaced fracture of medial malleolus of right tibia with routine healing  S82  51XD                      Subjective:  She reports she felt really good after last visit with increased in functional activity  Patient wants to get into powerlifting with her son and wants to make sure her ankle can handle that pressure  Objective: See treatment diary below      Assessment: Tolerated treatment well  Patient would benefit from continued PT  Tolerated continuation of skipping and BW reduced jog well with no reports of pain in the ankle and slight discomfort in the knee  Increased resistance with squatting and lifting with excellent tolerance and form  Fatigued with eccentric 2 to 1 heel raise  Plan: Continue per plan of care  Radha Bernabe return to running  Increase resistance with squatting/RDL         Eval/ Re-eval Auth #/ Referral # Total visits Start date  Expiration date Total active units  Total manual units  PT only or  PT+OT?   10/10/22                  12/13    12  12/20  12/30                                      Date: 10/10 10/24/22 10/27 11/2 11/15 11/23/22 11/30/22 12/2   Total units authorized  Active: 1/12 2/12 3/12 4/12 5/12 6/12 7/12 8/12     Manual:                   Total units remaining  Active:     11 10 9 8 7 6 5 4     Manual:                           Date: 12/6 12/8  12/13  12/15  12/21  12/28  1/5 1/12 1/16 1/24 2/2 2/7   Total units authorized  Active: 9/12 10/12  11/12  12/12  1/12  2/12  3/12 4/12 5/12 6/12 7/12 8/12     Manual:                      Total units remaining  Active:     3 2  1    11  10  9 8 7 6 5 4     Manual:                             Precautions:  As of 23 patient is fully cleared     Specialty Daily Treatment Diary         Manuals 22 1/24/23 2/2/23 2/7/23   Visit # 13 14 15 16 17 18 19 20   STM Gastroc/ soleus/ Peroneals 2'  3' --       PROM ankle 3'          Stretch soleus gastroc 3'  3' --        Ankle mobilization     TCJ gr 4  SJ gr 4  Navicular gr 5      Warm-up           NuStep 10 min 10 min 8 min 10' 10' PRE 5' L4 URB 5'     Neuro Re-Ed           Sheet stretch prostretch 10x 10 sec prostretch 10x 10 sec prostretch 10x 10 sec prostretch 10x10" prostretch 10x10" prostretch 10x10" prostretch 10x10" prostretch 10x10"   Ankle DF stretch at stair           Side step 4 x 20 ft' red loop 4 x 20 ft' red loop 4 x 20 ft' red loop 5x20ft red loop 4 x 20 ft' green loop 4 x 20 ft' green loop  4x20ft heavy loop   SLB on foam   5x10s   5x10s 5x15s Y balance w blaze pods 3x30s   Y balance w blaze pods 3x30s    ABCs            Step downs  2x10 4"  2x10 6" lateral step down 6" lat/fwd 2x10 ea 6" lat/fwd 2x10 ea 8" lat/fwd 2x10 ea 8" lat 2x10 ea     Lunges 3 way  2x10 each  x10 each Disc lunge 3 way 10x ea Disc lunge 3 way 10x ea Disc lunge 3 way 10x ea Disc lunge 3 way 10x ea Disc lunge 3 way 10x ea w mini TT 7#   Ther Ex           Knee ext  30          SLR --          Towel scrunch           Rockerboard in sitting  Stand DF/PF  In/Ev  On woble board          Digit flex/ ext  Ankle DF MWM at step 2x10          Deadlift        3x6 18#   Leg press 2x10  85# bilat  2x10  45# right 2x10  85# bilat  2x10  45# right 2x10  85# bilat  2x10  45# right 2x10  95# bilat  2x10  55# right 2x10  95# bilat  2x10  55# right 3x10  105# bilat  2x10  55# right 3x10  115# bilat  2x10  55# right 3x10  125# bilat  3x10  65# right   TRX squats 3x10 3x10 3x10 3x10 x10 B  2x10 kickstand 2x10 kickstand 2x10 SL TRX 2x10 SL TRX  Reg 3x8 18#   HR  2x10  x20 Leg press HR with wedge 2x10 25# x20 x20 w heel sq for inv x30 w heel sq for inv x20 w heel sq for inv  x20 2:1               Ther Activity            Squat jump      x10  x10 jump down  3x5 u/d, a/p, s/s  2x10 jump down  3x5 u/d, a/p, s/s    Shuffle      4x20 ft 4x20 ft 4x20 ft   Gait Training            Skipping       4x20 ft 4x20ft    Alter G       5' walk-jog <3 7 mph 75% BW  10' walk-jog 3-5 mph 75% BW    Modalities           CP

## 2023-02-13 ENCOUNTER — OFFICE VISIT (OUTPATIENT)
Dept: PHYSICAL THERAPY | Facility: CLINIC | Age: 38
End: 2023-02-13

## 2023-02-13 DIAGNOSIS — S82.51XD CLOSED DISPLACED FRACTURE OF MEDIAL MALLEOLUS OF RIGHT TIBIA WITH ROUTINE HEALING: Primary | ICD-10-CM

## 2023-02-13 NOTE — PROGRESS NOTES
Daily Note     Today's date: 2023  Patient name: Jeaneen Jeans  : 1985  MRN: [id number]  Referring provider: Vahe Muniz DPM  Dx:   Encounter Diagnosis     ICD-10-CM    1  Closed displaced fracture of medial malleolus of right tibia with routine healing  S82  51XD           Start Time: 1800  Stop Time: 1845  Total time in clinic (min): 45 minutes    Subjective: Patient reports feeling pretty good today      Objective: See treatment diary below      Assessment: Tolerated treatment well  Patient would benefit from continued PT in order to strengthen quads, hips, and glutes as well as interossei  Did well with the addition of [place] but was challenged and had some instability  Continue to progress in strengthening exercises  Plan: Continue per plan of care        Eval/ Re-eval Auth #/ Referral # Total visits Start date  Expiration date Total active units  Total manual units  PT only or  PT+OT?   10/10/22                  12/13    12  12/20  12/30                                      Date: 10/10 10/24/22 10/27 11/2 11/15 11/23/22 11/30/22 12/2   Total units authorized  Active: 1/12 2/12 3/12 4/12 5/12 6/12 7/12 8/12     Manual:                   Total units remaining  Active:     11 10 9 8 7 6 5 4     Manual:                           Date: 12/6 12/8  12/13  12/15  12/21  12/28  1/5 1/12 1/16 1/24 2/2 2/7 2/13   Total units authorized  Active: 9/12 10/12  11/12  12/12  1/12  2/12  3/12 4/12 5/12 6/12 7/12 8/12 9/12     Manual:                       Total units remaining  Active:     3 2  1    11  10  9 8 7 6 5 4 3     Manual:                              Precautions:  As of 23 patient is fully cleared     Specialty Daily Treatment Diary         Manuals 22   Visit # 13 14 15 16 17 18 19 20 21   STM Gastroc/ soleus/ Peroneals 2'  3' --        PROM ankle 3'           Stretch soleus gastroc 3'  3' --        Cielo Fitch mobilization     TCJ gr 4  SJ gr 4  Navicular gr 5       Warm-up            NuStep 10 min 10 min 8 min 10' 10' PRE 5' L4 URB 5'   URB 5'   Neuro Re-Ed            Sheet stretch prostretch 10x 10 sec prostretch 10x 10 sec prostretch 10x 10 sec prostretch 10x10" prostretch 10x10" prostretch 10x10" prostretch 10x10" prostretch 10x10" prostretch 10 x 10"   Ankle DF stretch at stair            Side step 4 x 20 ft' red loop 4 x 20 ft' red loop 4 x 20 ft' red loop 5x20ft red loop 4 x 20 ft' green loop 4 x 20 ft' green loop  4x20ft heavy loop 4 x 20 ft heavy loop   SLB on foam   5x10s   5x10s 5x15s Y balance w blaze pods 3x30s   Y balance w blaze pods 3x30s  Ladder drills, in-in, out-out, hop scotch, double foot jump to single leg landing   ABCs             Step downs  2x10 4"  2x10 6" lateral step down 6" lat/fwd 2x10 ea 6" lat/fwd 2x10 ea 8" lat/fwd 2x10 ea 8" lat 2x10 ea  RDL 5x 3, b/l 9#    Lunges 3 way  2x10 each  x10 each Disc lunge 3 way 10x ea Disc lunge 3 way 10x ea Disc lunge 3 way 10x ea Disc lunge 3 way 10x ea Disc lunge 3 way 10x ea w mini TT 7# Disc lunge 3 way 10x ea w mini TT 7#   Ther Ex            Knee ext  30           SLR --           Towel scrunch            Rockerboard in sitting  Stand DF/PF  In/Ev  On woble board           Digit flex/ ext  Ankle DF MWM at step 2x10           Deadlift        3x6 18# 3*6 18#   Leg press 2x10  85# bilat  2x10  45# right 2x10  85# bilat  2x10  45# right 2x10  85# bilat  2x10  45# right 2x10  95# bilat  2x10  55# right 2x10  95# bilat  2x10  55# right 3x10  105# bilat  2x10  55# right 3x10  115# bilat  2x10  55# right 3x10  125# bilat  3x10  65# right 3*10 125# b/l  3*10 65# right   TRX squats 3x10 3x10 3x10 3x10 x10 B  2x10 kickstand 2x10 kickstand 2x10 SL TRX 2x10 SL TRX  Reg 3x8 18# 2*10 SL TRX   Reg 3*8 18#   HR  2x10  x20 Leg press HR with wedge 2x10 25# x20 x20 w heel sq for inv x30 w heel sq for inv x20 w heel sq for inv  x20 2:1 X 20 w heel sq for inv  x20 2:1     Ther Activity             Squat jump      x10  x10 jump down  3x5 u/d, a/p, s/s  2x10 jump down  3x5 u/d, a/p, s/s 3*5 u/d, a/p, s/s  2*10 jump down    Shuffle      4x20 ft 4x20 ft 4x20 ft 4*20 ft   Gait Training             Skipping       4x20 ft 4x20ft 4 x 20 ft    Alter G       5' walk-jog <3 7 mph 75% BW  10' walk-jog 3-5 mph 75% BW     Modalities            CP

## 2023-02-14 ENCOUNTER — APPOINTMENT (OUTPATIENT)
Dept: PHYSICAL THERAPY | Facility: CLINIC | Age: 38
End: 2023-02-14

## 2023-02-16 ENCOUNTER — EVALUATION (OUTPATIENT)
Dept: PHYSICAL THERAPY | Facility: CLINIC | Age: 38
End: 2023-02-16

## 2023-02-16 DIAGNOSIS — S82.51XD CLOSED DISPLACED FRACTURE OF MEDIAL MALLEOLUS OF RIGHT TIBIA WITH ROUTINE HEALING: Primary | ICD-10-CM

## 2023-02-16 NOTE — PROGRESS NOTES
PT Re-Evaluation     Today's date: 2023  Patient name: Juan Suggs  : 1985  MRN: [id number]  Referring provider: Vinicio Quintana DPM  Dx:   Encounter Diagnosis     ICD-10-CM    1  Closed displaced fracture of medial malleolus of right tibia with routine healing  S82  51XD                      Assessment  Assessment details: Juan Case has remaining pain, decreased LE range of motion, decreased LE strength, impaired function, decreased activity tolerance, poor balance, swelling  and poor body mechanics  Patient's clinical presentation is consistent with their referring diagnosis of Closed displaced fracture of medial malleolus of right tibia with routine healing  The pt presents with functional limitations of ADLs, recreational activities, ambulation, performing household chores, self-care and stair negotiation  Pt would benefit from continued physical therapy services to address these limitations and maximize function  Impairments: abnormal gait, abnormal muscle firing, abnormal muscle tone, abnormal or restricted ROM, abnormal movement, activity intolerance, impaired balance, impaired physical strength, lacks appropriate home exercise program, pain with function, weight-bearing intolerance, poor posture  and poor body mechanics  Understanding of Dx/Px/POC: good   Prognosis: good    Goals  Short term goals  (4 weeks)  1  Patient will have no pain right ankle  -- MET  2   Patient will have full range of motion right ankle -- MET  3  Patient will report a 50% improvement with activities of daily living --  MET  4  Patient will decrease swelling of right ankle from mod to min  --  MET    Long term goals - (8 weeks)  1  Patient will be independent with home exercise program  --  PARTIALLY MET  2  Patient will have no gait deviations ambulating on level surfaces  --  MET  3  Patient will report 80 % improvement with activity of daily living function  --  PARTIALLY MET  4    Patient will negotiate stairs up and down pain free with use of one railing    --  MOSTLY MET  5  Patient SLS to be equal bilaterally  --  PARTIALLY MET      Plan  Patient would benefit from: skilled physical therapy  Planned modality interventions: thermotherapy: hydrocollator packs and cryotherapy  Planned therapy interventions: ADL training, balance/weight bearing training, joint mobilization, manual therapy, massage, neuromuscular re-education, patient education, postural training, strengthening, stretching, functional ROM exercises, therapeutic activities, therapeutic exercise, gait training and home exercise program  Frequency: 2x week  Duration in visits: 6  Duration in weeks: 3  Treatment plan discussed with: patient        Subjective Evaluation    History of Present Illness  Date of onset: 10/1/2022  Date of surgery: 10/6/2022  Mechanism of injury: Malia reports overall improvement  Prolonged weight bearing including walking and standing remain difficult due to her right ankle becoming sore and swollen  Negotiating down stairs and squatting down remains limited due to [facility] [place] having end range ROM restrictions in right ankle  Pain  Current pain ratin  At best pain ratin  At worst pain rating: 3  Location: Medial right ankle  Quality: throbbing and dull ache (pins and needles)    Social Support    Employment status: working  Exercise history: Housework    Patient Goals  Patient goals for therapy: increased motion, increased strength and independence with ADLs/IADLs          Objective     Palpation     Right   Hypertonic in the lateral gastrocnemius, medial gastrocnemius and soleus       Neurological Testing     Sensation     Ankle/Foot     Right Ankle/Foot   Intact: light touch     Active Range of Motion   Left Ankle/Foot   Dorsiflexion (ke): 12 degrees   Plantar flexion: 68 degrees   Inversion: 38 degrees   Eversion: 20 degrees     Right Ankle/Foot   Dorsiflexion (ke): 7 degrees   Plantar flexion: 50 degrees Inversion: 18 degrees   Eversion: 11 degrees     Strength/Myotome Testing     Left Ankle/Foot   Dorsiflexion: 5  Plantar flexion: 5  Inversion: 5  Eversion: 5    Right Ankle/Foot   Dorsiflexion: 4  Plantar flexion: 4  Inversion: 4  Eversion: 4    Ambulation   Weight-Bearing Status   Weight-Bearing Status (Right): full weight-bearing    Assistive device used: none    Comments   She no longer uses a cam boot  General Comments:       Ankle/Foot Comments   She has minimal to moderate edema throughout right ankle                       Eval/ Re-eval Auth #/ Referral # Total visits Start date  Expiration date Total active units  Total manual units  PT only or  PT+OT?   10/10/22                  12/13    12  12/20  12/30                                      Date: 10/10 10/24/22 10/27 11/2 11/15 11/23/22 11/30/22 12/2   Total units authorized  Active: 1/12 2/12 3/12 4/12 5/12 6/12 7/12 8/12     Manual:                   Total units remaining  Active:     11 10 9 8 7 6 5 4     Manual:                           Date: 12/6 12/8  12/13  12/15  12/21  12/28  1/5 1/12 1/16 1/24 2/2 2/7 2/13   Total units authorized  Active: 9/12 10/12  11/12  12/12  1/12  2/12  3/12 4/12 5/12 6/12 7/12 8/12 9/12     Manual:                             Total units remaining  Active:     3 2  1    11  10  9 8 7 6 5 4 3     Manual:                                  Date: 2/16          Total units authorized  Active: 10/12            Manual:           Total units remaining  Active:     2            Manual:                Precautions:  As of 2/2/23 patient is fully cleared     Specialty Daily Treatment Diary         Manuals 1/24/23 2/2/23 2/7/23 2/13/23 2/16/23   Visit # 18 19 20 21 22   STM Gastroc/ soleus/ Peroneals            PROM ankle            Stretch soleus gastroc             Ankle mobilization            Warm-up            NuStep 5' L4 URB 5'    URB 5' Bike  5'   Neuro Re-Ed            Sheet stretch prostretch 10x10" prostretch 10x10" prostretch 10x10" prostretch 10 x 10" prostretch 10x10"   Ankle DF stretch at stair            Side step 4 x 20 ft' green loop   4x20ft heavy loop 4 x 20 ft heavy loop    SLB on foam  Y balance w blaze pods 3x30s    Y balance w blaze pods 3x30s   Ladder drills, in-in, out-out, hop scotch, double foot jump to single leg landing    ABCs             Step downs 8" lat/fwd 2x10 ea 8" lat 2x10 ea   RDL 5x 3, b/l 9# RDL 5x 3, b/l 9#    Lunges 3 way Disc lunge 3 way 10x ea Disc lunge 3 way 10x ea Disc lunge 3 way 10x ea w mini TT 7# Disc lunge 3 way 10x ea w mini TT 7# Step - up and over  9# kb  1x10 w glute drive   Ther Ex            Deadlift     3x6 18# 3*6 18#    Leg press 3x10  105# bilat  2x10  55# right 3x10  115# bilat  2x10  55# right 3x10  125# bilat  3x10  65# right 3*10 125# b/l  3*10 65# right 3*10 125# b/l  3*10 65# right   TRX squats 2x10 kickstand 2x10 SL TRX 2x10 SL TRX  Reg 3x8 18# 2*10 SL TRX   Reg 3*8 18# TRX squat    HR x20 w heel sq for inv x30 w heel sq for inv x20 w heel sq for inv  x20 2:1 X 20 w heel sq for inv  x20 2:1 TRX rev lunge  1x10 ea                Ther Activity             Squat jump x10  x10 jump down  3x5 u/d, a/p, s/s  2x10 jump down  3x5 u/d, a/p, s/s 3*5 u/d, a/p, s/s  2*10 jump down Squat on wedge  3x10    Shuffle 4x20 ft 4x20 ft 4x20 ft 4*20 ft 6 x 20 ft   Gait Training             Skipping   4x20 ft 4x20ft 4 x 20 ft 6 x 20 ft    Alter G   5' walk-jog <3 7 mph 75% BW  10' walk-jog 3-5 mph 75% BW       Modalities            CP

## 2023-02-23 ENCOUNTER — OFFICE VISIT (OUTPATIENT)
Dept: PHYSICAL THERAPY | Facility: CLINIC | Age: 38
End: 2023-02-23

## 2023-02-23 DIAGNOSIS — F32.1 CURRENT MODERATE EPISODE OF MAJOR DEPRESSIVE DISORDER WITHOUT PRIOR EPISODE (HCC): ICD-10-CM

## 2023-02-23 DIAGNOSIS — F43.10 PTSD (POST-TRAUMATIC STRESS DISORDER): ICD-10-CM

## 2023-02-23 DIAGNOSIS — F41.1 GENERALIZED ANXIETY DISORDER: ICD-10-CM

## 2023-02-23 DIAGNOSIS — S82.51XD CLOSED DISPLACED FRACTURE OF MEDIAL MALLEOLUS OF RIGHT TIBIA WITH ROUTINE HEALING: Primary | ICD-10-CM

## 2023-02-23 RX ORDER — ESCITALOPRAM OXALATE 20 MG/1
TABLET ORAL
Qty: 90 TABLET | Refills: 0 | Status: SHIPPED | OUTPATIENT
Start: 2023-02-23

## 2023-02-23 NOTE — PROGRESS NOTES
Daily Note     Today's date: 2023  Patient name: Maria Teresa Sheehan  : 1985  MRN: [id number]  Referring provider: Dusty Miller DPM  Dx:   Encounter Diagnosis     ICD-10-CM    1  Closed displaced fracture of medial malleolus of right tibia with routine healing  S82  51XD                      Subjective:  She has no complaints      Objective: See treatment diary below      Assessment: Tolerated treatment well  Patient would benefit from continued PT  She has some restriction still with DF making full crouch down stairs difficult      Plan: Continue per plan of care  Progress treatment as tolerated     Discuss D/C next session        Eval/ Re-eval Auth #/ Referral # Total visits Start date  Expiration date Total active units  Total manual units  PT only or  PT+OT?   10/10/22                  12/13    12  12/20  12/30                                      Date: 10/10 10/24/22 10/27 11/2 11/15 11/23/22 11/30/22 12/2   Total units authorized  Active: 1/12 2/12 3/12 4/12 5/12 6/12 7/12 8/12     Manual:                   Total units remaining  Active:     11 10 9 8 7 6 5 4     Manual:                           Date: 12/6 12/8  12/13  12/15  12/21  12/28  1   Total units authorized  Active: 9/12 10/12  11/12  12/12  1/12  2/12  3/12 4/12 5/12 6/12 7/12 8/12 9/12     Manual:                             Total units remaining  Active:     3 2  1    11  10  9 8 7 6 5 4 3     Manual:                                  Date:          Total units authorized  Active: 10/12 11/12           Manual:           Total units remaining  Active:     2 1           Manual:                Precautions:  As of 23 patient is fully cleared     Specialty Daily Treatment Diary         Manuals 23   Visit #    STM Gastroc/ soleus/ Peroneals           PROM ankle           Stretch soleus gastroc            Ankle mobilization           Warm-up       NuStep URB 5'    URB 5' Bike  5' Bike 5 min   Neuro Re-Ed           Sheet stretch prostretch 10x10" prostretch 10x10" prostretch 10 x 10" prostretch 10x10" Prostretch 10x10"   Ankle DF stretch at stair           Side step   4x20ft heavy loop 4 x 20 ft heavy loop     SLB on foam  Y balance w blaze pods 3x30s   Ladder drills, in-in, out-out, hop scotch, double foot jump to single leg landing  Ladder drills, in-in, out-out, hop scotch, double foot jump to single leg landing   ABCs            Step downs 8" lat 2x10 ea   RDL 5x 3, b/l 9# RDL 5x 3, b/l 9# SLDL 9#  20x    Lunges 3 way Disc lunge 3 way 10x ea Disc lunge 3 way 10x ea w mini TT 7# Disc lunge 3 way 10x ea w mini TT 7# Step - up and over  9# kb  1x10 w glute drive    Ther Ex           Deadlift   3x6 18# 3*6 18#     Leg press 3x10  115# bilat  2x10  55# right 3x10  125# bilat  3x10  65# right 3*10 125# b/l  3*10 65# right 3*10 125# b/l  3*10 65# right 3*10 125# b/l  3*10 65# right   TRX squats 2x10 SL TRX 2x10 SL TRX  Reg 3x8 18# 2*10 SL TRX   Reg 3*8 18# TRX squat  --   HR x30 w heel sq for inv x20 w heel sq for inv  x20 2:1 X 20 w heel sq for inv  x20 2:1 TRX rev lunge  1x10 ea Rev lunge  1x10 ea               Ther Activity            Squat jump 3x5 u/d, a/p, s/s  2x10 jump down  3x5 u/d, a/p, s/s 3*5 u/d, a/p, s/s  2*10 jump down Squat on wedge  3x10 Squat on wedge  3x10    Shuffle 4x20 ft 4x20 ft 4*20 ft 4 x 20 ft shuffle   2 x 60 ft shuffle  2 x 60 ft   grapevine   Gait Training       Jog 4 x 20 ft Jog 4 x 60 ft    Skipping 4x20 ft 4x20ft 4 x 20 ft 4 x 20 ft 4 x 60 ft    Alter G 5' walk-jog <3 7 mph 75% BW  10' walk-jog 3-5 mph 75% BW        Modalities           CP

## 2023-02-28 ENCOUNTER — OFFICE VISIT (OUTPATIENT)
Dept: PHYSICAL THERAPY | Facility: CLINIC | Age: 38
End: 2023-02-28

## 2023-02-28 DIAGNOSIS — S82.51XD CLOSED DISPLACED FRACTURE OF MEDIAL MALLEOLUS OF RIGHT TIBIA WITH ROUTINE HEALING: Primary | ICD-10-CM

## 2023-02-28 NOTE — PROGRESS NOTES
Daily Note     Today's date: 2023  Patient name: Debi Michael  : 1985  MRN: [id number]  Referring provider: Roxy Denver, DPM  Dx:   Encounter Diagnosis     ICD-10-CM    1  Closed displaced fracture of medial malleolus of right tibia with routine healing  S82  51XD                      Subjective: Pt reports that ankle continues to improve  Notes that her mid back has really been bothering her as of late  Feels that PT has been beneficial overall  Objective: requires cueing throughout tspine program to promote spinal motions vs full trunk or hip compensations  Corrects and is able ot maintain  Assessment: Tolerated treatment well  Patient demonstrated fatigue post treatment and would benefit from continued PT  Does well w/ exercise progressions today  Good relief noted w/ tspine mobility and postural strength program  Does well w/ below exercises and was given updated HEP to reflect these  Pt will follow up w/ primary PT and determine final POC after today  Plan: Continue per plan of care   check tspine mobility         Eval/ Re-eval Auth #/ Referral # Total visits Start date  Expiration date Total active units  Total manual units  PT only or  PT+OT?   10/10/22                  12/13    12  12/20  12/30            Auth pending [id number]                            Date: 10/10 10/24/22 10/27 11/2 11/15 11/23/22 11/30/22 12/2   Total units authorized  Active: 1/12 2/12 3/12 4/12 5/12 6/12 7/12 8/12     Manual:                   Total units remaining  Active:     11 10 9 8 7 6 5 4     Manual:                           Date: 12/6 12/8  12/13  12/15  12/21  12/28  1/   Total units authorized  Active: 9/12 10/12  11/12  12/12  1/12  2/12  3/12 4/12 5/12 6/12 7/12 8/12 9/12     Manual:                             Total units remaining  Active:     3 2  1    11  10  9 8 7 6 5 4 3     Manual:                                  Date:         Total units authorized  Active: 10/12 11/12 12/12          Manual:           Total units remaining  Active:     2 1 0          Manual:                Precautions:  As of 2/2/23 patient is fully cleared     Specialty Daily Treatment Diary         Manuals 2/2/23 2/7/23 2/13/23 2/16/23 2/23/23 2/28/23   Visit # 19 20 21 22 23 24   STM Gastroc/ soleus/ Peroneals            PROM ankle            Stretch soleus gastroc             Ankle mobilization            Warm-up            NuStep URB 5'    URB 5' Bike  5' Bike 5 min Upright 6'    Neuro Re-Ed            Sheet stretch prostretch 10x10" prostretch 10x10" prostretch 10 x 10" prostretch 10x10" Prostretch 10x10" rev   Ankle DF stretch at stair            Side step   4x20ft heavy loop 4 x 20 ft heavy loop      SLB on foam  Y balance w blaze pods 3x30s   Ladder drills, in-in, out-out, hop scotch, double foot jump to single leg landing  Ladder drills, in-in, out-out, hop scotch, double foot jump to single leg landing rev   ABCs             Step downs 8" lat 2x10 ea   RDL 5x 3, b/l 9# RDL 5x 3, b/l 9# SLDL 9#  20x 18# x10-15    Lunges 3 way Disc lunge 3 way 10x ea Disc lunge 3 way 10x ea w mini TT 7# Disc lunge 3 way 10x ea w mini TT 7# Step - up and over  9# kb  1x10 w glute drive  rev         Half kneel tspine rot x10-15 B, open books x10-15         Seated tspine ext 2x10         Green loop B ER w/ scap sq x20, w/ lift off x20         B ER w/ scap sq GTB x20         Wall angels x20         HEP rev x3-4 mins            Ther Ex            Deadlift   3x6 18# 3*6 18#      Leg press 3x10  115# bilat  2x10  55# right 3x10  125# bilat  3x10  65# right 3*10 125# b/l  3*10 65# right 3*10 125# b/l  3*10 65# right 3*10 125# b/l  3*10 65# right 125# doubles x10, singles down to 65# 3x10    TRX squats 2x10 SL TRX 2x10 SL TRX  Reg 3x8 18# 2*10 SL TRX   Reg 3*8 18# TRX squat  -- 18# goblet and sumo x20    HR x30 w heel sq for inv x20 w heel sq for inv  x20 2:1 X 20 w heel sq for inv  x20 2:1 TRX rev lunge  1x10 ea Rev lunge  1x10 ea rev             PPU x10-15 total         tspine mobility/posture assessment x 5 mins            Ther Activity             Squat jump 3x5 u/d, a/p, s/s  2x10 jump down  3x5 u/d, a/p, s/s 3*5 u/d, a/p, s/s  2*10 jump down Squat on wedge  3x10 Squat on wedge  3x10     Shuffle 4x20 ft 4x20 ft 4*20 ft 4 x 20 ft shuffle   2 x 60 ft shuffle  2 x 60 ft   grapevine    Gait Training       Jog 4 x 20 ft Jog 4 x 60 ft     Skipping 4x20 ft 4x20ft 4 x 20 ft 4 x 20 ft 4 x 60 ft     Alter G 5' walk-jog <3 7 mph 75% BW  10' walk-jog 3-5 mph 75% BW         Modalities            CP                              From 2/28:  Access Code: [facility]  URL: [email or web address]  Date: 02/28/2023  Prepared by: Carline Mccullough    Exercises  • Seated Thoracic Lumbar Extension with Pectoralis Stretch - 1 x daily - 7 x weekly - 3 sets - 10 reps  • Standing Shoulder External Rotation with Resistance - 1 x daily - 7 x weekly - 3 sets - 10 reps  • Wall [place] - 1 x daily - 7 x weekly - 3 sets - 10 reps  • Low Trap Setting at [address] 1 x daily - 7 x weekly - 3 sets - 10 reps  • Sidelying Open Book Thoracic Rotation with Knee on Foam Roll - 1 x daily - 7 x weekly - 3 sets - 10 reps  • Standing Thoracic Open Book at [address] - 1 x daily - 7 x weekly - 3 sets - 10 reps  • Single Leg Deadlift with Kettlebell - 1 x daily - 7 x weekly - 3 sets - 10 reps  • Standing Shoulder Row with Anchored Resistance - 1 x daily - 7 x weekly - 3 sets - 10 reps  • Shoulder extension with resistance - Neutral - 1 x daily - 7 x weekly - 3 sets - 10 reps

## 2023-03-02 ENCOUNTER — OFFICE VISIT (OUTPATIENT)
Dept: PHYSICAL THERAPY | Facility: CLINIC | Age: 38
End: 2023-03-02

## 2023-03-02 DIAGNOSIS — S82.51XD CLOSED DISPLACED FRACTURE OF MEDIAL MALLEOLUS OF RIGHT TIBIA WITH ROUTINE HEALING: Primary | ICD-10-CM

## 2023-03-02 NOTE — PROGRESS NOTES
Daily Note     Today's date: 3/2/2023  Patient name: Bethany Thomas  : 1985  MRN: [id number]  Referring provider: Sharri Gu DPM  Dx:   Encounter Diagnosis     ICD-10-CM    1  Closed displaced fracture of medial malleolus of right tibia with routine healing  S82  51XD                      Subjective: Pt reports that she felt good after last session  Felt like exercises helped and that she loosened up quite a bit  Objective: requires cueing during all TRX work to engage scapular retractors prior to initiating arm motions, able to to correct and maintain  Assessment: Tolerated treatment well  Patient demonstrated fatigue post treatment and would benefit from continued PT  Sahara Kumar well w/ exercise progressions today, spent time reviewing how to perform these exercises in relation to lifting and carrying child w/ good understanding  Will continue w/ full body strength work as sx allow  Focus on techniques that can be replicated at home or gym  Given info about [employer] for possible training independent of PT  Plan: Continue per plan of care   full body strength progressions         Eval/ Re-eval Auth #/ Referral # Total visits Start date  Expiration date Total active units  Total manual units  PT only or  PT+OT?   10/10/22                  12/13    12  12/20  12/30            Auth pending [id number]                            Date: 10/10 10/24/22 10/27 11/2 11/15 11/23/22 11/30/22 12/2   Total units authorized  Active: 1/12 2/12 3/12 4/12 5/12 6/12 7/12 8/12     Manual:                   Total units remaining  Active:     11 10 9 8 7 6 5 4     Manual:                           Date: 12/6 12/8  12/13  12/15  12/21  12/28  1   Total units authorized  Active: 9/12 10/12  11/12  12/12  1/12  2/12  3/12 4/12 5/12 6/12 7/12 8/12 9/12     Manual:                             Total units remaining  Active:     3 2  1    11  10  9 8 7 6 5 4 3     Manual:                                  Date: 2/16 2/23 2/28 3/2       Total units authorized  Active: 10/12 11/12 12/12 13/12         Manual:           Total units remaining  Active:     2 1 0 -1         Manual:                Precautions:  As of 2/2/23 patient is fully cleared     Specialty Daily Treatment Diary         Manuals 3/2/23  2/13/23 2/16/23 2/23/23 2/28/23   Visit # 25  21 22 23 24   STM Gastroc/ soleus/ Peroneals            PROM ankle            Stretch soleus gastroc             Ankle mobilization            Warm-up            NuStep URB 5'  pre    URB 5' Bike  5' Bike 5 min Upright 6'    Neuro Re-Ed            Sheet stretch  prostretch 10x10" prostretch 10 x 10" prostretch 10x10" Prostretch 10x10" rev   Ankle DF stretch at stair           Side step  4x20ft heavy loop 4 x 20 ft heavy loop      SLB on foam     Ladder drills, in-in, out-out, hop scotch, double foot jump to single leg landing  Ladder drills, in-in, out-out, hop scotch, double foot jump to single leg landing rev   ABCs             Step downs    RDL 5x 3, b/l 9# RDL 5x 3, b/l 9# SLDL 9#  20x 18# x10-15    Lunges 3 way  Disc lunge 3 way 10x ea w mini TT 7# Disc lunge 3 way 10x ea w mini TT 7# Step - up and over  9# kb  1x10 w glute drive  rev    Half kneel tspine rot 2x10 B, open books x10-15       Half kneel tspine rot x10-15 B, open books x10-15         Seated tspine ext 2x10    Green loop wall slide w/ lift off 2x10      Green loop B ER w/ scap sq x20, w/ lift off x20    B ER w/ scap sq green 2x10      B ER w/ scap sq GTB x20    2x10     Wall angels x20    Rev of HEP x 2-3 mins      HEP rev x3-4 mins    TRX rows 2x10, ecc I's and T's x 5 each        Ther Ex            Deadlift   3x6 18# 3*6 18#      Leg press  3x10  125# bilat  3x10  65# right 3*10 125# b/l  3*10 65# right 3*10 125# b/l  3*10 65# right 3*10 125# b/l  3*10 65# right 125# doubles x10, singles down to 65# 3x10    TRX squats  2x10 SL TRX  Reg 3x8 18# 2*10 SL TRX   Reg 3*8 18# TRX squat  -- 18# goblet and sumo x20    HR 40# sand bag squats 2x10 x20 w heel sq for inv  x20 2:1 X 20 w heel sq for inv  x20 2:1 TRX rev lunge  1x10 ea Rev lunge  1x10 ea rev     PPU 2x10        PPU x10-15 total    [place] rows 26# KB 2x10      tspine mobility/posture assessment x 5 mins    Seated tspine x 10-15  overhalf foam x 2-3 mins        Ther Activity             Squat jump  3x5 u/d, a/p, s/s 3*5 u/d, a/p, s/s  2*10 jump down Squat on wedge  3x10 Squat on wedge  3x10     Shuffle  4x20 ft 4*20 ft 4 x 20 ft shuffle   2 x 60 ft shuffle  2 x 60 ft   grapevine    Gait Training       Jog 4 x 20 ft Jog 4 x 60 ft     Skipping 4x20 ft 4x20ft 4 x 20 ft 4 x 20 ft 4 x 60 ft     Alter G 5' walk-jog <3 7 mph 75% BW  10' walk-jog 3-5 mph 75% BW         Modalities            CP                              From 2/28:  Access Code: [facility]  URL: [email or web address]  Date: 02/28/2023  Prepared by: Sara Polanco    Exercises  • Seated Thoracic Lumbar Extension with Pectoralis Stretch - 1 x daily - 7 x weekly - 3 sets - 10 reps  • Standing Shoulder External Rotation with Resistance - 1 x daily - 7 x weekly - 3 sets - 10 reps  • Wall [place] - 1 x daily - 7 x weekly - 3 sets - 10 reps  • Low Trap Setting at [address] 1 x daily - 7 x weekly - 3 sets - 10 reps  • Sidelying Open Book Thoracic Rotation with Knee on Foam Roll - 1 x daily - 7 x weekly - 3 sets - 10 reps  • Standing Thoracic Open Book at [address] - 1 x daily - 7 x weekly - 3 sets - 10 reps  • Single Leg Deadlift with Kettlebell - 1 x daily - 7 x weekly - 3 sets - 10 reps  • Standing Shoulder Row with Anchored Resistance - 1 x daily - 7 x weekly - 3 sets - 10 reps  • Shoulder extension with resistance - Neutral - 1 x daily - 7 x weekly - 3 sets - 10 reps

## 2023-03-07 ENCOUNTER — OFFICE VISIT (OUTPATIENT)
Dept: PHYSICAL THERAPY | Facility: CLINIC | Age: 38
End: 2023-03-07

## 2023-03-07 DIAGNOSIS — S82.51XD CLOSED DISPLACED FRACTURE OF MEDIAL MALLEOLUS OF RIGHT TIBIA WITH ROUTINE HEALING: Primary | ICD-10-CM

## 2023-03-07 NOTE — PROGRESS NOTES
Daily Note     Today's date: 3/7/2023  Patient name: Mer Mathias  : 1985  MRN: [id number]  Referring provider: Ania Rai DPM  Dx:   Encounter Diagnosis     ICD-10-CM    1  Closed displaced fracture of medial malleolus of right tibia with routine healing  S82  51XD                      Subjective:  No compalints      Objective: treatment per flow sheet      Assessment: Tolerated treatment well  She was challenged with single leg activities  She would benefit from continued functional strengthening and increases to proprio  Plan: Continue per plan of care    Progress proprioception to include dynamic motions on soft surfaces        Eval/ Re-eval Auth #/ Referral # Total visits Start date  Expiration date Total active units  Total manual units  PT only or  PT+OT?   10/10/22                  12/13    12  12/20  12/30            Auth pending [id number]                            Date: 10/10 10/24/22 10/27 11/2 11/15 11/23/22 11/30/22 12/2   Total units authorized  Active: 1/12 2/12 3/12 4/12 5/12 6/12 7/12 8/12     Manual:                   Total units remaining  Active:     11 10 9 8 7 6 5 4     Manual:                           Date: 12/6 12/8  12/13  12/15  12/21  12/28  1   Total units authorized  Active: 9/12 10/12  11/12  12/12  1/12  2/12  3/12 4/12 5/12 6/12 7/12 8/12 9/12     Manual:                             Total units remaining  Active:     3 2  1    11  10  9 8 7 6 5 4 3     Manual:                                  Date: 2/16 2/23 2/28 3/2 3/7      Total units authorized  Active: 10/12 11/12 12/12 1/12 2/12        Manual:           Total units remaining  Active:     2 1 0 11 10        Manual:                Precautions:  As of 23 patient is fully cleared     Specialty Daily Treatment Diary         Manuals 3/2/23 3/7/23      Visit # 17 26      STM Gastroc/ soleus/ Peroneals          PROM ankle          Stretch soleus gastroc           Ankle mobilization          Warm-up          NuStep URB 5'  pre   Bike 7  5'      Neuro Re-Ed          Sheet stretch  prostretch 10x10"      Ankle DF stretch at stair               Side step        SLB on foam    SLS D1/D2  2x5 ea      SLDL   SLDL 9# KB 2X10       Lunges 3 way  Dome lunge w vertical TT 2x10 ea       Half kneel tspine rot 2x10 B, open books x10-15                  Green loop wall slide w/ lift off 2x10         B ER w/ scap sq green 2x10         2x10        Rev of HEP x 2-3 mins         TRX rows 2x10, ecc I's and T's x 5 each       Ther Ex          Deadlift   2x10 SLDL 9#      Leg press  3x10  125# bilat  3x10  65# right      TRX squats  2x10 SL TRX        HR 40# sand bag squats 2x10         PPU 2x10          Gorilla rows 26# KB 2x10         Seated tspine x 10-15  overhalf foam x 2-3 mins       Ther Activity                    Shuffle  4x20 ft      Gait Training    jog 4 x 60 ft       Skipping 4x20 ft 4x60 ft A-skip fwd and lat       Alter G 5' walk-jog <3 7 mph 75% BW   Single arm snatch  15#  20x ea       Modalities           CP                            From 2/28:  Access Code: [facility]  URL: [email or web address]  Date: 02/28/2023  Prepared by: Suly Coyne    Exercises  • Seated Thoracic Lumbar Extension with Pectoralis Stretch - 1 x daily - 7 x weekly - 3 sets - 10 reps  • Standing Shoulder External Rotation with Resistance - 1 x daily - 7 x weekly - 3 sets - 10 reps  • Wall [place] - 1 x daily - 7 x weekly - 3 sets - 10 reps  • Low Trap Setting at [address] 1 x daily - 7 x weekly - 3 sets - 10 reps  • Sidelying Open Book Thoracic Rotation with Knee on Foam Roll - 1 x daily - 7 x weekly - 3 sets - 10 reps  • Standing Thoracic Open Book at [address] - 1 x daily - 7 x weekly - 3 sets - 10 reps  • Single Leg Deadlift with Kettlebell - 1 x daily - 7 x weekly - 3 sets - 10 reps  • Standing Shoulder Row with Anchored Resistance - 1 x daily - 7 x weekly - 3 sets - 10 reps  • Shoulder extension with resistance - Neutral - 1 x daily - 7 x weekly - 3 sets - 10 reps

## 2023-03-14 ENCOUNTER — APPOINTMENT (OUTPATIENT)
Dept: PHYSICAL THERAPY | Facility: CLINIC | Age: 38
End: 2023-03-14

## 2023-03-15 ENCOUNTER — OFFICE VISIT (OUTPATIENT)
Dept: PHYSICAL THERAPY | Facility: CLINIC | Age: 38
End: 2023-03-15

## 2023-03-15 DIAGNOSIS — S82.51XD CLOSED DISPLACED FRACTURE OF MEDIAL MALLEOLUS OF RIGHT TIBIA WITH ROUTINE HEALING: Primary | ICD-10-CM

## 2023-03-15 NOTE — PROGRESS NOTES
Daily Note     Today's date: 3/15/2023  Patient name: Mer Mathias  : 1985  MRN: [id number]  Referring provider: Ania Rai DPM  Dx:   Encounter Diagnosis     ICD-10-CM    1  Closed displaced fracture of medial malleolus of right tibia with routine healing  S82  51XD                      Subjective:  No compalints of ankle pain  She does have right > left upper back and scaular pain      Objective: treatment per flow sheet      Assessment: Tolerated treatment well  She was challenged with single leg activities  Taught her side lying rotation forward and backward to improve upper T-S mobility        Plan: Continue per plan of care            Eval/ Re-eval Auth #/ Referral # Total visits Start date  Expiration date Total active units  Total manual units  PT only or  PT+OT?   10/10/22                  12/13    12  12/20  12/30            Auth pending [id number]                            Date: 10/10 10/24/22 10/27 11/2 11/15 11/23/22 11/30/22 12/2   Total units authorized  Active: 1/12 2/12 3/12 4/12 5/12 6/12 7/12 8/12     Manual:                   Total units remaining  Active:     11 10 9 8 7 6 5 4     Manual:                           Date: 12/6 12/8  12/13  12/15  12/21  12/28  1   Total units authorized  Active: 9/12 10/12  11/12  12/12  1/12  2/12  3/12 4/12 5/12 6/12 7/12 8/12 9/12     Manual:                             Total units remaining  Active:     3 2  1    11  10  9 8 7 6 5 4 3     Manual:                                  Date: 2/16 2/23 2/28 3/2 3/7 3/15     Total units authorized  Active: 10/12 11/12 12/12 1/12 2/12 3/12       Manual:           Total units remaining  Active:     2 1 0 11 10 9       Manual:                Precautions:  As of 23 patient is fully cleared     Specialty Daily Treatment Diary         Manuals 3/2/23 3/7/23 3/15/23     Visit # 25 26 27     STM Gastroc/ soleus/ Peroneals          PROM ankle          Stretch soleus gastroc      Ankle mobilization          Warm-up          NuStep URB 5'  pre   Bike 7 5' Bike 8 min     Neuro Re-Ed          Sheet stretch  prostretch 10x10" prostretch 10x10"     Ankle DF stretch at stair               Side step        SLB on foam    SLS D1/D2  2x5 ea SLS D1/D2  2x5 ea     SLDL   SLDL 9# KB 2X10 SLDL 18# KB 2X10      Lunges 3 way  Dome lunge w vertical TT 2x10 ea BOSU lat step up w glute drive 88X      Half kneel tspine rot 2x10 B, open books x10-15                  Green loop wall slide w/ lift off 2x10         B ER w/ scap sq green 2x10         TRX rows 2x10, ecc I's and T's x 5 each       Ther Ex          Deadlift   2x10 SLDL 9# 2x10 SLDL 18#     Leg press  3x10  125# bilat  3x10  65# right 3x10  125# bilat  3x20  65# right     TRX squats  2x10 SL TRX   2x10 SL TRX     HR 40# sand bag squats 2x10  Step up glute drive to HR 48G       PPU 2x10          Gorilla rows 26# KB 2x10         Seated tspine x 10-15  overhalf foam x 2-3 mins       Ther Activity                    Shuffle  4x20 ft      Gait Training    jog 4 x 60 ft       Skipping 4x20 ft 4x60 ft A-skip fwd and lat       Alter G 5' walk-jog <3 7 mph 75% BW   Single arm snatch  15#  20x ea       Modalities           CP                            From 2/28:  Access Code: [facility]  URL: [email or web address]  Date: 02/28/2023  Prepared by: Honorio Angela    Exercises  • Seated Thoracic Lumbar Extension with Pectoralis Stretch - 1 x daily - 7 x weekly - 3 sets - 10 reps  • Standing Shoulder External Rotation with Resistance - 1 x daily - 7 x weekly - 3 sets - 10 reps  • Wall [place] - 1 x daily - 7 x weekly - 3 sets - 10 reps  • Low Trap Setting at [address] [place] [address] 1 x daily - 7 x weekly - 3 sets - 10 reps  • Sidelying Open Book Thoracic Rotation with Knee on Foam Roll - 1 x daily - 7 x weekly - 3 sets - 10 reps  • Standing Thoracic Open Book at [address] [place] [address] 1 x daily - 7 x weekly - 3 sets - 10 reps  • Single Leg Deadlift with Kettlebell - 1 x daily - 7 x weekly - 3 sets - 10 reps  • Standing Shoulder Row with Anchored Resistance - 1 x daily - 7 x weekly - 3 sets - 10 reps  • Shoulder extension with resistance - Neutral - 1 x daily - 7 x weekly - 3 sets - 10 reps

## 2023-03-16 ENCOUNTER — APPOINTMENT (OUTPATIENT)
Dept: PHYSICAL THERAPY | Facility: CLINIC | Age: 38
End: 2023-03-16

## 2023-03-17 ENCOUNTER — OFFICE VISIT (OUTPATIENT)
Dept: PHYSICAL THERAPY | Facility: CLINIC | Age: 38
End: 2023-03-17

## 2023-03-17 DIAGNOSIS — S82.51XD CLOSED DISPLACED FRACTURE OF MEDIAL MALLEOLUS OF RIGHT TIBIA WITH ROUTINE HEALING: Primary | ICD-10-CM

## 2023-03-17 NOTE — PROGRESS NOTES
Daily Note     Today's date: 3/17/2023  Patient name: Shaila Grewal  : 1985  MRN: [id number]  Referring provider: Magdalena Closs, DPM  Dx:   Encounter Diagnosis     ICD-10-CM    1  Closed displaced fracture of medial malleolus of right tibia with routine healing  S82  51XD                      Subjective:  Mild LBP today  No ankle complaints      Objective: treatment per flow sheet      Assessment: Tolerated treatment well  She could hop with no ankle pain  Talked to her about performing child's pose to stretch her low back        Plan: Continue per plan of care            Eval/ Re-eval Auth #/ Referral # Total visits Start date  Expiration date Total active units  Total manual units  PT only or  PT+OT?   10/10/22                  12/13    12  12/20  12/30            Auth pending [id number]                            Date: 10/10 10/24/22 10/27 11/2 11/15 11/23/22 11/30/22 12/2   Total units authorized  Active: 1/12 2/12 3/12 4/12 5/12 6/12 7/12 8/12     Manual:                   Total units remaining  Active:     11 10 9 8 7 6 5 4     Manual:                           Date: 12/6 12/8  12/13  12/15  12/21  12/28  1/5 1/12 1/16 1/24 2/2 2/7 2/13   Total units authorized  Active: 9/12 10/12  11/12  12/12  1/12  2/12  3/12 4/12 5/12 6/12 7/12 8/12 9/12     Manual:                             Total units remaining  Active:     3 2  1    11  10  9 8 7 6 5 4 3     Manual:                                  Date: 2/16 2/23 2/28 3/2 3/7 3/15 3/17    Total units authorized  Active: 10/12 11/12 12/12 1/12 2/12 3/12 4/12      Manual:           Total units remaining  Active:     2 1 0 11 10 9 8      Manual:                Precautions:  As of 23 patient is fully cleared     Specialty Daily Treatment Diary         Manuals 3/2/23 3/7/23 3/15/23 3/17/23    Visit # 25 26 27 28    STM Gastroc/ soleus/ Peroneals          PROM ankle      5 min    Stretch soleus gastroc           Ankle mobilization          Warm-up     NuStep URB 5'  pre   Bike 7 5' Bike 8 min Bike 5 min    Neuro Re-Ed          Sheet stretch  prostretch 10x10" prostretch 10x10" prostretch 10x10"    Ankle DF stretch at stair           Walking lunge w trunk rotation w tidal tube 6x20 ft    Side step        SLB on foam    SLS D1/D2  2x5 ea SLS D1/D2  2x5 ea     SLDL   SLDL 9# KB 2X10 SLDL 18# KB 2X10 SLDL 18# KB 2X10     Lunges 3 way  Dome lunge w vertical TT 2x10 ea BOSU lat step up w glute drive 74D      Half kneel tspine rot 2x10 B, open books x10-15                  Green loop wall slide w/ lift off 2x10         B ER w/ scap sq green 2x10         TRX rows 2x10, ecc I's and T's x 5 each       Ther Ex          Deadlift   2x10 SLDL 9# 2x10 SLDL 18# 2x10 SLDL 18#    Leg press  3x10  125# bilat  3x10  65# right 3x10  125# bilat  3x20  65# right 3x10  125# bilat  3x20  65# right    TRX squats  2x10 SL TRX   2x10 SL TRX TRX rev lunge 20x    HR 40# sand bag squats 2x10  Step up glute [address] to HR 03L Step up glute [address] to HR 52Q fwd / lat      PPU 2x10          Gorilla rows 26# KB 2x10         Seated tspine x 10-15  overhalf foam x 2-3 mins       Ther Activity                    Shuffle  4x20 ft  [place] 4 x 60 ft    Gait Training    jog 4 x 60 ft  4 x 60 ft     Skipping 4x20 ft 4x60 ft A-skip fwd and lat  2 x 60 ft ea  A-skip  Fwd/lat     Alter G 5' walk-jog <3 7 mph 75% BW   Single arm snatch  15#  20x ea       Modalities           CP                            From 2/28:  Access Code: [facility]  URL: [email or web address]  Date: 02/28/2023  Prepared by: Flex Garibay    Exercises  • Seated Thoracic Lumbar Extension with Pectoralis Stretch - 1 x daily - 7 x weekly - 3 sets - 10 reps  • Standing Shoulder External Rotation with Resistance - 1 x daily - 7 x weekly - 3 sets - 10 reps  • Wall [place] - 1 x daily - 7 x weekly - 3 sets - 10 reps  • Low Trap Setting at [address] 1 x daily - 7 x weekly - 3 sets - 10 reps  • Sidelying Open Book Thoracic Rotation with Knee on Foam Roll - 1 x daily - 7 x weekly - 3 sets - 10 reps  • Standing Thoracic Open Book at [address] - 1 x daily - 7 x weekly - 3 sets - 10 reps  • Single Leg Deadlift with Kettlebell - 1 x daily - 7 x weekly - 3 sets - 10 reps  • Standing Shoulder Row with Anchored Resistance - 1 x daily - 7 x weekly - 3 sets - 10 reps  • Shoulder extension with resistance - Neutral - 1 x daily - 7 x weekly - 3 sets - 10 reps

## 2023-03-20 ENCOUNTER — APPOINTMENT (OUTPATIENT)
Dept: PHYSICAL THERAPY | Facility: CLINIC | Age: 38
End: 2023-03-20

## 2023-03-21 ENCOUNTER — APPOINTMENT (OUTPATIENT)
Dept: PHYSICAL THERAPY | Facility: CLINIC | Age: 38
End: 2023-03-21

## 2023-03-23 ENCOUNTER — APPOINTMENT (OUTPATIENT)
Dept: PHYSICAL THERAPY | Facility: CLINIC | Age: 38
End: 2023-03-23

## 2023-03-27 ENCOUNTER — OFFICE VISIT (OUTPATIENT)
Dept: PHYSICAL THERAPY | Facility: CLINIC | Age: 38
End: 2023-03-27

## 2023-03-27 DIAGNOSIS — S82.51XD CLOSED DISPLACED FRACTURE OF MEDIAL MALLEOLUS OF RIGHT TIBIA WITH ROUTINE HEALING: Primary | ICD-10-CM

## 2023-03-27 NOTE — PROGRESS NOTES
Daily Note     Today's date: 3/27/2023  Patient name: Brain Shaw  : 1985  MRN: [id number]  Referring provider: Claudette Pardon, DPM  Dx:   Encounter Diagnosis     ICD-10-CM    1  Closed displaced fracture of medial malleolus of right tibia with routine healing  S82  51XD                      Subjective:  No ankle or low back pain      Objective: treatment per flow sheet      Assessment: Tolerated treatment well  Began some hopping motions today  She had no pain during these          Plan: Continue per plan of care            Eval/ Re-eval Auth #/ Referral # Total visits Start date  Expiration date Total active units  Total manual units  PT only or  PT+OT?   10/10/22                  12/13    12  12/20  12/30            Auth pending [id number]                            Date: 10/10 10/24/22 10/27 11/2 11/15 11/23/22 11/30/22 12/2   Total units authorized  Active: 1/12 2/12 3/12 4/12 5/12 6/12 7/12 8/12     Manual:                   Total units remaining  Active:     11 10 9 8 7 6 5 4     Manual:                           Date: 12/6 12/8  12/13  12/15  12/21  12/28  1   Total units authorized  Active: 9/12 10/12  11/12  12/12  1/12  2/12  3/12 4/12 5/12 6/12 7/12 8/12 9/12     Manual:                             Total units remaining  Active:     3 2  1    11  10  9 8 7 6 5 4 3     Manual:                                  Date: 2/16 2/23 2/28 3/2 3/7 3/15 3/17 3/27    Total units authorized  Active: 10/12 11/12 12/12 1/12 2/12 3/12 4/12 5/12      Manual:            Total units remaining  Active:     2 1 0 11 10 9 8 7      Manual:                 Precautions:  As of 23 patient is fully cleared     Specialty Daily Treatment Diary         Manuals 3/2/23 3/7/23 3/15/23 3/17/23 3/27/23   Visit # 25 26 27 28 29   STM Gastroc/ soleus/ Peroneals          PROM ankle      5 min    Stretch soleus gastroc           Ankle mobilization          Warm-up          NuStep URB 5'  pre   Bike "7 5' Bike 8 min Bike 5 min 5' bike   Neuro Re-Ed          Sheet stretch  prostretch 10x10\" prostretch 10x10\" prostretch 10x10\" prostretch 10x10\"   Ankle DF stretch at stair           Walking lunge w trunk rotation w tidal tube 6x20 ft    Side step        SLB on foam    SLS D1/D2  2x5 ea SLS D1/D2  2x5 ea     SLDL   SLDL 9# KB 2X10 SLDL 18# KB 2X10 SLDL 18# KB 2X10 SLDL 18# KB 2X10    Lunges 3 way  Dome lunge w vertical TT 2x10 ea BOSU lat step up w glute [address] 40R      Half kneel tspine rot 2x10 B, open books x10-15                  Green [place] wall slide w/ lift off 2x10         B ER w/ scap sq green 2x10     Fwd/ Lat hop 10 ft x 6    TRX rows 2x10, ecc I's and T's x 5 each       Ther Ex          Deadlift   2x10 SLDL 9# 2x10 SLDL 18# 2x10 SLDL 18# 2x10 SLDL 18#   Leg press  3x10  125# bilat  3x10  65# right 3x10  125# bilat  3x20  65# right 3x10  125# bilat  3x20  65# right 3x10  205# bilat  3x20  105# right   TRX squats  2x10 SL TRX   2x10 SL TRX TRX rev lunge 20x    HR 40# sand bag squats 2x10  Step up [address] to HR 84A Step up [address] to HR 84L fwd / lat      PPU 2x10          [place] rows 26# KB 2x10     Skaters with post toe touch 2x10    Seated tspine x 10-15  overhalf foam x 2-3 mins    SL ball slam  2x10  10# ball   Ther Activity                    Shuffle  4x20 ft  [place] 4 x 60 ft Run grapevine skip 2x60' then repeat   Gait Training    jog 4 x 60 ft  4 x 60 ft     Skipping 4x20 ft 4x60 ft A-skip fwd and lat  2 x 60 ft ea  A-skip  Fwd/lat     Alter G 5' walk-jog <3 7 mph 75% BW   Single arm snatch  15#  20x ea       Modalities           CP                            From 2/28:  Access Code: [facility]  URL: [email or web address]  Date: 02/28/2023  Prepared by: Caleb Steven    Exercises  • Seated Thoracic Lumbar Extension with Pectoralis Stretch - 1 x daily - 7 x weekly - 3 sets - 10 reps  • Standing Shoulder External Rotation with Resistance - 1 x daily - 7 x weekly - 3 sets - 10 " reps  • Wall [place] - 1 x daily - 7 x weekly - 3 sets - 10 reps  • Low Trap Setting at [address] - 1 x daily - 7 x weekly - 3 sets - 10 reps  • Sidelying Open Book Thoracic Rotation with Knee on Foam Roll - 1 x daily - 7 x weekly - 3 sets - 10 reps  • Standing Thoracic Open Book at [address] - 1 x daily - 7 x weekly - 3 sets - 10 reps  • Single Leg Deadlift with Kettlebell - 1 x daily - 7 x weekly - 3 sets - 10 reps  • Standing Shoulder Row with Anchored Resistance - 1 x daily - 7 x weekly - 3 sets - 10 reps  • Shoulder extension with resistance - Neutral - 1 x daily - 7 x weekly - 3 sets - 10 reps

## 2023-03-28 ENCOUNTER — APPOINTMENT (OUTPATIENT)
Dept: PHYSICAL THERAPY | Facility: CLINIC | Age: 38
End: 2023-03-28

## 2023-03-29 ENCOUNTER — OFFICE VISIT (OUTPATIENT)
Dept: PHYSICAL THERAPY | Facility: CLINIC | Age: 38
End: 2023-03-29

## 2023-03-29 DIAGNOSIS — S82.51XD CLOSED DISPLACED FRACTURE OF MEDIAL MALLEOLUS OF RIGHT TIBIA WITH ROUTINE HEALING: Primary | ICD-10-CM

## 2023-03-29 NOTE — PROGRESS NOTES
Daily Note     Today's date: 3/29/2023  Patient name: Yecenia Rojas  : 1985  MRN: [id number]  Referring provider: Anisa Suárez DPM  Dx:   Encounter Diagnosis     ICD-10-CM    1  Closed displaced fracture of medial malleolus of right tibia with routine healing  S82  51XD                      Subjective:  She was able to perform heavy squats with no LBP  No ankle complaints  Objective: treatment per flow sheet      Assessment:  She felt fatigue with leg press hops   She would bebfit from continued strengthening      Plan: Continue per plan of care            Eval/ Re-eval Auth #/ Referral # Total visits Start date  Expiration date Total active units  Total manual units  PT only or  PT+OT?   10/10/22                  12/13    12  12/20  12/30            Auth pending [id number]                            Date: 10/10 10/24/22 10/27 11/2 11/15 11/23/22 11/30/22 12/2   Total units authorized  Active: 1/12 2/12 3/12 4/12 5/12 6/12 7/12 8/12     Manual:                   Total units remaining  Active:     11 10 9 8 7 6 5 4     Manual:                           Date: 12/6 12/8  12/13  12/15  12/21  12/28  1/5 1/12 1/16 1/24 2/2 2/7 2/13   Total units authorized  Active: 9/12 10/12  11/12  12/12  1/12  2/12  3/12 4/12 5/12 6/12 7/12 8/12 9/12     Manual:                             Total units remaining  Active:     3 2  1    11  10  9 8 7 6 5 4 3     Manual:                                  Date: 2/16 2/23 2/28 3/2 3/7 3/15 3/17 3/27 3/29   Total units authorized  Active: 10/12 11/12 12/12 1/12 2/12 3/12 4/12 5/12 6/12     Manual:            Total units remaining  Active:     2 1 0 11 10 9 8 7 6     Manual:                 Precautions:  As of 23 patient is fully cleared     Specialty Daily Treatment Diary         Manuals 3/7/23 3/15/23 3/17/23 3/27/23 3/29/23   Visit # 26 27 28 29 30   STM Gastroc/ soleus/ Peroneals         PROM ankle    5 min  5 min   Stretch soleus gastroc                  Warm-up   "  NuStep  Bike 7 5' Bike 8 min Bike 5 min 5' bike 5' bike   Neuro Re-Ed         Sheet stretch prostretch 10x10\" prostretch 10x10\" prostretch 10x10\" prostretch 10x10\" prostretch 10x10\"   Ankle DF stretch at stair          Walking lunge w trunk rotation w tidal tube 6x20 ft  Walking lunge w trunk twist 6x10ft 10# tidal tube   Side step        SLB on foam   SLS D1/D2  2x5 ea SLS D1/D2  2x5 ea      SLDL  SLDL 9# KB 2X10 SLDL 18# KB 2X10 SLDL 18# KB 2X10 SLDL 18# KB 2X10     Lunges 3 way Dome lunge w vertical TT 2x10 ea BOSU lat step up w [address] 65B          Fwd/ Lat hop 10 ft x 6         Agility ladder 5 min                   Ther Ex         Deadlift 2x10 SLDL 9# 2x10 SLDL 18# 2x10 SLDL 18# 2x10 SLDL 18#    Leg press 3x10  125# bilat  3x10  65# right 3x10  125# bilat  3x20  65# right 3x10  125# bilat  3x20  65# right 3x10  205# bilat  3x20  105# right Bilat jump 85#  20x  Uni hop  45# 10 ea   TRX squats 2x10 SL TRX   2x10 SL TRX TRX rev lunge 20x     HR  Step up [address] to HR 55A Step up [address] to HR 24I fwd / lat                   Skaters with post toe touch 2x10        SL ball slam  2x10  10# ball SL ball slam  3x10  10# ball   Ther Activity                   Shuffle 4x20 ft  [place] 4 x 60 ft Run grapevine skip 2x60' then repeat Run grapevine skip 2x60' then repeat   Gait Training  jog 4 x 60 ft  4 x 60 ft      Skipping 4x60 ft A-skip fwd and lat  2 x 60 ft ea  A-skip  Fwd/lat      Alter G  Single arm snatch  15#  20x ea        Modalities          CP                          From 2/28:  Access Code: [facility]  URL: [email or web address]  Date: 02/28/2023  Prepared by: Alma Dietz    Exercises  • Seated Thoracic Lumbar Extension with Pectoralis Stretch - 1 x daily - 7 x weekly - 3 sets - 10 reps  • Standing Shoulder External Rotation with Resistance - 1 x daily - 7 x weekly - 3 sets - 10 reps  • Wall [place] - 1 x daily - 7 x weekly - 3 sets - 10 reps  • Low Trap Setting at [address] - 1 x " daily - 7 x weekly - 3 sets - 10 reps  • Sidelying Open Book Thoracic Rotation with Knee on Foam Roll - 1 x daily - 7 x weekly - 3 sets - 10 reps  • Standing Thoracic Open Book at [address] - 1 x daily - 7 x weekly - 3 sets - 10 reps  • Single Leg Deadlift with Kettlebell - 1 x daily - 7 x weekly - 3 sets - 10 reps  • Standing Shoulder Row with Anchored Resistance - 1 x daily - 7 x weekly - 3 sets - 10 reps  • Shoulder extension with resistance - Neutral - 1 x daily - 7 x weekly - 3 sets - 10 reps

## 2023-03-30 ENCOUNTER — APPOINTMENT (OUTPATIENT)
Dept: PHYSICAL THERAPY | Facility: CLINIC | Age: 38
End: 2023-03-30

## 2023-04-03 NOTE — BH TREATMENT PLAN
Seems a little bit better today  BP up-- meds adjusted   Trend stable today    TREATMENT PLAN (Medication Management Only)        [facility]    Name and Date of Birth:  Francy Savage 40 y o  1985  Date of Treatment Plan: April 7, 2022  Diagnosis/Diagnoses:    1  Generalized anxiety disorder    2  PTSD (post-traumatic stress disorder)    3  Post-partum depression      Strengths/Personal Resources for Self-Care: supportive family, supportive friends, taking medications as prescribed, ability to communicate well, ability to listen, ability to reason, average or above intelligence, general fund of knowledge, motivation for treatment  Area/Areas of need (in own words): anxiety, depression  1  Long Term Goal: continue improvement in anxiety  Target Date:3 months - 7/7/2022  Person/Persons responsible for completion of goal: Malia  2  Short Term Objective (s) - How will we reach this goal?:   A  Provider new recommended medication/dosage changes and/or continue medication(s): continue current medications as prescribed  B  N/A   C  N/A  Target Date:3 months - 7/7/2022  Person/Persons Responsible for Completion of Goal: Malia  Progress Towards Goals: continuing treatment  Treatment Modality: medication management every 3 months  Review due 180 days from date of this plan: 3 months - 7/7/2022  Expected length of service: ongoing treatment  My Physician/PA/NP and I have developed this plan together and I agree to work on the goals and objectives  I understand the treatment goals that were developed for my treatment

## 2023-04-04 ENCOUNTER — APPOINTMENT (OUTPATIENT)
Dept: PHYSICAL THERAPY | Facility: CLINIC | Age: 38
End: 2023-04-04

## 2023-04-05 ENCOUNTER — OFFICE VISIT (OUTPATIENT)
Dept: PHYSICAL THERAPY | Facility: CLINIC | Age: 38
End: 2023-04-05

## 2023-04-05 DIAGNOSIS — S82.51XD CLOSED DISPLACED FRACTURE OF MEDIAL MALLEOLUS OF RIGHT TIBIA WITH ROUTINE HEALING: Primary | ICD-10-CM

## 2023-04-05 NOTE — PROGRESS NOTES
Daily Note     Today's date: 2023  Patient name: Ramos Demarco  : 1985  MRN: [id number]  Referring provider: Eleanor Maier DPM  Dx:   Encounter Diagnosis     ICD-10-CM    1  Closed displaced fracture of medial malleolus of right tibia with routine healing  S82  51XD                      Subjective:  Davy Gonsales had LBP today from lifting her son multiple times yesterday      Objective: treatment per flow sheet      Assessment:  Exercise intensity was reduced this session due to complaints of LBP  Focused on ankle exercises for proprioception ROM and strengthening  Added L-S ext in standing  Plan: Continue per plan of care     Resume normal program next time if symptoms allow         Eval/ Re-eval Auth #/ Referral # Total visits Start date  Expiration date Total active units  Total manual units  PT only or  PT+OT?   10/10/22                  12/13    12  12/20  12/30            Auth pending [id number]                            Date: 10/10 10/24/22 10/27 11/2 11/15 11/23/22 11/30/22 12/2   Total units authorized  Active: 1/12 2/12 3/12 4/12 5/12 6/12 7/12 8/12     Manual:                   Total units remaining  Active:     11 10 9 8 7 6 5 4     Manual:                           Date: 12/6 12/8  12/13  12/15  12/21  12/28  1/5 1/12 1/16 1/24 2/2 2/7 2/13   Total units authorized  Active: 9/12 10/12  11/12  12/12  1/12  2/12  3/12 4/12 5/12 6/12 7/12 8/12 9/12     Manual:                             Total units remaining  Active:     3 2  1    11  10  9 8 7 6 5 4 3     Manual:                                  Date: 2/16 2/23 2/28 3/2 3/7 3/15 3/17 3/27 3/29   Total units authorized  Active: 10/12 11/12 12/12 1/12 2/12 3/12 4/12 5/12 6/12     Manual:            Total units remaining  Active:     2 1 0 11 10 9 8 7 6     Manual:                 Precautions:  As of 23 patient is fully cleared     Specialty Daily Treatment Diary         Manuals 3/15/23 3/17/23 3/27/23 3/29/23 4/5/23   Visit # 72 19 60 95 "31   STM Gastroc/ soleus/ Peroneals        PROM ankle  5 min  5 min 5 min   Stretch soleus gastroc                Warm-up        NuStep Bike 8 min Bike 5 min 5' bike 5' bike 5' bike   Neuro Re-Ed     Standing L-S ext 20x   Sheet stretch prostretch 10x10\" prostretch 10x10\" prostretch 10x10\" prostretch 10x10\"    Ankle DF stretch at stair  Walking lunge w trunk rotation w tidal tube 6x20 ft  Walking lunge w trunk twist 6x10ft 10# tidal tube Walking lunges 4 x 12 ft   Side step        SLB on foam  SLS D1/D2  2x5 ea    SLS on foam  20x   SLDL SLDL 18# KB 2X10 SLDL 18# KB 2X10 SLDL 18# KB 2X10  SLDL 20x to table    Lunges 3 way BOSU lat step up w glute [address] 84U          Fwd/ Lat hop 10 ft x 6         Agility ladder 5 min                    Ther Ex        Deadlift 2x10 SLDL 18# 2x10 SLDL 18# 2x10 SLDL 18#     Leg press 3x10  125# bilat  3x20  65# right 3x10  125# bilat  3x20  65# right 3x10  205# bilat  3x20  105# right Bilat jump 85#  20x  Uni hop  45# 10 ea 85# 3x15   TRX squats 2x10 SL TRX TRX rev lunge 20x   20x  TRX   HR Step up [address] to HR 38K Step up glute [address] to HR 48D fwd / lat   Step overs 6\" step  20x               Skaters with post toe touch 2x10  Skaters with post toe touch 2x10      SL ball slam  2x10  10# ball SL ball slam  3x10  10# ball    Ther Activity                  Shuffle  [place] 4 x 60 ft Run grapevine skip 2x60' then repeat Run grapevine skip 2x60' then repeat [place] 4 x 60 ft   Gait Training  4 x 60 ft       Skipping  2 x 60 ft ea  A-skip  Fwd/lat       Alter G         Modalities         CP                        From 2/28:  Access Code: [facility]  URL: [email or web address]  Date: 02/28/2023  Prepared by: Lou Barkley    Exercises  • Seated Thoracic Lumbar Extension with Pectoralis Stretch - 1 x daily - 7 x weekly - 3 sets - 10 reps  • Standing Shoulder External Rotation with Resistance - 1 x daily - 7 x weekly - 3 sets - 10 reps  • Wall [place] - 1 x daily - 7 x " weekly - 3 sets - 10 reps  • Low Trap Setting at [address] - 1 x daily - 7 x weekly - 3 sets - 10 reps  • Sidelying Open Book Thoracic Rotation with Knee on Foam Roll - 1 x daily - 7 x weekly - 3 sets - 10 reps  • Standing Thoracic Open Book at [address] - 1 x daily - 7 x weekly - 3 sets - 10 reps  • Single Leg Deadlift with Kettlebell - 1 x daily - 7 x weekly - 3 sets - 10 reps  • Standing Shoulder Row with Anchored Resistance - 1 x daily - 7 x weekly - 3 sets - 10 reps  • Shoulder extension with resistance - Neutral - 1 x daily - 7 x weekly - 3 sets - 10 reps

## 2023-04-24 DIAGNOSIS — F43.10 PTSD (POST-TRAUMATIC STRESS DISORDER): ICD-10-CM

## 2023-04-24 DIAGNOSIS — F33.41 RECURRENT MAJOR DEPRESSIVE DISORDER, IN PARTIAL REMISSION (HCC): ICD-10-CM

## 2023-04-24 DIAGNOSIS — F41.1 GENERALIZED ANXIETY DISORDER: ICD-10-CM

## 2023-04-27 ENCOUNTER — OFFICE VISIT (OUTPATIENT)
Dept: PHYSICAL THERAPY | Facility: CLINIC | Age: 38
End: 2023-04-27

## 2023-04-27 DIAGNOSIS — S82.51XD CLOSED DISPLACED FRACTURE OF MEDIAL MALLEOLUS OF RIGHT TIBIA WITH ROUTINE HEALING: Primary | ICD-10-CM

## 2023-04-27 NOTE — PROGRESS NOTES
Daily Note     Today's date: 2023  Patient name: Galen Torres  : 1985  MRN: [id number]  Referring provider: Lizbeth Ibanez DPM  Dx:   Encounter Diagnosis     ICD-10-CM    1  Closed displaced fracture of medial malleolus of right tibia with routine healing  S82  51XD           Start Time: 266  Stop Time: 930  Total time in clinic (min): 40 minutes    Subjective:  Patient has no new complaints today  She was walking down a hill and rolled her ankle, with slight pain  She is fine now  Objective: treatment per flow sheet      Assessment:  Patient is progressing well through program, denying pain during the session  SL balance much improved  Provided patient with DF MWM at the steps to help increase ROM for ease with descending stairs reciprocally  Patient notes improvements mobility down the stairs after mobilization performed  Will continue to progress as able  Plan: Continue per plan of care     Next session to be performed at [place] [id number] #/ Referral # Total visits Start date  Expiration date Total active units  Total manual units  PT only or  PT+OT?   10/10/22                  12/13    12  12/20  12/30            Auth pending [id number]                            Date: 10/10 10/24/22 10/27 11/2 11/15 11/23/22 11/30/22 12/2   Total units authorized  Active: 1/12 2/12 3/12 4/12 5/12 6/12 7/12 8/12     Manual:                   Total units remaining  Active:     11 10 9 8 7 6 5 4     Manual:                           Date: 12/6 12/8  12/13  12/15  12/21  12/28  1   Total units authorized  Active: 9/12 10/12  11/12  12/12  1/12  2/12  3/12 4/12 5/12 6/12 7/12 8/12 9/12     Manual:                             Total units remaining  Active:     3 2  1    11  10  9 8 7 6 5 4 3     Manual:                                  Date:  3/2 3/7 3/15 3/17 3/27 3/29 4/5 4/10 4/13 4/27     Total units "authorized  Active: 10/12 11/12 12/12 1/12 2/12 3/12 4/12 5/12 6/12 7/12 8/12 9/12 10/12       Manual:                  Total units remaining  Active:     2 1 0 11 10 9 8 7 6 5 4 3 2       Manual:                       Precautions:  As of 2/2/23 patient is fully cleared     Specialty Daily Treatment Diary         Manuals 3/27/23 3/29/23 4/5/23 4/10/23 4/13/23 4/27/23   Visit # 29 30 31 32 33 34   STM Gastroc/ soleus/ Peroneals         PROM ankle  5 min 5 min 5 min 5'    Stretch soleus gastroc                  Warm-up         NuStep 5' bike 5' bike 5' bike 5' 5' Bike 5'    Neuro Re-Ed   Standing L-S ext 20x      Sheet stretch prostretch 10x10\" prostretch 10x10\"  Prostretch 10x10\" Prostretch 10x10\"    Ankle DF stretch at stair  Walking lunge w trunk twist 6x10ft 10# tidal tube Walking lunges 4 x 12 ft      Side step         SLB on foam    SLS on foam  20x SLS on foam  20x SLS on foam  20x diagonals SLS on foam  20x diagonals with 8# MB   SLDL SLDL 18# KB 2X10  SLDL 20x to table SLDL 20x  18# kb  SLDL 20x  18# kb    Lunges 3 way    3 way skiders 5x ea leg SS to uni crossover toe touch  20x SS to uni crossover toe touch  20x    Fwd/ Lat hop 10 ft x 6    TRIPLE HOP  10X EA TRIPLE HOP  10X EA     Agility ladder 5 min             Forward heel taps 8\" 2x10             Ther Ex         Deadlift 2x10 SLDL 18#        Leg press 3x10  205# bilat  3x20  105# right Bilat jump 85#  20x  Uni hop  45# 10 ea 85# 3x15 95# 3x15 95# 3x15 95# 3x10 DL hops     65# 2x10 SL   TRX squats   20x  TRX      HR   Step overs 6\" step  20x Step overs 6\" step  20x                Skaters with post toe touch 2x10  Skaters with post toe touch 2x10       SL ball slam  2x10  10# ball SL ball slam  3x10  10# ball       Ther Activity                   Shuffle Run grapevine skip 2x60' then repeat Run grapevine skip 2x60' then repeat [place] 4 x 60 ft Run grapevine skip 4x60' each Run grapevine skip 4x60' each Run grapevine skip 4x60' each   Gait Training     "  Elmira Jamil         CP                        From 2/28:  Access Code: [facility]  URL: [email or web address]  Date: 02/28/2023  Prepared by: Surinder Zambrano    Exercises  • Seated Thoracic Lumbar Extension with Pectoralis Stretch - 1 x daily - 7 x weekly - 3 sets - 10 reps  • Standing Shoulder External Rotation with Resistance - 1 x daily - 7 x weekly - 3 sets - 10 reps  • Wall [place] - 1 x daily - 7 x weekly - 3 sets - 10 reps  • Low Trap Setting at [address] [place] [address] 1 x daily - 7 x weekly - 3 sets - 10 reps  • Sidelying Open Book Thoracic Rotation with Knee on Foam Roll - 1 x daily - 7 x weekly - 3 sets - 10 reps  • Standing Thoracic [facility] - 1 x daily - 7 x weekly - 3 sets - 10 reps  • Single Leg Deadlift with Kettlebell - 1 x daily - 7 x weekly - 3 sets - 10 reps  • Standing Shoulder Row with Anchored Resistance - 1 x daily - 7 x weekly - 3 sets - 10 reps  • Shoulder extension with resistance - Neutral - 1 x daily - 7 x weekly - 3 sets - 10 reps

## 2023-06-01 DIAGNOSIS — F43.10 PTSD (POST-TRAUMATIC STRESS DISORDER): ICD-10-CM

## 2023-06-01 DIAGNOSIS — F41.1 GENERALIZED ANXIETY DISORDER: ICD-10-CM

## 2023-06-01 DIAGNOSIS — F32.1 CURRENT MODERATE EPISODE OF MAJOR DEPRESSIVE DISORDER WITHOUT PRIOR EPISODE (HCC): ICD-10-CM

## 2023-06-01 RX ORDER — ESCITALOPRAM OXALATE 20 MG/1
20 TABLET ORAL DAILY
Qty: 90 TABLET | Refills: 0 | Status: SHIPPED | OUTPATIENT
Start: 2023-06-01

## 2023-06-01 NOTE — TELEPHONE ENCOUNTER
Medication Refill Request     Name of Medication escitalporam  Dose/Frequency 20mg tablet - take 1 tablet daily  Quantity 90  Verified pharmacy   [x]  Verified ordering Provider   [x]  Does patient have enough for the next 3 days? Yes [x] No []  Does patient have a follow-up appointment scheduled?  Yes [] No [x]   If so when is appointment: patient of dr Scar Lam, last seen 12/12

## 2023-06-29 ENCOUNTER — OFFICE VISIT (OUTPATIENT)
Dept: PSYCHIATRY | Facility: CLINIC | Age: 38
End: 2023-06-29
Payer: COMMERCIAL

## 2023-06-29 DIAGNOSIS — F41.1 GENERALIZED ANXIETY DISORDER: ICD-10-CM

## 2023-06-29 DIAGNOSIS — F33.41 RECURRENT MAJOR DEPRESSIVE DISORDER, IN PARTIAL REMISSION (HCC): Primary | ICD-10-CM

## 2023-06-29 DIAGNOSIS — F43.10 PTSD (POST-TRAUMATIC STRESS DISORDER): ICD-10-CM

## 2023-06-29 DIAGNOSIS — F51.01 PRIMARY INSOMNIA: ICD-10-CM

## 2023-06-29 PROCEDURE — 99214 OFFICE O/P EST MOD 30 MIN: CPT | Performed by: PHYSICIAN ASSISTANT

## 2023-06-29 RX ORDER — HYDROXYZINE HYDROCHLORIDE 10 MG/1
10 TABLET, FILM COATED ORAL 2 TIMES DAILY PRN
Qty: 60 TABLET | Refills: 1 | Status: SHIPPED | OUTPATIENT
Start: 2023-06-29 | End: 2023-08-28
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
## 2023-06-29 NOTE — BH TREATMENT PLAN
TREATMENT PLAN (Medication Management Only)        [facility]    Name and Date of Birth:  Sancho Goodrich 45 y o  1985  Date of Treatment Plan: June 29, 2023  Diagnosis/Diagnoses:    1  Recurrent major depressive disorder, in partial remission ([address] )    2  Primary insomnia    3  Generalized anxiety disorder    4  PTSD (post-traumatic stress disorder)      Strengths/Personal Resources for Self-Care: taking medications as prescribed, ability to adapt to life changes, ability to communicate needs, ability to communicate well  Area/Areas of need (in own words): anxiety, depression  1  Long Term Goal: maintain control of depression  Target Date:6 months - 12/29/2023  Person/Persons responsible for completion of goal: Malia  2  Short Term Objective (s) - How will we reach this goal?:   A  Provider new recommended medication/dosage changes and/or continue medication(s): continue current medications as prescribed  B  N/A   C  N/A  Target Date:6 months - 12/29/2023  Person/Persons Responsible for Completion of Goal: Malia  Progress Towards Goals: continuing treatment  Treatment Modality: medication management every 3 months  Review due 180 days from date of this plan: 6 months - 12/29/2023  Expected length of service: ongoing treatment  My Physician/PA/NP and I have developed this plan together and I agree to work on the goals and objectives  I understand the treatment goals that were developed for my treatment

## 2023-07-11 ENCOUNTER — OFFICE VISIT (OUTPATIENT)
Dept: DERMATOLOGY | Facility: CLINIC | Age: 38
End: 2023-07-11
Payer: COMMERCIAL

## 2023-07-11 VITALS — WEIGHT: 137.2 LBS | BODY MASS INDEX: 25.25 KG/M2 | TEMPERATURE: 97.3 F | HEIGHT: 62 IN

## 2023-07-11 DIAGNOSIS — D18.01 CHERRY ANGIOMA: ICD-10-CM

## 2023-07-11 DIAGNOSIS — L81.2 FRECKLE: Primary | ICD-10-CM

## 2023-07-11 PROCEDURE — 99204 OFFICE O/P NEW MOD 45 MIN: CPT | Performed by: DERMATOLOGY

## 2023-07-11 NOTE — PATIENT INSTRUCTIONS
FRECKLE    Assessment and Plan:  Based on a thorough discussion of this condition and the management approach to it (including a comprehensive discussion of the known risks, side effects and potential benefits of treatment), the patient (family) agrees to implement the following specific plan:  Use a moisturizer + sunscreen "combo" product such as Neutrogena Daily Defense SPF 50+ or CeraVe AM at least three times a day. Reassured benign. Continue to monitor skin for any changes in color. Continue yearly skin exams. CHERRY ANGIOMAS  - Relevant exam: Scattered over the trunk/extremities are red papules  - Exam and clinical history consistent with cherry angiomas  - Educated that these are benign  - Educated that removal is considered aesthetic and would incur a fee.

## 2023-07-11 NOTE — PROGRESS NOTES
[facility] Note     Patient Name: Liliana Ho  Encounter Date: 07/11/2023     Have you been cared for by a [facility]atologist in the last 3 years and, if so, which description applies to you? NO. I am considered a "new" patient and must complete all patient intake questions. I am FEMALE/of child-bearing potential.    REVIEW OF SYSTEMS:  Have you recently had or currently have any of the following? Recent fever or chills? No  Any non-healing wound? No  Are you pregnant or planning to become pregnant? No  Are you currently or planning to be nursing or breast feeding? No   PAST MEDICAL HISTORY:  Have you personally ever had or currently have any of the following? If "YES," then please provide more detail. Skin cancer (such as Melanoma, Basal Cell Carcinoma, Squamous Cell Carcinoma? No  Tuberculosis, HIV/AIDS, Hepatitis B or C: No  Systemic Immunosuppression such as Diabetes, Biologic or Immunotherapy, Chemotherapy, Organ Transplantation, Bone Marrow Transplantation No  Radiation Treatment No   FAMILY HISTORY:  Any "first degree relatives" (parent, brother, sister, or child) with the following? Skin Cancer, Pancreatic or Other Cancer? No   PATIENT EXPERIENCE:    Do you want the Dermatologist to perform a COMPLETE skin exam today including a clinical examination under the "bra and underwear" areas? NO  If necessary, do we have your permission to call and leave a detailed message on your Preferred Phone number that includes your specific medical information?   Yes      No Known Allergies   Current Outpatient Medications:   •  busPIRone (BUSPAR) 15 mg tablet, Take 1 tablet (15 mg total) by mouth 3 (three) times a day, Disp: 270 tablet, Rfl: 0  •  escitalopram (LEXAPRO) 10 mg tablet, Take 1 tablet (10 mg total) by mouth daily, Disp: 90 tablet, Rfl: 0  •  escitalopram (LEXAPRO) 20 mg tablet, Take 1 tablet (20 mg total) by mouth daily, Disp: 90 tablet, Rfl: 0  •  hydrOXYzine HCL (ATARAX) 10 mg tablet, Take 1 tablet (10 mg total) by mouth 2 (two) times a day as needed for anxiety, Disp: 60 tablet, Rfl: 1  •  meclizine (ANTIVERT) 25 mg tablet, Take 1 tablet (25 mg total) by mouth 3 (three) times a day as needed for dizziness, Disp: 15 tablet, Rfl: 0  •  Melatonin ER 10 MG TBCR, Take 1 tablet (10 mg total) by mouth daily (Patient taking differently: Take 1 tablet by mouth if needed), Disp: , Rfl:   •  omeprazole (PriLOSEC) 20 mg delayed release capsule, Take 1 capsule (20 mg total) by mouth daily, Disp: 21 capsule, Rfl: 0  •  ondansetron (ZOFRAN) 4 mg tablet, Take 1 tablet (4 mg total) by mouth every 8 (eight) hours as needed for nausea or vomiting, Disp: 30 tablet, Rfl: 0  •  SUMAtriptan (IMITREX) 100 mg tablet, Take 1 tablet (100 mg total) by mouth once as needed for migraine for up to 1 dose, Disp: 12 tablet, Rfl: 0  •  therapeutic multivitamin-minerals (THERAGRAN-M) tablet, Take 1 tablet by mouth daily, Disp: , Rfl:           Whom besides the patient is providing clinical information about today's encounter? NO ADDITIONAL HISTORIAN (patient alone provided history)    Physical Exam and Assessment/Plan by Diagnosis:    lentigo  Physical Exam:  Anatomic Location Affected:  Left forearm, left sole of foot  Morphological Description:  Normal dermoscopy pattern  Pertinent Positives:  Pertinent Negatives: Additional History of Present Condition:  Patient reports freckles have appeared in the past few months. Assessment and Plan:  Based on a thorough discussion of this condition and the management approach to it (including a comprehensive discussion of the known risks, side effects and potential benefits of treatment), the patient (family) agrees to implement the following specific plan:  Use a moisturizer + sunscreen "combo" product such as Neutrogena Daily Defense SPF 50+ or CeraVe AM at least three times a day. Reassured benign. Continue to monitor skin for any changes in color.    Continue yearly skin exams. Photograph taken. I discussed that monitoring is indicated as occasionally lentigo slowly transform to lentigo maligna. CHERRY ANGIOMAS  - Relevant exam: Scattered over the trunk/extremities are red papules  - Exam and clinical history consistent with cherry angiomas  - Educated that these are benign  - Educated that removal is considered aesthetic and would incur a fee.       Scribe Attestation    I,:  Natalie Cho am acting as a scribe while in the presence of the attending physician.:       I,:  Julissa Antoine MD personally performed the services described in this documentation    as scribed in my presence.:

## 2023-07-13 DIAGNOSIS — F41.1 GENERALIZED ANXIETY DISORDER: ICD-10-CM

## 2023-07-13 RX ORDER — HYDROXYZINE HYDROCHLORIDE 10 MG/1
TABLET, FILM COATED ORAL
Qty: 180 TABLET | Refills: 1 | Status: SHIPPED | OUTPATIENT
Start: 2023-07-13

## 2023-07-23 RX ORDER — ESCITALOPRAM OXALATE 10 MG/1
TABLET ORAL
Qty: 90 TABLET | Refills: 1 | OUTPATIENT
Start: 2023-07-23

## 2023-07-23 RX ORDER — BUSPIRONE HYDROCHLORIDE 15 MG/1
TABLET ORAL
Qty: 270 TABLET | Refills: 0 | OUTPATIENT
Start: 2023-07-23

## 2023-09-01 DIAGNOSIS — F43.10 PTSD (POST-TRAUMATIC STRESS DISORDER): ICD-10-CM

## 2023-09-01 DIAGNOSIS — F41.1 GENERALIZED ANXIETY DISORDER: ICD-10-CM

## 2023-09-01 DIAGNOSIS — F32.1 CURRENT MODERATE EPISODE OF MAJOR DEPRESSIVE DISORDER WITHOUT PRIOR EPISODE (HCC): ICD-10-CM

## 2023-09-01 DIAGNOSIS — F33.41 RECURRENT MAJOR DEPRESSIVE DISORDER, IN PARTIAL REMISSION (HCC): ICD-10-CM

## 2023-09-01 RX ORDER — ESCITALOPRAM OXALATE 10 MG/1
10 TABLET ORAL DAILY
Qty: 90 TABLET | Refills: 0 | Status: SHIPPED | OUTPATIENT
Start: 2023-09-01

## 2023-09-01 RX ORDER — BUSPIRONE HYDROCHLORIDE 15 MG/1
15 TABLET ORAL 3 TIMES DAILY
Qty: 270 TABLET | Refills: 0 | Status: SHIPPED | OUTPATIENT
Start: 2023-09-01

## 2023-09-01 RX ORDER — ESCITALOPRAM OXALATE 20 MG/1
20 TABLET ORAL DAILY
Qty: 90 TABLET | Refills: 0 | OUTPATIENT
Start: 2023-09-01

## 2023-09-26 DIAGNOSIS — F43.10 PTSD (POST-TRAUMATIC STRESS DISORDER): ICD-10-CM

## 2023-09-26 DIAGNOSIS — F32.1 CURRENT MODERATE EPISODE OF MAJOR DEPRESSIVE DISORDER WITHOUT PRIOR EPISODE (HCC): ICD-10-CM

## 2023-09-26 DIAGNOSIS — F41.1 GENERALIZED ANXIETY DISORDER: ICD-10-CM

## 2023-09-26 RX ORDER — ESCITALOPRAM OXALATE 20 MG/1
20 TABLET ORAL DAILY
Qty: 90 TABLET | Refills: 0 | Status: SHIPPED | OUTPATIENT
Start: 2023-09-26

## 2023-09-29 ENCOUNTER — OFFICE VISIT (OUTPATIENT)
Dept: PSYCHIATRY | Facility: CLINIC | Age: 38
End: 2023-09-29

## 2023-09-29 NOTE — PSYCH
No Call. No Show. No Charge    Jarethtamie Sweet no showed 09/29/23 appointment , staff called and left message to reschedule appointment     Treatment Plan not due at this session.

## 2023-11-13 ENCOUNTER — TELEPHONE (OUTPATIENT)
Age: 38
End: 2023-11-13

## 2023-11-13 NOTE — TELEPHONE ENCOUNTER
Patient called, asking for a urine culture. She stated her partner just tested positive for Mycoplasma genitalium and she wants to make sure she does not have it as well.

## 2023-11-14 ENCOUNTER — OFFICE VISIT (OUTPATIENT)
Dept: FAMILY MEDICINE CLINIC | Facility: CLINIC | Age: 38
End: 2023-11-14
Payer: COMMERCIAL

## 2023-11-14 VITALS
RESPIRATION RATE: 16 BRPM | WEIGHT: 151 LBS | HEART RATE: 74 BPM | TEMPERATURE: 97.7 F | DIASTOLIC BLOOD PRESSURE: 78 MMHG | BODY MASS INDEX: 27.79 KG/M2 | HEIGHT: 62 IN | SYSTOLIC BLOOD PRESSURE: 120 MMHG

## 2023-11-14 DIAGNOSIS — Z20.2 EXPOSURE TO STD: ICD-10-CM

## 2023-11-14 DIAGNOSIS — N39.0 URINARY TRACT INFECTION WITHOUT HEMATURIA, SITE UNSPECIFIED: Primary | ICD-10-CM

## 2023-11-14 LAB
SL AMB  POCT GLUCOSE, UA: NEGATIVE
SL AMB LEUKOCYTE ESTERASE,UA: NORMAL
SL AMB POCT BILIRUBIN,UA: NEGATIVE
SL AMB POCT BLOOD,UA: NORMAL
SL AMB POCT CLARITY,UA: NORMAL
SL AMB POCT COLOR,UA: YELLOW
SL AMB POCT KETONES,UA: NEGATIVE
SL AMB POCT NITRITE,UA: NEGATIVE
SL AMB POCT PH,UA: 6
SL AMB POCT SPECIFIC GRAVITY,UA: 1.01
SL AMB POCT URINE PROTEIN: NEGATIVE
SL AMB POCT UROBILINOGEN: 0.2

## 2023-11-14 PROCEDURE — 81003 URINALYSIS AUTO W/O SCOPE: CPT | Performed by: FAMILY MEDICINE

## 2023-11-14 PROCEDURE — 99213 OFFICE O/P EST LOW 20 MIN: CPT | Performed by: FAMILY MEDICINE

## 2023-11-14 PROCEDURE — 87086 URINE CULTURE/COLONY COUNT: CPT | Performed by: FAMILY MEDICINE

## 2023-11-14 RX ORDER — DOXYCYCLINE HYCLATE 100 MG/1
100 CAPSULE ORAL EVERY 12 HOURS SCHEDULED
Qty: 20 CAPSULE | Refills: 0 | Status: SHIPPED | OUTPATIENT
Start: 2023-11-14 | End: 2023-11-24

## 2023-11-14 NOTE — PROGRESS NOTES
Assessment/Plan:    1. Urinary tract infection without hematuria, site unspecified  -     doxycycline hyclate (VIBRAMYCIN) 100 mg capsule; Take 1 capsule (100 mg total) by mouth every 12 (twelve) hours for 10 days  -     POCT urine dip auto non-scope  -     Urine culture    2. Exposure to STD  -     doxycycline hyclate (VIBRAMYCIN) 100 mg capsule; Take 1 capsule (100 mg total) by mouth every 12 (twelve) hours for 10 days  -     POCT urine dip auto non-scope  -     Urine culture    Will treat regardless of culture resulst  Pt had leuks on the urine dip        There are no Patient Instructions on file for this visit. No follow-ups on file. Subjective:      Patient ID: Tk Sutton is a 45 y.o. female. Chief Complaint   Patient presents with   • Gigi Ill she is seeing tested positive for Mycoplasmism genitalium lw cma       Pt states someone that she has been seeing just tested positive for mycoplasm Genitialia  Pt has no symptoms  Her partner had burning w urination. Pt had a pelvic yesterday        The following portions of the patient's history were reviewed and updated as appropriate: allergies, current medications, past family history, past medical history, past social history, past surgical history and problem list.    Review of Systems   Constitutional: Negative. Negative for activity change, appetite change, chills, diaphoresis and fatigue. HENT: Negative. Negative for dental problem, ear pain, sinus pressure and sore throat. Eyes: Negative. Negative for photophobia, pain, discharge, redness, itching and visual disturbance. Respiratory:  Negative for apnea and chest tightness. Cardiovascular: Negative. Negative for chest pain, palpitations and leg swelling. Gastrointestinal: Negative. Negative for abdominal distention, abdominal pain, constipation and diarrhea. Endocrine: Negative. Negative for cold intolerance and heat intolerance. Genitourinary: Negative.   Negative for difficulty urinating and dyspareunia. Musculoskeletal: Negative. Negative for arthralgias and back pain. Skin: Negative. Allergic/Immunologic: Negative for environmental allergies. Neurological: Negative. Negative for dizziness. Psychiatric/Behavioral: Negative. Negative for agitation. Current Outpatient Medications   Medication Sig Dispense Refill   • busPIRone (BUSPAR) 15 mg tablet TAKE 1 TABLET BY MOUTH 3 TIMES A DAY. 270 tablet 0   • doxycycline hyclate (VIBRAMYCIN) 100 mg capsule Take 1 capsule (100 mg total) by mouth every 12 (twelve) hours for 10 days 20 capsule 0   • escitalopram (LEXAPRO) 10 mg tablet TAKE 1 TABLET BY MOUTH EVERY DAY 90 tablet 0   • escitalopram (LEXAPRO) 20 mg tablet TAKE 1 TABLET BY MOUTH EVERY DAY 90 tablet 0   • hydrOXYzine HCL (ATARAX) 10 mg tablet TAKE 1 TABLET (10 MG TOTAL) BY MOUTH TWICE A DAY AS NEEDED FOR ANXIETY 180 tablet 1   • meclizine (ANTIVERT) 25 mg tablet Take 1 tablet (25 mg total) by mouth 3 (three) times a day as needed for dizziness 15 tablet 0   • Melatonin ER 10 MG TBCR Take 1 tablet (10 mg total) by mouth daily (Patient taking differently: Take 1 tablet by mouth if needed)     • omeprazole (PriLOSEC) 20 mg delayed release capsule Take 1 capsule (20 mg total) by mouth daily 21 capsule 0   • ondansetron (ZOFRAN) 4 mg tablet Take 1 tablet (4 mg total) by mouth every 8 (eight) hours as needed for nausea or vomiting 30 tablet 0   • SUMAtriptan (IMITREX) 100 mg tablet Take 1 tablet (100 mg total) by mouth once as needed for migraine for up to 1 dose 12 tablet 0   • therapeutic multivitamin-minerals (THERAGRAN-M) tablet Take 1 tablet by mouth daily       No current facility-administered medications for this visit. Objective:    /78   Pulse 74   Temp 97.7 °F (36.5 °C) (Temporal)   Resp 16   Ht 5' 2" (1.575 m)   Wt 68.5 kg (151 lb)   LMP 11/04/2023 (Exact Date)   BMI 27.62 kg/m²        Physical Exam  Vitals and nursing note reviewed. Constitutional:       General: She is not in acute distress. Appearance: She is well-developed. She is not diaphoretic. HENT:      Head: Normocephalic and atraumatic. Right Ear: External ear normal.      Left Ear: External ear normal.      Nose: Nose normal.      Mouth/Throat:      Pharynx: No oropharyngeal exudate. Eyes:      General: No scleral icterus. Right eye: No discharge. Left eye: No discharge. Pupils: Pupils are equal, round, and reactive to light. Neck:      Thyroid: No thyromegaly. Cardiovascular:      Rate and Rhythm: Normal rate. Heart sounds: Normal heart sounds. No murmur heard. Pulmonary:      Effort: Pulmonary effort is normal. No respiratory distress. Breath sounds: Normal breath sounds. No wheezing. Abdominal:      General: Bowel sounds are normal. There is no distension. Palpations: Abdomen is soft. There is no mass. Tenderness: There is no abdominal tenderness. There is no guarding or rebound. Musculoskeletal:         General: Normal range of motion. Skin:     General: Skin is warm and dry. Findings: No erythema or rash. Neurological:      Mental Status: She is alert.       Coordination: Coordination normal.      Deep Tendon Reflexes: Reflexes normal.   Psychiatric:         Behavior: Behavior normal.                Veronica Rogel DO

## 2023-11-15 ENCOUNTER — TELEPHONE (OUTPATIENT)
Dept: FAMILY MEDICINE CLINIC | Facility: CLINIC | Age: 38
End: 2023-11-15

## 2023-11-15 LAB — BACTERIA UR CULT: NORMAL

## 2023-11-15 NOTE — TELEPHONE ENCOUNTER
Please call pt , her culture did not grow anything out.   But I treated her emperically so no changes

## 2023-11-15 NOTE — TELEPHONE ENCOUNTER
Left voice mail.  Please forward Dr Lorelei Pagan note when she calls back   Thank you  Mariam Goodson MA

## 2023-11-16 NOTE — TELEPHONE ENCOUNTER
Patient stated that she was supposed to be tested for an STD culture patient stated that she discussed this withDr. Angel Hopkins . Please call patient back  Gordon Cortes   Please advise.

## 2023-11-16 NOTE — TELEPHONE ENCOUNTER
Phone message in chart from 11/13 pt was requesting a urine culture. Pt was seen the day before I saw her and she had a pelvic   I saw pt after her partner was diagnosed with mycoplasm genitalis - this is usually dx with a cervical culture which can be done with a pevic exam or a urine culture. Either way pt was tested with abx that would eradicate the infection if she had it. She should finish the abx but I would guess she is not infected.   Which is great

## 2023-11-16 NOTE — TELEPHONE ENCOUNTER
Left message for patient to call back.   Please give provider message  below if patient calls back    Sin Fitch

## 2023-11-28 DIAGNOSIS — F41.1 GENERALIZED ANXIETY DISORDER: ICD-10-CM

## 2023-11-28 DIAGNOSIS — F43.10 PTSD (POST-TRAUMATIC STRESS DISORDER): ICD-10-CM

## 2023-11-28 RX ORDER — BUSPIRONE HYDROCHLORIDE 15 MG/1
15 TABLET ORAL 3 TIMES DAILY
Qty: 270 TABLET | Refills: 0 | Status: SHIPPED | OUTPATIENT
Start: 2023-11-28

## 2023-12-07 ENCOUNTER — OFFICE VISIT (OUTPATIENT)
Dept: URGENT CARE | Facility: CLINIC | Age: 38
End: 2023-12-07
Payer: COMMERCIAL

## 2023-12-07 VITALS
BODY MASS INDEX: 28.72 KG/M2 | RESPIRATION RATE: 14 BRPM | WEIGHT: 157 LBS | OXYGEN SATURATION: 100 % | TEMPERATURE: 96.5 F | HEART RATE: 64 BPM

## 2023-12-07 DIAGNOSIS — J02.9 SORE THROAT: Primary | ICD-10-CM

## 2023-12-07 DIAGNOSIS — J34.89 SINUS PRESSURE: ICD-10-CM

## 2023-12-07 LAB — S PYO AG THROAT QL: NEGATIVE

## 2023-12-07 PROCEDURE — 99213 OFFICE O/P EST LOW 20 MIN: CPT | Performed by: PHYSICIAN ASSISTANT

## 2023-12-07 PROCEDURE — 87636 SARSCOV2 & INF A&B AMP PRB: CPT | Performed by: PHYSICIAN ASSISTANT

## 2023-12-07 PROCEDURE — 87880 STREP A ASSAY W/OPTIC: CPT | Performed by: PHYSICIAN ASSISTANT

## 2023-12-07 RX ORDER — PREDNISONE 10 MG/1
40 TABLET ORAL DAILY
Qty: 16 TABLET | Refills: 0 | Status: SHIPPED | OUTPATIENT
Start: 2023-12-07 | End: 2023-12-11

## 2023-12-07 RX ORDER — BROMPHENIRAMINE MALEATE, PSEUDOEPHEDRINE HYDROCHLORIDE, AND DEXTROMETHORPHAN HYDROBROMIDE 2; 30; 10 MG/5ML; MG/5ML; MG/5ML
10 SYRUP ORAL 4 TIMES DAILY PRN
Qty: 120 ML | Refills: 0 | Status: SHIPPED | OUTPATIENT
Start: 2023-12-07

## 2023-12-07 NOTE — PATIENT INSTRUCTIONS
A sinus infection is most often caused by a virus. Treatment for a sinus infection focuses on symptomatic management as it typically resolves within 7 to 10 days. There are no treatments to shorten the clinical course of the disease. Patients with a viral sinus infection should be managed with supportive care only. Bacterial infection occurs in only 0.5 to 2 percent of episodes of sinus infections. Acute bacterial sinus infections may also be a self-limited disease and resolve without antibiotics. However, if your symptoms do last past 7 days you may call the office back and I will send in an antibiotic for you. According to the [facility] of Otolaryngology- Head and Neck Surgery patients with a sinus infection should have a seven day waiting period with symptomatic management. Upon day seven if there is no improvement an antibiotic should then be initiated.          Symptomatic management:     - Nasal congestion: Nasal irrigation with nasal saline or intranasal glucocorticoids (FLONASE)           Short course of oral decongestants (Sudafed) 3-5 days           (Guaifenesin) may help thin secretions and may promote ease of mucus drainage and clearance           Inhalation of warm, humidified air (steam), may provide patients with a transient sense of relief of congestion                - Pain and Fever: Over-the-counter analgesics and antipyretics such as nonsteroidal antiinflammatory drugs (NSAIDs) and acetaminophen may be used

## 2023-12-07 NOTE — PROGRESS NOTES
[facility]        NAME: Nancy Sewet is a 45 y.o. female  : 1985    MRN: [id number]  DATE: 2023  TIME: 1:31 PM    Assessment and Plan   Sore throat [J02.9]  1. Sore throat  POCT rapid strepA    Covid/Flu-Office Collect      2. Sinus pressure  brompheniramine-pseudoephedrine-DM 30-2-10 MG/5ML syrup    predniSONE 10 mg tablet            Patient Instructions     Patient Instructions   A sinus infection is most often caused by a virus. Treatment for a sinus infection focuses on symptomatic management as it typically resolves within 7 to 10 days. There are no treatments to shorten the clinical course of the disease. Patients with a viral sinus infection should be managed with supportive care only. Bacterial infection occurs in only 0.5 to 2 percent of episodes of sinus infections. Acute bacterial sinus infections may also be a self-limited disease and resolve without antibiotics. However, if your symptoms do last past 7 days you may call the office back and I will send in an antibiotic for you. According to the  [facility] of Otolaryngology- Head and Neck Surgery patients with a sinus infection should have a seven day waiting period with symptomatic management. Upon day seven if there is no improvement an antibiotic should then be initiated. Symptomatic management:     - Nasal congestion: Nasal irrigation with nasal saline or intranasal glucocorticoids (FLONASE)           Short course of oral decongestants (Sudafed) 3-5 days           (Guaifenesin) may help thin secretions and may promote ease of mucus drainage and clearance           Inhalation of warm, humidified air (steam), may provide patients with a transient sense of relief of congestion                - Pain and Fever: Over-the-counter analgesics and antipyretics such as nonsteroidal antiinflammatory drugs (NSAIDs) and acetaminophen may be used                 Follow up with PCP in 3-5 days.   Proceed to  ER if symptoms worsen. Chief Complaint     Chief Complaint   Patient presents with    Cold Like Symptoms     Pt presents with cough, PND, ear pain, sinus pressure, irritated throat; started 3-4 days ago         History of Present Illness       The patient is a 40-year-old female presenting today for viral symptoms. She reports sinus pressure/pain, PND, ear pain, sore throat and a cough x4 days. Had tried Nyquil. Does admit to a history of sinus infections every year. Review of Systems   Review of Systems   Constitutional:  Negative for activity change, appetite change, chills, fatigue and fever. HENT:  Positive for ear pain, postnasal drip, sinus pressure, sinus pain and sore throat. Negative for congestion and rhinorrhea. Eyes:  Negative for pain and visual disturbance. Respiratory:  Positive for cough. Negative for chest tightness and shortness of breath. Cardiovascular:  Negative for chest pain and palpitations. Gastrointestinal:  Negative for abdominal pain, diarrhea, nausea and vomiting. Genitourinary:  Negative for dysuria and hematuria. Musculoskeletal:  Negative for arthralgias, back pain and myalgias. Skin:  Negative for color change, pallor and rash. Neurological:  Negative for seizures, syncope and headaches. All other systems reviewed and are negative.         Current Medications       Current Outpatient Medications:     brompheniramine-pseudoephedrine-DM 30-2-10 MG/5ML syrup, Take 10 mL by mouth 4 (four) times a day as needed for congestion or cough, Disp: 120 mL, Rfl: 0    busPIRone (BUSPAR) 15 mg tablet, TAKE 1 TABLET BY MOUTH THREE TIMES A DAY, Disp: 270 tablet, Rfl: 0    escitalopram (LEXAPRO) 10 mg tablet, TAKE 1 TABLET BY MOUTH EVERY DAY, Disp: 90 tablet, Rfl: 0    escitalopram (LEXAPRO) 20 mg tablet, TAKE 1 TABLET BY MOUTH EVERY DAY, Disp: 90 tablet, Rfl: 0    hydrOXYzine HCL (ATARAX) 10 mg tablet, TAKE 1 TABLET (10 MG TOTAL) BY MOUTH TWICE A DAY AS NEEDED FOR ANXIETY, Disp: 180 tablet, Rfl: 1    meclizine (ANTIVERT) 25 mg tablet, Take 1 tablet (25 mg total) by mouth 3 (three) times a day as needed for dizziness, Disp: 15 tablet, Rfl: 0    ondansetron (ZOFRAN) 4 mg tablet, Take 1 tablet (4 mg total) by mouth every 8 (eight) hours as needed for nausea or vomiting, Disp: 30 tablet, Rfl: 0    predniSONE 10 mg tablet, Take 4 tablets (40 mg total) by mouth daily for 4 days, Disp: 16 tablet, Rfl: 0    SUMAtriptan (IMITREX) 100 mg tablet, Take 1 tablet (100 mg total) by mouth once as needed for migraine for up to 1 dose, Disp: 12 tablet, Rfl: 0    therapeutic multivitamin-minerals (THERAGRAN-M) tablet, Take 1 tablet by mouth daily, Disp: , Rfl:     Melatonin ER 10 MG TBCR, Take 1 tablet (10 mg total) by mouth daily (Patient not taking: Reported on 12/7/2023), Disp: , Rfl:     omeprazole (PriLOSEC) 20 mg delayed release capsule, Take 1 capsule (20 mg total) by mouth daily (Patient not taking: Reported on 12/7/2023), Disp: 21 capsule, Rfl: 0    Current Allergies     Allergies as of 12/07/2023    (No Known Allergies)            The following portions of the patient's history were reviewed and updated as appropriate: allergies, current medications, past family history, past medical history, past social history, past surgical history and problem list.     Past Medical History:   Diagnosis Date    Abnormal Pap smear of cervix     Anxiety     BRCA1 negative     BRCA2 negative     Breast injury     Pt states scar tissue around nipple from breastfeeding    Depression     GERD (gastroesophageal reflux disease)     HPV (human papilloma virus) infection     Kidney stone 2011    H/O PYELONEPHRITIS    Migraine     Migraines     Seasonal allergies     Sleep difficulties     Varicella     POS HX       Past Surgical History:   Procedure Laterality Date    CHOLECYSTECTOMY      FINGER SURGERY      left hand     GYNECOLOGIC CRYOSURGERY      HAND SURGERY Left     Phelebitis in left hand  post IV infilitration, had sx for clot removal     LYMPH NODE BIOPSY      of neck    LYMPH NODE DISSECTION Right     cervical    CT  DELIVERY ONLY N/A 2018    Procedure:  SECTION (); Surgeon: Faith Friedman MD;  Location: [facility];  Service: Obstetrics    CT EXC B9 LESION MRGN XCP SK TG T/A/L >4.0 CM Right 2022    Procedure: EXCISION  BIOPSY LESION/MASS BACK;  Surgeon: Genell Apgar, MD;  Location: [facility];  Service: General    CT OPEN TREATMENT MEDIAL MALLEOLUS FRACTURE Right 10/6/2022    Procedure: Ankle ORIFmedial malleolus;  Surgeon: Toby Jenkins DPM;  Location: [facility];  Service: Podiatry       Family History   Problem Relation Age of Onset    Hypertension Mother     Heart disease Mother     Depression Mother     Coronary artery disease Mother     Hypertension Father     Hyperlipidemia Father     Heart disease Father         MI    Stroke Paternal Grandmother     Cancer Paternal Grandmother         breast    Breast cancer Paternal Grandmother     Cancer Paternal Aunt         colon    Colon cancer Paternal Aunt     Early death Maternal Uncle     Cancer Maternal Uncle         lymphoma    Early death Maternal Uncle         lymphoma    Birth defects Maternal Uncle     Undescended testes Son     Cystic fibrosis Other     Cystic fibrosis Other     Heart failure Maternal Grandmother     Stroke Maternal Grandfather     Alcohol abuse Maternal Grandfather     Alcohol abuse Brother     Drug abuse Brother          of overdose 2020    Anxiety disorder Sister     Post-traumatic stress disorder Sister     Developmental delay Son     Macrocephaly Son     Hydrocephalus Son     Breast cancer Other          Medications have been verified. Objective   Pulse 64   Temp (!) 96.5 °F (35.8 °C)   Resp 14   Wt 71.2 kg (157 lb)   LMP 2023 (Exact Date)   SpO2 100%   BMI 28.72 kg/m²        Physical Exam     Physical Exam  Vitals and nursing note reviewed.    Constitutional: General: She is not in acute distress. Appearance: Normal appearance. She is well-developed and normal weight. She is not ill-appearing, toxic-appearing or diaphoretic. HENT:      Head: Normocephalic and atraumatic. Right Ear: Tympanic membrane, ear canal and external ear normal. No drainage, swelling or tenderness. No middle ear effusion. There is no impacted cerumen. Tympanic membrane is not erythematous. Left Ear: Tympanic membrane, ear canal and external ear normal. No drainage, swelling or tenderness. No middle ear effusion. There is no impacted cerumen. Tympanic membrane is not erythematous. Nose: Nose normal. No congestion or rhinorrhea. Mouth/Throat:      Mouth: Mucous membranes are moist. No oral lesions. Pharynx: Oropharynx is clear. Uvula midline. No pharyngeal swelling, oropharyngeal exudate, posterior oropharyngeal erythema or uvula swelling. Tonsils: No tonsillar exudate or tonsillar abscesses. 0 on the right. 0 on the left. Eyes:      Extraocular Movements:      Right eye: Normal extraocular motion. Left eye: Normal extraocular motion. Conjunctiva/sclera: Conjunctivae normal.      Pupils: Pupils are equal, round, and reactive to light. Cardiovascular:      Rate and Rhythm: Normal rate and regular rhythm. Heart sounds: Normal heart sounds. No murmur heard. No friction rub. No gallop. Pulmonary:      Effort: Pulmonary effort is normal. No respiratory distress. Breath sounds: Normal breath sounds. No stridor. No wheezing, rhonchi or rales. Chest:      Chest wall: No tenderness. Musculoskeletal:         General: Normal range of motion. Skin:     General: Skin is warm and dry. Capillary Refill: Capillary refill takes less than 2 seconds. Neurological:      Mental Status: She is alert.

## 2023-12-08 LAB
FLUAV RNA RESP QL NAA+PROBE: NEGATIVE
FLUBV RNA RESP QL NAA+PROBE: NEGATIVE
SARS-COV-2 RNA RESP QL NAA+PROBE: NEGATIVE

## 2023-12-20 ENCOUNTER — OFFICE VISIT (OUTPATIENT)
Dept: URGENT CARE | Facility: CLINIC | Age: 38
End: 2023-12-20

## 2023-12-20 VITALS
TEMPERATURE: 98 F | RESPIRATION RATE: 18 BRPM | HEART RATE: 84 BPM | OXYGEN SATURATION: 98 % | BODY MASS INDEX: 28.61 KG/M2 | WEIGHT: 156.4 LBS

## 2023-12-20 DIAGNOSIS — U07.1 COVID: Primary | ICD-10-CM

## 2023-12-20 NOTE — PROGRESS NOTES
[facility]    NAME: Malia Guerrero is a 38 y.o. female  : 1985    MRN: [id number]  DATE: 2023  TIME: 11:26 AM    Assessment and Plan   COVID [U07.1]  1. COVID          At home COVID test is positive. Overall mild symptoms, tolerated COVID without difficulty previously. No high risk criteria met.   Note provided for work.   Continue supportive care for symptoms.   Educated on return precautions to PCP or to ED.     Patient Instructions     Keep hydrated and rest as able.  COVID is positive you need to quarantine until your fever is gone for 24 hours and days 0-5. The first day of symptoms is day 0.   Tell anyone you were around 2 days before you got sick that you have COVID and to be tested if they get sick.   Wear your mask and wash hands often.  See your family doctor in 3-5 days; call them first  Go to an emergency department if symptoms worsen, especially shortness or breath, weakness, or if unable to tolerate fluid intake.       Chief Complaint     Chief Complaint   Patient presents with    Cough     Tested positive for Covid this morning 23 at home, her symptoms began  evening; cough, congestion         History of Present Illness       Presents with sick symptoms that began 3 days ago. Symptoms include cough, congestion. Known sick contact includes son with similar. Denies shortness of breath or chest pain.         Review of Systems   Review of Systems   Constitutional:  Positive for fatigue. Negative for chills and fever.   HENT:  Positive for congestion. Negative for sore throat.    Respiratory:  Positive for cough. Negative for shortness of breath and wheezing.    Cardiovascular:  Negative for chest pain.   Gastrointestinal:  Negative for abdominal pain.   Genitourinary:  Negative for dysuria.   Musculoskeletal:  Negative for myalgias.   Neurological:  Negative for dizziness.   Psychiatric/Behavioral:  Negative for confusion.          Current Medications        Current Outpatient Medications:     brompheniramine-pseudoephedrine-DM 30-2-10 MG/5ML syrup, Take 10 mL by mouth 4 (four) times a day as needed for congestion or cough, Disp: 120 mL, Rfl: 0    busPIRone (BUSPAR) 15 mg tablet, TAKE 1 TABLET BY MOUTH THREE TIMES A DAY, Disp: 270 tablet, Rfl: 0    escitalopram (LEXAPRO) 10 mg tablet, TAKE 1 TABLET BY MOUTH EVERY DAY, Disp: 90 tablet, Rfl: 0    escitalopram (LEXAPRO) 20 mg tablet, TAKE 1 TABLET BY MOUTH EVERY DAY, Disp: 90 tablet, Rfl: 0    hydrOXYzine HCL (ATARAX) 10 mg tablet, TAKE 1 TABLET (10 MG TOTAL) BY MOUTH TWICE A DAY AS NEEDED FOR ANXIETY, Disp: 180 tablet, Rfl: 1    meclizine (ANTIVERT) 25 mg tablet, Take 1 tablet (25 mg total) by mouth 3 (three) times a day as needed for dizziness, Disp: 15 tablet, Rfl: 0    Melatonin ER 10 MG TBCR, Take 1 tablet (10 mg total) by mouth daily (Patient not taking: Reported on 12/7/2023), Disp: , Rfl:     omeprazole (PriLOSEC) 20 mg delayed release capsule, Take 1 capsule (20 mg total) by mouth daily (Patient not taking: Reported on 12/7/2023), Disp: 21 capsule, Rfl: 0    ondansetron (ZOFRAN) 4 mg tablet, Take 1 tablet (4 mg total) by mouth every 8 (eight) hours as needed for nausea or vomiting, Disp: 30 tablet, Rfl: 0    SUMAtriptan (IMITREX) 100 mg tablet, Take 1 tablet (100 mg total) by mouth once as needed for migraine for up to 1 dose, Disp: 12 tablet, Rfl: 0    therapeutic multivitamin-minerals (THERAGRAN-M) tablet, Take 1 tablet by mouth daily, Disp: , Rfl:     Current Allergies     Allergies as of 12/20/2023    (No Known Allergies)            The following portions of the patient's history were reviewed and updated as appropriate: allergies, current medications, past family history, past medical history, past social history, past surgical history and problem list.     Past Medical History:   Diagnosis Date    Abnormal Pap smear of cervix     Anxiety     BRCA1 negative     BRCA2 negative     Breast injury     Pt  states scar tissue around nipple from breastfeeding    Depression     GERD (gastroesophageal reflux disease)     HPV (human papilloma virus) infection     Kidney stone     H/O PYELONEPHRITIS    Migraine     Migraines     Seasonal allergies     Sleep difficulties     Varicella     POS HX       Past Surgical History:   Procedure Laterality Date    CHOLECYSTECTOMY      FINGER SURGERY      left hand     GYNECOLOGIC CRYOSURGERY      HAND SURGERY Left     Phelebitis in left hand  post IV infilitration, had sx for clot removal     LYMPH NODE BIOPSY      of neck    LYMPH NODE DISSECTION Right     cervical    AR  DELIVERY ONLY N/A 2018    Procedure:  SECTION ();  Surgeon: Kiera Ramos MD;  Location: [facility];  Service: Obstetrics    AR EXC B9 LESION MRGN XCP SK TG T/A/L >4.0 CM Right 2022    Procedure: EXCISION  BIOPSY LESION/MASS BACK;  Surgeon: Gaurav Quinones MD;  Location: [facility];  Service: General    AR OPEN TREATMENT MEDIAL MALLEOLUS FRACTURE Right 10/6/2022    Procedure: Ankle ORIFmedial malleolus;  Surgeon: Isma Holguin DPM;  Location: [facility];  Service: Podiatry       Family History   Problem Relation Age of Onset    Hypertension Mother     Heart disease Mother     Depression Mother     Coronary artery disease Mother     Hypertension Father     Hyperlipidemia Father     Heart disease Father         MI    Stroke Paternal Grandmother     Cancer Paternal Grandmother         breast    Breast cancer Paternal Grandmother     Cancer Paternal Aunt         colon    Colon cancer Paternal Aunt     Early death Maternal Uncle     Cancer Maternal Uncle         lymphoma    Early death Maternal Uncle         lymphoma    Birth defects Maternal Uncle     Undescended testes Son     Cystic fibrosis Other     Cystic fibrosis Other     Heart failure Maternal Grandmother     Stroke Maternal Grandfather     Alcohol abuse Maternal Grandfather     Alcohol abuse Brother     Drug abuse Brother           of overdose 2020    Anxiety disorder Sister     Post-traumatic stress disorder Sister     Developmental delay Son     Macrocephaly Son     Hydrocephalus Son     Breast cancer Other          Medications have been verified.      Objective   Pulse 84   Temp 98 °F (36.7 °C)   Resp 18   Wt 70.9 kg (156 lb 6.4 oz)   LMP 2023 (Exact Date)   SpO2 98%   BMI 28.61 kg/m²        Physical Exam     Physical Exam  Vitals reviewed.   Constitutional:       General: She is not in acute distress.     Appearance: Normal appearance.   HENT:      Right Ear: Tympanic membrane, ear canal and external ear normal.      Left Ear: Tympanic membrane, ear canal and external ear normal.      Nose: Nose normal.      Mouth/Throat:      Mouth: Mucous membranes are moist.      Pharynx: No posterior oropharyngeal erythema.   Eyes:      Conjunctiva/sclera: Conjunctivae normal.   Cardiovascular:      Rate and Rhythm: Normal rate and regular rhythm.      Pulses: Normal pulses.      Heart sounds: Normal heart sounds. No murmur heard.  Pulmonary:      Effort: Pulmonary effort is normal. No respiratory distress.      Breath sounds: Normal breath sounds.   Skin:     General: Skin is warm and dry.   Neurological:      General: No focal deficit present.      Mental Status: She is alert and oriented to person, place, and time.   Psychiatric:         Mood and Affect: Mood normal.         Behavior: Behavior normal.

## 2023-12-20 NOTE — PATIENT INSTRUCTIONS
Keep hydrated and rest as able.  COVID is positive you need to quarantine until your fever is gone for 24 hours and days 0-5. The first day of symptoms is day 0.   Tell anyone you were around 2 days before you got sick that you have COVID and to be tested if they get sick.   Wear your mask and wash hands often.  See your family doctor in 3-5 days; call them first  Go to an emergency department if symptoms worsen, especially shortness or breath, weakness, or if unable to tolerate fluid intake.

## 2023-12-20 NOTE — LETTER
December 20, 2023     Patient: Malia Guerrero   YOB: 1985   Date of Visit: 12/20/2023       To Whom it May Concern:    Malia Guerrero was seen in my clinic on 12/20/2023. She should be excused through 12/22/23 and then can return after 12/23/23.    If you have any questions or concerns, please don't hesitate to call.         Sincerely,          PATO Stevens        CC: No Recipients

## 2023-12-23 DIAGNOSIS — F41.1 GENERALIZED ANXIETY DISORDER: ICD-10-CM

## 2023-12-23 DIAGNOSIS — F32.1 CURRENT MODERATE EPISODE OF MAJOR DEPRESSIVE DISORDER WITHOUT PRIOR EPISODE (HCC): ICD-10-CM

## 2023-12-23 DIAGNOSIS — F43.10 PTSD (POST-TRAUMATIC STRESS DISORDER): ICD-10-CM

## 2023-12-26 RX ORDER — ESCITALOPRAM OXALATE 20 MG/1
20 TABLET ORAL DAILY
Qty: 90 TABLET | Refills: 0 | Status: SHIPPED | OUTPATIENT
Start: 2023-12-26

## 2024-01-25 NOTE — DISCHARGE INSTRUCTIONS
Return to the ER for further concerns or worsening symptoms  Follow up with your primary care physician and cardiology in 1-2 days  Take medication as prescribed
25-Jan-2024

## 2024-01-29 ENCOUNTER — OFFICE VISIT (OUTPATIENT)
Dept: OBGYN CLINIC | Facility: CLINIC | Age: 39
End: 2024-01-29
Payer: COMMERCIAL

## 2024-01-29 VITALS — DIASTOLIC BLOOD PRESSURE: 70 MMHG | BODY MASS INDEX: 27.62 KG/M2 | WEIGHT: 151 LBS | SYSTOLIC BLOOD PRESSURE: 118 MMHG

## 2024-01-29 DIAGNOSIS — A49.3 INFECTION DUE TO MYCOPLASMA GENITALIUM: ICD-10-CM

## 2024-01-29 DIAGNOSIS — Z11.3 SCREENING EXAMINATION FOR STD (SEXUALLY TRANSMITTED DISEASE): Primary | ICD-10-CM

## 2024-01-29 PROCEDURE — 99213 OFFICE O/P EST LOW 20 MIN: CPT | Performed by: NURSE PRACTITIONER

## 2024-01-29 NOTE — PROGRESS NOTES
Malia Guerrero presents for sexually transmitted disease check. She [facility]. Her partner at the time tested positive for mycoplasma genitalium.      Previous history of STD:  none. Current symptoms include none.  Contraception: none.    Patient's last menstrual period was 01/23/2024 (exact date).    OBJECTIVE:     She appears well, afebrile.  Abdomen: benign, soft, nontender, no masses.  Pelvic Exam: VULVA: normal appearing vulva with no masses, tenderness or lesions, VAGINA: normal appearing vagina with normal color and discharge, no lesions, CERVIX: normal appearing cervix without discharge or lesions.      ASSESSMENT AND PLAN:     Diagnoses and all orders for this visit:    Screening examination for STD (sexually transmitted disease)  -     Mycoplasma / ureaplasma culture  -     NuSwab Vaginitis Plus (VG+)    Infection due to Mycoplasma genitalium  -     Mycoplasma / ureaplasma culture        Results will be released to [facility], if abnormal will call to review and discuss treatment plan.     Patient overdue for annual exam. Recommend she schedule appt.

## 2024-01-31 LAB
A VAGINAE DNA VAG QL NAA+PROBE: ABNORMAL SCORE
BVAB2 DNA VAG QL NAA+PROBE: ABNORMAL SCORE
C ALBICANS DNA VAG QL NAA+PROBE: NEGATIVE
C GLABRATA DNA VAG QL NAA+PROBE: NEGATIVE
C TRACH RRNA SPEC QL NAA+PROBE: NEGATIVE
MEGA1 DNA VAG QL NAA+PROBE: ABNORMAL SCORE
N GONORRHOEA RRNA SPEC QL NAA+PROBE: NEGATIVE
T VAGINALIS RRNA SPEC QL NAA+PROBE: NEGATIVE

## 2024-02-01 DIAGNOSIS — B96.89 BV (BACTERIAL VAGINOSIS): Primary | ICD-10-CM

## 2024-02-01 DIAGNOSIS — N76.0 BV (BACTERIAL VAGINOSIS): Primary | ICD-10-CM

## 2024-02-01 RX ORDER — METRONIDAZOLE 500 MG/1
500 TABLET ORAL EVERY 12 HOURS SCHEDULED
Qty: 14 TABLET | Refills: 0 | Status: SHIPPED | OUTPATIENT
Start: 2024-02-01 | End: 2024-02-08

## 2024-02-26 DIAGNOSIS — F43.10 PTSD (POST-TRAUMATIC STRESS DISORDER): ICD-10-CM

## 2024-02-26 DIAGNOSIS — F41.1 GENERALIZED ANXIETY DISORDER: ICD-10-CM

## 2024-02-26 RX ORDER — BUSPIRONE HYDROCHLORIDE 15 MG/1
15 TABLET ORAL 3 TIMES DAILY
Qty: 270 TABLET | Refills: 0 | Status: SHIPPED | OUTPATIENT
Start: 2024-02-26

## 2024-05-18 NOTE — PROGRESS NOTES
OFFICE VISIT: Pt had called office with complaints of migraines in pregnancy  Pt states she has a history of migraines but prior to this pregnancy would only get 1-2 a year  Pt has had two migraines in the last week  Prior to pregnancy patient would take Excedrin Migraine and have relief  Last migraine was less than a week ago and patient stated she took tylenol and 2 cups of coffee and rested and migraine resolved after a day or so  This current migraine began yesterday, pt tried tylenol and coffee and has had no relief  Pt states she was unable to sleep due to the pain  Pt has also had nausea/vomiting since yesterday  Migraine is right sided with photophobia on right side as well  Pt denies any edema, or epigastric pain, or visual changes besides photophobia  Reviewed with patient options  Rx given for Fioricet Q 12 hours and Reglan 10mg Q 12 hours, Can take 2 doses,  instructions to not take more than 2 times a week for acute onset of headache as prolonged use can cause rebound headache  Also reviewed with patient to prevent reoccurring migraine headaches to try Riboflavin 400mg daily and Magnesium Oxide 250-400mg daily  Pt instructed if after 2 doses of Fioricet and Reglan she has no resolve of her headache to call office  Denies any Cramping, VB, LOF, Edema, Domestic Violence, Smoking  + FM  Tolerating PNV  RTO for regular PNV  [FreeTextEntry1] : Pathology was reviewed showed acute appendicitis with abscess - this was discussed with the patient. Regular diet. Advised to avoid heavy lifting Follow up as needed.

## 2024-06-26 DIAGNOSIS — Z00.6 ENCOUNTER FOR EXAMINATION FOR NORMAL COMPARISON OR CONTROL IN CLINICAL RESEARCH PROGRAM: ICD-10-CM

## 2024-07-24 ENCOUNTER — OFFICE VISIT (OUTPATIENT)
Dept: URGENT CARE | Facility: CLINIC | Age: 39
End: 2024-07-24
Payer: COMMERCIAL

## 2024-07-24 VITALS
RESPIRATION RATE: 14 BRPM | BODY MASS INDEX: 26.52 KG/M2 | SYSTOLIC BLOOD PRESSURE: 136 MMHG | HEART RATE: 92 BPM | DIASTOLIC BLOOD PRESSURE: 82 MMHG | TEMPERATURE: 97.2 F | WEIGHT: 145 LBS | OXYGEN SATURATION: 99 %

## 2024-07-24 DIAGNOSIS — Z20.2 POSSIBLE EXPOSURE TO STD: ICD-10-CM

## 2024-07-24 DIAGNOSIS — N39.0 URINARY TRACT INFECTION WITHOUT HEMATURIA, SITE UNSPECIFIED: Primary | ICD-10-CM

## 2024-07-24 LAB
SL AMB  POCT GLUCOSE, UA: ABNORMAL
SL AMB LEUKOCYTE ESTERASE,UA: ABNORMAL
SL AMB POCT BILIRUBIN,UA: ABNORMAL
SL AMB POCT BLOOD,UA: ABNORMAL
SL AMB POCT CLARITY,UA: ABNORMAL
SL AMB POCT COLOR,UA: YELLOW
SL AMB POCT KETONES,UA: ABNORMAL
SL AMB POCT NITRITE,UA: ABNORMAL
SL AMB POCT PH,UA: 6
SL AMB POCT SPECIFIC GRAVITY,UA: 1
SL AMB POCT URINE PROTEIN: ABNORMAL
SL AMB POCT UROBILINOGEN: 0.2

## 2024-07-24 PROCEDURE — 87491 CHLMYD TRACH DNA AMP PROBE: CPT | Performed by: PHYSICIAN ASSISTANT

## 2024-07-24 PROCEDURE — 81002 URINALYSIS NONAUTO W/O SCOPE: CPT | Performed by: PHYSICIAN ASSISTANT

## 2024-07-24 PROCEDURE — 87086 URINE CULTURE/COLONY COUNT: CPT | Performed by: FAMILY MEDICINE

## 2024-07-24 PROCEDURE — 99213 OFFICE O/P EST LOW 20 MIN: CPT | Performed by: PHYSICIAN ASSISTANT

## 2024-07-24 PROCEDURE — 87591 N.GONORRHOEAE DNA AMP PROB: CPT | Performed by: PHYSICIAN ASSISTANT

## 2024-07-24 PROCEDURE — 87186 SC STD MICRODIL/AGAR DIL: CPT | Performed by: FAMILY MEDICINE

## 2024-07-24 PROCEDURE — 87077 CULTURE AEROBIC IDENTIFY: CPT | Performed by: FAMILY MEDICINE

## 2024-07-24 RX ORDER — CEPHALEXIN 500 MG/1
500 CAPSULE ORAL EVERY 12 HOURS SCHEDULED
Qty: 10 CAPSULE | Refills: 0 | Status: SHIPPED | OUTPATIENT
Start: 2024-07-24 | End: 2024-07-29

## 2024-07-24 NOTE — PROGRESS NOTES
[facility]        NAME: Malia Guerrero is a 39 y.o. female  : 1985    MRN: [id number]  DATE: 2024  TIME: 7:24 PM    Assessment and Plan   Urinary tract infection without hematuria, site unspecified [N39.0]  1. Urinary tract infection without hematuria, site unspecified  POCT urine dip    Urine culture    cephalexin (KEFLEX) 500 mg capsule      2. Possible exposure to STD  Chlamydia/GC amplified DNA by PCR        Patient's Urine dip showed large RBCs and moderate leukocytes  Prescribed Keflex for UTI  Obtained GC and Chlamydia test upon patient's request. Results will be in my chart, will call if positive  Come back to urgent care if symptoms worsen or you experience any fever, chills, flank pain, or hematuria    Patient Instructions       Follow up with PCP in 3-5 days.  Proceed to  ER if symptoms worsen.    Chief Complaint     Chief Complaint   Patient presents with    Possible UTI     Pt presents with urinary urgency, painful urination; started yesterday         History of Present Illness       Malia Guerrero is a 39 year old female with a significant PMH of pyelonephritis presenting to the the urgent care for dysuria, urinary frequency, and urinary urgency x 1 day. She states that she has had UTIs in the past and this feels like one. She denies using any OTC medication. She denies any flank pain, fever, chills, hematuria, urine odor, vaginal itching, abnormal vaginal discharge, nausea, or vomiting. She denies any allergies. She is requesting STI testing as well for recent unprotected sex.         Review of Systems   Review of Systems   Constitutional:  Negative for appetite change, chills, diaphoresis, fatigue and fever.   Respiratory:  Negative for shortness of breath.    Cardiovascular:  Negative for chest pain.   Gastrointestinal:  Negative for abdominal pain, constipation, diarrhea, nausea and vomiting.   Genitourinary:  Positive for dysuria, frequency and urgency. Negative for  difficulty urinating, flank pain, hematuria, pelvic pain, vaginal bleeding, vaginal discharge and vaginal pain.   Musculoskeletal:  Negative for back pain and myalgias.   Psychiatric/Behavioral:  Negative for confusion.          Current Medications       Current Outpatient Medications:     busPIRone (BUSPAR) 15 mg tablet, TAKE 1 TABLET BY MOUTH THREE TIMES A DAY, Disp: 270 tablet, Rfl: 0    cephalexin (KEFLEX) 500 mg capsule, Take 1 capsule (500 mg total) by mouth every 12 (twelve) hours for 5 days, Disp: 10 capsule, Rfl: 0    meclizine (ANTIVERT) 25 mg tablet, Take 1 tablet (25 mg total) by mouth 3 (three) times a day as needed for dizziness, Disp: 15 tablet, Rfl: 0    Melatonin ER 10 MG TBCR, Take 1 tablet (10 mg total) by mouth daily, Disp: , Rfl:     omeprazole (PriLOSEC) 20 mg delayed release capsule, Take 1 capsule (20 mg total) by mouth daily, Disp: 21 capsule, Rfl: 0    ondansetron (ZOFRAN) 4 mg tablet, Take 1 tablet (4 mg total) by mouth every 8 (eight) hours as needed for nausea or vomiting, Disp: 30 tablet, Rfl: 0    SUMAtriptan (IMITREX) 100 mg tablet, Take 1 tablet (100 mg total) by mouth once as needed for migraine for up to 1 dose, Disp: 12 tablet, Rfl: 0    therapeutic multivitamin-minerals (THERAGRAN-M) tablet, Take 1 tablet by mouth daily, Disp: , Rfl:     brompheniramine-pseudoephedrine-DM 30-2-10 MG/5ML syrup, Take 10 mL by mouth 4 (four) times a day as needed for congestion or cough (Patient not taking: Reported on 7/24/2024), Disp: 120 mL, Rfl: 0    escitalopram (LEXAPRO) 10 mg tablet, TAKE 1 TABLET BY MOUTH EVERY DAY (Patient not taking: Reported on 7/24/2024), Disp: 90 tablet, Rfl: 0    escitalopram (LEXAPRO) 20 mg tablet, TAKE 1 TABLET BY MOUTH EVERY DAY (Patient not taking: Reported on 7/24/2024), Disp: 90 tablet, Rfl: 0    hydrOXYzine HCL (ATARAX) 10 mg tablet, TAKE 1 TABLET (10 MG TOTAL) BY MOUTH TWICE A DAY AS NEEDED FOR ANXIETY (Patient not taking: Reported on 7/24/2024), Disp: 180  tablet, Rfl: 1    Current Allergies     Allergies as of 2024    (No Known Allergies)            The following portions of the patient's history were reviewed and updated as appropriate: allergies, current medications, past family history, past medical history, past social history, past surgical history and problem list.     Past Medical History:   Diagnosis Date    Abnormal Pap smear of cervix     Anxiety     BRCA1 negative     BRCA2 negative     Breast injury     Pt states scar tissue around nipple from breastfeeding    Depression     GERD (gastroesophageal reflux disease)     HPV (human papilloma virus) infection     Kidney stone     H/O PYELONEPHRITIS    Migraine     Migraines     Seasonal allergies     Sleep difficulties     Varicella     POS HX       Past Surgical History:   Procedure Laterality Date    CHOLECYSTECTOMY      FINGER SURGERY      left hand     GYNECOLOGIC CRYOSURGERY      HAND SURGERY Left     Phelebitis in left hand  post IV infilitration, had sx for clot removal     LYMPH NODE BIOPSY      of neck    LYMPH NODE DISSECTION Right     cervical    HI  DELIVERY ONLY N/A 2018    Procedure:  SECTION ();  Surgeon: Kiera Ramos MD;  Location: [facility];  Service: Obstetrics    HI EXC B9 LESION MRGN XCP SK TG T/A/L >4.0 CM Right 2022    Procedure: EXCISION  BIOPSY LESION/MASS BACK;  Surgeon: Gaurav Quinones MD;  Location: [facility];  Service: General    HI OPEN TREATMENT MEDIAL MALLEOLUS FRACTURE Right 10/6/2022    Procedure: Ankle ORIFmedial malleolus;  Surgeon: Isma Holguin DPM;  Location: [facility];  Service: Podiatry       Family History   Problem Relation Age of Onset    Hypertension Mother     Heart disease Mother     Depression Mother     Coronary artery disease Mother     Hypertension Father     Hyperlipidemia Father     Heart disease Father         MI    Stroke Paternal Grandmother     Cancer Paternal Grandmother         breast    Breast cancer  Paternal Grandmother     Cancer Paternal Aunt         colon    Colon cancer Paternal Aunt     Early death Maternal Uncle     Cancer Maternal Uncle         lymphoma    Early death Maternal Uncle         lymphoma    Birth defects Maternal Uncle     Undescended testes Son     Cystic fibrosis Other     Cystic fibrosis Other     Heart failure Maternal Grandmother     Stroke Maternal Grandfather     Alcohol abuse Maternal Grandfather     Alcohol abuse Brother     Drug abuse Brother          of overdose 2020    Anxiety disorder Sister     Post-traumatic stress disorder Sister     Developmental delay Son     Macrocephaly Son     Hydrocephalus Son     Breast cancer Other          Medications have been verified.        Objective   /82   Pulse 92   Temp (!) 97.2 °F (36.2 °C)   Resp 14   Wt 65.8 kg (145 lb)   SpO2 99%   BMI 26.52 kg/m²        Physical Exam     Physical Exam  Vitals and nursing note reviewed.   Constitutional:       General: She is not in acute distress.     Appearance: Normal appearance. She is normal weight. She is not ill-appearing.   HENT:      Head: Normocephalic and atraumatic.   Cardiovascular:      Rate and Rhythm: Normal rate and regular rhythm.      Heart sounds: Normal heart sounds.   Pulmonary:      Effort: Pulmonary effort is normal. No respiratory distress.      Breath sounds: Normal breath sounds. No wheezing, rhonchi or rales.   Abdominal:      General: Abdomen is flat.      Palpations: Abdomen is soft.      Tenderness: There is no abdominal tenderness. There is no right CVA tenderness, left CVA tenderness or guarding.   Skin:     General: Skin is warm and dry.      Capillary Refill: Capillary refill takes less than 2 seconds.   Neurological:      Mental Status: She is alert and oriented to person, place, and time.   Psychiatric:         Behavior: Behavior normal.

## 2024-07-25 LAB
C TRACH DNA SPEC QL NAA+PROBE: NEGATIVE
N GONORRHOEA DNA SPEC QL NAA+PROBE: NEGATIVE

## 2024-07-26 LAB — BACTERIA UR CULT: ABNORMAL

## 2024-10-23 ENCOUNTER — OFFICE VISIT (OUTPATIENT)
Dept: FAMILY MEDICINE CLINIC | Facility: CLINIC | Age: 39
End: 2024-10-23
Payer: COMMERCIAL

## 2024-10-23 VITALS
HEIGHT: 62 IN | DIASTOLIC BLOOD PRESSURE: 80 MMHG | TEMPERATURE: 96.8 F | HEART RATE: 68 BPM | SYSTOLIC BLOOD PRESSURE: 120 MMHG | WEIGHT: 147.8 LBS | RESPIRATION RATE: 16 BRPM | BODY MASS INDEX: 27.2 KG/M2

## 2024-10-23 DIAGNOSIS — N63.11 MASS OF UPPER OUTER QUADRANT OF RIGHT BREAST: ICD-10-CM

## 2024-10-23 DIAGNOSIS — N64.4 BREAST PAIN: Primary | ICD-10-CM

## 2024-10-23 PROCEDURE — 99213 OFFICE O/P EST LOW 20 MIN: CPT | Performed by: FAMILY MEDICINE

## 2024-10-23 NOTE — PROGRESS NOTES
Assessment/Plan:    1. Breast pain  -     US breast axilla right; Future; Expected date: 10/23/2024  2. Mass of upper outer quadrant of right breast  -     US breast axilla right; Future; Expected date: 10/23/2024          There are no Patient Instructions on file for this visit.    No follow-ups on file.    Subjective:      Patient ID: Malia Guerrero is a 39 y.o. female.    Chief Complaint   Patient presents with    Breast Mass     Right breast hurts, pain started saturday  Cynthia Cope MA       Pt started with pain in lat aspect of rt breats.    Did feel a lump but does not feel it now  Looks swollen to her  Will be getting her period in a few days - but her breasts do not swell at this time.          The following portions of the patient's history were reviewed and updated as appropriate: allergies, current medications, past family history, past medical history, past social history, past surgical history and problem list.    Review of Systems   Skin:         Lump in rt breast         Current Outpatient Medications   Medication Sig Dispense Refill    busPIRone (BUSPAR) 15 mg tablet TAKE 1 TABLET BY MOUTH THREE TIMES A  tablet 0    brompheniramine-pseudoephedrine-DM 30-2-10 MG/5ML syrup Take 10 mL by mouth 4 (four) times a day as needed for congestion or cough (Patient not taking: Reported on 7/24/2024) 120 mL 0    escitalopram (LEXAPRO) 10 mg tablet TAKE 1 TABLET BY MOUTH EVERY DAY (Patient not taking: Reported on 7/24/2024) 90 tablet 0    escitalopram (LEXAPRO) 20 mg tablet TAKE 1 TABLET BY MOUTH EVERY DAY (Patient not taking: Reported on 7/24/2024) 90 tablet 0    hydrOXYzine HCL (ATARAX) 10 mg tablet TAKE 1 TABLET (10 MG TOTAL) BY MOUTH TWICE A DAY AS NEEDED FOR ANXIETY (Patient not taking: Reported on 7/24/2024) 180 tablet 1    meclizine (ANTIVERT) 25 mg tablet Take 1 tablet (25 mg total) by mouth 3 (three) times a day as needed for dizziness (Patient not taking: Reported on 10/23/2024) 15 tablet 0     "Melatonin ER 10 MG TBCR Take 1 tablet (10 mg total) by mouth daily (Patient not taking: Reported on 10/23/2024)      omeprazole (PriLOSEC) 20 mg delayed release capsule Take 1 capsule (20 mg total) by mouth daily (Patient not taking: Reported on 10/23/2024) 21 capsule 0    ondansetron (ZOFRAN) 4 mg tablet Take 1 tablet (4 mg total) by mouth every 8 (eight) hours as needed for nausea or vomiting (Patient not taking: Reported on 10/23/2024) 30 tablet 0    SUMAtriptan (IMITREX) 100 mg tablet Take 1 tablet (100 mg total) by mouth once as needed for migraine for up to 1 dose (Patient not taking: Reported on 10/23/2024) 12 tablet 0    therapeutic multivitamin-minerals (THERAGRAN-M) tablet Take 1 tablet by mouth daily (Patient not taking: Reported on 10/23/2024)       No current facility-administered medications for this visit.       Objective:    /80   Pulse 68   Temp (!) 96.8 °F (36 °C)   Resp 16   Ht 5' 2\" (1.575 m) Comment: per pt  Wt 67 kg (147 lb 12.8 oz)   LMP 09/28/2024 (Approximate)   BMI 27.03 kg/m²        Physical Exam  Skin:     Comments: Tender lump in rt lat quad of rt breast  No erythma no discharge                Frank Lombardi,   "

## 2024-11-06 ENCOUNTER — HOSPITAL ENCOUNTER (OUTPATIENT)
Dept: RADIOLOGY | Facility: HOSPITAL | Age: 39
Discharge: HOME | End: 2024-11-06
Attending: FAMILY MEDICINE
Payer: COMMERCIAL

## 2024-11-06 ENCOUNTER — TELEPHONE (OUTPATIENT)
Dept: FAMILY MEDICINE CLINIC | Facility: CLINIC | Age: 39
End: 2024-11-06

## 2024-11-06 VITALS — WEIGHT: 145 LBS | HEIGHT: 62 IN | BODY MASS INDEX: 26.68 KG/M2

## 2024-11-06 DIAGNOSIS — M79.89 AXILLARY SWELLING: ICD-10-CM

## 2024-11-06 DIAGNOSIS — N63.11 MASS OF UPPER OUTER QUADRANT OF RIGHT BREAST: ICD-10-CM

## 2024-11-06 DIAGNOSIS — N64.4 BREAST PAIN: ICD-10-CM

## 2024-11-06 PROCEDURE — G0279 TOMOSYNTHESIS, MAMMO: HCPCS

## 2024-11-06 PROCEDURE — 76642 ULTRASOUND BREAST LIMITED: CPT

## 2024-11-06 PROCEDURE — 77066 DX MAMMO INCL CAD BI: CPT

## 2024-11-06 NOTE — TELEPHONE ENCOUNTER
Called patient to discuss results of the breast ultrasound.  Ultrasound shows no suspicious masses or masses at this time.  Left message for patient to call back.  Okay to give message if patient calls back or I can speak with her thank you

## 2024-12-05 ENCOUNTER — TELEPHONE (OUTPATIENT)
Age: 39
End: 2024-12-05

## 2024-12-05 NOTE — TELEPHONE ENCOUNTER
Shai marroquin [facility] called in to request medical records for the patient.    The office fax number was given for further assistance and the provider and office staff were notified.

## 2024-12-06 NOTE — TELEPHONE ENCOUNTER
Faxed & Confirmation to [facility] [facility]--(w/ provider sign off) Record Request From [employer] [place] [employer] [place] (scanned in media).

## 2024-12-10 ENCOUNTER — TELEPHONE (OUTPATIENT)
Dept: PSYCHIATRY | Facility: CLINIC | Age: 39
End: 2024-12-10

## 2024-12-10 NOTE — TELEPHONE ENCOUNTER
A medical records request was received from [facility]. Request is from [employer]. Date range is for the past 3 years.

## 2024-12-17 ENCOUNTER — OFFICE VISIT (OUTPATIENT)
Dept: FAMILY MEDICINE CLINIC | Facility: CLINIC | Age: 39
End: 2024-12-17
Payer: COMMERCIAL

## 2024-12-17 VITALS
TEMPERATURE: 98.7 F | SYSTOLIC BLOOD PRESSURE: 124 MMHG | HEART RATE: 68 BPM | WEIGHT: 151 LBS | DIASTOLIC BLOOD PRESSURE: 76 MMHG | BODY MASS INDEX: 27.79 KG/M2 | HEIGHT: 62 IN | RESPIRATION RATE: 18 BRPM

## 2024-12-17 DIAGNOSIS — J06.9 UPPER RESPIRATORY TRACT INFECTION, UNSPECIFIED TYPE: Primary | ICD-10-CM

## 2024-12-17 PROCEDURE — 99213 OFFICE O/P EST LOW 20 MIN: CPT | Performed by: FAMILY MEDICINE

## 2024-12-17 RX ORDER — AZITHROMYCIN 250 MG/1
TABLET, FILM COATED ORAL
Qty: 6 TABLET | Refills: 0 | Status: SHIPPED | OUTPATIENT
Start: 2024-12-17 | End: 2024-12-22

## 2024-12-17 NOTE — PROGRESS NOTES
"Name: Malia Guerrero      : 1985      MRN: [id number]  Encounter Provider: Frank Lombardi, DO  Encounter Date: 2024   Encounter department: [facility]  :  Assessment & Plan  Upper respiratory tract infection, unspecified type    Orders:  •  azithromycin (ZITHROMAX) 250 mg tablet; 2 tabs on day 1, 1 tab a day for 4 days after           History of Present Illness     Pt is here for a same day appt  Copngestion  Ears hurt  Chest congestion       Review of Systems   HENT:  Positive for congestion and sinus pressure.        Objective   /76   Pulse 68   Temp 98.7 °F (37.1 °C)   Resp 18   Ht 5' 2\" (1.575 m)   Wt 68.5 kg (151 lb)   BMI 27.62 kg/m²      Physical Exam  HENT:      Nose: Congestion and rhinorrhea present.         "

## 2024-12-18 DIAGNOSIS — J06.9 UPPER RESPIRATORY TRACT INFECTION, UNSPECIFIED TYPE: Primary | ICD-10-CM

## 2024-12-18 RX ORDER — METHYLPREDNISOLONE 4 MG/1
TABLET ORAL
Qty: 21 EACH | Refills: 0 | Status: SHIPPED | OUTPATIENT
Start: 2024-12-18

## 2025-02-20 ENCOUNTER — OFFICE VISIT (OUTPATIENT)
Dept: FAMILY MEDICINE CLINIC | Facility: CLINIC | Age: 40
End: 2025-02-20

## 2025-02-20 VITALS
TEMPERATURE: 97.8 F | HEIGHT: 62 IN | SYSTOLIC BLOOD PRESSURE: 116 MMHG | WEIGHT: 152 LBS | BODY MASS INDEX: 27.97 KG/M2 | HEART RATE: 100 BPM | DIASTOLIC BLOOD PRESSURE: 82 MMHG

## 2025-02-20 DIAGNOSIS — B96.89 ACUTE BACTERIAL SINUSITIS: Primary | ICD-10-CM

## 2025-02-20 DIAGNOSIS — J01.90 ACUTE BACTERIAL SINUSITIS: Primary | ICD-10-CM

## 2025-02-20 PROCEDURE — 99213 OFFICE O/P EST LOW 20 MIN: CPT | Performed by: FAMILY MEDICINE

## 2025-02-20 RX ORDER — BUPROPION HYDROCHLORIDE 150 MG/1
150 TABLET, EXTENDED RELEASE ORAL 2 TIMES DAILY
COMMUNITY
Start: 2024-12-27

## 2025-02-20 RX ORDER — METHYLPREDNISOLONE 4 MG/1
TABLET ORAL
Qty: 21 EACH | Refills: 0 | Status: SHIPPED | OUTPATIENT
Start: 2025-02-20

## 2025-02-20 NOTE — PROGRESS NOTES
"Name: Malia Guerrero      : 1985      MRN: [id number]  Encounter Provider: Frank Lombardi, DO  Encounter Date: 2025   Encounter department: [facility]  :  Assessment & Plan  Acute bacterial sinusitis    Orders:  •  amoxicillin-clavulanate (AUGMENTIN) 875-125 mg per tablet; Take 1 tablet by mouth every 12 (twelve) hours for 7 days  •  methylPREDNISolone 4 MG tablet therapy pack; Use as directed on package           History of Present Illness   Pt states she feels she has a sinus infection  Recently tested pos for flu a - 3 weeks ago  This started two days ago   Congestiion  Facial presssure        Review of Systems   HENT:  Positive for congestion and sinus pressure.    Respiratory:  Positive for cough.        Objective   /82   Pulse 100   Temp 97.8 °F (36.6 °C)   Ht 5' 2\" (1.575 m)   Wt 68.9 kg (152 lb)   LMP 2025 (Approximate)   BMI 27.80 kg/m²      Physical Exam  HENT:      Nose: Congestion and rhinorrhea present.         "

## 2025-06-12 ENCOUNTER — OFFICE VISIT (OUTPATIENT)
Dept: FAMILY MEDICINE CLINIC | Facility: CLINIC | Age: 40
End: 2025-06-12
Payer: COMMERCIAL

## 2025-06-12 VITALS
WEIGHT: 145 LBS | BODY MASS INDEX: 26.52 KG/M2 | TEMPERATURE: 97.6 F | HEART RATE: 76 BPM | OXYGEN SATURATION: 98 % | SYSTOLIC BLOOD PRESSURE: 120 MMHG | DIASTOLIC BLOOD PRESSURE: 76 MMHG | RESPIRATION RATE: 14 BRPM

## 2025-06-12 DIAGNOSIS — M79.641 PAIN OF RIGHT HAND: Primary | ICD-10-CM

## 2025-06-12 PROCEDURE — 99213 OFFICE O/P EST LOW 20 MIN: CPT | Performed by: FAMILY MEDICINE

## 2025-06-12 RX ORDER — PREDNISONE 20 MG/1
TABLET ORAL
Qty: 32 TABLET | Refills: 0 | Status: SHIPPED | OUTPATIENT
Start: 2025-06-12

## 2025-06-12 NOTE — PROGRESS NOTES
Name: Malia Guerrero      : 1985      MRN: [id number]  Encounter Provider: Frank Lombardi, DO  Encounter Date: 2025   Encounter department: [facility]  :  Assessment & Plan  Pain of right hand  Pt does have a repetative job w rt hand  No numbness - yet  Orders:  •  EMG; Future  •  predniSONE 20 mg tablet; 4 tabs for three days, 3 tabs for three days, 2 tabs for three days, 1 tab for three days, 1/2 tab for 4 days  •  Ambulatory Referral to Occupational Therapy; Future           History of Present Illness   Pt is sched for rt hand pain    Pt states she has thumb and elbow pain  Swlling  Pt does hairdressing and massages  Pain has been going on for two months  Lomng day doing work will be in pain in the evening  Pt will rarely get numbness and tingling      Review of Systems   Constitutional: Negative.  Negative for activity change, appetite change, chills, diaphoresis and fatigue.   HENT: Negative.  Negative for dental problem, ear pain, sinus pressure and sore throat.    Eyes: Negative.  Negative for photophobia, pain, discharge, redness, itching and visual disturbance.   Respiratory:  Negative for apnea and chest tightness.    Cardiovascular: Negative.  Negative for chest pain, palpitations and leg swelling.   Gastrointestinal: Negative.  Negative for abdominal distention, abdominal pain, constipation and diarrhea.   Endocrine: Negative.  Negative for cold intolerance and heat intolerance.   Genitourinary: Negative.  Negative for difficulty urinating and dyspareunia.   Musculoskeletal:  Positive for arthralgias and myalgias. Negative for back pain.   Skin: Negative.    Allergic/Immunologic: Negative for environmental allergies.   Neurological: Negative.  Negative for dizziness.   Psychiatric/Behavioral: Negative.  Negative for agitation.        Objective   /76   Pulse 76   Temp 97.6 °F (36.4 °C)   Resp 14   Wt 65.8 kg (145 lb)   LMP 2025   SpO2 98%   BMI 26.52 kg/m²       Physical Exam  Vitals and nursing note reviewed.   Constitutional:       General: She is not in acute distress.     Appearance: She is well-developed. She is not diaphoretic.   HENT:      Head: Normocephalic and atraumatic.      Right Ear: External ear normal.      Left Ear: External ear normal.      Nose: Nose normal.      Mouth/Throat:      Pharynx: No oropharyngeal exudate.     Eyes:      General: No scleral icterus.        Right eye: No discharge.         Left eye: No discharge.      Pupils: Pupils are equal, round, and reactive to light.     Neck:      Thyroid: No thyromegaly.     Cardiovascular:      Rate and Rhythm: Normal rate.      Heart sounds: Normal heart sounds. No murmur heard.  Pulmonary:      Effort: Pulmonary effort is normal. No respiratory distress.      Breath sounds: Normal breath sounds. No wheezing.   Abdominal:      General: Bowel sounds are normal. There is no distension.      Palpations: Abdomen is soft. There is no mass.      Tenderness: There is no abdominal tenderness. There is no guarding or rebound.     Musculoskeletal:         General: Normal range of motion.     Skin:     General: Skin is warm and dry.      Findings: No erythema or rash.     Neurological:      Mental Status: She is alert.      Coordination: Coordination normal.      Deep Tendon Reflexes: Reflexes normal.     Psychiatric:         Behavior: Behavior normal.

## 2025-06-23 ENCOUNTER — EVALUATION (OUTPATIENT)
Dept: OCCUPATIONAL THERAPY | Facility: CLINIC | Age: 40
End: 2025-06-23
Attending: FAMILY MEDICINE
Payer: COMMERCIAL

## 2025-06-23 DIAGNOSIS — M79.641 PAIN OF RIGHT HAND: Primary | ICD-10-CM

## 2025-06-23 PROCEDURE — 97166 OT EVAL MOD COMPLEX 45 MIN: CPT

## 2025-07-09 ENCOUNTER — OFFICE VISIT (OUTPATIENT)
Dept: OCCUPATIONAL THERAPY | Facility: CLINIC | Age: 40
End: 2025-07-09
Attending: FAMILY MEDICINE
Payer: COMMERCIAL

## 2025-07-09 DIAGNOSIS — M79.641 PAIN OF RIGHT HAND: Primary | ICD-10-CM

## 2025-07-09 PROCEDURE — 97110 THERAPEUTIC EXERCISES: CPT

## 2025-07-09 NOTE — PROGRESS NOTES
"Daily Note     Today's date: 2025  Patient name: Malia Guerrero  : 1985  MRN: [id number]  Referring provider: Lombardi, Frank, DO  Dx:   Encounter Diagnosis     ICD-10-CM    1. Pain of right hand  M79.641           Start Time: 825  Stop Time: 910  Total time in clinic (min): 45 minutes    Subjective: Patient reports, \"my hand has been feeling pretty good because I'm on Prednisone, it's a temporary fix but it's been so helpful\". Patient reports compliance with HEP.      Objective: See treatment diary below         Assessment:   Patient tolerated session well, able to perform all TE with good activity tolerance. Patient session focused on patient education on anatomy and physiology concerning current dx, techniques for decreasing deficits through HEP, and appropriate use of modalities. E-stimulation provided to reduce inflammation and pain to the R hand and thumb. Patient educated on neoprene brace to purchase online via [employer], \"Comfort Cool Brace\". Patient educated on treatment plan at this time. Patient benefiting from skilled hand therapy OT to reduce deficits to improve independence with daily activities.        Plan:   Focus on ROM and modalities to decrease pain to improve ability to complete daily activites with ease.  POC 25 - 25        Visit Tracker  Date  IE                                                                                    PMHx:   has a past medical history of Abnormal Pap smear of cervix, Anxiety, BRCA1 negative, BRCA2 negative, Breast injury, Depression, GERD (gastroesophageal reflux disease), HPV (human papilloma virus) infection, Kidney stone (), Migraine, Migraines, Seasonal allergies, Sleep difficulties, and Varicella.    Precautions:   For all exercises and treatment interventions listed below; patient educated to complete within tolerance and pain threshold. Patient educated if symptoms increase or pain becomes intolerable they should discontinue " "and/or reduce intensity/frequency.       Manuals HEP 7/9/2025                         Ther Ex     Education on HEP and dx  x5min   Thenar STM massage  x Lacrosse ball         PROM wrist flex with thumb  x x3 5-10 sec holds   PROM thumb radial abduction  x x5 with 5-10 sec holds   PROM thumb composite abduction  x X5 with 5-10 sec holds    E-stim   X10 min   Thumb AROM  X10    \"C\" stabilizer  5 x 5 sec holds   \"C\" shape index finger lifts   X10    First dorsal interossei strengthening   X10    Dice with opposition   Whole jar    Lacrosse ball  X3 min              Ther Act                     Modalities     MHP  5x min         Patient received Premod Continuous Electric Stimulation - continuous to R thenar eminence at level (80-150HZ) to decrease pain to allow for increased ability to perform ADLs.  2in x 2in electrode pads were used at an average intensity of 9.2 Cv volts. Skin was assessed pre and post tx with no adverse effects noted. Therapist attended to patient throughout the entire duration of ESTM treatment to adjust intensity as needed.        "

## 2025-07-18 ENCOUNTER — OFFICE VISIT (OUTPATIENT)
Dept: OCCUPATIONAL THERAPY | Facility: CLINIC | Age: 40
End: 2025-07-18
Attending: FAMILY MEDICINE
Payer: COMMERCIAL

## 2025-07-18 DIAGNOSIS — M79.641 PAIN OF RIGHT HAND: Primary | ICD-10-CM

## 2025-07-18 PROCEDURE — 97110 THERAPEUTIC EXERCISES: CPT

## 2025-07-18 NOTE — PROGRESS NOTES
"Daily Note     Today's date: 2025  Patient name: Malia Guerrero  : 1985  MRN: [id number]  Referring provider: Lombardi, Frank, DO  Dx:   Encounter Diagnosis     ICD-10-CM    1. Pain of right hand  M79.641             Start Time: 745  Stop Time: 0830  Total time in clinic (min): 45 minutes    Subjective: Patient reports, \"I did get the thumb brace and it helped a lot, I was mowing the lawn, doing the edges I was outside for 6 hours\".         Objective: See treatment diary below         Assessment:   Patient tolerated session well, able to perform all TE with good activity tolerance. Patient session focused on patient education on anatomy and physiology concerning current dx, techniques for decreasing deficits through HEP, and appropriate use of modalities. E-stimulation provided to reduce inflammation and pain to the R hand and thumb. Patient educated on treatment plan at this time. Patient benefiting from skilled hand therapy OT to reduce deficits to improve independence with daily activities.        Plan:   Focus on ROM and modalities to decrease pain to improve ability to complete daily activites with ease.  POC 25 - 25        Visit Tracker  Date  IE                                                                                   PMHx:   has a past medical history of Abnormal Pap smear of cervix, Anxiety, BRCA1 negative, BRCA2 negative, Breast injury, Depression, GERD (gastroesophageal reflux disease), HPV (human papilloma virus) infection, Kidney stone (), Migraine, Migraines, Seasonal allergies, Sleep difficulties, and Varicella.    Precautions:   For all exercises and treatment interventions listed below; patient educated to complete within tolerance and pain threshold. Patient educated if symptoms increase or pain becomes intolerable they should discontinue and/or reduce intensity/frequency.       Manuals HEP 2025                         Ther Ex     Education on " "HEP and dx  x5min   Thenar STM massage  x Lacrosse ball         PROM wrist flex with thumb  x x3 5-10 sec holds   PROM thumb radial abduction  x x5 with 5-10 sec holds   PROM thumb composite abduction  x X5 with 5-10 sec holds    E-stim   X10 min   Thumb AROM  X10    \"C\" stabilizer  5 x 5 sec holds   \"C\" shape index finger lifts   X10    First dorsal interossei strengthening   X10    Dice with opposition   Whole jar    Lacrosse ball  X3 min    Chime balls   X1 min CW/CCW                       Ther Act                               Modalities     MHP  5x min         Patient received Premod Continuous Electric Stimulation - continuous to R thenar eminence at level (80-150HZ) to decrease pain to allow for increased ability to perform ADLs.  2in x 2in electrode pads were used at an average intensity of 8.6 Cv volts. Skin was assessed pre and post tx with no adverse effects noted. Therapist attended to patient throughout the entire duration of ESTM treatment to adjust intensity as needed.        "

## 2025-07-28 ENCOUNTER — APPOINTMENT (EMERGENCY)
Dept: RADIOLOGY | Facility: HOSPITAL | Age: 40
End: 2025-07-28
Payer: COMMERCIAL

## 2025-07-28 ENCOUNTER — NURSE TRIAGE (OUTPATIENT)
Age: 40
End: 2025-07-28

## 2025-07-28 ENCOUNTER — HOSPITAL ENCOUNTER (EMERGENCY)
Facility: HOSPITAL | Age: 40
Discharge: HOME | End: 2025-07-28
Attending: STUDENT IN AN ORGANIZED HEALTH CARE EDUCATION/TRAINING PROGRAM | Admitting: STUDENT IN AN ORGANIZED HEALTH CARE EDUCATION/TRAINING PROGRAM
Payer: COMMERCIAL

## 2025-07-28 VITALS
RESPIRATION RATE: 20 BRPM | TEMPERATURE: 98.9 F | DIASTOLIC BLOOD PRESSURE: 74 MMHG | OXYGEN SATURATION: 100 % | HEART RATE: 84 BPM | SYSTOLIC BLOOD PRESSURE: 132 MMHG

## 2025-07-28 DIAGNOSIS — M20.012 MALLET FINGER OF LEFT HAND: Primary | ICD-10-CM

## 2025-07-28 DIAGNOSIS — M20.019: Primary | ICD-10-CM

## 2025-07-28 PROCEDURE — 73140 X-RAY EXAM OF FINGER(S): CPT

## 2025-07-28 PROCEDURE — 99284 EMERGENCY DEPT VISIT MOD MDM: CPT | Performed by: STUDENT IN AN ORGANIZED HEALTH CARE EDUCATION/TRAINING PROGRAM

## 2025-07-28 PROCEDURE — 99284 EMERGENCY DEPT VISIT MOD MDM: CPT

## 2025-07-28 PROCEDURE — 29130 APPL FINGER SPLINT STATIC: CPT | Performed by: STUDENT IN AN ORGANIZED HEALTH CARE EDUCATION/TRAINING PROGRAM

## 2025-07-29 ENCOUNTER — OFFICE VISIT (OUTPATIENT)
Dept: OBGYN CLINIC | Facility: CLINIC | Age: 40
End: 2025-07-29
Payer: COMMERCIAL

## 2025-07-29 ENCOUNTER — OFFICE VISIT (OUTPATIENT)
Dept: OCCUPATIONAL THERAPY | Facility: CLINIC | Age: 40
End: 2025-07-29
Attending: FAMILY MEDICINE
Payer: COMMERCIAL

## 2025-07-29 VITALS — BODY MASS INDEX: 26.52 KG/M2 | WEIGHT: 145 LBS

## 2025-07-29 DIAGNOSIS — M20.012 MALLET DEFORMITY OF LEFT RING FINGER: Primary | ICD-10-CM

## 2025-07-29 DIAGNOSIS — M20.012 MALLET FINGER OF LEFT HAND: ICD-10-CM

## 2025-07-29 DIAGNOSIS — M20.019: ICD-10-CM

## 2025-07-29 PROCEDURE — 99203 OFFICE O/P NEW LOW 30 MIN: CPT | Performed by: STUDENT IN AN ORGANIZED HEALTH CARE EDUCATION/TRAINING PROGRAM

## 2025-07-29 PROCEDURE — L3933 FO W/O JOINTS CF: HCPCS

## 2025-07-31 ENCOUNTER — OFFICE VISIT (OUTPATIENT)
Dept: OCCUPATIONAL THERAPY | Facility: CLINIC | Age: 40
End: 2025-07-31
Attending: FAMILY MEDICINE
Payer: COMMERCIAL

## 2025-07-31 DIAGNOSIS — M79.641 PAIN OF RIGHT HAND: Primary | ICD-10-CM

## 2025-07-31 PROCEDURE — 97032 APPL MODALITY 1+ESTIM EA 15: CPT

## 2025-07-31 PROCEDURE — 97110 THERAPEUTIC EXERCISES: CPT

## 2025-08-07 ENCOUNTER — OFFICE VISIT (OUTPATIENT)
Dept: OCCUPATIONAL THERAPY | Facility: CLINIC | Age: 40
End: 2025-08-07
Attending: FAMILY MEDICINE
Payer: COMMERCIAL

## 2025-08-07 DIAGNOSIS — M79.641 PAIN OF RIGHT HAND: Primary | ICD-10-CM

## 2025-08-07 DIAGNOSIS — M20.012 MALLET DEFORMITY OF LEFT RING FINGER: ICD-10-CM

## 2025-08-07 PROCEDURE — 97032 APPL MODALITY 1+ESTIM EA 15: CPT

## 2025-08-07 PROCEDURE — 97110 THERAPEUTIC EXERCISES: CPT

## (undated) DEVICE — ABDOMINAL PAD: Brand: DERMACEA

## (undated) DEVICE — ACE WRAP 4 IN UNSTERILE

## (undated) DEVICE — DRAPE UTILITY

## (undated) DEVICE — ASTOUND IMPERVIOUS SURGICAL GOWN: Brand: CONVERTORS

## (undated) DEVICE — SUT MONOCRYL 0 CTX 36 IN Y398H

## (undated) DEVICE — SUT VICRYL 4-0 PS-2 27 IN J426H

## (undated) DEVICE — OCCLUSIVE GAUZE STRIP,3% BISMUTH TRIBROMOPHENATE IN PETROLATUM BLEND: Brand: XEROFORM

## (undated) DEVICE — COBAN 4 IN STERILE

## (undated) DEVICE — SUT VICRYL 0 CT-1 27 IN J260H

## (undated) DEVICE — GLOVE SRG LF STRL BGL SKNSNS 7 PF

## (undated) DEVICE — ACE WRAP 6 IN UNSTERILE

## (undated) DEVICE — STOCKINETTE REGULAR

## (undated) DEVICE — CAST PADDING 4 IN SYNTHETIC NON-STRL

## (undated) DEVICE — CHLORAPREP HI-LITE 26ML ORANGE

## (undated) DEVICE — GLOVE SRG BIOGEL 7.5

## (undated) DEVICE — SPLINT ORTHO-GLASS 4IN X 15FT

## (undated) DEVICE — TELFA NON-ADHERENT ABSORBENT DRESSING: Brand: TELFA

## (undated) DEVICE — GLOVE INDICATOR PI UNDERGLOVE SZ 7 BLUE

## (undated) DEVICE — KERLIX BANDAGE ROLL: Brand: KERLIX

## (undated) DEVICE — ADHESIVE SKN CLSR HISTOACRYL FLEX 0.5ML LF

## (undated) DEVICE — GLOVE SRG BIOGEL 8

## (undated) DEVICE — PACK C-SECTION PBDS

## (undated) DEVICE — 3M™ TEGADERM™ TRANSPARENT FILM DRESSING FRAME STYLE, 1626W, 4 IN X 4-3/4 IN (10 CM X 12 CM), 50/CT 4CT/CASE: Brand: 3M™ TEGADERM™

## (undated) DEVICE — DRILL BIT 2.6 X 160MM CANN

## (undated) DEVICE — 2000CC GUARDIAN II: Brand: GUARDIAN

## (undated) DEVICE — DRAPE C-ARMOUR

## (undated) DEVICE — DRAPE C-ARM X-RAY

## (undated) DEVICE — INTENDED FOR TISSUE SEPARATION, AND OTHER PROCEDURES THAT REQUIRE A SHARP SURGICAL BLADE TO PUNCTURE OR CUT.: Brand: BARD-PARKER ® CARBON RIB-BACK BLADES

## (undated) DEVICE — PROXIMATE SKIN STAPLERS (35 WIDE) CONTAINS 35 STAINLESS STEEL STAPLES (FIXED HEAD): Brand: PROXIMATE

## (undated) DEVICE — PACK GENERAL LF

## (undated) DEVICE — NEEDLE 25G X 1 1/2

## (undated) DEVICE — SKIN MARKER DUAL TIP WITH RULER CAP, FLEXIBLE RULER AND LABELS: Brand: DEVON

## (undated) DEVICE — SYRINGE 10ML LL CONTROL TOP

## (undated) DEVICE — SCD SEQUENTIAL COMPRESSION COMFORT SLEEVE MEDIUM KNEE LENGTH: Brand: KENDALL SCD

## (undated) DEVICE — GLOVE INDICATOR PI UNDERGLOVE SZ 8 BLUE

## (undated) DEVICE — Device

## (undated) DEVICE — CUFF TOURNIQUET 30 X 4 IN QUICK CONNECT DISP 1BLA

## (undated) DEVICE — LABEL STERILE (RSC) (2-SENSOR CAINE 2-LIDOCAINE 2-HEPARIN)

## (undated) DEVICE — GAUZE SPONGES,16 PLY: Brand: CURITY

## (undated) DEVICE — SYRINGE 10ML LL

## (undated) DEVICE — 3M™ STERI-STRIP™ REINFORCED ADHESIVE SKIN CLOSURES, R1547, 1/2 IN X 4 IN (12 MM X 100 MM), 6 STRIPS/ENVELOPE: Brand: 3M™ STERI-STRIP™

## (undated) DEVICE — UNDYED MONOFILAMENT (POLYDIOXANONE), ABSORBABLE SURGICAL SUTURE: Brand: PDS

## (undated) DEVICE — WEBRIL 6 IN UNSTERILE

## (undated) DEVICE — BETHLEHEM UNIVERSAL  MIONR EXT: Brand: CARDINAL HEALTH

## (undated) DEVICE — PLUMEPEN PRO 10FT

## (undated) DEVICE — GROUNDING PAD UNIVERSAL SLW

## (undated) DEVICE — GLOVE INDICATOR PI UNDERGLOVE SZ 7.5 BLUE

## (undated) DEVICE — 10FR FRAZIER SUCTION HANDLE: Brand: CARDINAL HEALTH

## (undated) DEVICE — BASIC DOUBLE BASIN 2-LF: Brand: MEDLINE INDUSTRIES, INC.